# Patient Record
Sex: MALE | Race: BLACK OR AFRICAN AMERICAN | Employment: OTHER | URBAN - METROPOLITAN AREA
[De-identification: names, ages, dates, MRNs, and addresses within clinical notes are randomized per-mention and may not be internally consistent; named-entity substitution may affect disease eponyms.]

---

## 2017-01-26 ENCOUNTER — LAB VISIT (OUTPATIENT)
Dept: LAB | Facility: HOSPITAL | Age: 67
End: 2017-01-26
Attending: INTERNAL MEDICINE
Payer: MEDICARE

## 2017-01-26 DIAGNOSIS — C61 PROSTATE CANCER: ICD-10-CM

## 2017-01-26 LAB — COMPLEXED PSA SERPL-MCNC: 0.22 NG/ML

## 2017-01-26 PROCEDURE — 36415 COLL VENOUS BLD VENIPUNCTURE: CPT | Mod: PO

## 2017-01-26 PROCEDURE — 84153 ASSAY OF PSA TOTAL: CPT

## 2017-01-27 ENCOUNTER — HISTORICAL (OUTPATIENT)
Dept: ADMINISTRATIVE | Facility: HOSPITAL | Age: 67
End: 2017-01-27

## 2017-01-27 LAB
ALBUMIN SERPL-MCNC: 3.7 G/DL (ref 3.1–4.7)
ALP SERPL-CCNC: 64 IU/L (ref 40–104)
ALT (SGPT): 13 IU/L (ref 3–33)
AST SERPL-CCNC: 13 IU/L (ref 10–40)
BASOPHILS NFR BLD: 0 K/UL (ref 0–0.2)
BASOPHILS NFR BLD: 0.7 %
BILIRUB SERPL-MCNC: 0.7 MG/DL (ref 0.3–1)
BUN SERPL-MCNC: 10 MG/DL (ref 8–20)
CALCIUM SERPL-MCNC: 9.1 MG/DL (ref 7.7–10.4)
CHLORIDE: 103 MMOL/L (ref 98–110)
CO2 SERPL-SCNC: 27 MMOL/L (ref 22.8–31.6)
CREATININE: 0.88 MG/DL (ref 0.6–1.4)
CRP SERPL-MCNC: 2.6 MG/DL (ref 0–1.4)
EOSINOPHIL NFR BLD: 0.2 K/UL (ref 0–0.7)
EOSINOPHIL NFR BLD: 4 %
ERYTHROCYTE [DISTWIDTH] IN BLOOD BY AUTOMATED COUNT: 13.6 % (ref 11.7–14.9)
GLUCOSE: 104 MG/DL (ref 70–99)
GRAN #: 4 K/UL (ref 1.4–6.5)
GRAN%: 69.2 %
HCT VFR BLD AUTO: 41.1 % (ref 39–55)
HGB BLD-MCNC: 13.2 G/DL (ref 14–16)
IMMATURE GRANS (ABS): 0 K/UL (ref 0–1)
IMMATURE GRANULOCYTES: 0.3 %
LYMPH #: 1 K/UL (ref 1.2–3.4)
LYMPH%: 17.1 %
MCH RBC QN AUTO: 29.7 PG (ref 25–35)
MCHC RBC AUTO-ENTMCNC: 32.1 G/DL (ref 31–36)
MCV RBC AUTO: 92.6 FL (ref 80–100)
MONO #: 0.5 K/UL (ref 0.1–0.6)
MONO%: 8.7 %
NUCLEATED RBCS: 0 %
PLATELET # BLD AUTO: 379 K/UL (ref 140–440)
PMV BLD AUTO: 9 FL (ref 8.8–12.7)
POTASSIUM SERPL-SCNC: 4.3 MMOL/L (ref 3.5–5)
PROT SERPL-MCNC: 8.1 G/DL (ref 6–8.2)
RBC # BLD AUTO: 4.44 M/UL (ref 4.3–5.9)
SODIUM: 140 MMOL/L (ref 134–144)
WBC # BLD AUTO: 5.7 K/UL (ref 5–10)

## 2017-02-01 ENCOUNTER — OFFICE VISIT (OUTPATIENT)
Dept: UROLOGY | Facility: CLINIC | Age: 67
End: 2017-02-01
Payer: MEDICARE

## 2017-02-01 VITALS
WEIGHT: 281.06 LBS | BODY MASS INDEX: 40.24 KG/M2 | SYSTOLIC BLOOD PRESSURE: 145 MMHG | DIASTOLIC BLOOD PRESSURE: 71 MMHG | HEIGHT: 70 IN | HEART RATE: 67 BPM

## 2017-02-01 DIAGNOSIS — R35.0 INCREASED FREQUENCY OF URINATION: ICD-10-CM

## 2017-02-01 DIAGNOSIS — C61 PROSTATE CANCER: Primary | ICD-10-CM

## 2017-02-01 DIAGNOSIS — Z90.79 HISTORY OF RADICAL PROSTATECTOMY: ICD-10-CM

## 2017-02-01 PROCEDURE — 1160F RVW MEDS BY RX/DR IN RCRD: CPT | Mod: S$GLB,,, | Performed by: NURSE PRACTITIONER

## 2017-02-01 PROCEDURE — 1126F AMNT PAIN NOTED NONE PRSNT: CPT | Mod: S$GLB,,, | Performed by: NURSE PRACTITIONER

## 2017-02-01 PROCEDURE — 99213 OFFICE O/P EST LOW 20 MIN: CPT | Mod: 25,S$GLB,, | Performed by: NURSE PRACTITIONER

## 2017-02-01 PROCEDURE — 99499 UNLISTED E&M SERVICE: CPT | Mod: S$GLB,,, | Performed by: NURSE PRACTITIONER

## 2017-02-01 PROCEDURE — 99999 PR PBB SHADOW E&M-EST. PATIENT-LVL III: CPT | Mod: PBBFAC,,, | Performed by: NURSE PRACTITIONER

## 2017-02-01 PROCEDURE — 96402 CHEMO HORMON ANTINEOPL SQ/IM: CPT | Mod: S$GLB,,, | Performed by: NURSE PRACTITIONER

## 2017-02-01 PROCEDURE — 1159F MED LIST DOCD IN RCRD: CPT | Mod: S$GLB,,, | Performed by: NURSE PRACTITIONER

## 2017-02-01 PROCEDURE — 1157F ADVNC CARE PLAN IN RCRD: CPT | Mod: S$GLB,,, | Performed by: NURSE PRACTITIONER

## 2017-02-01 RX ORDER — PANTOPRAZOLE SODIUM 40 MG/1
40 TABLET, DELAYED RELEASE ORAL DAILY
COMMUNITY
End: 2019-06-03

## 2017-02-01 NOTE — PROGRESS NOTES
Subjective:       Patient ID: Naresh Painting is a 66 y.o. male.    Chief Complaint: Prostate Cancer    HPI Comments: Mr. Painting is a 66-year-old patient that underwent a radical prostatectomy in December of 1995 by Dr. Partida at [facility].  He was first seen by Dr. Menjivar in 2005 where He was currently on GnRH agaonist therapy/Eligard 45 mg every six months due to rise in PSA.  He was last seen in clinic & received Trelstar on 07/28/2016.    He denies any urological problems or concerns.  Has been battling a cold; now with just a cough.  Reports good bladder control    He had some swelling to right side of neck; seeing PCP tomorrow for bx.    Just had wedding anniversary (1/26/2017)  Eating healtier;   Getting plenty of rest.                      PSA                  0.22                01/26/2017                 PSA                  0.19                07/22/2016                 PSA                  0.20                01/18/2016                 PSA                  0.19                07/24/2015                 PSA                  0.15                01/12/2015                 PSA                  0.17                06/06/2014                 PSA                  0.11                12/02/2013                 PSA                      0.13                05/31/2013                 PSA                      0.16                11/29/2012                 PSA                      0.12                05/25/2012                 PSA                      0.13                10/18/2011                 PSA                      0.11                03/17/2011                 PSA                      0.20                08/25/2010                 PSA                      0.11                06/25/2009                 PSA                      0.09                01/09/2009                 PSA                      0.1                 06/12/2008                 PSA                      0.1                 12/11/2007                                    Past Medical History:    Prostate cancer                                               Past Surgical History:    PROSTATE SURGERY                                               HERNIA REPAIR                                                  EYE SURGERY                                                    No family history on file.      Social History    Marital Status:              Spouse Name:                       Years of Education:                 Number of children:               Occupational History    None on file    Social History Main Topics    Smoking Status: Former Smoker                   Packs/Day: 0.00  Years:          Smokeless Status: Never Used                        Comment: quit 1995    Alcohol Use: No              Drug Use: Not on file     Sexual Activity: Not on file          Other Topics            Concern    None on file    Social History Narrative    None on file        Allergies:  Review of patient's allergies indicates no known allergies.    Medications:  Current outpatient prescriptions: aspirin 81 MG Chew, Take 81 mg by mouth once daily., Disp: , Rfl: ;  pravastatin (PRAVACHOL) 40 MG tablet, , Disp: , Rfl: ;  TOPROL XL 50 mg 24 hr tablet, , Disp: , Rfl:   Current facility-administered medications: triptorelin pamoate Syrg 22.5 mg, 22.5 mg, Intramuscular, 1 time in Clinic/HOD, Fauzia Maza NP              Review of Systems   Constitutional: Negative.  Negative for activity change, appetite change and fever.   HENT: Negative.  Negative for facial swelling and trouble swallowing.         Swelling left side of neck     Eyes: Negative.    Respiratory: Positive for cough. Negative for shortness of breath.    Cardiovascular: Negative.  Negative for chest pain and palpitations.   Gastrointestinal: Negative for abdominal pain, constipation, diarrhea, nausea and vomiting.   Genitourinary: Positive for frequency. Negative for difficulty urinating, dysuria, enuresis, flank  pain, genital sores, hematuria, nocturia, penile pain, scrotal swelling, testicular pain and urgency.        Ok with how he urinates.     Musculoskeletal: Negative for back pain, gait problem and neck stiffness.   Skin: Negative.  Negative for wound.   Neurological: Negative for dizziness, tremors, seizures, syncope, speech difficulty, light-headedness and headaches.   Hematological: Does not bruise/bleed easily.   Psychiatric/Behavioral: Negative for confusion and hallucinations. The patient is not nervous/anxious.        Objective:      Physical Exam   Nursing note and vitals reviewed.  Constitutional: He is oriented to person, place, and time. Vital signs are normal. He appears well-developed and well-nourished. He is active and cooperative.  Non-toxic appearance. He does not have a sickly appearance.   HENT:   Head: Normocephalic and atraumatic.   Right Ear: External ear normal.   Left Ear: External ear normal.   Nose: Nose normal.   Mouth/Throat: Mucous membranes are normal.   Eyes: Conjunctivae and lids are normal. No scleral icterus.   Neck: Trachea normal, normal range of motion and full passive range of motion without pain. Neck supple. No JVD present. No tracheal deviation present.       Cardiovascular: Normal rate, regular rhythm, S1 normal and S2 normal.    Pulmonary/Chest: Effort normal and breath sounds normal. No respiratory distress. He exhibits no tenderness.   Abdominal: Soft. Normal appearance and bowel sounds are normal. There is no hepatosplenomegaly. There is no tenderness. There is no rebound, no guarding and no CVA tenderness. Hernia confirmed negative in the right inguinal area and confirmed negative in the left inguinal area.   Genitourinary: Testes normal and penis normal. Right testis shows no swelling and no tenderness. Left testis shows no swelling and no tenderness. Circumcised. No hypospadias, penile erythema or penile tenderness. No discharge found.   Musculoskeletal: Normal range of  motion.   Lymphadenopathy: No inguinal adenopathy noted on the right or left side.   Neurological: He is alert and oriented to person, place, and time. He has normal strength.   Skin: Skin is warm, dry and intact.     Psychiatric: He has a normal mood and affect. His behavior is normal. Judgment and thought content normal.       Assessment:       1. Prostate cancer    2. History of radical prostatectomy    3. Increased frequency of urination        Plan:         I spent 20 minutes with the patient of which more than half was spent in direct consultation with the patient in regards to our treatment and plan.    Education and recommendations of today's plan of care including home remedies.  Diet modifications and regular exercise to improve BMI as well as Prostate cancer care.  Urine sent to lab;  Trelstar today;  RTC 6 months for Trelstar/PSA

## 2017-02-01 NOTE — PROGRESS NOTES
Patient is here today to receive 22.5mg of Trelstar.   Allergies were reviewed.  Alcohol swab used at injection site.  22.5 mg of Trelstar was given IM in the right gluteal.  Patient tolerated injection well .

## 2017-02-01 NOTE — MR AVS SNAPSHOT
[facility]  [address]  [facility] [address]  Phone: [phone]                  Naresh Painting   2017 8:40 AM   Office Visit    Description:  Male : 1950   Provider:  Fauzia Maza NP   Department:  [facility]           Reason for Visit     Prostate Cancer           Diagnoses this Visit        Comments    Prostate cancer    -  Primary     History of radical prostatectomy                To Do List           Goals (5 Years of Data)     None      Follow-Up and Disposition     Return in about 6 months (around 2017) for PSA and Trelstar.      [facility]     [facility] -  Assistance  Registered nurses in the [facility] provide clinical advisement, health education, appointment booking, and other advisory services.  Call for this free service at [phone].             Medications           Message regarding Medications     Verify the changes and/or additions to your medication regime listed below are the same as discussed with your clinician today.  If any of these changes or additions are incorrect, please notify your healthcare provider.        These medications were administered today        Dose Freq    triptorelin pamoate Syrg 22.5 mg 22.5 mg Clinic/HOD 1 time    Sig: Inject 22.5 mg into the muscle one time.    Class: Normal    Route: Intramuscular           Verify that the below list of medications is an accurate representation of the medications you are currently taking.  If none reported, the list may be blank. If incorrect, please contact your healthcare provider. Carry this list with you in case of emergency.           Current Medications     aspirin 81 MG Chew Take 81 mg by mouth once daily.    co-enzyme Q-10 30 mg capsule Take 30 mg by mouth 3 (three) times daily.    fish oil-omega-3 fatty acids 300-1,000 mg capsule Take 2 g by mouth once daily.    L GASSERI/B BIFIDUM/B LONGUM (ESCALERA' COLON  "HEALTH ORAL) Take by mouth.    MULTIVITAMIN/IRON/FOLIC ACID (CENTRUM COMPLETE ORAL) Take by mouth.    pantoprazole (PROTONIX) 40 MG tablet Take 40 mg by mouth once daily.    pravastatin (PRAVACHOL) 40 MG tablet     TOPROL XL 50 mg 24 hr tablet     vitamin D 1000 units Tab Take 185 mg by mouth once daily.    vitamin E 100 UNIT capsule Take 100 Units by mouth once daily.           Clinical Reference Information           Vital Signs - Last Recorded  Most recent update: 2/1/2017  9:12 AM by Loni Hirsch MA    BP Pulse Ht Wt BMI    (!) 145/71 (BP Location: Right arm, Patient Position: Sitting, BP Method: Automatic) 67 5' 10" (1.778 m) 127.5 kg (281 lb 1.4 oz) 40.33 kg/m2      Blood Pressure          Most Recent Value    BP  (!)  145/71      Allergies as of 2/1/2017     No Known Allergies      Immunizations Administered on Date of Encounter - 2/1/2017     None      Orders Placed During Today's Visit      Normal Orders This Visit    Medication Pre-Authorization     Future Labs/Procedures Expected by Expires    Prostate Specific Antigen, Diagnostic  8/1/2017 (Approximate) 3/8/2018      [facility] Sign-Up     Activating your [facility] account is as easy as 1-2-3!     1) Visit [email or web address], select Sign Up Now, enter this activation code and your date of birth, then select Next.  [email or web address]  Expires: 3/18/2017  9:19 AM      2) Create a username and password to use when you visit [facility] in the future and select a security question in case you lose your password and select Next.    3) Enter your e-mail address and click Sign Up!    Additional Information  If you have questions, please e-mail [email or web address] or call [phone] to talk to our [facility] staff. Remember, [facility] is NOT to be used for urgent needs. For medical emergencies, dial 911.         Instructions         PSA                  0.22                01/26/2017                 PSA                  0.19                07/22/2016                "  PSA                  0.20                01/18/2016                 PSA                  0.19                07/24/2015                 PSA                  0.15                01/12/2015                 PSA                  0.17                06/06/2014                 PSA                  0.11                12/02/2013                 PSA                      0.13                05/31/2013                 PSA                      0.16                11/29/2012                 PSA                      0.12                05/25/2012                 PSA                      0.13                10/18/2011                 PSA                      0.11                03/17/2011                 PSA                      0.20                08/25/2010                 PSA                      0.11                06/25/2009                 PSA                      0.09                01/09/2009                 PSA                      0.1                 06/12/2008                 PSA                      0.1                 12/11/2007                          Triptorelin Pamoate Suspension for injection  What is this medicine?  TRIPTORELIN (TRIP toe rel in) decreases testosterone in men and estrogen in women. It is used to treat advanced prostate cancer and endometriosis.  This medicine may be used for other purposes; ask your health care provider or pharmacist if you have questions.  What should I tell my health care provider before I take this medicine?  They need to know if you have any of these conditions:  · diabetes  · heart disease or previous heart attack  · high blood pressure  · high cholesterol  · pain or difficulty passing urine  · spinal cord metastasis  · stroke  · tobacco smoker  · an unusual or allergic reaction to triptorelin, other medicines, foods, dyes, or preservatives  · pregnant or trying to get pregnant  · breast-feeding  How should I use this medicine?  This medicine is for injection into a  muscle. It is given by a health care professional in a hospital or clinic setting.  Talk to your pediatrician regarding the use of this medicine in children. This medicine is not approved for use in children.  Overdosage: If you think you have taken too much of this medicine contact a poison control center or emergency room at once.  NOTE: This medicine is only for you. Do not share this medicine with others.  What if I miss a dose?  It is important not to miss your dose. Call your doctor or health care professional if you are unable to keep an appointment.  What may interact with this medicine?  Do not take this medicine with any of the following medications:  · chasteberry supplements  This medicine may also interact with the following medications:  · cimetidine  · herbal or dietary supplements, like black cohosh, DHEA  · female hormones, like estrogen  · male hormones, like testosterone  · medicines for depression, anxiety, or psychotic disturbances  · methyldopa  · metoclopramide  · phenothiazines like chlorpromazine, mesoridazine, prochlorperazine, thioridazine  · prasterone  · reserpine  This list may not describe all possible interactions. Give your health care provider a list of all the medicines, herbs, non-prescription drugs, or dietary supplements you use. Also tell them if you smoke, drink alcohol, or use illegal drugs. Some items may interact with your medicine.  What should I watch for while using this medicine?  Your condition will be monitored carefully while you are receiving this medicine. You will need important blood work done while you are taking this medicine.  During the first weeks of treatment your symptoms may get worse. These should get better as you continue your treatment. Tell your doctor or healthcare professional if your symptoms do not start to get better or if they continue to get worse.  Women should inform their doctor if they wish to become pregnant or think they might be  pregnant. There is a potential for serious side effects to an unborn child. Talk to your health care professional or pharmacist for more information.  What side effects may I notice from receiving this medicine?  Side effects that you should report to your doctor or health care professional as soon as possible:  · allergic reactions like skin rash, itching or hives, swelling of the face, lips, or tongue  · breast enlargement in both males and females  · breathing problems  · changes in vision  · confused, not alert, other mental change  · dark urine  · new or worsening pain  · pain, tingling, numbness in the hands or feet  · swelling of the ankles, feet, hands  · trouble passing urine or change in the amount of urine  · vomiting  · weakness or paralysis  Side effects that usually do not require medical attention (report to your doctor or health care professional if they continue or are bothersome):  · change in sex drive or performance  · constipation or diarrhea  · dizziness  · headache  · hot flashes  · nausea, stomach upset  · pain at site where injected  This list may not describe all possible side effects. Call your doctor for medical advice about side effects. You may report side effects to FDA at 6-270-FDA-0861.  Where should I keep my medicine?  This drug is given in a hospital or clinic and will not be stored at home.  NOTE:This sheet is a summary. It may not cover all possible information. If you have questions about this medicine, talk to your doctor, pharmacist, or health care provider. Copyright© 2016 [employer]

## 2017-02-01 NOTE — PROGRESS NOTES
Patient, Naresh Painting (MRN #[id number]), presented with a recorded BMI of 40.33 kg/m^2 consistent with the definition of morbid obesity (ICD-10 E66.01). The patient's morbid obesity was monitored, evaluated, addressed and/or treated. This addendum to the medical record is made on 02/01/2017.

## 2017-02-01 NOTE — PATIENT INSTRUCTIONS
PSA                  0.22                01/26/2017                 PSA                  0.19                07/22/2016                 PSA                  0.20                01/18/2016                 PSA                  0.19                07/24/2015                 PSA                  0.15                01/12/2015                 PSA                  0.17                06/06/2014                 PSA                  0.11                12/02/2013                 PSA                      0.13                05/31/2013                 PSA                      0.16                11/29/2012                 PSA                      0.12                05/25/2012                 PSA                      0.13                10/18/2011                 PSA                      0.11                03/17/2011                 PSA                      0.20                08/25/2010                 PSA                      0.11                06/25/2009                 PSA                      0.09                01/09/2009                 PSA                      0.1                 06/12/2008                 PSA                      0.1                 12/11/2007                          Triptorelin Pamoate Suspension for injection  What is this medicine?  TRIPTORELIN (TRIP toe rel in) decreases testosterone in men and estrogen in women. It is used to treat advanced prostate cancer and endometriosis.  This medicine may be used for other purposes; ask your health care provider or pharmacist if you have questions.  What should I tell my health care provider before I take this medicine?  They need to know if you have any of these conditions:  · diabetes  · heart disease or previous heart attack  · high blood pressure  · high cholesterol  · pain or difficulty passing urine  · spinal cord metastasis  · stroke  · tobacco smoker  · an unusual or allergic reaction to triptorelin, other medicines, foods, dyes, or  preservatives  · pregnant or trying to get pregnant  · breast-feeding  How should I use this medicine?  This medicine is for injection into a muscle. It is given by a health care professional in a hospital or clinic setting.  Talk to your pediatrician regarding the use of this medicine in children. This medicine is not approved for use in children.  Overdosage: If you think you have taken too much of this medicine contact a poison control center or emergency room at once.  NOTE: This medicine is only for you. Do not share this medicine with others.  What if I miss a dose?  It is important not to miss your dose. Call your doctor or health care professional if you are unable to keep an appointment.  What may interact with this medicine?  Do not take this medicine with any of the following medications:  · chasteberry supplements  This medicine may also interact with the following medications:  · cimetidine  · herbal or dietary supplements, like black cohosh, DHEA  · female hormones, like estrogen  · male hormones, like testosterone  · medicines for depression, anxiety, or psychotic disturbances  · methyldopa  · metoclopramide  · phenothiazines like chlorpromazine, mesoridazine, prochlorperazine, thioridazine  · prasterone  · reserpine  This list may not describe all possible interactions. Give your health care provider a list of all the medicines, herbs, non-prescription drugs, or dietary supplements you use. Also tell them if you smoke, drink alcohol, or use illegal drugs. Some items may interact with your medicine.  What should I watch for while using this medicine?  Your condition will be monitored carefully while you are receiving this medicine. You will need important blood work done while you are taking this medicine.  During the first weeks of treatment your symptoms may get worse. These should get better as you continue your treatment. Tell your doctor or healthcare professional if your symptoms do not start to  get better or if they continue to get worse.  Women should inform their doctor if they wish to become pregnant or think they might be pregnant. There is a potential for serious side effects to an unborn child. Talk to your health care professional or pharmacist for more information.  What side effects may I notice from receiving this medicine?  Side effects that you should report to your doctor or health care professional as soon as possible:  · allergic reactions like skin rash, itching or hives, swelling of the face, lips, or tongue  · breast enlargement in both males and females  · breathing problems  · changes in vision  · confused, not alert, other mental change  · dark urine  · new or worsening pain  · pain, tingling, numbness in the hands or feet  · swelling of the ankles, feet, hands  · trouble passing urine or change in the amount of urine  · vomiting  · weakness or paralysis  Side effects that usually do not require medical attention (report to your doctor or health care professional if they continue or are bothersome):  · change in sex drive or performance  · constipation or diarrhea  · dizziness  · headache  · hot flashes  · nausea, stomach upset  · pain at site where injected  This list may not describe all possible side effects. Call your doctor for medical advice about side effects. You may report side effects to FDA at 0-144-FDA-8777.  Where should I keep my medicine?  This drug is given in a hospital or clinic and will not be stored at home.  NOTE:This sheet is a summary. It may not cover all possible information. If you have questions about this medicine, talk to your doctor, pharmacist, or health care provider. Copyright© 2016 [employer]

## 2017-03-03 ENCOUNTER — HISTORICAL (OUTPATIENT)
Dept: ADMINISTRATIVE | Facility: HOSPITAL | Age: 67
End: 2017-03-03

## 2017-03-03 LAB
BUN SERPL-MCNC: 12 MG/DL (ref 8–20)
CALCIUM SERPL-MCNC: 9 MG/DL (ref 7.7–10.4)
CHLORIDE: 102 MMOL/L (ref 98–110)
CO2 SERPL-SCNC: 26.9 MMOL/L (ref 22.8–31.6)
CREATININE: 0.83 MG/DL (ref 0.6–1.4)
GLUCOSE: 94 MG/DL (ref 70–99)
HCT VFR BLD AUTO: 43 % (ref 39–55)
HGB BLD-MCNC: 13.9 G/DL (ref 14–16)
MCH RBC QN AUTO: 30.1 PG (ref 25–35)
MCHC RBC AUTO-ENTMCNC: 32.3 G/DL (ref 31–36)
MCV RBC AUTO: 93.1 FL (ref 80–100)
NUCLEATED RBCS: 0 %
PLATELET # BLD AUTO: 347 K/UL (ref 140–440)
POTASSIUM SERPL-SCNC: 4.6 MMOL/L (ref 3.5–5)
RBC # BLD AUTO: 4.62 M/UL (ref 4.3–5.9)
SODIUM: 138 MMOL/L (ref 134–144)
WBC # BLD AUTO: 6.6 K/UL (ref 5–10)

## 2017-05-26 PROBLEM — R59.0 CERVICAL LYMPHADENOPATHY: Status: ACTIVE | Noted: 2017-05-26

## 2017-05-30 RX ORDER — HYDROCHLOROTHIAZIDE 25 MG/1
1 TABLET ORAL DAILY
COMMUNITY
End: 2017-06-09

## 2017-06-09 ENCOUNTER — OFFICE VISIT (OUTPATIENT)
Dept: HEMATOLOGY/ONCOLOGY | Facility: CLINIC | Age: 67
End: 2017-06-09
Payer: MEDICARE

## 2017-06-09 VITALS
WEIGHT: 263 LBS | BODY MASS INDEX: 37.65 KG/M2 | SYSTOLIC BLOOD PRESSURE: 121 MMHG | TEMPERATURE: 98 F | RESPIRATION RATE: 18 BRPM | DIASTOLIC BLOOD PRESSURE: 81 MMHG | HEIGHT: 70 IN | HEART RATE: 76 BPM

## 2017-06-09 DIAGNOSIS — I88.8 GRANULOMATOUS LYMPHADENITIS: ICD-10-CM

## 2017-06-09 DIAGNOSIS — R59.0 CERVICAL LYMPHADENOPATHY: ICD-10-CM

## 2017-06-09 DIAGNOSIS — C61 PROSTATE CANCER: Primary | ICD-10-CM

## 2017-06-09 PROCEDURE — 99203 OFFICE O/P NEW LOW 30 MIN: CPT | Mod: ,,, | Performed by: INTERNAL MEDICINE

## 2017-06-09 PROCEDURE — 1126F AMNT PAIN NOTED NONE PRSNT: CPT | Mod: ,,, | Performed by: INTERNAL MEDICINE

## 2017-06-09 PROCEDURE — 1159F MED LIST DOCD IN RCRD: CPT | Mod: ,,, | Performed by: INTERNAL MEDICINE

## 2017-06-09 RX ORDER — HYDROCODONE BITARTRATE AND ACETAMINOPHEN 5; 325 MG/1; MG/1
TABLET ORAL
COMMUNITY
Start: 2017-03-06 | End: 2017-06-09

## 2017-06-09 NOTE — PROGRESS NOTES
"    [facility]            History & Physical    Subjective:      Patient ID:   NAME: Naresh Painting : 1950     67 y.o. male    Referring Doc: Ronny Driscoll MD  Other Physicians: Adriano Michaud Pernenkil        Chief Complaint:   Advice Only (lump in neck, lymphadenopathy)      HPI:  67 y.o. male with diagnosis of lymphadenopathy who has been referred by Dr Michaud with ID for evaluation by medical hematology/oncology. The patient reported developing right sided neck swelling in /Feb of this year. He had a bad cold and cough back in 2016. He first noticed the cervical lymphnodes on left and then right side of neck in 2017. He has lost some weight but has been on "weight watchers". No fevers or night sweats. He has seen Dr Michaud with ID and had several studies done and she is to see them again on . He is here with his wife. He denies any CP, SOB, HA's or N/V.               ROS:   GEN: normal without any fever, night sweats or unintended weight loss  HEENT: normal with no HA's, sore throat, stiff neck, changes in vision; cervical LAD right > left  CV: normal with no CP, SOB, PND, COOPER or orthopnea  PULM: normal with no SOB, cough, hemoptysis, sputum or pleuritic pain  GI: normal with no abdominal pain, nausea, vomiting, constipation, diarrhea, melanotic stools, BRBPR, or hematemesis  : normal with no hematuria, dysuria  BREAST: normal with no mass, discharge, pain  SKIN: normal with no rash, erythema, bruising, or swelling       Past Medical/Surgical History:  Past Medical History:   Diagnosis Date    GERD (gastroesophageal reflux disease)     Hyperlipidemia     Hypertension     Lymphadenopathy     Prostate cancer      Other: JAK      Past Surgical History:   Procedure Laterality Date    EYE SURGERY      HERNIA REPAIR      PROCTECTOMY      PROSTATE SURGERY           Allergies:  Review of patient's allergies indicates:  No Known Allergies    Social/Family " "History:  Social History     Social History    Marital status:      Spouse name: N/A    Number of children: N/A    Years of education: N/A     Occupational History    Not on file.     Social History Main Topics    Smoking status: Former Smoker    Smokeless tobacco: Never Used      Comment: quit 1995    Alcohol use No    Drug use: No    Sexual activity: Not on file     Other Topics Concern    Not on file     Social History Narrative    No narrative on file     Family History   Problem Relation Age of Onset    Heart disease Mother     Diabetes Father          Medications:    Current Outpatient Prescriptions:     aspirin 81 MG Chew, Take 81 mg by mouth once daily., Disp: , Rfl:     co-enzyme Q-10 30 mg capsule, Take 30 mg by mouth 3 (three) times daily., Disp: , Rfl:     fish oil-omega-3 fatty acids 300-1,000 mg capsule, Take 2 g by mouth once daily., Disp: , Rfl:     L GASSERI/B BIFIDUM/B LONGUM ([employer] HEALTH ORAL), Take by mouth., Disp: , Rfl:     MULTIVITAMIN/IRON/FOLIC ACID (CENTRUM COMPLETE ORAL), Take by mouth., Disp: , Rfl:     pantoprazole (PROTONIX) 40 MG tablet, Take 40 mg by mouth once daily., Disp: , Rfl:     pravastatin (PRAVACHOL) 40 MG tablet, , Disp: , Rfl:     TOPROL XL 50 mg 24 hr tablet, , Disp: , Rfl:     vitamin D 1000 units Tab, Take 185 mg by mouth once daily., Disp: , Rfl:     vitamin E 100 UNIT capsule, Take 100 Units by mouth once daily., Disp: , Rfl:       Pathology:  3/6/2017 supraclavicular left LN biopsy      Objective:   Vitals:  Blood pressure 121/81, pulse 76, temperature 97.9 °F (36.6 °C), temperature source Oral, resp. rate 18, height 5' 10" (1.778 m), weight 119.3 kg (263 lb).    Physical Examination:   GEN: no apparent distress, comfortable; AAOx3  HEAD: atraumatic and normocephalic  EYES: no pallor, no icterus, PERRLA  ENT: OMM, no pharyngeal erythema, external ears WNL; no nasal discharge; no thrush  NECK: cervical LAD right > left; rubbery " in quality; thyroid normal, trachea midline,   CV: RRR with no murmur; normal pulse; normal S1 and S2; no pedal edema  CHEST: Normal respiratory effort; CTAB; normal breath sounds; no wheeze or crackles  ABDOM: nontender and nondistended; soft; normal bowel sounds; no rebound/guarding; overweight  MUSC/Skeletal: ROM normal; no crepitus; joints normal; no deformities or arthropathy  EXTREM: no clubbing, cyanosis, inflammation or swelling  SKIN: no rashes, lesions, ulcers, petechiae or subcutaneous nodules  : no cannon  NEURO: grossly intact; motor/sensory WNL; AAOx3; no tremors  PSYCH: normal mood, affect and behavior  LYMPH: normal cervical, supraclavicular, axillary and groin LN's      Labs:   Lab Results   Component Value Date    WBC 6.6 03/03/2017    HGB 13.9 (L) 03/03/2017    HCT 43.0 03/03/2017    MCV 93.1 03/03/2017     03/03/2017    CMP  Sodium   Date Value Ref Range Status   03/03/2017 138 134 - 144 mmol/L      Potassium   Date Value Ref Range Status   03/03/2017 4.6 3.5 - 5.0 mmol/L      Chloride   Date Value Ref Range Status   03/03/2017 102 98 - 110 mmol/L      CO2   Date Value Ref Range Status   03/03/2017 26.9 22.8 - 31.6 mmol/L      Glucose   Date Value Ref Range Status   03/03/2017 94 70 - 99 mg/dL      BUN, Bld   Date Value Ref Range Status   03/03/2017 12 8 - 20 mg/dL      Creatinine   Date Value Ref Range Status   03/03/2017 0.83 0.60 - 1.40 mg/dL      Calcium   Date Value Ref Range Status   03/03/2017 9.0 7.7 - 10.4 mg/dL      Total Protein   Date Value Ref Range Status   01/27/2017 8.1 6.0 - 8.2 g/dL      Albumin   Date Value Ref Range Status   01/27/2017 3.7 3.1 - 4.7 g/dL      Total Bilirubin   Date Value Ref Range Status   01/27/2017 0.7 0.3 - 1.0 mg/dL      Alkaline Phosphatase   Date Value Ref Range Status   01/27/2017 64 40 - 104 IU/L      AST   Date Value Ref Range Status   01/27/2017 13 10 - 40 IU/L      eGFR if    Date Value Ref Range Status   11/25/2014 >60.0 >60  mL/min/1.73 m^2 Final     eGFR if non    Date Value Ref Range Status   11/25/2014 >60.0 >60 mL/min/1.73 m^2 Final     Comment:     Calculation used to obtain the estimated glomerular filtration  rate (eGFR) is the CKD-EPI equation. Since race is unknown   in our information system, the eGFR values for   -American and Non--American patients are given   for each creatinine result.           Radiology/Diagnostic Studies:      CT scan Feb 6th, 2017    All lab results and imaging results have been reviewed and discussed with the patient    Assessment:   (1) 67 y.o. male with diagnosis of development of right sided neck swelling since Feb 2017  - he has been referred by Dr Michaud with ID for an evaluation  - i spoke to Dr Michaud a couple of weeks ago about the patient peripherally  - FNA bx on 2/24 which was nondiagnostic  - he had a  guided biopsy of an entire left supraclavicular LN on 3/6/2017 with Dr Alexander Oh which showed  necrotizing granulomatous lymphadenitis  - CT scans were from Feb 2017  - flow cytometry studies from 5/25 appear to have been negative for any abnormal cell populations    (2) Hx/of prostate cancer - 1995; s/p prostatectomy followed by XRT    (3) JAK - uses CPAP    (4) GERD    (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       Plan:       1. Set up PET/CT fusion  2. consider referral to Dr Cox or Chetna at [facility] for second opinion and pathological review  3. Fax note to Yamila Otto and Adriano  4. RTc 3-4 weeks    RTC in  3-4 weeks    I have explained and the patient understands all of  the current recommendation(s). I have answered all of their questions to the best of my ability and to their complete satisfaction.             Thank you for allowing me to participate in this patient's care. Please call with any  questions or concerns.    Electronically signed Fidencio Rdz MD

## 2017-07-06 ENCOUNTER — TELEPHONE (OUTPATIENT)
Dept: HEMATOLOGY/ONCOLOGY | Facility: CLINIC | Age: 67
End: 2017-07-06

## 2017-07-06 DIAGNOSIS — C61 PROSTATE CANCER: Primary | ICD-10-CM

## 2017-07-06 LAB — GLUCOSE SERPL-MCNC: 89 MG/DL (ref 70–99)

## 2017-07-06 NOTE — TELEPHONE ENCOUNTER
----- Message from Fidencio Rdz MD sent at 7/6/2017 12:29 PM CDT -----  make sure he has RTC; see if we can set up Ct guided biopsy of the most assessable LN by Interventional Radiology

## 2017-07-07 ENCOUNTER — OFFICE VISIT (OUTPATIENT)
Dept: FAMILY MEDICINE | Facility: CLINIC | Age: 67
End: 2017-07-07
Payer: MEDICARE

## 2017-07-07 VITALS
DIASTOLIC BLOOD PRESSURE: 64 MMHG | HEIGHT: 70 IN | WEIGHT: 259 LBS | BODY MASS INDEX: 37.08 KG/M2 | SYSTOLIC BLOOD PRESSURE: 127 MMHG | HEART RATE: 67 BPM

## 2017-07-07 DIAGNOSIS — E78.2 MULTIPLE-TYPE HYPERLIPIDEMIA: ICD-10-CM

## 2017-07-07 DIAGNOSIS — I10 BENIGN ESSENTIAL HYPERTENSION: Primary | ICD-10-CM

## 2017-07-07 DIAGNOSIS — R59.0 CERVICAL LYMPHADENOPATHY: ICD-10-CM

## 2017-07-07 PROBLEM — Z86.19 HISTORY OF VARICELLA: Status: ACTIVE | Noted: 2017-07-07

## 2017-07-07 PROBLEM — G47.33 OBSTRUCTIVE SLEEP APNEA SYNDROME: Status: ACTIVE | Noted: 2017-07-07

## 2017-07-07 PROBLEM — K21.9 GASTROESOPHAGEAL REFLUX DISEASE WITHOUT ESOPHAGITIS: Status: ACTIVE | Noted: 2017-07-07

## 2017-07-07 PROBLEM — Z78.9 NON-SMOKER: Status: ACTIVE | Noted: 2017-07-07

## 2017-07-07 PROBLEM — R22.1 MASS OF NECK: Status: ACTIVE | Noted: 2017-07-07

## 2017-07-07 PROCEDURE — 99214 OFFICE O/P EST MOD 30 MIN: CPT | Mod: ,,, | Performed by: INTERNAL MEDICINE

## 2017-07-07 PROCEDURE — 1126F AMNT PAIN NOTED NONE PRSNT: CPT | Mod: ,,, | Performed by: INTERNAL MEDICINE

## 2017-07-07 PROCEDURE — 1159F MED LIST DOCD IN RCRD: CPT | Mod: ,,, | Performed by: INTERNAL MEDICINE

## 2017-07-07 NOTE — PROGRESS NOTES
Subjective:       Patient ID: Naresh Painting is a 67 y.o. male.    Chief Complaint: Hypertension    Mr. Painting is a 67-year-old male who comes for follow-up. He has lost approximately 28 pounds of weight after following his diet. He is still going through investigations for lymphadenopathy. He had a PET scan yesterday. He has a scheduled follow-up at Dr. Michaud and Dr. Amezquita next week.    I've taken the opportunity to review patient's PET scan also.  IMPRESSION:    1. Markedly hypermetabolic cervical, mesenteric, retroperitoneal, and retrocrural lymphadenopathy. Findings are highly   suspicious for malignancy, with lymphoma felt more likely than metastasis. Please correlate with results of percutaneous   cervical lymph node biopsy performed in February 2017.  2. Diffusely increased marrow activity likely due to red marrow conversion or anemia. Correlation with CBC would be   helpful.  3. Diffuse hypermetabolic activity within the colon could be physiologic or medication related.  4. Air-fluid level in the left maxillary sinus with soft tissue density within the left mastoid sinus anterior wall.  5. Cholelithiasis, large fat-containing umbilical hernia, gynecomastia, left renal cyst, and other incidental findings as   above.    Read and electronically signed by: Aurelio Hammonds MD on 7/6/2017 12:17 PM CDT       AURELIO HAMMONDS MD        Hypertension   This is a chronic problem. The current episode started more than 1 year ago. Pertinent negatives include no chest pain, malaise/fatigue, neck pain, palpitations, peripheral edema, shortness of breath or sweats. Risk factors for coronary artery disease include obesity and male gender. Past treatments include beta blockers. There is no history of CVA, PVD or a thyroid problem.   Hyperlipidemia   This is a chronic problem. The current episode started more than 1 year ago. Exacerbating diseases include obesity. He has no history of diabetes, hypothyroidism or liver  disease. Pertinent negatives include no chest pain or shortness of breath. Current antihyperlipidemic treatment includes statins. The current treatment provides moderate improvement of lipids. Risk factors for coronary artery disease include a sedentary lifestyle, hypertension, male sex, obesity and dyslipidemia.   Head and Neck Mass:   Chronicity:  Chronic  Onset:  More than 1 month ago  Progression since onset:  Waxing and waning  Frequency:  Constantly  Pain Scale:  0/10  Mass characteristics:  Hard   Associated symptoms: weight loss.  No sore throat, no hemoptysis, no shortness of breath, no sore throat and no sweats.No asthma, no bronchitis, no COPD, no environmental allergies and no pneumonia.      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     Social History Main Topics    Smoking status: Former Smoker    Smokeless tobacco: Never Used      Comment: quit 1995    Alcohol use Yes    Drug use: No    Sexual activity: Yes     Partners: Female     Other Topics Concern    Not on file     Social History Narrative    No narrative on file     Past Surgical History:   Procedure Laterality Date    EYE SURGERY      HERNIA REPAIR      PROCTECTOMY      PROSTATE SURGERY       Family History   Problem Relation Age of Onset    Heart disease Mother     Diabetes Father        Review of Systems   Constitutional: Positive for weight loss. Negative for activity change, appetite change, fatigue, malaise/fatigue and unexpected weight change (lost 28 lbs).   HENT: Negative  "for congestion, sneezing, sore throat and trouble swallowing.    Eyes: Negative for pain and visual disturbance.        Left eye Glass eye prosthesis   Respiratory: Negative for cough, hemoptysis, chest tightness and shortness of breath.    Cardiovascular: Negative for chest pain, palpitations and leg swelling.   Gastrointestinal: Positive for constipation. Negative for abdominal distention, abdominal pain, blood in stool and diarrhea.   Endocrine: Negative for cold intolerance, heat intolerance, polydipsia, polyphagia and polyuria.   Genitourinary: Negative for dysuria, hematuria and scrotal swelling.   Musculoskeletal: Negative for arthralgias, back pain, gait problem and neck pain.   Skin: Negative for pallor, rash and wound.   Allergic/Immunologic: Negative for environmental allergies, food allergies and immunocompromised state.   Neurological: Negative for dizziness, seizures, speech difficulty, light-headedness and numbness.   Hematological: Positive for adenopathy. Does not bruise/bleed easily.   Psychiatric/Behavioral: Negative for agitation, behavioral problems and confusion. The patient is not nervous/anxious.        Objective:       Vitals:    07/07/17 0933 07/07/17 1015   BP: 98/74 127/64   BP Location:  Right arm   Patient Position:  Sitting   BP Method:  Automatic   Pulse: 67    Weight: 117.5 kg (259 lb)    Height: 5' 10" (1.778 m)      Physical Exam   Constitutional: He is oriented to person, place, and time. He appears well-developed. No distress.   HENT:   Head: Normocephalic and atraumatic.       Nose: Nose normal.   Mouth/Throat: Oropharynx is clear and moist. No oropharyngeal exudate.   Eyes: Conjunctivae and EOM are normal. Right eye exhibits no discharge and no exudate. Right conjunctiva is not injected. Right conjunctiva has no hemorrhage.   Left eye is prosthetic.   Neck: Normal range of motion. Neck supple. No JVD present. No neck rigidity. No tracheal deviation and normal range of motion " present. No thyromegaly present.       Cardiovascular: Normal rate, regular rhythm and normal heart sounds.  Exam reveals no gallop and no friction rub.    No murmur heard.  Pulmonary/Chest: Effort normal and breath sounds normal. No respiratory distress. He has no wheezes. He has no rales.   Abdominal: Soft. Bowel sounds are normal. He exhibits no distension. There is no tenderness.       Musculoskeletal: Normal range of motion.   Lymphadenopathy:     He has cervical adenopathy.   Neurological: He is alert and oriented to person, place, and time. He has normal reflexes.   Skin: Skin is warm and dry.   Psychiatric: He has a normal mood and affect.   Nursing note and vitals reviewed.      Assessment:       1. Benign essential hypertension    2. Multiple-type hyperlipidemia    3. Cervical lymphadenopathy         Plan:           Benign essential hypertension    Multiple-type hyperlipidemia    Cervical lymphadenopathy    Patient's blood pressures seem to be fairly stable. It is likely that over a period of time if indeed he has a malignancy, he may lose more weight and his blood pressures start dropping.    I've advised him to continue to monitor his blood pressures at home and notify me if there is any drop in blood pressures. He will continue his lipid medications also for the time being.    He will keep up his follow with Dr. Amezquita and Dr. Monique Michaud    Follow up with me in 3 months or earlier as needed.    Current Outpatient Prescriptions on File Prior to Visit   Medication Sig Dispense Refill    aspirin 81 MG Chew Take 81 mg by mouth once daily.      co-enzyme Q-10 30 mg capsule Take 30 mg by mouth 3 (three) times daily.      fish oil-omega-3 fatty acids 300-1,000 mg capsule Take 2 g by mouth once daily.      L GASSERI/B BIFIDUM/B LONGUM ([employer] HEALTH ORAL) Take by mouth.      MULTIVITAMIN/IRON/FOLIC ACID (CENTRUM COMPLETE ORAL) Take by mouth.      pantoprazole (PROTONIX) 40 MG tablet Take 40  mg by mouth once daily.      pravastatin (PRAVACHOL) 40 MG tablet       TOPROL XL 50 mg 24 hr tablet       vitamin D 1000 units Tab Take 185 mg by mouth once daily.      vitamin E 100 UNIT capsule Take 100 Units by mouth once daily.       No current facility-administered medications on file prior to visit.

## 2017-07-07 NOTE — PATIENT INSTRUCTIONS
Taking Your Blood Pressure  Blood pressure is the force of blood against the artery wall as it moves from the heart through the blood vessels. You can take your own blood pressure reading using a digital monitor. Take readings as often as your healthcare provider instructs. Take each reading at the same time of day.  Step 1. Relax    · Take your blood pressure at the same time every day, such as in the morning or evening, or at the time your healthcare provider recommends.  · Wait at least a half-hour after smoking, eating, drinking caffeinated beverages, or exercising.  · Sit comfortably at a table with both feet on the floor. Do not cross your legs or feet. Place the monitor near you.  · Rest for a few minutes before you begin.  Step 2. Wrap the cuff    · Place your arm on the table, palm up. Your arm should be at the level of your heart. Wrap the cuff around your upper arm, just above your elbow. Its best done on bare skin, not over clothing. Most cuffs will indicate where the brachial artery (the blood vessel in the middle of the arm at the inner side of the elbow) should line up with the cuff. Look in your monitor's instruction booklet for an illustration. You can also bring your cuff to your healthcare provider and have them show you how to correctly place the cuff.  · Make sure your cuff fits. If it doesnt wrap around your upper arm, order a larger cuff.  Step 3. Inflate the cuff    · Pump the cuff until the scale reads 160. If you have a self-inflating cuff, push the button that starts the pump.  · The cuff will tighten, then loosen.  · The numbers will change. When they stop changing, your blood pressure reading will appear.  · Take 2 or 3 readings one minute apart.  Step 4. Write down the results of each reading    · Write down your blood pressure numbers for each reading. Note the date and time. Keep your results in one place, such as a notebook. Even if your monitor has a built-in memory, keep a hard  copy of the readings.  · Remove the cuff from your arm. Turn off the machine.  · Share your blood pressure records with your healthcare providers at each visit.  Date Last Reviewed: 4/27/2016  © 3144-3523 [employer]. [address] [place] [address], [place] [address]. All rights reserved. This information is not intended as a substitute for professional medical care. Always follow your healthcare professional's instructions.

## 2017-07-10 ENCOUNTER — OFFICE VISIT (OUTPATIENT)
Dept: HEMATOLOGY/ONCOLOGY | Facility: CLINIC | Age: 67
End: 2017-07-10
Payer: MEDICARE

## 2017-07-10 VITALS
DIASTOLIC BLOOD PRESSURE: 80 MMHG | TEMPERATURE: 98 F | RESPIRATION RATE: 18 BRPM | WEIGHT: 259.63 LBS | HEIGHT: 70 IN | BODY MASS INDEX: 37.17 KG/M2 | SYSTOLIC BLOOD PRESSURE: 135 MMHG | HEART RATE: 71 BPM

## 2017-07-10 DIAGNOSIS — Z78.9 NON-SMOKER: ICD-10-CM

## 2017-07-10 DIAGNOSIS — I88.8 GRANULOMATOUS LYMPHADENITIS: ICD-10-CM

## 2017-07-10 DIAGNOSIS — R59.0 CERVICAL LYMPHADENOPATHY: ICD-10-CM

## 2017-07-10 DIAGNOSIS — C61 MALIGNANT NEOPLASM OF PROSTATE: Primary | ICD-10-CM

## 2017-07-10 PROCEDURE — 1126F AMNT PAIN NOTED NONE PRSNT: CPT | Mod: ,,, | Performed by: INTERNAL MEDICINE

## 2017-07-10 PROCEDURE — 1159F MED LIST DOCD IN RCRD: CPT | Mod: ,,, | Performed by: INTERNAL MEDICINE

## 2017-07-10 PROCEDURE — 99214 OFFICE O/P EST MOD 30 MIN: CPT | Mod: ,,, | Performed by: INTERNAL MEDICINE

## 2017-07-10 NOTE — LETTER
July 10, 2017      Ronny Driscoll MD  [address]  [address]  [place] [address]           [facility]  [address]  [address]  [place] [address]  Phone: [phone]  Fax: [phone]          Patient: Naresh Painting   MR Number: [id number]   YOB: 1950   Date of Visit: 7/10/2017       Dear Dr. Ronny Driscoll:    Thank you for referring Naresh Painting to me for evaluation. Attached you will find relevant portions of my assessment and plan of care.    If you have questions, please do not hesitate to call me. I look forward to following Naresh Painting along with you.    Sincerely,    Fidencio Rdz MD    Enclosure  CC:  No Recipients    If you would like to receive this communication electronically, please contact [email or web address] or [phone] to request more information on [employer] access.    For providers and/or their staff who would like to refer a patient to [facility], please contact us through our one-stop-shop provider referral line, [facility], at [phone].    If you feel you have received this communication in error or would no longer like to receive these types of communications, please e-mail [email or web address]

## 2017-07-10 NOTE — PROGRESS NOTES
[facility] Hematology/Oncology            PROGRESS NOTE      Subjective:       Patient ID:   NAME: Naresh Painting : 1950     67 y.o. male    Referring Doc: Ronny Driscoll MD  Other Physicians:  Adriano Michaud Pernenkil    Chief Complaint:  Follow-up and Results (PET)      History of Present Illness:     Patient returns today for a regularly scheduled follow-up visit.  The patient had recent PEt scan on  to further evaluate his LN status and he is here to go over the results. He is here with his wife. He denies any new issues. He reports that he was outside with his daughter couple of weeks ago and spayed himself with bug spray which seemed to exacerbate his neck swelling (this has since resolved). He denies any CP, SOB, HA's or N/V. He has lost weight but has been on weight watchers since April. No night sweats or fevers. He saw Dr Driscoll this past Friday.             ROS:   GEN: normal without any fever, night sweats or weight loss  HEENT: normal with no HA's, sore throat, stiff neck, changes in vision  CV: normal with no CP, SOB, PND, COOPER or orthopnea  PULM: normal with no SOB, cough, hemoptysis, sputum or pleuritic pain  GI: normal with no abdominal pain, nausea, vomiting, constipation, diarrhea, melanotic stools, BRBPR, or hematemesis  : normal with no hematuria, dysuria  BREAST: normal with no mass, discharge, pain  SKIN: normal with no rash, erythema, bruising, or swelling    Allergies:  Review of patient's allergies indicates:  No Known Allergies    Medications:    Current Outpatient Prescriptions:     aspirin 81 MG Chew, Take 81 mg by mouth once daily., Disp: , Rfl:     co-enzyme Q-10 30 mg capsule, Take 30 mg by mouth 3 (three) times daily., Disp: , Rfl:     fish oil-omega-3 fatty acids 300-1,000 mg capsule, Take 2 g by mouth once daily., Disp: , Rfl:     L GASSERI/B BIFIDUM/B LONGUM ([employer] ORAL), Take by mouth., Disp: , Rfl:     MULTIVITAMIN/IRON/FOLIC ACID (CENTRUM  "COMPLETE ORAL), Take by mouth., Disp: , Rfl:     pantoprazole (PROTONIX) 40 MG tablet, Take 40 mg by mouth once daily., Disp: , Rfl:     pravastatin (PRAVACHOL) 40 MG tablet, , Disp: , Rfl:     TOPROL XL 50 mg 24 hr tablet, , Disp: , Rfl:     vitamin D 1000 units Tab, Take 185 mg by mouth once daily., Disp: , Rfl:     vitamin E 100 UNIT capsule, Take 100 Units by mouth once daily., Disp: , Rfl:     PMHx/PSHx Updates:  See patient's last visit with me on 6/9/2017.  See H&P on       Pathology:  No matching staging information was found for the patient.          Objective:     Vitals:  Blood pressure 135/80, pulse 71, temperature 97.8 °F (36.6 °C), temperature source Oral, resp. rate 18, height 5' 10" (1.778 m), weight 117.8 kg (259 lb 9.6 oz).    Physical Examination:   GEN: no apparent distress, comfortable; AAOx3  HEAD: atraumatic and normocephalic  EYES: no pallor, no icterus, PERRLA  ENT: OMM, no pharyngeal erythema, external ears WNL; no nasal discharge; no thrush  NECK: no masses, thyroid normal, trachea midline, no LAD/LN's, supple  CV: RRR with no murmur; normal pulse; normal S1 and S2; no pedal edema  CHEST: Normal respiratory effort; CTAB; normal breath sounds; no wheeze or crackles  ABDOM: nontender and nondistended; soft; normal bowel sounds; no rebound/guarding  MUSC/Skeletal: ROM normal; no crepitus; joints normal; no deformities or arthropathy  EXTREM: no clubbing, cyanosis, inflammation or swelling  SKIN: no rashes, lesions, ulcers, petechiae or subcutaneous nodules  : no cannon  NEURO: grossly intact; motor/sensory WNL; AAOx3; no tremors  PSYCH: normal mood, affect and behavior  LYMPH: abnormal LN's on neck right > left            Labs:   Lab Results   Component Value Date    WBC 6.6 03/03/2017    HGB 13.9 (L) 03/03/2017    HCT 43.0 03/03/2017    MCV 93.1 03/03/2017     03/03/2017    CMP  Sodium   Date Value Ref Range Status   03/03/2017 138 134 - 144 mmol/L      Potassium   Date Value " Ref Range Status   03/03/2017 4.6 3.5 - 5.0 mmol/L      Chloride   Date Value Ref Range Status   03/03/2017 102 98 - 110 mmol/L      CO2   Date Value Ref Range Status   03/03/2017 26.9 22.8 - 31.6 mmol/L      Glucose   Date Value Ref Range Status   03/03/2017 94 70 - 99 mg/dL      BUN, Bld   Date Value Ref Range Status   03/03/2017 12 8 - 20 mg/dL      Creatinine   Date Value Ref Range Status   03/03/2017 0.83 0.60 - 1.40 mg/dL      Calcium   Date Value Ref Range Status   03/03/2017 9.0 7.7 - 10.4 mg/dL      Total Protein   Date Value Ref Range Status   01/27/2017 8.1 6.0 - 8.2 g/dL      Albumin   Date Value Ref Range Status   01/27/2017 3.7 3.1 - 4.7 g/dL      Total Bilirubin   Date Value Ref Range Status   01/27/2017 0.7 0.3 - 1.0 mg/dL      Alkaline Phosphatase   Date Value Ref Range Status   01/27/2017 64 40 - 104 IU/L      AST   Date Value Ref Range Status   01/27/2017 13 10 - 40 IU/L      eGFR if    Date Value Ref Range Status   11/25/2014 >60.0 >60 mL/min/1.73 m^2 Final     eGFR if non    Date Value Ref Range Status   11/25/2014 >60.0 >60 mL/min/1.73 m^2 Final     Comment:     Calculation used to obtain the estimated glomerular filtration  rate (eGFR) is the CKD-EPI equation. Since race is unknown   in our information system, the eGFR values for   -American and Non--American patients are given   for each creatinine result.       I have reviewed all available lab results and radiology reports.    Radiology/Diagnostic Studies:    PET 7/6/2017    Assessment/Plan:   (1) 67 y.o. male with diagnosis of development of right sided neck swelling since Feb 2017  - he has been referred by Dr Michaud with ID for an evaluation  - i spoke to Dr Michaud a couple of weeks ago about the patient peripherally  - FNA bx on 2/24 which was nondiagnostic  - he had a  guided biopsy of an entire left supraclavicular LN on 3/6/2017 with Dr Alexander Oh which showed  necrotizing  granulomatous lymphadenitis  - CT scans were from Feb 2017  - flow cytometry studies from 5/25 appear to have been negative for any abnormal cell populations  - PET scan on 7/6 with markedly hypermetabolic LAD     (2) Hx/of prostate cancer - 1995; s/p prostatectomy followed by XRT     (3) JAK - uses CPAP     (4) GERD     (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 Reviewing materials, labs, reports and studies. Making treatment and analytical decisions. Ordering necessary labs, tests and studies.       Plan:       1. Refer to Dr Cox or Chetna at [facility] for second opinion and pathological review  2. Fax note to Dr Michaud, Chetna Driscoll and Adriano  3. RTc 3-4 weeks              Discussion:     I have explained all of the above in detail and the patient understands all of the current recommendation(s). I have answered all of their questions to the best of my ability and to their complete satisfaction.   The patient is to continue with the current management plan.    RTC in  3-4 weeks          Electronically signed by Fidencio Rdz MD

## 2017-07-25 ENCOUNTER — LAB VISIT (OUTPATIENT)
Dept: LAB | Facility: HOSPITAL | Age: 67
End: 2017-07-25
Attending: NURSE PRACTITIONER
Payer: MEDICARE

## 2017-07-25 DIAGNOSIS — C61 PROSTATE CANCER: ICD-10-CM

## 2017-07-25 DIAGNOSIS — Z90.79 HISTORY OF RADICAL PROSTATECTOMY: ICD-10-CM

## 2017-07-25 LAB — COMPLEXED PSA SERPL-MCNC: 0.17 NG/ML

## 2017-07-25 PROCEDURE — 84153 ASSAY OF PSA TOTAL: CPT

## 2017-07-25 PROCEDURE — 36415 COLL VENOUS BLD VENIPUNCTURE: CPT | Mod: PO

## 2017-08-01 ENCOUNTER — OFFICE VISIT (OUTPATIENT)
Dept: UROLOGY | Facility: CLINIC | Age: 67
End: 2017-08-01
Payer: MEDICARE

## 2017-08-01 VITALS
SYSTOLIC BLOOD PRESSURE: 123 MMHG | WEIGHT: 253 LBS | BODY MASS INDEX: 36.3 KG/M2 | HEART RATE: 53 BPM | DIASTOLIC BLOOD PRESSURE: 58 MMHG

## 2017-08-01 DIAGNOSIS — Z90.79 HISTORY OF RADICAL PROSTATECTOMY: ICD-10-CM

## 2017-08-01 DIAGNOSIS — R97.20 ELEVATED PSA: ICD-10-CM

## 2017-08-01 DIAGNOSIS — C61 PROSTATE CANCER: Primary | ICD-10-CM

## 2017-08-01 PROCEDURE — 1126F AMNT PAIN NOTED NONE PRSNT: CPT | Mod: S$GLB,,, | Performed by: NURSE PRACTITIONER

## 2017-08-01 PROCEDURE — 99999 PR PBB SHADOW E&M-EST. PATIENT-LVL III: CPT | Mod: PBBFAC,,, | Performed by: NURSE PRACTITIONER

## 2017-08-01 PROCEDURE — 99213 OFFICE O/P EST LOW 20 MIN: CPT | Mod: 25,S$GLB,, | Performed by: NURSE PRACTITIONER

## 2017-08-01 PROCEDURE — 96402 CHEMO HORMON ANTINEOPL SQ/IM: CPT | Mod: S$GLB,,, | Performed by: NURSE PRACTITIONER

## 2017-08-01 PROCEDURE — 1159F MED LIST DOCD IN RCRD: CPT | Mod: S$GLB,,, | Performed by: NURSE PRACTITIONER

## 2017-08-01 NOTE — PATIENT INSTRUCTIONS
PSA                  0.17                07/25/2017                 PSA                  0.22                01/26/2017                 PSA                  0.19                07/22/2016                 PSA                  0.20                01/18/2016                 PSA                  0.19                07/24/2015                 PSA                  0.15                01/12/2015                 PSA                  0.17                06/06/2014                 PSA                  0.11                12/02/2013                 PSA                      0.13                05/31/2013                 PSA                      0.16                11/29/2012                 PSA                      0.12                05/25/2012                 PSA                      0.13                10/18/2011                 PSA                      0.11                03/17/2011                 PSA                      0.20                08/25/2010                 PSA                      0.11                06/25/2009                 PSA                      0.09                01/09/2009                 PSA                      0.1                 06/12/2008                 PSA                      0.1                 12/11/2007

## 2017-08-01 NOTE — PROGRESS NOTES
Subjective:       Patient ID: Naresh Painting is a 67 y.o. male.    Chief Complaint: Prostate Cancer    Mr. Painting is a 67-year-old patient that underwent a radical prostatectomy in December of 1995 by Dr. Partida at [facility].  He was first seen by Dr. Menjivar in 2005 where He was currently on GnRH agaonist therapy/Eligard 45 mg every six months due to rise in PSA.  He was last seen in clinic & received Trelstar on 02/01/2017    He denies any urological problems or concerns.  Has been battling a cold; now with just a cough.  Reports good bladder control    He had some swelling to left side of neck which improved; (-) Ca.  Now has swelling to right side; seeing specialist at [facility].    Just had wedding anniversary (1/26/2017)  Eating healtier;   Getting plenty of rest.                    PSA                  0.17                07/25/2017                 PSA                  0.22                01/26/2017                 PSA                  0.19                07/22/2016                 PSA                  0.20                01/18/2016                 PSA                  0.19                07/24/2015                 PSA                  0.15                01/12/2015                 PSA                  0.17                06/06/2014                 PSA                  0.11                12/02/2013                 PSA                      0.13                05/31/2013                 PSA                      0.16                11/29/2012                 PSA                      0.12                05/25/2012                 PSA                      0.13                10/18/2011                 PSA                      0.11                03/17/2011                 PSA                      0.20                08/25/2010                 PSA                      0.11                06/25/2009                 PSA                      0.09                01/09/2009                 PSA                      0.1                  06/12/2008                 PSA                      0.1                 12/11/2007                                   Past Medical History:    Prostate cancer                                               Past Surgical History:    PROSTATE SURGERY                                               HERNIA REPAIR                                                  EYE SURGERY                                                    No family history on file.      Social History    Marital Status:              Spouse Name:                       Years of Education:                 Number of children:               Occupational History    None on file    Social History Main Topics    Smoking Status: Former Smoker                   Packs/Day: 0.00  Years:          Smokeless Status: Never Used                        Comment: quit 1995    Alcohol Use: No              Drug Use: Not on file     Sexual Activity: Not on file          Other Topics            Concern    None on file    Social History Narrative    None on file        Allergies:  Review of patient's allergies indicates no known allergies.    Medications:  Current outpatient prescriptions: aspirin 81 MG Chew, Take 81 mg by mouth once daily., Disp: , Rfl: ;  pravastatin (PRAVACHOL) 40 MG tablet, , Disp: , Rfl: ;  TOPROL XL 50 mg 24 hr tablet, , Disp: , Rfl:   Current facility-administered medications: triptorelin pamoate Syrg 22.5 mg, 22.5 mg, Intramuscular, 1 time in Clinic/HOD, Fauzia Maza NP                Review of Systems   Constitutional: Negative.  Negative for activity change, appetite change and fever.   HENT: Negative.  Negative for facial swelling and trouble swallowing.    Eyes: Negative.    Respiratory: Negative.  Negative for shortness of breath.    Cardiovascular: Negative.  Negative for chest pain and palpitations.   Gastrointestinal: Negative for abdominal pain, constipation, diarrhea, nausea and vomiting.   Genitourinary: Negative for  difficulty urinating, dysuria, enuresis, flank pain, frequency, genital sores, hematuria, nocturia, penile pain, scrotal swelling, testicular pain and urgency.        No urinary complaints.     Musculoskeletal: Negative for back pain, gait problem and neck stiffness.   Skin: Negative.  Negative for wound.   Neurological: Negative for dizziness, tremors, seizures, syncope, speech difficulty, light-headedness and headaches.   Hematological: Does not bruise/bleed easily.   Psychiatric/Behavioral: Negative for confusion and hallucinations. The patient is not nervous/anxious.        Objective:      Physical Exam   Nursing note and vitals reviewed.  Constitutional: He is oriented to person, place, and time. Vital signs are normal. He appears well-developed and well-nourished. He is cooperative.  Non-toxic appearance. He does not have a sickly appearance.   Urine clear of infection     HENT:   Head: Normocephalic and atraumatic.   Right Ear: External ear normal.   Left Ear: External ear normal.   Nose: Nose normal.   Mouth/Throat: Mucous membranes are normal.   Eyes: Conjunctivae and lids are normal. No scleral icterus.   Neck: Trachea normal, normal range of motion and full passive range of motion without pain. Neck supple. No JVD present. No tracheal deviation present.   Cardiovascular: Normal rate, regular rhythm, S1 normal and S2 normal.    Pulmonary/Chest: Effort normal and breath sounds normal. No respiratory distress. He exhibits no tenderness.   Abdominal: Soft. Normal appearance and bowel sounds are normal. There is no hepatosplenomegaly. There is no tenderness. There is no rebound, no guarding and no CVA tenderness.   Genitourinary: Testes normal and penis normal. Right testis shows no mass, no swelling and no tenderness. Left testis shows no mass, no swelling and no tenderness. Circumcised. No hypospadias or penile erythema. No discharge found.       Musculoskeletal: Normal range of motion.   Lymphadenopathy: No  inguinal adenopathy noted on the right or left side.   Neurological: He is alert and oriented to person, place, and time. He has normal strength.   Skin: Skin is warm, dry and intact.     Psychiatric: He has a normal mood and affect. His behavior is normal. Judgment and thought content normal.       Assessment:       1. Prostate cancer    2. Elevated PSA    3. History of radical prostatectomy        Plan:     I spent 20 minutes with the patient of which more than half was spent in direct consultation with the patient in regards to our treatment and plan.    Education and recommendations of today's plan of care including home remedies.  Discussed psa levels;  Tolerating Trelstar  RTC 6 months with PSA and next Trelstar

## 2017-08-10 ENCOUNTER — OFFICE VISIT (OUTPATIENT)
Dept: HEMATOLOGY/ONCOLOGY | Facility: CLINIC | Age: 67
End: 2017-08-10
Payer: MEDICARE

## 2017-08-10 VITALS
RESPIRATION RATE: 18 BRPM | WEIGHT: 250.38 LBS | HEART RATE: 81 BPM | DIASTOLIC BLOOD PRESSURE: 55 MMHG | BODY MASS INDEX: 35.84 KG/M2 | TEMPERATURE: 98 F | SYSTOLIC BLOOD PRESSURE: 109 MMHG | HEIGHT: 70 IN

## 2017-08-10 DIAGNOSIS — R22.1 MASS OF NECK: Primary | ICD-10-CM

## 2017-08-10 DIAGNOSIS — I88.8 GRANULOMATOUS LYMPHADENITIS: ICD-10-CM

## 2017-08-10 PROCEDURE — 3008F BODY MASS INDEX DOCD: CPT | Mod: ,,, | Performed by: INTERNAL MEDICINE

## 2017-08-10 PROCEDURE — 99213 OFFICE O/P EST LOW 20 MIN: CPT | Mod: ,,, | Performed by: INTERNAL MEDICINE

## 2017-08-10 PROCEDURE — 1159F MED LIST DOCD IN RCRD: CPT | Mod: ,,, | Performed by: INTERNAL MEDICINE

## 2017-08-10 PROCEDURE — 1126F AMNT PAIN NOTED NONE PRSNT: CPT | Mod: ,,, | Performed by: INTERNAL MEDICINE

## 2017-08-10 NOTE — PROGRESS NOTES
"   [facility] Hematology/Oncology  PROGRESS NOTE      Subjective:       Patient ID:   NAME: Naresh Painting : 1950     67 y.o. male    Referring Doc: Yamila  Other Physicians: Adriano Michaud Pernenkil    Chief Complaint:  Left neck LAD f/u    History of Present Illness:     Patient returns today for a regularly scheduled follow-up visit.  The patient saw Dr Basilio on  and they ordered blood work. He returns to see Dr Basilio on . He is here with his wife. He is feeling "pretty good"; no CP, SOB, HA's or N/V; LN's on right looks better and left neck with no appreciable LAD currently            ROS:   GEN: normal without any fever, night sweats or weight loss  HEENT: normal with no HA's, sore throat, stiff neck, changes in vision  CV: normal with no CP, SOB, PND, COOPER or orthopnea  PULM: normal with no SOB, cough, hemoptysis, sputum or pleuritic pain  GI: normal with no abdominal pain, nausea, vomiting, constipation, diarrhea, melanotic stools, BRBPR, or hematemesis  : normal with no hematuria, dysuria  BREAST: normal with no mass, discharge, pain  SKIN: normal with no rash, erythema, bruising, or swelling    Allergies:  Review of patient's allergies indicates:  No Known Allergies    Medications:    Current Outpatient Prescriptions:     aspirin 81 MG Chew, Take 81 mg by mouth once daily., Disp: , Rfl:     co-enzyme Q-10 30 mg capsule, Take 30 mg by mouth 3 (three) times daily., Disp: , Rfl:     fish oil-omega-3 fatty acids 300-1,000 mg capsule, Take 2 g by mouth once daily., Disp: , Rfl:     L GASSERI/B BIFIDUM/B LONGUM ([employer] ORAL), Take by mouth., Disp: , Rfl:     MULTIVITAMIN/IRON/FOLIC ACID (CENTRUM COMPLETE ORAL), Take by mouth., Disp: , Rfl:     pantoprazole (PROTONIX) 40 MG tablet, Take 40 mg by mouth once daily., Disp: , Rfl:     pravastatin (PRAVACHOL) 40 MG tablet, , Disp: , Rfl:     TOPROL XL 50 mg 24 hr tablet, , Disp: , Rfl:     vitamin D 1000 units Tab, Take 185 mg by " "mouth once daily., Disp: , Rfl:     vitamin E 100 UNIT capsule, Take 100 Units by mouth once daily., Disp: , Rfl:     PMHx/PSHx Updates:  See patient's last visit with me on 7/10/2017.  See H&P on 6/9/2017        Pathology:  See prior note        Objective:     Vitals:  Blood pressure (!) 109/55, pulse 81, temperature 97.6 °F (36.4 °C), resp. rate 18, height 5' 10" (1.778 m), weight 113.6 kg (250 lb 6.4 oz).    Physical Examination:   GEN: no apparent distress, comfortable; AAOx3  HEAD: atraumatic and normocephalic  EYES: no pallor, no icterus, PERRLA  ENT: OMM, no pharyngeal erythema, external ears WNL; no nasal discharge; no thrush  NECK: no masses, thyroid normal, trachea midline, no LAD/LN's, supple  CV: RRR with no murmur; normal pulse; normal S1 and S2; no pedal edema  CHEST: Normal respiratory effort; CTAB; normal breath sounds; no wheeze or crackles  ABDOM: nontender and nondistended; soft; normal bowel sounds; no rebound/guarding  MUSC/Skeletal: ROM normal; no crepitus; joints normal; no deformities or arthropathy  EXTREM: no clubbing, cyanosis, inflammation or swelling  SKIN: no rashes, lesions, ulcers, petechiae or subcutaneous nodules  : no cannon  NEURO: grossly intact; motor/sensory WNL; AAOx3; no tremors  PSYCH: normal mood, affect and behavior  LYMPH: right cervical LAD improved and left neck loos good          Labs:     3/3/2017 on chart      Radiology/Diagnostic Studies:    PEt 7/6/ on chart    I have reviewed all available lab results and radiology reports.    Assessment/Plan:   (1) 67 y.o. male with diagnosis of development of right sided neck swelling since Feb 2017  - he has been referred by Dr Michaud with ID for an evaluation  - i spoke to Dr Michaud previously about the patient peripherally  - FNA bx on 2/24 which was nondiagnostic  - he had a  guided biopsy of an entire left supraclavicular LN on 3/6/2017 with Dr Alexander Oh which showed  necrotizing granulomatous lymphadenitis  - CT " scans were from Feb 2017  - flow cytometry studies from 5/25 appear to have been negative for any abnormal cell populations  - PET scan on 7/6 with markedly hypermetabolic LAD  - he was referred to and seen by Dr Basilio on 7/28 and some studies were ordered by him; the patient follows up with Dr Basilio on 8/25     (2) Hx/of prostate cancer - 1995; s/p prostatectomy followed by XRT     (3) JAK - uses CPAP     (4) GERD     (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 F/[place]  2. Await recommendations from Dr Basilio  3. F/u with PCP  4. RTC in Sept 2017  Fax note to Ronny Driscoll MD, Chetna Michaud    I spent over 15 mins of time with the patient. Over half of this time was spent couseling and coordinating care: reviewing materials, labs, reports and studies; making treatment and analytical decisions; ordering necessary labs, tests and studies; and discussing treatment options and setting up treatment plans.      Discussion:     I have explained all of the above in detail and the patient understands all of the current recommendation(s). I have answered all of their questions to the best of my ability and to their complete satisfaction.   The patient is to continue with the current management plan.            Electronically signed by Fidencio Rdz MD

## 2017-08-10 NOTE — LETTER
August 10, 2017      Ronny Driscoll MD  [address] [facility]  [address]  [place] [address]           [facility] Hematology Oncology  [address] [facility]  [address]  [place] [address]  Phone: [phone]  Fax: [phone]          Patient: Naresh Painting   MR Number: [id number]   YOB: 1950   Date of Visit: 8/10/2017       Dear Dr. Ronny Driscoll:    Thank you for referring Naresh Painting to me for evaluation. Attached you will find relevant portions of my assessment and plan of care.    If you have questions, please do not hesitate to call me. I look forward to following Naresh Painting along with you.    Sincerely,    Fidencio Rdz MD    Enclosure  CC:  No Recipients    If you would like to receive this communication electronically, please contact [email or web address] or [phone] to request more information on [employer] access.    For providers and/or their staff who would like to refer a patient to [facility], please contact us through our one-stop-shop provider referral line, [facility], at [phone].    If you feel you have received this communication in error or would no longer like to receive these types of communications, please e-mail [email or web address]

## 2017-09-12 ENCOUNTER — OFFICE VISIT (OUTPATIENT)
Dept: SURGERY | Facility: CLINIC | Age: 67
End: 2017-09-12
Payer: MEDICARE

## 2017-09-12 VITALS
SYSTOLIC BLOOD PRESSURE: 109 MMHG | DIASTOLIC BLOOD PRESSURE: 55 MMHG | WEIGHT: 250 LBS | HEIGHT: 70 IN | BODY MASS INDEX: 35.79 KG/M2

## 2017-09-12 DIAGNOSIS — R59.0 LAD (LYMPHADENOPATHY) OF LEFT CERVICAL REGION: Primary | ICD-10-CM

## 2017-09-12 LAB
BASOPHILS NFR BLD: 0 K/UL (ref 0–0.2)
BASOPHILS NFR BLD: 0.8 %
BUN SERPL-MCNC: 13 MG/DL (ref 8–20)
CALCIUM SERPL-MCNC: 9.6 MG/DL (ref 7.7–10.4)
CHLORIDE: 102 MMOL/L (ref 98–110)
CO2 SERPL-SCNC: 27.1 MMOL/L (ref 22.8–31.6)
CREATININE: 0.77 MG/DL (ref 0.6–1.4)
EOSINOPHIL NFR BLD: 0.3 K/UL (ref 0–0.7)
EOSINOPHIL NFR BLD: 6 %
ERYTHROCYTE [DISTWIDTH] IN BLOOD BY AUTOMATED COUNT: 15.2 % (ref 11.7–14.9)
GLUCOSE: 87 MG/DL (ref 70–99)
GRAN #: 3.3 K/UL (ref 1.4–6.5)
GRAN%: 64.7 %
HCT VFR BLD AUTO: 43.7 % (ref 39–55)
HGB BLD-MCNC: 14.2 G/DL (ref 14–16)
IMMATURE GRANS (ABS): 0 K/UL (ref 0–1)
IMMATURE GRANULOCYTES: 0.2 %
LYMPH #: 0.8 K/UL (ref 1.2–3.4)
LYMPH%: 15.9 %
MCH RBC QN AUTO: 28.8 PG (ref 25–35)
MCHC RBC AUTO-ENTMCNC: 32.5 G/DL (ref 31–36)
MCV RBC AUTO: 88.6 FL (ref 80–100)
MONO #: 0.6 K/UL (ref 0.1–0.6)
MONO%: 12.4 %
NUCLEATED RBCS: 0 %
PLATELET # BLD AUTO: 305 K/UL (ref 140–440)
PMV BLD AUTO: 9.8 FL (ref 8.8–12.7)
POTASSIUM SERPL-SCNC: 4.2 MMOL/L (ref 3.5–5)
RBC # BLD AUTO: 4.93 M/UL (ref 4.3–5.9)
SODIUM: 138 MMOL/L (ref 134–144)
WBC # BLD AUTO: 5 K/UL (ref 5–10)

## 2017-09-12 PROCEDURE — 3008F BODY MASS INDEX DOCD: CPT | Mod: ,,, | Performed by: SURGERY

## 2017-09-12 PROCEDURE — 1159F MED LIST DOCD IN RCRD: CPT | Mod: ,,, | Performed by: SURGERY

## 2017-09-12 PROCEDURE — 99213 OFFICE O/P EST LOW 20 MIN: CPT | Mod: ,,, | Performed by: SURGERY

## 2017-09-12 NOTE — PROGRESS NOTES
Subjective:       Patient ID: Naresh Painting is a 67 y.o. male.    Chief Complaint: Other (Referred by  &  to al for possible biopsy)      HPI:  Patient is well known to me.  He has history of recurring and relapsing bulky cervical lymphadenopathy.  I performed excisional biopsy of left cervical lymph node six months ago and path returned-NECROTIZING GRANULOMATOUS LYMPHADENITIS.  The LAD actually resolved but is now back and very bulky bilaterally but worse on the left causing neck and left arm discomfort.  PET was performed that showed uptake in the left nodes with largest measuring 6cm.  The right nodes did not have any uptake but measured up to 4cm.  He also had mesenteric and mediastinal enlarged Ln's.  He is referred back to the office for request to biopsy the FDG avid left cervical node.  He again has no constitutional symptoms.             Past Medical History:   Diagnosis Date    Cholelithiasis without cholecystitis July 2017-PET scan    GERD (gastroesophageal reflux disease)     Granulomatous lymphadenitis 6/9/2017    Hyperlipidemia     Hypertension     Lymphadenopathy     JAK (obstructive sleep apnea)     Prostate CA     Prostate cancer      Past Surgical History:   Procedure Laterality Date    EYE SURGERY      HERNIA REPAIR      LYMPH NODE BIOPSY      PROCTECTOMY      PROSTATE SURGERY       Review of patient's allergies indicates:  No Known Allergies  Medication List with Changes/Refills   Current Medications    ASPIRIN 81 MG CHEW    Take 81 mg by mouth once daily.    CO-ENZYME Q-10 30 MG CAPSULE    Take 30 mg by mouth 3 (three) times daily.    FISH OIL-OMEGA-3 FATTY ACIDS 300-1,000 MG CAPSULE    Take 2 g by mouth once daily.    L GASSERI/B BIFIDUM/B LONGUM ([facility] HEALTH ORAL)    Take by mouth.    MULTIVITAMIN/IRON/FOLIC ACID (CENTRUM COMPLETE ORAL)    Take by mouth.    PANTOPRAZOLE (PROTONIX) 40 MG TABLET    Take 40 mg by mouth once daily.    PRAVASTATIN  (PRAVACHOL) 40 MG TABLET        TOPROL XL 50 MG 24 HR TABLET        VITAMIN D 1000 UNITS TAB    Take 185 mg by mouth once daily.    VITAMIN E 100 UNIT CAPSULE    Take 100 Units by mouth once daily.     Family History   Problem Relation Age of Onset    Heart disease Mother     Diabetes Father      Social History     Social History    Marital status:      Spouse name: N/A    Number of children: N/A    Years of education: N/A     Social History Main Topics    Smoking status: Former Smoker    Smokeless tobacco: Never Used      Comment: quit 1995    Alcohol use Yes    Drug use: No    Sexual activity: Yes     Partners: Female     Other Topics Concern    None     Social History Narrative    None         Review of Systems   Constitutional: Negative for appetite change, chills, fever and unexpected weight change.   HENT: Negative for hearing loss, rhinorrhea, sore throat and voice change.    Eyes: Negative for photophobia and visual disturbance.   Respiratory: Negative for cough, choking and shortness of breath.    Cardiovascular: Negative for chest pain, palpitations and leg swelling.   Gastrointestinal: Negative for abdominal pain, blood in stool, constipation, diarrhea, nausea and vomiting.   Endocrine: Negative for cold intolerance, heat intolerance, polydipsia and polyuria.   Musculoskeletal: Negative for arthralgias, back pain, joint swelling and neck stiffness.   Skin: Negative for color change, pallor and rash.   Neurological: Negative for dizziness, seizures, syncope and headaches.   Hematological: Negative for adenopathy. Does not bruise/bleed easily.   Psychiatric/Behavioral: Negative for agitation, behavioral problems and confusion.       Objective:      Physical Exam   Constitutional: He appears well-developed and well-nourished.  Non-toxic appearance. No distress.   HENT:   Head: Normocephalic and atraumatic. Head is without abrasion and without laceration.   Right Ear: External ear normal.    Left Ear: External ear normal.   Nose: Nose normal.   Mouth/Throat: Oropharynx is clear and moist.   Eyes: EOM are normal. Pupils are equal, round, and reactive to light.   Neck: Trachea normal. No tracheal deviation and normal range of motion present.       Large firm barely mobile left neck mass/LAD.  Right side is bulky but much more mobile.     Cardiovascular: Normal rate and regular rhythm.    Pulmonary/Chest: Effort normal. No accessory muscle usage. No tachypnea. No respiratory distress.   Abdominal: Soft. Normal appearance and bowel sounds are normal. He exhibits no distension and no mass. There is no tenderness.   Lymphadenopathy:     He has cervical adenopathy (very bulky LAD bilaterally worse on the left.  ).        Right cervical: Superficial cervical adenopathy present.        Left cervical: Superficial cervical and deep cervical adenopathy present.     He has no axillary adenopathy.        Right: No inguinal adenopathy present.        Left: No inguinal adenopathy present.   Neurological: He is alert. Coordination and gait normal.   Skin: Skin is warm and intact.   Psychiatric: He has a normal mood and affect. His speech is normal and behavior is normal.       Assessment/Plan:   Naresh RIVAS was seen today for other.    Diagnoses and all orders for this visit:    LAD (lymphadenopathy) of left cervical region  -     Ambulatory Referral to External Surgery  -     CBC auto differential; Future  -     Basic metabolic panel; Future  -     CBC auto differential  -     Basic metabolic panel        Will plan for lymph node biopsy tomorrow of the left cervical node.    Planned procedure: Biopsy left cervical lymph node    Ancef 2 gm IV on call to OR    NPO past midnight    Dewayne cloth scrub per protocol    SCDs Bilateral Lower Extremities    I discussed the proposed procedures the the patient including risks, benefits, indications, alternatives and special concerns.  The patient appears to understand and agrees to  go ahead with surgery.  I have made no promises, warranties or verbal agreements beyond what was discussed above.

## 2017-09-19 ENCOUNTER — OFFICE VISIT (OUTPATIENT)
Dept: FAMILY MEDICINE | Facility: CLINIC | Age: 67
End: 2017-09-19
Payer: MEDICARE

## 2017-09-19 VITALS
HEIGHT: 70 IN | WEIGHT: 243 LBS | HEART RATE: 72 BPM | BODY MASS INDEX: 34.79 KG/M2 | SYSTOLIC BLOOD PRESSURE: 138 MMHG | DIASTOLIC BLOOD PRESSURE: 73 MMHG

## 2017-09-19 DIAGNOSIS — R22.1 MASS OF NECK: ICD-10-CM

## 2017-09-19 DIAGNOSIS — M54.2 NECK PAIN: ICD-10-CM

## 2017-09-19 DIAGNOSIS — E78.2 MULTIPLE-TYPE HYPERLIPIDEMIA: ICD-10-CM

## 2017-09-19 DIAGNOSIS — Z23 INFLUENZA VACCINE ADMINISTERED: Primary | ICD-10-CM

## 2017-09-19 DIAGNOSIS — I10 BENIGN ESSENTIAL HYPERTENSION: ICD-10-CM

## 2017-09-19 DIAGNOSIS — C85.91 NON-HODGKIN LYMPHOMA OF LYMPH NODES OF NECK, UNSPECIFIED NON-HODGKIN LYMPHOMA TYPE: ICD-10-CM

## 2017-09-19 PROCEDURE — G0008 ADMIN INFLUENZA VIRUS VAC: HCPCS | Mod: ,,, | Performed by: INTERNAL MEDICINE

## 2017-09-19 PROCEDURE — 3008F BODY MASS INDEX DOCD: CPT | Mod: ,,, | Performed by: INTERNAL MEDICINE

## 2017-09-19 PROCEDURE — 1125F AMNT PAIN NOTED PAIN PRSNT: CPT | Mod: ,,, | Performed by: INTERNAL MEDICINE

## 2017-09-19 PROCEDURE — 90662 IIV NO PRSV INCREASED AG IM: CPT | Mod: ,,, | Performed by: INTERNAL MEDICINE

## 2017-09-19 PROCEDURE — 1159F MED LIST DOCD IN RCRD: CPT | Mod: ,,, | Performed by: INTERNAL MEDICINE

## 2017-09-19 PROCEDURE — 99214 OFFICE O/P EST MOD 30 MIN: CPT | Mod: ,,, | Performed by: INTERNAL MEDICINE

## 2017-09-19 RX ORDER — TIZANIDINE 4 MG/1
4 TABLET ORAL 2 TIMES DAILY
Qty: 20 TABLET | Refills: 1 | Status: SHIPPED | OUTPATIENT
Start: 2017-09-19 | End: 2017-09-29

## 2017-09-19 NOTE — PATIENT INSTRUCTIONS
What Is Non-Hodgkin Lymphoma?    Cancer occurs when cells in the body begin to change and multiply out of control. These cells can form lumps of tissue called tumors. Lymphoma is cancer that starts in cells in the body's lymphatic system. Most types of cancer in the lymphatic system are non-Hodgkin lymphoma.  Understanding the lymphatic system  The lymphatic system helps your body fight disease and infections. This system is a network of tubes, or vessels. The vessels pass through tissue all over your body. A clear fluid called lymph flows through the vessels. Lymph helps the body fight infections. Small organs called lymph nodes are also found along this network of vessels. Lymph nodes are found throughout the body. Most are grouped together in the neck, underarms, and groin area. You may feel swollen or enlarged nodes in these areas when you have a cold.  Some of the bodys internal organs are also part of the lymphatic system. These organs include the bone marrow, spleen, thymus, and tonsils. Other organs, such as parts of the digestive tract, also have lymph tissue.  When non-Hodgkin lymphoma forms  The lymphatic system stretches over the whole body. So non-Hodgkin lymphoma can start almost anywhere in your body. Lymphoma can also spread from the lymphatic system to other tissues of the body. This spread is called metastasis.  Treatment options for non-Hodgkin lymphoma  You and your healthcare provider will discuss a treatment plan thats best for your needs. Treatment options may include:  · Radiation therapy. This uses focused rays of energy to kill cancer cells.  · Chemotherapy. This uses strong medicines to kill cancer cells.  · Immunotherapy. This uses the bodys own immune system to help fight cancer.  · Targeted therapy. This uses medicines that target parts of cancer cells that make them different from normal cells.  · Stem cell transplant. This kills cancer cells with high doses of chemotherapy and  radiation.  Date Last Reviewed: 7/1/2015  © 4249-9392 [employer]. [address] [place] [address], [place] [address]. All rights reserved. This information is not intended as a substitute for professional medical care. Always follow your healthcare professional's instructions.

## 2017-09-19 NOTE — LETTER
September 19, 2017        Fidencio Rdz MD  [address]  [address]  [facility] [address]             [facility]  [address] [address]  Phone: [phone]  Fax: [phone]   Patient: Naresh Painting   MR Number: [id number]   YOB: 1950   Date of Visit: 9/19/2017       Dear Dr. Rdz:    Thank you for referring Naresh Painting to me for evaluation. Below are the relevant portions of my assessment and plan of care.        1. Influenza vaccine administered    2. Mass of neck    3. Non-Hodgkin lymphoma of lymph nodes of neck, unspecified non-Hodgkin lymphoma type    4. Benign essential hypertension    5. Multiple-type hyperlipidemia    6. Neck pain              If you have questions, please do not hesitate to call me. I look forward to following Naresh RIVAS along with you.    Sincerely,      MD CIERRA Tovar III, MD

## 2017-09-19 NOTE — PROGRESS NOTES
Subjective:       Patient ID: Naresh Painting is a 67 y.o. male.    Chief Complaint: Follow-up (surg lym node removal )    Mr. Steinberg is a 67-year-old male who has had significant lymphadenopathy bilaterally in the neck. Initial biopsy and histopathology did not reveal anything incriminating. He had undergone infection workup also in the interim without any significantly positive finding. He continued to have significant lumps and further growth on the right side of the neck.    He has thus far been seen by infectious disease, general surgery, hematology oncology. He also recently got a second opinion from hematology oncology department at [facility] with Dr. Maggie Basilio who advised him to get another biopsy. He underwent another biopsy on the prominent left supraclavicular nodes and the results returned back as malignant lymphoma. Further immunohistochemical staining are pending at this point.     Patient also had some difficulty healing on the left supraclavicular region. He also experiences some numbness in the left hand fingers. He also has some pain in the supraclavicular site. He recently gone to the emergency room because of losing and he had to be observed in the emergency room for approximately 12 hours with multiple dressing changes. His wife had changed the dressing again yesterday. There is no active oozing at this point.      Patient also complains of left-sided neck pain. He was recently immobilized for left neck surgery.      Head and Neck Mass:   Chronicity:  Chronic  Progression since onset:  Gradually worsening  Pain Scale:  3/10  Severity:  Mild  Mass characteristics:  HardNo sore throat, no nasal congestion, no shortness of breath, no sore throat and no weight loss.Aggravated by:  Nothing  Treatments tried:  NothingNo environmental allergies and no pneumonia.  Hypertension   This is a chronic problem. The current episode started more than 1 year ago. The problem has been gradually improving  since onset. The problem is controlled. Associated symptoms include neck pain. Pertinent negatives include no chest pain, palpitations or shortness of breath. Risk factors for coronary artery disease include sedentary lifestyle and obesity. Past treatments include beta blockers. The current treatment provides moderate improvement.   Neck Pain    This is a new problem. The current episode started in the past 7 days. The problem occurs intermittently. The problem has been waxing and waning. The pain is associated with a sleep position. The quality of the pain is described as burning. The pain is at a severity of 3/10. The pain is mild. The pain is worse during the night. Associated symptoms include numbness. Pertinent negatives include no chest pain, paresis, trouble swallowing or weight loss. The treatment provided mild relief.       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     Social History Main Topics    Smoking status: Former Smoker    Smokeless tobacco: Never Used      Comment: quit 1995    Alcohol use Yes    Drug use: No    Sexual activity: Yes     Partners: Female     Other Topics Concern    Not on file     Social History Narrative    No narrative on file     Past Surgical History:   Procedure Laterality Date    EYE SURGERY      HERNIA REPAIR      LYMPH NODE BIOPSY      PROCTECTOMY      PROSTATE SURGERY       Family History   Problem Relation Age of Onset    Heart disease Mother     Diabetes Father        Review of Systems   Constitutional:  "Negative for activity change, appetite change, fatigue, unexpected weight change (lost 28 lbs) and weight loss.   HENT: Negative for congestion, sneezing, sore throat and trouble swallowing.    Eyes: Negative for pain and visual disturbance.        Left eye Glass eye prosthesis   Respiratory: Negative for cough, chest tightness and shortness of breath.    Cardiovascular: Negative for chest pain, palpitations and leg swelling.   Gastrointestinal: Positive for constipation. Negative for abdominal distention, abdominal pain, blood in stool and diarrhea.   Endocrine: Negative for cold intolerance, heat intolerance, polydipsia, polyphagia and polyuria.   Genitourinary: Negative for dysuria, hematuria and scrotal swelling.   Musculoskeletal: Positive for neck pain. Negative for arthralgias, back pain and gait problem.   Skin: Negative for pallor, rash and wound.        Patient's has lumps in the neck which have now been diagnosed to be lymphoma.   Allergic/Immunologic: Negative for environmental allergies, food allergies and immunocompromised state.   Neurological: Positive for numbness. Negative for dizziness, seizures, speech difficulty and light-headedness.        Patient has numbness in the left hand fingers.   Hematological: Positive for adenopathy. Does not bruise/bleed easily.   Psychiatric/Behavioral: Negative for agitation, behavioral problems and confusion. The patient is not nervous/anxious.        Objective:       Vitals:    09/19/17 1106   BP: 138/73   Pulse: 72   Weight: 110.2 kg (243 lb)   Height: 5' 10" (1.778 m)     Physical Exam   Constitutional: He is oriented to person, place, and time. He appears well-developed. No distress.   HENT:   Head: Normocephalic and atraumatic.       Nose: Nose normal.   Mouth/Throat: Oropharynx is clear and moist. No oropharyngeal exudate.   Eyes: Conjunctivae and EOM are normal. Right eye exhibits no discharge and no exudate. Right conjunctiva is not injected. Right " conjunctiva has no hemorrhage.   Left eye is prosthetic.   Neck: Normal range of motion. Neck supple. No JVD present. No neck rigidity. No tracheal deviation and normal range of motion present. No thyromegaly present.       Cardiovascular: Normal rate, regular rhythm and normal heart sounds.  Exam reveals no gallop and no friction rub.    No murmur heard.  Pulmonary/Chest: Effort normal and breath sounds normal. No respiratory distress. He has no wheezes. He has no rales.   Abdominal: Soft. Bowel sounds are normal. He exhibits no distension. There is no tenderness.       Musculoskeletal: Normal range of motion. He exhibits no tenderness or deformity.   Examination of the neck does not reveal any deformity.   Lymphadenopathy:     He has cervical adenopathy.   Neurological: He is alert and oriented to person, place, and time. He has normal reflexes.   Skin: Skin is warm and dry.   Psychiatric: His mood appears anxious.   Nursing note and vitals reviewed.      Assessment:       1. Influenza vaccine administered    2. Mass of neck    3. Non-Hodgkin lymphoma of lymph nodes of neck, unspecified non-Hodgkin lymphoma type    4. Benign essential hypertension    5. Multiple-type hyperlipidemia    6. Neck pain       PRELIMINARY DIAGNOSIS:  1.  LEFT CERVICAL LYMPH NODE BIOPSY:      -    ABNORMAL LYMPH NODE WITH AN ATYPICAL LYMPHOID PROLIFERATION      MOST CONSISTENT WITH A LYMPHOPROLIFERATIVE NEOPLASM, MALIGNANT      LYMPHOMA.  Note: This case will be sent to [facility] for additional  immunohistochemical studies and consultation to confirm the diagnosis  and further classify this lesion.  The final report is pending these  results.  Plan:           Influenza vaccine administered  -     Influenza - High Dose (65+) (PF) ([facility])    Mass of neck    Non-Hodgkin lymphoma of lymph nodes of neck, unspecified non-Hodgkin lymphoma type    Benign essential hypertension    Multiple-type hyperlipidemia    Neck pain  -     tizanidine  (ZANAFLEX) 4 MG tablet; Take 1 tablet (4 mg total) by mouth 2 (two) times daily.  Dispense: 20 tablet; Refill: 1    Recent histopathology returns as malignant lymphoma. This maternal to be non-Hodgkin's lymphoma.    Within my capabilities of her primary care physician I've advised him that more than likely he will undergo further evaluation by the hematology oncology and may have to go through chemotherapy or a combination of chemotherapy or radiation therapy based upon further evaluation.    Depending upon the staging of lymphoma  his prognosis might be reasonable and favorable given her current combination of chemotherapies and other interventions.    He might also need a Port-A-Cath insertion. He needs to continue to watch his left wound and dress it regularly.      He does have some neuropathy and tingling-like symptoms in the left fingers and I hope this gets better on repeat of time.    He'll continue to monitor his blood pressure and dietary measures.    I will see him perhaps in 3 months or earlier for his regular medical issues and in the next few weeks I suspect he will be more busy with the hematology oncology/surgery.

## 2017-09-21 ENCOUNTER — TELEPHONE (OUTPATIENT)
Dept: HEMATOLOGY/ONCOLOGY | Facility: CLINIC | Age: 67
End: 2017-09-21

## 2017-09-21 NOTE — TELEPHONE ENCOUNTER
----- Message from Fidencio Rdz MD sent at 9/20/2017  4:42 PM CDT -----  He needs RTC and I need pathology report and notes from Dr Basilio      ----- Message -----  From: Ronny Driscoll MD  Sent: 9/19/2017   5:09 PM  To: Fidencio Rdz MD, #

## 2017-09-25 ENCOUNTER — TELEPHONE (OUTPATIENT)
Dept: HEMATOLOGY/ONCOLOGY | Facility: CLINIC | Age: 67
End: 2017-09-25

## 2017-09-25 NOTE — TELEPHONE ENCOUNTER
Called to get clarification on Lovenox pt rx is for 7 days 120mg 11.2 ml PA needs to be done.    In the process of getting the auth completed.

## 2017-09-25 NOTE — TELEPHONE ENCOUNTER
Pt's wife called inquiring about the lovenox that was given in the hospital.    Nick on [address] was able to give enough for the pt to get threw the day.    Pt schedule to come f/u with us on 9/26/17.

## 2017-09-26 ENCOUNTER — OFFICE VISIT (OUTPATIENT)
Dept: HEMATOLOGY/ONCOLOGY | Facility: CLINIC | Age: 67
End: 2017-09-26
Payer: MEDICARE

## 2017-09-26 VITALS
DIASTOLIC BLOOD PRESSURE: 84 MMHG | SYSTOLIC BLOOD PRESSURE: 132 MMHG | RESPIRATION RATE: 18 BRPM | BODY MASS INDEX: 34.59 KG/M2 | HEART RATE: 92 BPM | HEIGHT: 70 IN | TEMPERATURE: 98 F | WEIGHT: 241.63 LBS

## 2017-09-26 DIAGNOSIS — R59.0 CERVICAL LYMPHADENOPATHY: ICD-10-CM

## 2017-09-26 DIAGNOSIS — I82.B19 SUBCLAVIAN VEIN THROMBOSIS, UNSPECIFIED LATERALITY: ICD-10-CM

## 2017-09-26 DIAGNOSIS — C83.31 DIFFUSE LARGE B-CELL LYMPHOMA OF LYMPH NODES OF NECK: ICD-10-CM

## 2017-09-26 DIAGNOSIS — C61 MALIGNANT NEOPLASM OF PROSTATE: ICD-10-CM

## 2017-09-26 DIAGNOSIS — R22.1 MASS OF NECK: Primary | ICD-10-CM

## 2017-09-26 PROCEDURE — 1159F MED LIST DOCD IN RCRD: CPT | Mod: ,,, | Performed by: INTERNAL MEDICINE

## 2017-09-26 PROCEDURE — 3008F BODY MASS INDEX DOCD: CPT | Mod: ,,, | Performed by: INTERNAL MEDICINE

## 2017-09-26 PROCEDURE — 1126F AMNT PAIN NOTED NONE PRSNT: CPT | Mod: ,,, | Performed by: INTERNAL MEDICINE

## 2017-09-26 PROCEDURE — 99214 OFFICE O/P EST MOD 30 MIN: CPT | Mod: ,,, | Performed by: INTERNAL MEDICINE

## 2017-09-26 RX ORDER — HYDROCODONE BITARTRATE AND ACETAMINOPHEN 10; 325 MG/1; MG/1
TABLET ORAL
COMMUNITY
Start: 2017-09-14 | End: 2017-11-17

## 2017-09-26 RX ORDER — CLINDAMYCIN HYDROCHLORIDE 300 MG/1
CAPSULE ORAL
COMMUNITY
Start: 2017-09-17 | End: 2017-10-05

## 2017-09-26 NOTE — PROGRESS NOTES
[facility] Hematology/Oncology  PROGRESS NOTE      Subjective:       Patient ID:   NAME: Naresh Painting : 1950     67 y.o. male    Referring Doc: Yamila  Other Physicians: Adriano Michaud, Chetna Pan    Chief Complaint:  Left neck LAD/lymphoma  f/u    History of Present Illness:     Patient returns today for a regularly scheduled follow-up visit.  The patient was seen and evaluated by Dr Basilio at [facility]. The patient underwent repeat LN biopsy with Dr Oh on 2017 and this time the pathologist reports presence of Diffuse Large B-Cell Lymphoma. . The patient was hospitalized over the past weekend at [facility] with new onset subclavian vein thrombosis. He was discharged on lovenox. He is here with his wife. He has been having some pain in left scapular region. No CP, SOB, HA's or N/V. Swelling has come down on the LUE. He wass on lovenox, and was given eliquis samples yesterday.             ROS:   GEN: normal without any fever, night sweats or weight loss  HEENT: normal with no HA's, sore throat, stiff neck, changes in vision  CV: normal with no CP, SOB, PND, COOPER or orthopnea  PULM: normal with no SOB, cough, hemoptysis, sputum or pleuritic pain  GI: normal with no abdominal pain, nausea, vomiting, constipation, diarrhea, melanotic stools, BRBPR, or hematemesis  : normal with no hematuria, dysuria  BREAST: normal with no mass, discharge, pain  SKIN: normal with no rash, erythema, bruising, or swelling    Allergies:  Review of patient's allergies indicates:  No Known Allergies    Medications:    Current Outpatient Prescriptions:     apixaban (ELIQUIS) 5 mg Tab, Take 5 mg by mouth 2 (two) times daily., Disp: , Rfl:     aspirin 81 MG Chew, Take 81 mg by mouth once daily., Disp: , Rfl:     clindamycin (CLEOCIN) 300 MG capsule, , Disp: , Rfl:     co-enzyme Q-10 30 mg capsule, Take 30 mg by mouth 3 (three) times daily., Disp: , Rfl:     fish oil-omega-3 fatty acids 300-1,000 mg capsule, Take 2 g by mouth  "once daily., Disp: , Rfl:     hydrocodone-acetaminophen 10-325mg (NORCO)  mg Tab, , Disp: , Rfl:     L GASSERI/B BIFIDUM/B LONGUM ([employer] ORAL), Take by mouth., Disp: , Rfl:     MULTIVITAMIN/IRON/FOLIC ACID (CENTRUM COMPLETE ORAL), Take by mouth., Disp: , Rfl:     pantoprazole (PROTONIX) 40 MG tablet, Take 40 mg by mouth once daily., Disp: , Rfl:     pravastatin (PRAVACHOL) 40 MG tablet, , Disp: , Rfl:     tizanidine (ZANAFLEX) 4 MG tablet, Take 1 tablet (4 mg total) by mouth 2 (two) times daily., Disp: 20 tablet, Rfl: 1    TOPROL XL 50 mg 24 hr tablet, , Disp: , Rfl:     vitamin D 1000 units Tab, Take 185 mg by mouth once daily., Disp: , Rfl:     vitamin E 100 UNIT capsule, Take 100 Units by mouth once daily., Disp: , Rfl:     PMHx/PSHx Updates:  See patient's last visit with me on 8/10/2017.  See H&P on 6/9/2017        Pathology:    9/13/2017  Pathology Report                                  FINAL DIAGNOSIS:  1.  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  Note: This case was sent to [facility] for additional  immunohistochemical studies and consultation, the results of which  confirm the diagnosis and classification of this lesion.  These results  are inserted below.  2.  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  Note: This case was sent to [facility] for additional  immunohistochemical studies and consultation, the results of which  confirm the diagnosis and classification of this lesion.          Objective:     Vitals:  Blood pressure 132/84, pulse 92, temperature 98.2 °F (36.8 °C), resp. rate 18, height 5' 10" (1.778 m), weight 109.6 kg (241 lb 9.6 oz).    Physical Examination:   GEN: no apparent distress, comfortable; AAOx3  HEAD: atraumatic and normocephalic  EYES: no pallor, no icterus, PERRLA  ENT: OMM, no pharyngeal erythema, external ears " WNL; no nasal discharge; no thrush  NECK: no masses, thyroid normal, trachea midline, no LAD/LN's, supple  CV: RRR with no murmur; normal pulse; normal S1 and S2; no pedal edema  CHEST: Normal respiratory effort; CTAB; normal breath sounds; no wheeze or crackles  ABDOM: nontender and nondistended; soft; normal bowel sounds; no rebound/guarding  MUSC/Skeletal: ROM normal; no crepitus; joints normal; no deformities or arthropathy  EXTREM: no clubbing, cyanosis, inflammation; milsd residual swelling LUE  SKIN: no rashes, lesions, ulcers, petechiae or subcutaneous nodules  : no cannon  NEURO: grossly intact; motor/sensory WNL; AAOx3; no tremors  PSYCH: normal mood, affect and behavior  LYMPH: LAD and LN's left and right neck        pettito      Labs:     9/12/2017  Lab Results   Component Value Date    WBC 5.0 09/12/2017    HGB 14.2 09/12/2017    HCT 43.7 09/12/2017    MCV 88.6 09/12/2017     09/12/2017     BMP  Lab Results   Component Value Date     09/12/2017    K 4.2 09/12/2017     09/12/2017    CO2 27.1 09/12/2017    BUN 13 09/12/2017    CREATININE 0.77 09/12/2017    CALCIUM 9.6 09/12/2017    ESTGFRAFRICA >60.0 11/25/2014    EGFRNONAA >60.0 11/25/2014               Radiology/Diagnostic Studies:    Doppler US of JAVIERE    I have reviewed all available lab results and radiology reports.    Assessment/Plan:   (1) 67 y.o. male with diagnosis of development of right sided neck swelling since Feb 2017  - he has been referred by Dr Michaud with ID for an evaluation  - i spoke to Dr Michaud previously about the patient peripherally  - FNA bx on 2/24 which was nondiagnostic  - he had a  guided biopsy of an entire left supraclavicular LN on 3/6/2017 with Dr Alexander Oh which showed  necrotizing granulomatous lymphadenitis  - CT scans were from Feb 2017  - flow cytometry studies from 5/25 appear to have been negative for any abnormal cell populations  - PET scan on 7/6 with markedly hypermetabolic LAD  - he  was referred to and seen by Dr Basilio at [facility] on 7/28 and again on 8/25  - he underwent repeat cervical LN biopsy with Dr Oh on 9/13/2017 and that 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)     (2) Hx/of prostate cancer - 1995; s/p prostatectomy followed by XRT     (3) JAK - uses CPAP     (4) GERD     (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 He needs to see Dr Basilio at [facility] again to decide on his treatment options (sooner than later)  2. Continue with Eliquis for now and write script  3. He will need to get bilateral bone marrow biopsies - most likely will be done at [facility]  4. he will need portacath at some point to facilitate chemotherapy  RTC in 1-2 weeks  Fax note to Ronny Driscoll MD, Jaswant Oh Saba    Discussion:     I have explained all of the above in detail and the patient understands all of the current recommendation(s). I have answered all of their questions to the best of my ability and to their complete satisfaction.   The patient is to continue with the current management plan.            Electronically signed by Fidencio Rdz MD

## 2017-09-26 NOTE — LETTER
September 26, 2017      Ronny Driscoll MD  [address] [facility]  [address]  [place] [address]           [facility] Hematology Oncology  [address] [facility]  [address]  [place] [address]  Phone: [phone]  Fax: [phone]          Patient: Naresh Painting   MR Number: [id number]   YOB: 1950   Date of Visit: 9/26/2017       Dear Dr. Ronny Driscoll:    Thank you for referring Naresh Painting to me for evaluation. Attached you will find relevant portions of my assessment and plan of care.    If you have questions, please do not hesitate to call me. I look forward to following Naresh Painting along with you.    Sincerely,    Fidencio Rdz MD    Enclosure  CC:  No Recipients    If you would like to receive this communication electronically, please contact [email or web address] or [phone] to request more information on [employer] Link access.    For providers and/or their staff who would like to refer a patient to [facility], please contact us through our one-stop-shop provider referral line, [facility], at [phone].    If you feel you have received this communication in error or would no longer like to receive these types of communications, please e-mail [email or web address]

## 2017-10-05 ENCOUNTER — TELEPHONE (OUTPATIENT)
Dept: HEMATOLOGY/ONCOLOGY | Facility: CLINIC | Age: 67
End: 2017-10-05

## 2017-10-05 ENCOUNTER — OFFICE VISIT (OUTPATIENT)
Dept: HEMATOLOGY/ONCOLOGY | Facility: CLINIC | Age: 67
End: 2017-10-05
Payer: MEDICARE

## 2017-10-05 VITALS
WEIGHT: 237.38 LBS | DIASTOLIC BLOOD PRESSURE: 84 MMHG | HEART RATE: 80 BPM | BODY MASS INDEX: 34.06 KG/M2 | SYSTOLIC BLOOD PRESSURE: 124 MMHG | TEMPERATURE: 98 F

## 2017-10-05 DIAGNOSIS — C83.31 DIFFUSE LARGE B-CELL LYMPHOMA OF LYMPH NODES OF NECK: Primary | ICD-10-CM

## 2017-10-05 DIAGNOSIS — T45.1X5A CHEMOTHERAPY-INDUCED NEUTROPENIA: ICD-10-CM

## 2017-10-05 DIAGNOSIS — C61 MALIGNANT NEOPLASM OF PROSTATE: ICD-10-CM

## 2017-10-05 DIAGNOSIS — I82.B19 SUBCLAVIAN VEIN THROMBOSIS, UNSPECIFIED LATERALITY: ICD-10-CM

## 2017-10-05 DIAGNOSIS — Z78.9 NON-SMOKER: ICD-10-CM

## 2017-10-05 DIAGNOSIS — D70.1 CHEMOTHERAPY-INDUCED NEUTROPENIA: ICD-10-CM

## 2017-10-05 DIAGNOSIS — R59.0 CERVICAL LYMPHADENOPATHY: ICD-10-CM

## 2017-10-05 PROCEDURE — 99214 OFFICE O/P EST MOD 30 MIN: CPT | Mod: ,,, | Performed by: INTERNAL MEDICINE

## 2017-10-05 RX ORDER — ONDANSETRON HYDROCHLORIDE 8 MG/1
TABLET, FILM COATED ORAL
COMMUNITY
Start: 2017-10-02 | End: 2017-11-17

## 2017-10-05 RX ORDER — PROMETHAZINE HYDROCHLORIDE 25 MG/1
25 TABLET ORAL EVERY 6 HOURS PRN
Qty: 30 TABLET | Refills: 3 | Status: SHIPPED | OUTPATIENT
Start: 2017-10-05 | End: 2017-11-17

## 2017-10-05 NOTE — PROGRESS NOTES
[facility] Hematology/Oncology  PROGRESS NOTE      Subjective:       Patient ID:   NAME: Naresh Painting : 1950     67 y.o. male    Referring Doc: Yamila  Other Physicians: Adriano Michaud Pernenkel, Saba    Chief Complaint:  DLBCL NHL    History of Present Illness:     Patient returns today for a regularly scheduled follow-up visit.  The patient saw Dr Basilio last Friday and he has been diagnosed with DLBCL NHL. Dr Basilio wants him to get echo and portacath and he recommends starting him on R-CHOP. He had bone marrow biopsy on 10/3 and the report appears to be negative for the presence of marrow involvement with lymphoma. He is still having some LUE weakness and possible radiculopathy. He has some occassional nausea and occasional hoarseness. No CP, SOB, or HA's.             ROS:   GEN: normal without any fever, night sweats or weight loss; positive fatigue  HEENT: normal with no HA's, sore throat, stiff neck, changes in vision  CV: normal with no CP, SOB, PND, COOPER or orthopnea  PULM: normal with no SOB, cough, hemoptysis, sputum or pleuritic pain  GI: normal with no abdominal pain, nausea, vomiting, constipation, diarrhea, melanotic stools, BRBPR, or hematemesis  : normal with no hematuria, dysuria  BREAST: normal with no mass, discharge, pain  SKIN: normal with no rash, erythema, bruising, or swelling    Allergies:  Review of patient's allergies indicates:  No Known Allergies    Medications:    Current Outpatient Prescriptions:     apixaban (ELIQUIS) 5 mg Tab, Take 5 mg by mouth 2 (two) times daily., Disp: , Rfl:     aspirin 81 MG Chew, Take 81 mg by mouth once daily., Disp: , Rfl:     co-enzyme Q-10 30 mg capsule, Take 30 mg by mouth 3 (three) times daily., Disp: , Rfl:     fish oil-omega-3 fatty acids 300-1,000 mg capsule, Take 2 g by mouth once daily., Disp: , Rfl:     hydrocodone-acetaminophen 10-325mg (NORCO)  mg Tab, , Disp: , Rfl:     L GASSERI/B BIFIDUM/B LONGUM ([employer]  ORAL), Take by mouth., Disp: , Rfl:     MULTIVITAMIN/IRON/FOLIC ACID (CENTRUM COMPLETE ORAL), Take by mouth., Disp: , Rfl:     ondansetron (ZOFRAN) 8 MG tablet, , Disp: , Rfl:     pantoprazole (PROTONIX) 40 MG tablet, Take 40 mg by mouth once daily., Disp: , Rfl:     pravastatin (PRAVACHOL) 40 MG tablet, , Disp: , Rfl:     TOPROL XL 50 mg 24 hr tablet, , Disp: , Rfl:     vitamin D 1000 units Tab, Take 185 mg by mouth once daily., Disp: , Rfl:     vitamin E 100 UNIT capsule, Take 100 Units by mouth once daily., Disp: , Rfl:     PMHx/PSHx Updates:  See patient's last visit with me on 9/26/2017.  See H&P on 6/9/2017        Pathology:  10/3 bone marrow biopsy - negative for lymphoma          Objective:     Vitals:  Blood pressure 124/84, pulse 80, temperature 97.9 °F (36.6 °C), temperature source Oral, weight 107.7 kg (237 lb 6.4 oz).    Physical Examination:   GEN: no apparent distress, comfortable; AAOx3  HEAD: atraumatic and normocephalic  EYES: no pallor, no icterus, PERRLA  ENT: OMM, no pharyngeal erythema, external ears WNL; no nasal discharge; no thrush  NECK: no masses, thyroid normal, trachea midline, no LAD/LN's, supple  CV: RRR with no murmur; normal pulse; normal S1 and S2; no pedal edema  CHEST: Normal respiratory effort; CTAB; normal breath sounds; no wheeze or crackles  ABDOM: nontender and nondistended; soft; normal bowel sounds; no rebound/guarding  MUSC/Skeletal: ROM normal; no crepitus; joints normal; no deformities or arthropathy  EXTREM: no clubbing, cyanosis, inflammation or swelling  SKIN: no rashes, lesions, ulcers, petechiae or subcutaneous nodules  : no cannon  NEURO: grossly intact; motor/sensory WNL; AAOx3; no tremors  PSYCH: normal mood, affect and behavior  LYMPH: left neck LAD            Labs:     9/23/2017      Radiology/Diagnostic Studies:        I have reviewed all available lab results and radiology reports.    Assessment/Plan:   (1) 67 y.o. male with diagnosis of development  of right sided neck swelling since Feb 2017  - he has been referred by Dr Michaud with ID for an evaluation  - i spoke to Dr Michaud previously about the patient peripherally  - FNA bx on 2/24 which was nondiagnostic  - he had a  guided biopsy of an entire left supraclavicular LN on 3/6/2017 with Dr Alexander Oh which showed  necrotizing granulomatous lymphadenitis  - CT scans were from Feb 2017  - flow cytometry studies from 5/25 appear to have been negative for any abnormal cell populations  - PET scan on 7/6 with markedly hypermetabolic LAD  - he was referred to and seen by Dr Basilio at [facility] on 7/28 and again on 8/25  - he underwent repeat cervical LN biopsy with Dr Oh on 9/13/2017 and that 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)  - he saw Dr Basilio again at [facility] and had bone marrow biopsy on 10/3  - the bone marrow showed no involvement of the lymphoom  - Dr Basilio recommended starting R-CHOP while he awaits additional studies  - he needs echo and portacath     (2) Hx/of prostate cancer - 1995; s/p prostatectomy followed by XRT     (3) JAK - uses CPAP     (4) GERD     (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 I discussed the TNM staging process with regard to the patient's particular cancer type, and the calculated stage based on the currently available TNM data. I discussed the available prognostic data with regard to the current staging information and how it relates to the prognosis of their particular neoplastic process.      I discussed the available treatment option(s) in accordance with the latest  NCCN Guidelines, their overall age and their co-morbidities. I went over the risks and benefits of the chemotherapy with regard to their particular cancer type, their cancer stage, their age, and their co-morbidities. I provided literature on the chemotherapy regimen and discussed the chemotherapy side-effect profiles of the drug(s). I discussed the importance of compliance with obtaining and monitoring weekly lab work, and went over the potential hematopathology issues and risks with anemia, leucopenia and thrombocytopenia that can occur with chemotherapy. I discussed the potential risks of liver and kidney damage, which could be permanent and could necessitate dialysis long-term if kidney failure developed. I discussed the emetic and/or diarrheal potential of the regimen and the potential need for use of antiemetic and anti-diarrheal medications. I discussed the risk for development of anaphylactic shock, bronchospasm, dysrhythmia, and respiratory/cardiovascular failure. I discussed the potential risks for development of alopecia, cold sensory issues, ringing in ears, vertigo and neuropathy, all of which could end up being chronic and life-long. I discussed the risks of hand-foot syndrome and rashes, and development of other autoimmune mediated processes such as pneumonitis and colitis which could be life threatening. I discussed the risks of the potential development of leukemia and/or lymphoma from use of certain chemotherapy agents. I discussed the need for neutropenic precautions, basic hygiene/sanitation behaviors and dietary restrictions.    The patient's consent has been obtained to proceed with the chemotherapy.The patient will be referred to Chemotherapy School /[facility] for training and education on chemotherapy, use of antiemetics and/or anti-diarrheals, use of NSAID's, potential chemotherapy side-effects, and any specific recommendations and precautions with the particular chemotherapy  agents.      I answered all of the patient's (and family's, if applicable) questions to the best of my ability and to their complete satisfaction. The patient acknowledged full understanding of the risks, recommendations and plan(s).       PLAN:  1.set up echo and portacath  2. provide literature on R- CHOP  3. Set up chemotherapy school with [facility]  4. RTC in 1 week  5. F/u with Dr Basilio on Nov 10th  Fax note to  Ronny Driscoll MD, Adriano Michaud Saba    Discussion:     I have explained all of the above in detail and the patient understands all of the current recommendation(s). I have answered all of their questions to the best of my ability and to their complete satisfaction.   The patient is to continue with the current management plan.            Electronically signed by Fidencio Rdz MD

## 2017-10-05 NOTE — TELEPHONE ENCOUNTER
I spoke with pt's wife. Informed her that the appt with Dr. Ellis will be on Tuesday, Oct 10, 2017 to eval for port placement. They will most likely put his port in on Wednesday. Per Dr. Rdz, pt needs to hold his eliquis 2 days prior to procedure and resume the day after the procedure. Pts wife understands and will call with any further questions.

## 2017-10-08 LAB
ALBUMIN SERPL-MCNC: 4.3 G/DL (ref 3.6–4.8)
ALBUMIN/GLOB SERPL: 2.3 {RATIO} (ref 1.2–2.2)
ALP SERPL-CCNC: 83 IU/L (ref 39–117)
ALT SERPL-CCNC: 15 IU/L (ref 0–44)
AMBIG ABBREV CMP 14 DEFAULT: NORMAL
AST SERPL-CCNC: 22 IU/L (ref 0–40)
BASOPHILS # BLD AUTO: 0.1 X10E3/UL (ref 0–0.2)
BASOPHILS NFR BLD AUTO: 1 %
BILIRUB SERPL-MCNC: 0.5 MG/DL (ref 0–1.2)
BUN SERPL-MCNC: 16 MG/DL (ref 8–27)
BUN/CREAT SERPL: 21 (ref 10–24)
CALCIUM SERPL-MCNC: 9.1 MG/DL (ref 8.6–10.2)
CHLORIDE SERPL-SCNC: 99 MMOL/L (ref 96–106)
CO2 SERPL-SCNC: 24 MMOL/L (ref 18–29)
CREAT SERPL-MCNC: 0.77 MG/DL (ref 0.76–1.27)
EOSINOPHIL # BLD AUTO: 0.2 X10E3/UL (ref 0–0.4)
EOSINOPHIL NFR BLD AUTO: 4 %
ERYTHROCYTE [DISTWIDTH] IN BLOOD BY AUTOMATED COUNT: 15.5 % (ref 12.3–15.4)
GLOBULIN SER CALC-MCNC: 1.9 G/DL (ref 1.5–4.5)
GLUCOSE SERPL-MCNC: 86 MG/DL (ref 65–99)
HCT VFR BLD AUTO: 43.1 % (ref 37.5–51)
HGB BLD-MCNC: 14 G/DL (ref 12.6–17.7)
IMM GRANULOCYTES # BLD: 0 X10E3/UL (ref 0–0.1)
IMM GRANULOCYTES NFR BLD: 0 %
LYMPHOCYTES # BLD AUTO: 0.8 X10E3/UL (ref 0.7–3.1)
LYMPHOCYTES NFR BLD AUTO: 15 %
MCH RBC QN AUTO: 29.2 PG (ref 26.6–33)
MCHC RBC AUTO-ENTMCNC: 32.5 G/DL (ref 31.5–35.7)
MCV RBC AUTO: 90 FL (ref 79–97)
MONOCYTES # BLD AUTO: 0.7 X10E3/UL (ref 0.1–0.9)
MONOCYTES NFR BLD AUTO: 12 %
NEUTROPHILS # BLD AUTO: 3.8 X10E3/UL (ref 1.4–7)
NEUTROPHILS NFR BLD AUTO: 68 %
PLATELET # BLD AUTO: 333 X10E3/UL (ref 150–379)
POTASSIUM SERPL-SCNC: 4.3 MMOL/L (ref 3.5–5.2)
PROT SERPL-MCNC: 6.2 G/DL (ref 6–8.5)
RBC # BLD AUTO: 4.79 X10E6/UL (ref 4.14–5.8)
SODIUM SERPL-SCNC: 141 MMOL/L (ref 134–144)
WBC # BLD AUTO: 5.5 X10E3/UL (ref 3.4–10.8)

## 2017-10-09 ENCOUNTER — CLINICAL SUPPORT (OUTPATIENT)
Dept: HEMATOLOGY/ONCOLOGY | Facility: CLINIC | Age: 67
End: 2017-10-09
Payer: MEDICARE

## 2017-10-09 ENCOUNTER — TELEPHONE (OUTPATIENT)
Dept: HEMATOLOGY/ONCOLOGY | Facility: CLINIC | Age: 67
End: 2017-10-09

## 2017-10-09 NOTE — TELEPHONE ENCOUNTER
Called Allopurinol 100 mg po daily #30 with 5 refills   Compazine 5 mg 1 po [id number] PRN nausea #30 with 2 Refills and Sancuso Patch 1 applied 24 hours prior to chemotherapy # 1 with 5 refills. To [facility] in the [facility] per the patient wife. Per [employer] the patients copay for  Sancuso would be $499 for 4 patches. Working on assistance for the patches through the drug company.

## 2017-10-09 NOTE — PROGRESS NOTES
Naresh RIVAS Painting  [id number]    Formerly [facility], later [facility]    TITLE: PLAN OF CARE FOR THE CHEMOTHERAPY PATIENT / TEACHING PROTOCOL    PURPOSE: To involve the patient / significant other in the plan of care and to provide teaching to the significant other & patient receiving chemotherapy.    LEVEL: Independent.    CONTENT: The Plan of Care for the chemotherapy patient is individualized and appropriate to the patients needs, strengths, limitations, & goals.  Education includes information regarding chemotherapy side effects, the treatment itself, and self-care  Activities.    GOAL / OUTCOME STANDARDS    PHYSIOLOGIC: The client will remain free or experience minimal side effects or toxicities throughout the chemotherapy treatment period.     PSYCHOLOGIC: The client/significant others will demonstrate positive coping mechanisms in relation to chemotherapy and its side effects.      COGINITIVE: The client/significant others will verbalize understanding of self-care measure to avoid/minimize side effects of the chemotherapy regime.    EVALUATION / COMMENT KEY:    V = Audiovisual/Video  S = Successfully meets outcome  N = Needs further instruction  NA = Not applicable to the patient  P = Previous knowledge  U = Unable to comprehend  * = See progress notes          PLAN OF CARE  INFORMATION TO BE DELIVERED / NURSING INTERVENTIONS DATE EVALUATION   1. Assessment of client/caregiver,         knowledge of cancer diagnosis,         and chemotherapy as a treatment. 1a. Evaluate patient/caregiver learning ability    b. Plan teaching sessions with patient/caregiver according to needs and present anxiety level/ability to learn.    c. Provide Chemotherapy Education Packet,        Mouth Care Protocol,         Specific Patient Education Sheets. 10/09/2017 S   2. Individual chemotherapy treatment         plan. 2a. Review of Chemotherapy Education handout from [employer]            10/09/2017   S   3. Knowledge Deficit &  Self-Management of general side effects common to all chemotherapy:  a. Nausea/Vomiting  b.   Diarrhea  c. Mouth Care  d. Dental care  e. Constipation  f. Hair Loss  g. Potential for infection  h. Fatigue   3a. Reinforce that the majority of side effects from chemotherapy are reversible and are  controlled both in the hospital and at home        (blood counts recover, hair grows back).   b.  Refer to the following for reinforcement of         information post-treatment:  1. Mouth Care Protocol.  2. Bowel Protocol for constipation or diarrhea.  3.  Drug Specific Chemotherapy Information Sheets for each medication patient receiving.    10/09/2017     S     PLAN OF CARE  INFORMATION TO BE DELIVERED / NURSING INTERVENTIONS DATE EVALUATION   h. Potential for bleeding         i. Potential anemia/fatigue         j. Potential sunburn         k. Birth control measures  l. Safety measures post treatment 4.  Chemotherapy Home Care Instruction  and Safety Information Sheet.  A. patient/caregivers to thoroughly cook shellfish (shrimp, crab, etc) to decrease the chance of infection.    B.  Use sunscreen and protective clothing while in the sun.   10/09/2017      4. Knowledge deficit & Self Management of Drug Specific  Side Effects.    a. BLADDER EFFECTS        (Hemorrhagic Cystitis)                  Preventable with adequate hydration; occurs 2-3 days or more post treatment.   1.  Instruct patient to:  a.   Void at least every 2 hours; increase intake.  b.   DO NOT hold urine; go when urge is felt.  c.    Empty bladder at bedtime and on         awakening.  d.   Observe for color changes (red to tea           colored), amount and frequency changes.  e.   Notify oncologist of any abnormalities           in urine or voiding or if you cannot               drink adequate fluids.   10/09/2017   S   b.   CHANGES IN URINE        COLOR:      1.   Instruct patient:  a.   Most evident in first 2-3 voidings after            administration.  b. Lasts less than 24 hours.  c. If urine is discolored 2 or more days post- treatment, notify oncologist.      10/09/2017 S   c.    KIDNEY EFFECTS           (Nephrotoxicity)   1.  Instruct patient to:  a.   Drink 8-16 glasses of fluid/day the day   pre-treatment and 3-4 days post-treatment to maintain hydration; the best way to minimize kidney problems.  b.   Notify oncologist immediately if unable to drink fluids or if changes are noted in urinary elimination.     10/09/2017   S   a. PULMONARY TOXICITY    1. Instruct patient to report symptoms such as shortness of breath, chest pain, shallow breathing, or chest wall discomfort to physician.  2. Reinforce preventative measures used by the health care team.  a. Baseline and periodic PFT and chest x-ray.   10/09/2017   S     PLAN OF CARE INFORMATION TO BE DELIVERED / NURSING INTERVENTIONS DATE EVALUATION   b. NERVE & MUSCLE EFFECTS (neurotoxocity; neuropathy, possible visual/hearing changes)        3. Instruct patient to:    a. Report numbness or tingling of the hands/feet, loss of fine motor movement (buttoning shirt, tying shoelaces), or gait changes to your oncologist.  b. If numbness/tingling are present:  1. protect feet with shoes at all times.  2. Use gloves for washing dishes/gardening & potholders in kitchen.       10/09/2017   S   c. CARDIOTOXICITY  Decreased effectiveness of             cardiac function. Effective are                  cumulative and irreversible.                                    CARDIAC ARRYTHMIAS              4   Instruct:  a. Heart function may be tested before treatment and perdiocally during treatment.  b. Notify oncologist of irregular pulse, palpitations, shortness of breath, or swelling in lower extremities/feet.          Taxol and Taxotere can cause arrhythmias on infusion that resolve once infusion discontinued. Instruct nurse if any irregularity felt.    10/09/2017   S   d. EXTRAVASTION  Occurs when vesicants  leak outside of vein and cause damage to the skin and underlying tissues.   1. Reinforce preventive measures used to avoid complications.  a. Fresh IV site or central line monitored continuously with vesicant IVP.  b. Continuous infusion via central line site and blood return monitored periodically around the clock.  2. Instruct to:  a. Notify nurse of any discomfort, burning, stinging, etc. at IV site during chemotherapy administration.  b. Notify oncologist of any redness, pain, or swelling at IV site after discharge from hospital.   10/09/2017   S   e. HYPERSENSITIVITY can happen with any medication.   1. Instruct patient:  a. Nurse is with them during the initial part of treatment and will be close by to monitor.  b. Pre-medication ordered by the oncologist must be taken on time. If doses are missed, treatment will need to be re-scheduled.  c. Skin redness, itching, or hives appearing after discharge should be reported to oncologist. 10/09/2017   S       PLAN OF CARE INFORMATION TO BE DELIVERED / NURSING INTERVENTIONS DATE EVALUATION   f. FLU-LIKE SYNDROME      1. Instruct patient symptoms are hard to prevent and may include fever, shaking chills, muscle and body aches.  a. Taking prescribed medications from physician if needed.  b. Adequate fluids are important.    2. Reinforce the need to call if temperature is         elevated to 100.4 or more  10/09/2017   S   g. HAND-FOOT SYNDROME  causes painful, symmetric swelling and redness of palms and soles                  5. Instruct patient to report any numbness or tingling in the hands or feet.  6. Explain prevention techniques, such as     a. Use heavy moisturizers to lessen skin dryness and itching, but to avoid if skin is cracked or broken  b. Bathe in tepid water, use non-perfumed soap, and wash gently. Baths with oatmeal or diluted baking soda may be soothing.  c. Avoid tight fitting shoes and repetitive actions, such as rubbing hands or applying pressure to  hands/feet.  7. Review measures to take should syndrome occur:  a. Cold compresses and elevation for          edema  b. Pain medications and other measures as ordered by oncologist.   4.   Syndrome resolves few weeks after therapy. 10/09/2017   S   5. DISCHARGE PLANNING /        EDUCATION 1.    Explain importance of compliance with follow- up  tests (CBC, CMP).  2.    Verify patient/caregiver know:  a.    Oncologists office phone number.  b.    Dates of follow-up appointments.  c.    Prescriptions given for nausea  3.   Review side effects to monitor and notify          oncologist about.  4.   Reinforce the need for patient and caregivers to:  a.    Review information given.  b.    Call oncologists office with questions          or symptoms  5.   Provide Cancer [facility] [employer] make referrals if needed for financial or .   10/09/2017   S     PROGRESS NOTES: I met with the patient and his wife today for chemotherapy education. he will be starting treatment with RCHOP . We discussed the mechanism of action, potential side effects of this treatment as well as ways he can manage them at home. Some of these side effects include but or not limited to fever, nausea, vomiting, decreased appetite, fatigue, weakness, cytopenias, myalgia/arthralgia, constipation, diarrhea, bleeding, headache, shortness of breath, nail changes, taste change, hair thinning/loss, mood disturbances, or edema. We also discussed dietary modifications he should make although this will be discussed in more detail with the dietician. he was provided with anti-emetic medication, a copy of all of the information we discussed today as well as our contact information. he will be provided a schedule on his first day of treatment. We will obtain labs on a weekly basis and the patient will follow-up with the physician for toxicity monitoring throughout treatment. All questions were answered and an informed consent was obtained. he was  reminded to certainly contact us sooner if needed. Patient currently had complaints of nausea and vomiting after eating. He has not been taking his Pantoprazole. Patient notified to restart his Pantoprazole per Dr. Rdz and he can try taking his Promethazine prior to eating. Per Dr. Rdz called Allopurinol to start today. Patient and wife verbalized understanding. Also called in a Sancuso patch and Compazine. Patient and wife notified to not take the compazine at the same time as Promethazine. Verbalized understanding. Patient has an ECHO today, port consult tomorrow with placement on Wednesday. Patient to start chemotherapy on Oct 16th.

## 2017-10-09 NOTE — PROGRESS NOTES
Naresh is a 67 year old male with NHL getting RCHOP.  He has a mass in his neck, but does not have any issues swallowing. Weight: 241 lbs. Prior to diagnosis, he attended [employer] and states he lost 50 lbs.  He recently lost 10 pounds which he attributes to being severely nauseated.  He states anything bulky or gritty will trigger his nausea. (his example was a smoothie his wife made him). Plan: Discussed importance of nutrition during cancer treatment and discouraged intentional weight loss. 2. Gave list of calories dense small meal and snack ideas.  Advised he aim for 2500 calories daily and 10 cups of fluid. 3. Advised he drink liquids between meals (1 hour after) to help with nausea and prevent fullness. 4. Gave samples of nutrition supplements and copy of milkshake recipes. 5. Advised he add fat for calories, unless the butter etc made him nauseated. 6. Reviewed list of eating tips with nausea and RN gave sample of Sancuso patch and called in prescription. 7. Gave copy of my chemo school packet, was not able to verbally review with them because they had appointment at the hospital.  8. Will follow up at first chemo appointment.

## 2017-10-09 NOTE — PROGRESS NOTES
Met with patient and his wife to complete New Patient Orientation and distress screening.  Patient signed consent form to receive information from the [employer].

## 2017-10-10 ENCOUNTER — TELEPHONE (OUTPATIENT)
Dept: HEMATOLOGY/ONCOLOGY | Facility: CLINIC | Age: 67
End: 2017-10-10

## 2017-10-10 ENCOUNTER — OFFICE VISIT (OUTPATIENT)
Dept: SURGERY | Facility: CLINIC | Age: 67
End: 2017-10-10
Payer: MEDICARE

## 2017-10-10 ENCOUNTER — DOCUMENTATION ONLY (OUTPATIENT)
Dept: RADIATION ONCOLOGY | Facility: CLINIC | Age: 67
End: 2017-10-10

## 2017-10-10 VITALS
SYSTOLIC BLOOD PRESSURE: 124 MMHG | DIASTOLIC BLOOD PRESSURE: 84 MMHG | HEIGHT: 70 IN | WEIGHT: 237.44 LBS | BODY MASS INDEX: 33.99 KG/M2

## 2017-10-10 DIAGNOSIS — C83.31 DIFFUSE LARGE B-CELL LYMPHOMA OF LYMPH NODES OF NECK: ICD-10-CM

## 2017-10-10 DIAGNOSIS — I87.2 VENOUS INSUFFICIENCY: Primary | ICD-10-CM

## 2017-10-10 PROCEDURE — 99024 POSTOP FOLLOW-UP VISIT: CPT | Mod: ,,, | Performed by: SURGERY

## 2017-10-10 RX ORDER — PROCHLORPERAZINE MALEATE 10 MG
TABLET ORAL
COMMUNITY
Start: 2017-10-09 | End: 2017-11-17

## 2017-10-10 RX ORDER — ALLOPURINOL 100 MG/1
TABLET ORAL
COMMUNITY
Start: 2017-10-09 | End: 2017-11-17

## 2017-10-10 NOTE — TELEPHONE ENCOUNTER
Received a call from Patient [employer]och[employer] that there is no assistance for the Sancuso patch for this patient.  He would have to pay his $400.00 co/pay.  Advised patient and Dr. Rdz's office.

## 2017-10-10 NOTE — PROGRESS NOTES
Subjective:       Patient ID: Naresh Painting is a 67 y.o. male.    Chief Complaint: Other (Eval Port Placement- Chemo to start 10/16/17)      HPI:  Patient returns in consultation for port a cath placement.  His last biopsy of the left supraclavicular mass did return diffuse B cell lymphoma.  He is scheduled to start chemotherapy this Monday.  He was diagnosed with left upper extremity DVT two weeks ago.  He is now on eliquis.  It has been stopped since Sunday.      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    ASPIRIN 81 MG CHEW    Take 81 mg by mouth once daily.    CO-ENZYME Q-10 30 MG CAPSULE    Take 30 mg by mouth 3 (three) times daily.    FISH OIL-OMEGA-3 FATTY ACIDS 300-1,000 MG CAPSULE    Take 2 g by mouth once daily.    HYDROCODONE-ACETAMINOPHEN 10-325MG (NORCO)  MG TAB        L GASSERI/B BIFIDUM/B LONGUM ([facility] ORAL)    Take by mouth.    MULTIVITAMIN/IRON/FOLIC ACID (CENTRUM COMPLETE ORAL)    Take by mouth.    ONDANSETRON (ZOFRAN) 8 MG TABLET        PANTOPRAZOLE (PROTONIX) 40 MG TABLET    Take 40 mg by mouth once daily.    PRAVASTATIN (PRAVACHOL) 40 MG TABLET         PROCHLORPERAZINE (COMPAZINE) 10 MG TABLET        PROMETHAZINE (PHENERGAN) 25 MG TABLET    Take 1 tablet (25 mg total) by mouth every 6 (six) hours as needed for Nausea.    TOPROL XL 50 MG 24 HR TABLET        VITAMIN D 1000 UNITS TAB    Take 185 mg by mouth once daily.    VITAMIN E 100 UNIT CAPSULE    Take 100 Units by mouth once daily.     Family History   Problem Relation Age of Onset    Heart disease Mother     Diabetes Father      Social History     Social History    Marital status:      Spouse name: N/A    Number of children: N/A    Years of education: N/A     Social History Main Topics    Smoking status: Former Smoker    Smokeless tobacco: Never Used      Comment: quit 1995    Alcohol use Yes    Drug use: No    Sexual activity: Yes     Partners: Female     Other Topics Concern    None     Social History Narrative    None         Review of Systems   Constitutional: Positive for fatigue. Negative for appetite change, chills, fever and unexpected weight change.   HENT: Negative for hearing loss, rhinorrhea, sore throat and voice change.    Eyes: Negative for photophobia and visual disturbance.   Respiratory: Negative for cough, choking and shortness of breath.    Cardiovascular: Negative for chest pain, palpitations and leg swelling.   Gastrointestinal: Positive for nausea. Negative for abdominal pain, blood in stool, constipation, diarrhea and vomiting.   Endocrine: Negative for cold intolerance, heat intolerance, polydipsia and polyuria.   Musculoskeletal: Positive for neck pain. Negative for arthralgias, back pain, joint swelling and neck stiffness.   Skin: Negative for color change, pallor and rash.   Neurological: Negative for dizziness, seizures, syncope and headaches.   Hematological: Positive for adenopathy. Does not bruise/bleed easily.   Psychiatric/Behavioral: Negative for agitation, behavioral problems and confusion.       Objective:      Physical Exam   Constitutional: He is  oriented to person, place, and time. He appears well-developed and well-nourished.  Non-toxic appearance. No distress.   HENT:   Head: Normocephalic and atraumatic. Head is without abrasion and without laceration.   Right Ear: External ear normal.   Left Ear: External ear normal.   Nose: Nose normal.   Mouth/Throat: Oropharynx is clear and moist.   Eyes: EOM are normal. Pupils are equal, round, and reactive to light.   Neck: Trachea normal. Neck supple. No tracheal deviation and normal range of motion present.       Large firm barely mobile left neck mass/LAD.  Right side is bulky but much more mobile.     Cardiovascular: Normal rate and regular rhythm.    Pulmonary/Chest: Effort normal. No accessory muscle usage. No tachypnea. No respiratory distress.   Abdominal: Soft. Normal appearance and bowel sounds are normal. He exhibits no distension and no mass. There is no tenderness.   Lymphadenopathy:     He has cervical adenopathy (very bulky LAD bilaterally worse on the left.  ).        Right cervical: Superficial cervical adenopathy present.        Left cervical: Superficial cervical and deep cervical adenopathy present.     He has no axillary adenopathy.        Right: No inguinal adenopathy present.        Left: No inguinal adenopathy present.   Neurological: He is alert and oriented to person, place, and time. Coordination and gait normal.   Skin: Skin is warm and intact.        incision healing well.     Psychiatric: He has a normal mood and affect. His speech is normal and behavior is normal.       Assessment/Plan:   Venous insufficiency  Comments:  Port will be scheduled for tomorrow.    Orders:  -     Ambulatory Referral to External Surgery    Diffuse large B-cell lymphoma of lymph nodes of neck  Comments:  Chemotherapy is scheduled to start this monday.  Port will be placed tomorrow.          Planned procedure: port a cath placement     Ancef 2 gm IV on call to OR    NPO past midnight    Dewayne cloth scrub per  protocol    SCDs Bilateral Lower Extremities    I discussed the proposed procedures the patient including risks, benefits, indications, alternatives and special concerns.  The patient appears to understand and agrees to go ahead with surgery.  I have made no promises, warranties or verbal agreements beyond what was discussed above.    No Follow-up on file.

## 2017-10-10 NOTE — PROGRESS NOTES
NUTRITION REFERRAL  Naresh is a 67 year old male with NHL getting RCHOP.  He has a mass in his neck, but does not have any issues swallowing. Weight: 241 lbs. Prior to diagnosis, he attended Weight Watchers and states he lost 50 lbs.  He recently lost 10 pounds which he attributes to being severely nauseated.  He states anything bulky or gritty will trigger his nausea. (his example was a smoothie his wife made him). Plan: Discussed importance of nutrition during cancer treatment and discouraged intentional weight loss. 2. Gave list of calories dense small meal and snack ideas.  Advised he aim for 2500 calories daily and 10 cups of fluid. 3. Advised he drink liquids between meals (1 hour after) to help with nausea and prevent fullness. 4. Gave samples of nutrition supplements and copy of milkshake recipes. 5. Advised he add fat for calories, unless the butter etc made him nauseated. 6. Reviewed list of eating tips with nausea and RN gave sample of Sancuso patch and called in prescription. 7. Gave copy of my chemo school packet, was not able to verbally review with them because they had appointment at the hospital.  8. Will follow up at first chemo appointment.

## 2017-10-10 NOTE — LETTER
October 10, 2017      Fidencio Rdz MD  [address]  [address]  [place] [address]           [facility]  [address]  [address]  [place] [address]  Phone: [phone]  Fax: [phone]          Patient: Naresh Painting   MR Number: [id number]   YOB: 1950   Date of Visit: 10/10/2017       Dear Dr. Fidencio Rdz:    Thank you for referring Naresh Painting to me for evaluation. Attached you will find relevant portions of my assessment and plan of care.    If you have questions, please do not hesitate to call me. I look forward to following Naresh Painting along with you.    Sincerely,    Amandeep Ellis III, MD    Enclosure  CC:  No Recipients    If you would like to receive this communication electronically, please contact [email or web address] or [phone] to request more information on [employer] access.    For providers and/or their staff who would like to refer a patient to [facility], please contact us through our one-stop-shop provider referral line, [facility], at [phone].    If you feel you have received this communication in error or would no longer like to receive these types of communications, please e-mail [email or web address]

## 2017-10-12 ENCOUNTER — OFFICE VISIT (OUTPATIENT)
Dept: HEMATOLOGY/ONCOLOGY | Facility: CLINIC | Age: 67
End: 2017-10-12
Payer: MEDICARE

## 2017-10-12 VITALS
SYSTOLIC BLOOD PRESSURE: 106 MMHG | HEIGHT: 70 IN | BODY MASS INDEX: 33.31 KG/M2 | WEIGHT: 232.69 LBS | RESPIRATION RATE: 18 BRPM | HEART RATE: 68 BPM | TEMPERATURE: 98 F | DIASTOLIC BLOOD PRESSURE: 60 MMHG

## 2017-10-12 DIAGNOSIS — C61 MALIGNANT NEOPLASM OF PROSTATE: Primary | ICD-10-CM

## 2017-10-12 DIAGNOSIS — T45.1X5A CHEMOTHERAPY-INDUCED NEUTROPENIA: ICD-10-CM

## 2017-10-12 DIAGNOSIS — C83.31 DIFFUSE LARGE B-CELL LYMPHOMA OF LYMPH NODES OF NECK: ICD-10-CM

## 2017-10-12 DIAGNOSIS — R59.0 CERVICAL LYMPHADENOPATHY: ICD-10-CM

## 2017-10-12 DIAGNOSIS — D70.1 CHEMOTHERAPY-INDUCED NEUTROPENIA: ICD-10-CM

## 2017-10-12 DIAGNOSIS — R22.1 MASS OF NECK: ICD-10-CM

## 2017-10-12 DIAGNOSIS — I82.B19 SUBCLAVIAN VEIN THROMBOSIS, UNSPECIFIED LATERALITY: ICD-10-CM

## 2017-10-12 PROCEDURE — 99214 OFFICE O/P EST MOD 30 MIN: CPT | Mod: ,,, | Performed by: INTERNAL MEDICINE

## 2017-10-12 RX ORDER — FAMOTIDINE 10 MG/ML
20 INJECTION INTRAVENOUS
Status: CANCELLED | OUTPATIENT
Start: 2017-10-12

## 2017-10-12 RX ORDER — DOXORUBICIN HYDROCHLORIDE 2 MG/ML
50 INJECTION, SOLUTION INTRAVENOUS
Status: CANCELLED | OUTPATIENT
Start: 2017-10-12

## 2017-10-12 RX ORDER — SODIUM CHLORIDE 0.9 % (FLUSH) 0.9 %
10 SYRINGE (ML) INJECTION
Status: CANCELLED | OUTPATIENT
Start: 2017-10-12

## 2017-10-12 RX ORDER — HEPARIN 100 UNIT/ML
500 SYRINGE INTRAVENOUS
Status: CANCELLED | OUTPATIENT
Start: 2017-10-12

## 2017-10-12 RX ORDER — ACETAMINOPHEN 325 MG/1
650 TABLET ORAL
Status: CANCELLED | OUTPATIENT
Start: 2017-10-12

## 2017-10-12 NOTE — LETTER
October 12, 2017      Ronny Driscoll MD  [address] [facility]  [address]  [place] [address]           [facility]  [address] [facility]  [address]  [place] [address]  Phone: [phone]  Fax: [phone]          Patient: Naresh Painting   MR Number: [id number]   YOB: 1950   Date of Visit: 10/12/2017       Dear Dr. Ronny Driscoll:    Thank you for referring Naresh Painting to me for evaluation. Attached you will find relevant portions of my assessment and plan of care.    If you have questions, please do not hesitate to call me. I look forward to following Naresh Painting along with you.    Sincerely,    Fidencio Rdz MD    Enclosure  CC:  No Recipients    If you would like to receive this communication electronically, please contact [email or web address] or [phone] to request more information on [email or web address] access.    For providers and/or their staff who would like to refer a patient to [facility], please contact us through our one-stop-shop provider referral line, [facility], at [phone].    If you feel you have received this communication in error or would no longer like to receive these types of communications, please e-mail [email or web address]

## 2017-10-12 NOTE — PROGRESS NOTES
[facility] Hematology/Oncology  PROGRESS NOTE      Subjective:       Patient ID:   NAME: Naresh Painting : 1950     67 y.o. male    Referring Doc: Ronny Driscoll MD  Other Physicians: Adriano Michaud Pernenkel, Saba    Chief Complaint:  Follow-up and Results (scan and labs in epic)      History of Present Illness:     Patient returns today for a regularly scheduled follow-up visit.  The patient had his portacath placed and echo was done. He is starting chemotherapy this Monday. He has had chemotherapy school and training with Che Lazo. He has bene having some N/V issues for the past couple of weeks. He is using zofran and phenergan. No CP, SOB, Ha's or fevers. He had portacath placed yesterday.             ROS:   GEN: normal without any fever, night sweats or weight loss  HEENT: normal with no HA's, sore throat, stiff neck, changes in vision  CV: normal with no CP, SOB, PND, COOPER or orthopnea  PULM: normal with no SOB, cough, hemoptysis, sputum or pleuritic pain  GI: normal with no abdominal pain, some nausea issues; no constipation, diarrhea, melanotic stools, BRBPR, or hematemesis  : normal with no hematuria, dysuria  BREAST: normal with no mass, discharge, pain  SKIN: normal with no rash, erythema, bruising, or swelling    Allergies:  Review of patient's allergies indicates:  No Known Allergies    Medications:    Current Outpatient Prescriptions:     allopurinol (ZYLOPRIM) 100 MG tablet, , Disp: , Rfl:     apixaban (ELIQUIS) 5 mg Tab, Take 5 mg by mouth 2 (two) times daily., Disp: , Rfl:     aspirin 81 MG Chew, Take 81 mg by mouth once daily., Disp: , Rfl:     co-enzyme Q-10 30 mg capsule, Take 30 mg by mouth 3 (three) times daily., Disp: , Rfl:     fish oil-omega-3 fatty acids 300-1,000 mg capsule, Take 2 g by mouth once daily., Disp: , Rfl:     hydrocodone-acetaminophen 10-325mg (NORCO)  mg Tab, , Disp: , Rfl:     L GASSERI/B BIFIDUM/B LONGUM (wireWAX ORAL), Take by mouth.,  "Disp: , Rfl:     MULTIVITAMIN/IRON/FOLIC ACID (CENTRUM COMPLETE ORAL), Take by mouth., Disp: , Rfl:     ondansetron (ZOFRAN) 8 MG tablet, , Disp: , Rfl:     pantoprazole (PROTONIX) 40 MG tablet, Take 40 mg by mouth once daily., Disp: , Rfl:     pravastatin (PRAVACHOL) 40 MG tablet, , Disp: , Rfl:     prochlorperazine (COMPAZINE) 10 MG tablet, , Disp: , Rfl:     promethazine (PHENERGAN) 25 MG tablet, Take 1 tablet (25 mg total) by mouth every 6 (six) hours as needed for Nausea., Disp: 30 tablet, Rfl: 3    TOPROL XL 50 mg 24 hr tablet, , Disp: , Rfl:     vitamin D 1000 units Tab, Take 185 mg by mouth once daily., Disp: , Rfl:     vitamin E 100 UNIT capsule, Take 100 Units by mouth once daily., Disp: , Rfl:     PMHx/PSHx Updates:  See patient's last visit with me on 10/5/2017.  See H&P on 6/9/2017        Pathology:  See path report on LN  9/13/2017  FINAL DIAGNOSIS:  1.  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  Note: This case was sent to [facility] for additional  immunohistochemical studies and consultation, the results of which  confirm the diagnosis and classification of this lesion.  These results  are inserted below.  2.  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  Note: This case was sent to [facility] for additional  immunohistochemical studies and consultation, the results of which  confirm the diagnosis and classification of this lesion.  These results  are inserted below.        Objective:     Vitals:  Blood pressure 106/60, pulse 68, temperature 97.9 °F (36.6 °C), resp. rate 18, height 5' 10" (1.778 m), weight 105.6 kg (232 lb 11.2 oz).    Physical Examination:   GEN: no apparent distress, comfortable; AAOx3  HEAD: atraumatic and normocephalic  EYES: no pallor, no icterus, PERRLA  ENT: OMM, no pharyngeal erythema, external ears WNL; no nasal discharge; no " thrush  NECK: no masses, thyroid normal, trachea midline, no LAD/LN's, supple  CV: RRR with no murmur; normal pulse; normal S1 and S2; no pedal edema  CHEST: Normal respiratory effort; CTAB; normal breath sounds; no wheeze or crackles  ABDOM: nontender and nondistended; soft; normal bowel sounds; no rebound/guarding  MUSC/Skeletal: ROM normal; no crepitus; joints normal; no deformities or arthropathy  EXTREM: no clubbing, cyanosis, inflammation or swelling  SKIN: no rashes, lesions, ulcers, petechiae or subcutaneous nodules  : no cannon  NEURO: grossly intact; motor/sensory WNL; AAOx3; no tremors  PSYCH: normal mood, affect and behavior  LYMPH: normal cervical, supraclavicular, axillary and groin LN's            Labs:     Pending today      Radiology/Diagnostic Studies:    [facility] Unknown Rad Eap    Result Date: 10/11/2017  HISTORY: Post central venous catheter insertion. FINDINGS: Portable chest radiograph at 0950 hours compared to 09/22/2017 shows interval insertion of a right subclavian injection port type catheter, with the distal tip overlying the SVC and no pneumothorax. The cardiomediastinal silhouette and pulmonary vasculature are stable. Rightward tracheal deviation from known left supraclavicular mass is unchanged. The lungs are hypoinflated with basilar opacities suggesting subsegmental atelectasis, and minor blunting the costophrenic angles. There is no acute osseous abnormality.   IMPRESSION: Insertion of a right subclavian injection port type catheter, with the distal tip overlying the SVC and no pneumothorax. Read and electronically signed by: Zac Borja MD on 10/11/2017 10:22 AM CDT ZAC BORJA MD      I have reviewed all available lab results and radiology reports.    Assessment/Plan:     (1) 67 y.o. male with diagnosis of development of right sided neck swelling since Feb 2017  - he has been referred by Dr Michaud with ID for an evaluation  - i spoke to Dr Michaud previously about the patient  peripherally  - FNA bx on 2/24 which was nondiagnostic  - he had a  guided biopsy of an entire left supraclavicular LN on 3/6/2017 with Dr Alexander Oh which showed  necrotizing granulomatous lymphadenitis  - CT scans were from Feb 2017  - flow cytometry studies from 5/25 appear to have been negative for any abnormal cell populations  - PET scan on 7/6 with markedly hypermetabolic LAD  - he was referred to and seen by Dr Basilio at [facility] on 7/28 and again on 8/25  - he underwent repeat cervical LN biopsy with Dr Oh on 9/13/2017 and that 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)  - he saw Dr Basilio again at 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 Start allopurinol now  2. proceed with 1st day of chemotherapy this Monday  3. I answered all of their questions   4.  RTC in 1 week  Fax note to  Ronny Driscoll MD, Adriano Michaud Saba    Discussion:     I have explained all of the above in detail and the patient understands all of the current recommendation(s). I have answered all of their questions to the best of my ability and to their complete satisfaction.   The patient is to continue with the current management plan.            Electronically signed by Fidencio Rdz MD

## 2017-10-13 ENCOUNTER — TELEPHONE (OUTPATIENT)
Dept: HEMATOLOGY/ONCOLOGY | Facility: CLINIC | Age: 67
End: 2017-10-13

## 2017-10-13 LAB
ALBUMIN SERPL-MCNC: 3.7 G/DL (ref 3.6–4.8)
ALBUMIN/GLOB SERPL: 1.5 {RATIO} (ref 1.2–2.2)
ALP SERPL-CCNC: 75 IU/L (ref 39–117)
ALT SERPL-CCNC: 21 IU/L (ref 0–44)
AST SERPL-CCNC: 30 IU/L (ref 0–40)
BASOPHILS # BLD AUTO: 0 X10E3/UL (ref 0–0.2)
BASOPHILS NFR BLD AUTO: 1 %
BILIRUB SERPL-MCNC: 0.7 MG/DL (ref 0–1.2)
BUN SERPL-MCNC: 14 MG/DL (ref 8–27)
BUN/CREAT SERPL: 17 (ref 10–24)
CALCIUM SERPL-MCNC: 9 MG/DL (ref 8.6–10.2)
CHLORIDE SERPL-SCNC: 105 MMOL/L (ref 96–106)
CO2 SERPL-SCNC: 24 MMOL/L (ref 18–29)
CREAT SERPL-MCNC: 0.82 MG/DL (ref 0.76–1.27)
EOSINOPHIL # BLD AUTO: 0.1 X10E3/UL (ref 0–0.4)
EOSINOPHIL NFR BLD AUTO: 2 %
ERYTHROCYTE [DISTWIDTH] IN BLOOD BY AUTOMATED COUNT: 15.5 % (ref 12.3–15.4)
GLOBULIN SER CALC-MCNC: 2.4 G/DL (ref 1.5–4.5)
GLUCOSE SERPL-MCNC: 91 MG/DL (ref 65–99)
HCT VFR BLD AUTO: 41.8 % (ref 37.5–51)
HGB BLD-MCNC: 13.6 G/DL (ref 12.6–17.7)
IMM GRANULOCYTES # BLD: 0 X10E3/UL (ref 0–0.1)
IMM GRANULOCYTES NFR BLD: 0 %
LYMPHOCYTES # BLD AUTO: 0.5 X10E3/UL (ref 0.7–3.1)
LYMPHOCYTES NFR BLD AUTO: 8 %
MCH RBC QN AUTO: 29.4 PG (ref 26.6–33)
MCHC RBC AUTO-ENTMCNC: 32.5 G/DL (ref 31.5–35.7)
MCV RBC AUTO: 91 FL (ref 79–97)
MONOCYTES # BLD AUTO: 0.5 X10E3/UL (ref 0.1–0.9)
MONOCYTES NFR BLD AUTO: 8 %
NEUTROPHILS # BLD AUTO: 5.3 X10E3/UL (ref 1.4–7)
NEUTROPHILS NFR BLD AUTO: 81 %
PLATELET # BLD AUTO: 254 X10E3/UL (ref 150–379)
POTASSIUM SERPL-SCNC: 4 MMOL/L (ref 3.5–5.2)
PROT SERPL-MCNC: 6.1 G/DL (ref 6–8.5)
RBC # BLD AUTO: 4.62 X10E6/UL (ref 4.14–5.8)
SODIUM SERPL-SCNC: 146 MMOL/L (ref 134–144)
WBC # BLD AUTO: 6.6 X10E3/UL (ref 3.4–10.8)

## 2017-10-13 NOTE — TELEPHONE ENCOUNTER
Patients wife called regarding a few questions she had prior to him starting chemo on Monday. She wanted to know when he starts his Prednisone, per the prescription in his chart he needs to start it oin Day2. She states he has been taking his Allopurinol. She wanted to know could he eat prior to. She was notified he can eat and take his medications as usual. He can bring his pain meds with him oif needed and they can also bring something to eat since he iwll be here a majority of the day. She also asked when he could put this Sancuso patch on? He only has 1, notified her he can put it on Sunday, 24 hours prior to chemo. Wife verbalized understanding.

## 2017-10-26 ENCOUNTER — OFFICE VISIT (OUTPATIENT)
Dept: HEMATOLOGY/ONCOLOGY | Facility: CLINIC | Age: 67
End: 2017-10-26
Payer: MEDICARE

## 2017-10-26 VITALS
TEMPERATURE: 98 F | SYSTOLIC BLOOD PRESSURE: 109 MMHG | HEART RATE: 90 BPM | BODY MASS INDEX: 31.87 KG/M2 | RESPIRATION RATE: 18 BRPM | WEIGHT: 222.63 LBS | DIASTOLIC BLOOD PRESSURE: 79 MMHG | HEIGHT: 70 IN

## 2017-10-26 DIAGNOSIS — I82.B19 SUBCLAVIAN VEIN THROMBOSIS, UNSPECIFIED LATERALITY: Primary | ICD-10-CM

## 2017-10-26 DIAGNOSIS — T45.1X5A CHEMOTHERAPY-INDUCED NEUTROPENIA: ICD-10-CM

## 2017-10-26 DIAGNOSIS — D70.1 CHEMOTHERAPY-INDUCED NEUTROPENIA: ICD-10-CM

## 2017-10-26 DIAGNOSIS — R59.0 CERVICAL LYMPHADENOPATHY: ICD-10-CM

## 2017-10-26 DIAGNOSIS — C61 MALIGNANT NEOPLASM OF PROSTATE: ICD-10-CM

## 2017-10-26 DIAGNOSIS — C83.31 DIFFUSE LARGE B-CELL LYMPHOMA OF LYMPH NODES OF NECK: ICD-10-CM

## 2017-10-26 PROCEDURE — 99214 OFFICE O/P EST MOD 30 MIN: CPT | Mod: ,,, | Performed by: INTERNAL MEDICINE

## 2017-10-26 RX ORDER — PREDNISONE 50 MG/1
TABLET ORAL
Refills: 6 | COMMUNITY
Start: 2017-10-13 | End: 2018-06-05

## 2017-10-26 NOTE — PROGRESS NOTES
"   [facility] Hematology/Oncology  PROGRESS NOTE      Subjective:       Patient ID:   NAME: Naresh Painting : 1950     67 y.o. male    Referring Doc: Yamila  Other Physicians: Rhonda Michaud Pernenkil, Saba    Chief Complaint:  NHL f/u    History of Present Illness:     Patient returns today for a regularly scheduled follow-up visit.  The patient had his first cycle of chemotherapy. He is here with his wife. He did well with the chemotherapy and had no ill effects. He denies any CP, SOB, HA's or V. His nausea resolved and he now able to lay on his back. His LN's in neck have gone down and the swelling in the left arm has resolved. No fevers. He is feeling "beautiful". He has some dry mouth issues.             ROS:   GEN: normal without any fever, night sweats or weight loss  HEENT: normal with no HA's, sore throat, stiff neck, changes in vision  CV: normal with no CP, SOB, PND, COOPER or orthopnea  PULM: normal with no SOB, cough, hemoptysis, sputum or pleuritic pain  GI: normal with no abdominal pain, nausea, vomiting, constipation, diarrhea, melanotic stools, BRBPR, or hematemesis  : normal with no hematuria, dysuria  BREAST: normal with no mass, discharge, pain  SKIN: normal with no rash, erythema, bruising, or swelling    Allergies:  Review of patient's allergies indicates:  No Known Allergies    Medications:    Current Outpatient Prescriptions:     allopurinol (ZYLOPRIM) 100 MG tablet, , Disp: , Rfl:     apixaban (ELIQUIS) 5 mg Tab, Take 5 mg by mouth 2 (two) times daily., Disp: , Rfl:     aspirin 81 MG Chew, Take 81 mg by mouth once daily., Disp: , Rfl:     co-enzyme Q-10 30 mg capsule, Take 30 mg by mouth 3 (three) times daily., Disp: , Rfl:     fish oil-omega-3 fatty acids 300-1,000 mg capsule, Take 2 g by mouth once daily., Disp: , Rfl:     hydrocodone-acetaminophen 10-325mg (NORCO)  mg Tab, , Disp: , Rfl:     L GASSERI/B BIFIDUM/B LONGUM (Workable ORAL), Take by mouth., Disp: , " "Rfl:     MULTIVITAMIN/IRON/FOLIC ACID (CENTRUM COMPLETE ORAL), Take by mouth., Disp: , Rfl:     ondansetron (ZOFRAN) 8 MG tablet, , Disp: , Rfl:     pantoprazole (PROTONIX) 40 MG tablet, Take 40 mg by mouth once daily., Disp: , Rfl:     pravastatin (PRAVACHOL) 40 MG tablet, , Disp: , Rfl:     predniSONE (DELTASONE) 50 MG Tab, TK 3 TS PO ONCE D ON DAYS 2 THROUGH 5, Disp: , Rfl: 6    prochlorperazine (COMPAZINE) 10 MG tablet, , Disp: , Rfl:     promethazine (PHENERGAN) 25 MG tablet, Take 1 tablet (25 mg total) by mouth every 6 (six) hours as needed for Nausea., Disp: 30 tablet, Rfl: 3    TOPROL XL 50 mg 24 hr tablet, , Disp: , Rfl:     vitamin D 1000 units Tab, Take 185 mg by mouth once daily., Disp: , Rfl:     vitamin E 100 UNIT capsule, Take 100 Units by mouth once daily., Disp: , Rfl:     PMHx/PSHx Updates:  See patient's last visit with me on 10/12/2017.  See H&P on 6/9/2017        Pathology:  See path report on LN  9/13/2017  FINAL DIAGNOSIS:  1.  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  Note: This case was sent to [facility] for additional  immunohistochemical studies and consultation, the results of which  confirm the diagnosis and classification of this lesion.  These results  are inserted below.  2.  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  Note: This case was sent to [facility] for additional  immunohistochemical studies and consultation, the results of which  confirm the diagnosis and classification of this lesion.  These results  are inserted below.          Objective:     Vitals:  Blood pressure 109/79, pulse 90, temperature 97.9 °F (36.6 °C), resp. rate 18, height 5' 10" (1.778 m), weight 101 kg (222 lb 9.6 oz).    Physical Examination:   GEN: no apparent distress, comfortable; AAOx3  HEAD: atraumatic and normocephalic  EYES: no pallor, no icterus, " PERRLA  ENT: OMM, no pharyngeal erythema, external ears WNL; no nasal discharge; no thrush  NECK: no masses, thyroid normal, trachea midline, no LAD/LN's, supple; left neck LAD resolved  CV: RRR with no murmur; normal pulse; normal S1 and S2; no pedal edema  CHEST: Normal respiratory effort; CTAB; normal breath sounds; no wheeze or crackles  ABDOM: nontender and nondistended; soft; normal bowel sounds; no rebound/guarding  MUSC/Skeletal: ROM normal; no crepitus; joints normal; no deformities or arthropathy  EXTREM: no clubbing, cyanosis, inflammation or swelling  SKIN: no rashes, lesions, ulcers, petechiae or subcutaneous nodules  : no cannon  NEURO: grossly intact; motor/sensory WNL; AAOx3; no tremors  PSYCH: normal mood, affect and behavior  LYMPH: normal cervical, supraclavicular, axillary and groin LN's            Labs:   10/19/2017  Lab Results   Component Value Date    WBC 4.9 10/19/2017    HGB 12.8 10/19/2017    HCT 39.7 10/19/2017    MCV 90 10/19/2017     10/19/2017     BMP  Lab Results   Component Value Date     10/19/2017    K 3.9 10/19/2017     10/19/2017    CO2 20 10/19/2017    BUN 17 10/19/2017    CREATININE 0.68 (L) 10/19/2017    CALCIUM 9.0 10/19/2017    ESTGFRAFRICA >60.0 11/25/2014    EGFRNONAA 98 10/19/2017     Lab Results   Component Value Date    ALT 17 10/19/2017    AST 26 10/19/2017    ALKPHOS 66 10/19/2017    BILITOT 0.9 10/19/2017     10/26/2017 due today      Radiology/Diagnostic Studies:    [facility] Unknown Rad Eap    Result Date: 10/11/2017  HISTORY: Post central venous catheter insertion. FINDINGS: Portable chest radiograph at 0950 hours compared to 09/22/2017 shows interval insertion of a right subclavian injection port type catheter, with the distal tip overlying the SVC and no pneumothorax. The cardiomediastinal silhouette and pulmonary vasculature are stable. Rightward tracheal deviation from known left supraclavicular mass is unchanged. The lungs are hypoinflated with  basilar opacities suggesting subsegmental atelectasis, and minor blunting the costophrenic angles. There is no acute osseous abnormality. IMPRESSION: Insertion of a right subclavian injection port type catheter, with the distal tip overlying the SVC and no pneumothorax. Read and electronically signed by: Zac Borja MD on 10/11/2017 10:22 AM CDT ZAC BORJA MD      I have reviewed all available lab results and radiology reports.    Assessment/Plan:     (1) 67 y.o. male with diagnosis of development of right sided neck swelling since Feb 2017  - he has been referred by Dr Michaud with ID for an evaluation  - i spoke to Dr Michaud previously about the patient peripherally  - FNA bx on 2/24 which was nondiagnostic  - he had a  guided biopsy of an entire left supraclavicular LN on 3/6/2017 with Dr Alexander Oh which showed  necrotizing granulomatous lymphadenitis  - CT scans were from Feb 2017  - flow cytometry studies from 5/25 appear to have been negative for any abnormal cell populations  - PET scan on 7/6 with markedly hypermetabolic LAD  - he was referred to and seen by Dr Basilio at [facility] on 7/28 and again on 8/25  - he underwent repeat cervical LN biopsy with Dr Oh on 9/13/2017 and that 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)  - he saw Dr Basilio again at [facility] and had bone marrow biopsy on 10/3  - the bone marrow showed no involvement of the lymphoom  - Dr Basilio recommended starting R-CHOP while he awaits additional studies  - portacath placed by Dr Oh and he underwent chemotherapy school and training with Che Lazo  - I previously provided literature on R-CHOP  - he had his first cycle and is here for short-term f/u - he is doing good and has resolution with the prior symptoms and the LAD has improved drastically    - labs are due today and are pending     (2) Hx/of prostate cancer - 1995; s/p prostatectomy followed by XRT     (3) JAK - uses CPAP     (4) GERD     (5)  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 Check labs weekly  2. Stay on allopurinol at this time  3. Proceed with next cycle as planned (Nov 6th)  4. RTC in 3-4 weeks  Fax note to Ronny Driscoll MD, Adriano Michaud Saba       Discussion:     I have explained all of the above in detail and the patient understands all of the current recommendation(s). I have answered all of their questions to the best of my ability and to their complete satisfaction.   The patient is to continue with the current management plan.            Electronically signed by Fidencio Rdz MD

## 2017-10-26 NOTE — LETTER
October 26, 2017      Ronny Driscoll MD  [address]  [address]  [place] [address]           [facility] Hematology Oncology  [address]  [address]  [place] [address]  Phone: [phone]  Fax: [phone]          Patient: Naresh Painting   MR Number: [id number]   YOB: 1950   Date of Visit: 10/26/2017       Dear Dr. Ronny Driscoll:    Thank you for referring Naresh Painting to me for evaluation. Attached you will find relevant portions of my assessment and plan of care.    If you have questions, please do not hesitate to call me. I look forward to following Naresh Painting along with you.    Sincerely,    Fidencio Rdz MD    Enclosure  CC:  No Recipients    If you would like to receive this communication electronically, please contact [email or web address] or [phone] to request more information on [employer] Link access.    For providers and/or their staff who would like to refer a patient to [facility], please contact us through our one-stop-shop provider referral line, [facility], [place], at [phone].    If you feel you have received this communication in error or would no longer like to receive these types of communications, please e-mail [email or web address]

## 2017-10-27 LAB
ALBUMIN SERPL-MCNC: 3.9 G/DL (ref 3.6–4.8)
ALBUMIN/GLOB SERPL: 1.7 {RATIO} (ref 1.2–2.2)
ALP SERPL-CCNC: 68 IU/L (ref 39–117)
ALT SERPL-CCNC: 28 IU/L (ref 0–44)
AMBIG ABBREV CMP 14 DEFAULT: NORMAL
AST SERPL-CCNC: 23 IU/L (ref 0–40)
BASOPHILS # BLD AUTO: 0 X10E3/UL (ref 0–0.2)
BASOPHILS NFR BLD AUTO: 4 %
BILIRUB SERPL-MCNC: 0.5 MG/DL (ref 0–1.2)
BUN SERPL-MCNC: 11 MG/DL (ref 8–27)
BUN/CREAT SERPL: 15 (ref 10–24)
CALCIUM SERPL-MCNC: 8.6 MG/DL (ref 8.6–10.2)
CHLORIDE SERPL-SCNC: 101 MMOL/L (ref 96–106)
CO2 SERPL-SCNC: 21 MMOL/L (ref 18–29)
CREAT SERPL-MCNC: 0.71 MG/DL (ref 0.76–1.27)
EOSINOPHIL # BLD AUTO: 0 X10E3/UL (ref 0–0.4)
EOSINOPHIL NFR BLD AUTO: 4 %
ERYTHROCYTE [DISTWIDTH] IN BLOOD BY AUTOMATED COUNT: 15.2 % (ref 12.3–15.4)
GFR SERPLBLD CREATININE-BSD FMLA CKD-EPI: 111 ML/MIN/1.73
GFR SERPLBLD CREATININE-BSD FMLA CKD-EPI: 96 ML/MIN/1.73
GLOBULIN SER CALC-MCNC: 2.3 G/DL (ref 1.5–4.5)
GLUCOSE SERPL-MCNC: 103 MG/DL (ref 65–99)
HCT VFR BLD AUTO: 40 % (ref 37.5–51)
HGB BLD-MCNC: 12.9 G/DL (ref 12.6–17.7)
LYMPHOCYTES # BLD AUTO: 0.3 X10E3/UL (ref 0.7–3.1)
LYMPHOCYTES NFR BLD AUTO: 42 %
MCH RBC QN AUTO: 28.8 PG (ref 26.6–33)
MCHC RBC AUTO-ENTMCNC: 32.3 G/DL (ref 31.5–35.7)
MCV RBC AUTO: 89 FL (ref 79–97)
MONOCYTES # BLD AUTO: 0 X10E3/UL (ref 0.1–0.9)
MONOCYTES NFR BLD AUTO: 3 %
MORPHOLOGY BLD-IMP: ABNORMAL
NEUTROPHILS # BLD AUTO: 0.4 X10E3/UL (ref 1.4–7)
NEUTROPHILS NFR BLD AUTO: 47 %
PLATELET # BLD AUTO: 143 X10E3/UL (ref 150–379)
POTASSIUM SERPL-SCNC: 4.1 MMOL/L (ref 3.5–5.2)
PROT SERPL-MCNC: 6.2 G/DL (ref 6–8.5)
RBC # BLD AUTO: 4.48 X10E6/UL (ref 4.14–5.8)
SODIUM SERPL-SCNC: 138 MMOL/L (ref 134–144)
WBC # BLD AUTO: 0.8 X10E3/UL (ref 3.4–10.8)

## 2017-11-02 RX ORDER — SODIUM CHLORIDE 0.9 % (FLUSH) 0.9 %
10 SYRINGE (ML) INJECTION
Status: CANCELLED | OUTPATIENT
Start: 2017-11-02

## 2017-11-02 RX ORDER — HEPARIN 100 UNIT/ML
500 SYRINGE INTRAVENOUS
Status: CANCELLED | OUTPATIENT
Start: 2017-11-02

## 2017-11-02 RX ORDER — DOXORUBICIN HYDROCHLORIDE 2 MG/ML
50 INJECTION, SOLUTION INTRAVENOUS
Status: CANCELLED | OUTPATIENT
Start: 2017-11-02

## 2017-11-02 RX ORDER — FAMOTIDINE 10 MG/ML
20 INJECTION INTRAVENOUS
Status: CANCELLED | OUTPATIENT
Start: 2017-11-02

## 2017-11-02 RX ORDER — ACETAMINOPHEN 325 MG/1
650 TABLET ORAL
Status: CANCELLED | OUTPATIENT
Start: 2017-11-02

## 2017-11-13 ENCOUNTER — OFFICE VISIT (OUTPATIENT)
Dept: HEMATOLOGY/ONCOLOGY | Facility: CLINIC | Age: 67
End: 2017-11-13
Payer: MEDICARE

## 2017-11-13 VITALS
RESPIRATION RATE: 18 BRPM | HEART RATE: 89 BPM | DIASTOLIC BLOOD PRESSURE: 68 MMHG | SYSTOLIC BLOOD PRESSURE: 102 MMHG | WEIGHT: 234.63 LBS | TEMPERATURE: 98 F | BODY MASS INDEX: 33.66 KG/M2

## 2017-11-13 DIAGNOSIS — R59.0 CERVICAL LYMPHADENOPATHY: ICD-10-CM

## 2017-11-13 DIAGNOSIS — T45.1X5A CHEMOTHERAPY-INDUCED NEUTROPENIA: ICD-10-CM

## 2017-11-13 DIAGNOSIS — C83.31 DIFFUSE LARGE B-CELL LYMPHOMA OF LYMPH NODES OF NECK: ICD-10-CM

## 2017-11-13 DIAGNOSIS — D70.1 CHEMOTHERAPY-INDUCED NEUTROPENIA: ICD-10-CM

## 2017-11-13 DIAGNOSIS — C61 MALIGNANT NEOPLASM OF PROSTATE: Primary | ICD-10-CM

## 2017-11-13 DIAGNOSIS — I82.B19 SUBCLAVIAN VEIN THROMBOSIS, UNSPECIFIED LATERALITY: ICD-10-CM

## 2017-11-13 PROCEDURE — 99214 OFFICE O/P EST MOD 30 MIN: CPT | Mod: ,,, | Performed by: INTERNAL MEDICINE

## 2017-11-13 NOTE — PROGRESS NOTES
"   [facility] Hematology/Oncology  PROGRESS NOTE      Subjective:       Patient ID:   NAME: Naresh Painting : 1950     67 y.o. male    Referring Doc: Yamila  Other Physicians: Rhonda Michaud Pernenkil, Saba    Chief Complaint:  NHL f/u    History of Present Illness:     Patient returns today for a regularly scheduled follow-up visit.  The patient received 2nd cycle last Monday and he is here with his wife today for follow-up visit. He is feeling "great"; no longer has any N/V; eating better; LN's have shrunken down around neck; no CP, SOB, HA's or N/V; no fevers or night sweats. He saw Dr Basilio on  and follows up again in 2018.             ROS:   GEN: normal without any fever, night sweats or weight loss  HEENT: normal with no HA's, sore throat, stiff neck, changes in vision  CV: normal with no CP, SOB, PND, COOPER or orthopnea  PULM: normal with no SOB, cough, hemoptysis, sputum or pleuritic pain  GI: normal with no abdominal pain, nausea, vomiting, constipation, diarrhea, melanotic stools, BRBPR, or hematemesis  : normal with no hematuria, dysuria  BREAST: normal with no mass, discharge, pain  SKIN: normal with no rash, erythema, bruising, or swelling    Allergies:  Review of patient's allergies indicates:  No Known Allergies    Medications:    Current Outpatient Prescriptions:     allopurinol (ZYLOPRIM) 100 MG tablet, , Disp: , Rfl:     apixaban (ELIQUIS) 5 mg Tab, Take 5 mg by mouth 2 (two) times daily., Disp: , Rfl:     aspirin 81 MG Chew, Take 81 mg by mouth once daily., Disp: , Rfl:     co-enzyme Q-10 30 mg capsule, Take 30 mg by mouth 3 (three) times daily., Disp: , Rfl:     fish oil-omega-3 fatty acids 300-1,000 mg capsule, Take 2 g by mouth once daily., Disp: , Rfl:     hydrocodone-acetaminophen 10-325mg (NORCO)  mg Tab, , Disp: , Rfl:     L GASSERI/B BIFIDUM/B LONGUM (UB Access ORAL), Take by mouth., Disp: , Rfl:     MULTIVITAMIN/IRON/FOLIC ACID (CENTRUM COMPLETE " ORAL), Take by mouth., Disp: , Rfl:     ondansetron (ZOFRAN) 8 MG tablet, , Disp: , Rfl:     pantoprazole (PROTONIX) 40 MG tablet, Take 40 mg by mouth once daily., Disp: , Rfl:     pravastatin (PRAVACHOL) 40 MG tablet, , Disp: , Rfl:     predniSONE (DELTASONE) 50 MG Tab, TK 3 TS PO ONCE D ON DAYS 2 THROUGH 5, Disp: , Rfl: 6    prochlorperazine (COMPAZINE) 10 MG tablet, , Disp: , Rfl:     promethazine (PHENERGAN) 25 MG tablet, Take 1 tablet (25 mg total) by mouth every 6 (six) hours as needed for Nausea., Disp: 30 tablet, Rfl: 3    TOPROL XL 50 mg 24 hr tablet, , Disp: , Rfl:     vitamin D 1000 units Tab, Take 185 mg by mouth once daily., Disp: , Rfl:     vitamin E 100 UNIT capsule, Take 100 Units by mouth once daily., Disp: , Rfl:     PMHx/PSHx Updates:  See patient's last visit with me on 10/26/2017.  See H&P on 6/9/2017        Pathology:  See 9/13/2017 path          Objective:     Vitals:  Blood pressure 102/68, pulse 89, temperature 98.1 °F (36.7 °C), resp. rate 18, weight 106.4 kg (234 lb 9.6 oz).    Physical Examination:   GEN: no apparent distress, comfortable; AAOx3  HEAD: atraumatic and normocephalic  EYES: no pallor, no icterus, PERRLA  ENT: OMM, no pharyngeal erythema, external ears WNL; no nasal discharge; no thrush  NECK: no masses, thyroid normal, trachea midline, no LAD/LN's, supple  CV: RRR with no murmur; normal pulse; normal S1 and S2; no pedal edema  CHEST: Normal respiratory effort; CTAB; normal breath sounds; no wheeze or crackles  ABDOM: nontender and nondistended; soft; normal bowel sounds; no rebound/guarding  MUSC/Skeletal: ROM normal; no crepitus; joints normal; no deformities or arthropathy  EXTREM: no clubbing, cyanosis, inflammation or swelling  SKIN: no rashes, lesions, ulcers, petechiae or subcutaneous nodules  : no cannon  NEURO: grossly intact; motor/sensory WNL; AAOx3; no tremors  PSYCH: normal mood, affect and behavior  LYMPH: normal cervical, supraclavicular, axillary and  groin LN's            Labs:     11/9/2017 on chart  Lab Results   Component Value Date    WBC 76.1 (>) 11/09/2017    HGB 12.4 (L) 11/09/2017    HCT 36.4 (L) 11/09/2017    MCV 90 11/09/2017     11/09/2017     BMP  Lab Results   Component Value Date     11/09/2017    K 3.9 11/09/2017     11/09/2017    CO2 20 11/09/2017    BUN 15 11/09/2017    CREATININE 0.67 (L) 11/09/2017    CALCIUM 9.0 11/09/2017    ESTGFRAFRICA >60.0 11/25/2014    EGFRNONAA 99 11/09/2017     Lab Results   Component Value Date    ALT 16 11/09/2017    AST 16 11/09/2017    ALKPHOS 92 11/09/2017    BILITOT 0.3 11/09/2017           Radiology/Diagnostic Studies:    No results found.    I have reviewed all available lab results and radiology reports.    Assessment/Plan:     (1) 67 y.o. male with diagnosis of development of right sided neck swelling since Feb 2017  - he has been referred by Dr Michaud with ID for an evaluation  - i spoke to Dr Michaud previously about the patient peripherally  - FNA bx on 2/24 which was nondiagnostic  - he had a  guided biopsy of an entire left supraclavicular LN on 3/6/2017 with Dr Alexander Oh which showed  necrotizing granulomatous lymphadenitis  - CT scans were from Feb 2017  - flow cytometry studies from 5/25 appear to have been negative for any abnormal cell populations  - PET scan on 7/6 with markedly hypermetabolic LAD  - he was referred to and seen by Dr Basilio at [facility] on 7/28 and again on 8/25  - he underwent repeat cervical LN biopsy with Dr Oh on 9/13/2017 and that 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)  - he saw Dr Basilio again at [facility] and had bone marrow biopsy on 10/3  - the bone marrow showed no involvement of the lymphoom  - Dr Basilio recommended starting R-CHOP while he awaits additional studies  - portacath placed by Dr Oh and he underwent chemotherapy school and training with Che Lazo  - I previously provided literature on R-CHOP  - he had his  2nd cycle and is here for short-term f/u - he is doing good and has resolution with the prior symptoms and the LAD has improved drastically     - labs are due today and are pending     (2) Hx/of prostate cancer - 1995; s/p prostatectomy followed by XRT; on lupron per Dr Menjivar with  and Fauzia Maza (NP)     (3) JAK - uses CPAP     (4) GERD     (5) Myocardial bridging congenital disorder - followed by Dr Santiago with cardiology   - recent echo with good EF at 60%     (6) recent subclavian vein thrombosis - on eliquis 5mg po bid    (7) elevated Leucocytosis - suspect secondary to the neulasta       PLAN:  1. Continue weekly labs  2. Continue current regimen  3. Discontinue the allopurinol  4. F/u with PCP etc  5. Need latest note for Dr Basilio  RTC in 3-4 weeks  Fax note Avelino Driscoll MD, Adriano Michaud, and  Chetna     Discussion:     I have explained all of the above in detail and the patient understands all of the current recommendation(s). I have answered all of their questions to the best of my ability and to their complete satisfaction.   The patient is to continue with the current management plan.            Electronically signed by Fidencio Rdz MD

## 2017-11-13 NOTE — LETTER
November 13, 2017      Ronny Driscoll MD  [address] [facility]  [address]  [place] [address]           [facility] Hematology Oncology  [address] [facility]  [address]  [place] [address]  Phone: [phone]  Fax: [phone]          Patient: Naresh Painting   MR Number: [id number]   YOB: 1950   Date of Visit: 11/13/2017       Dear Dr. Ronny Driscoll:    Thank you for referring Naresh Painting to me for evaluation. Attached you will find relevant portions of my assessment and plan of care.    If you have questions, please do not hesitate to call me. I look forward to following Naresh Painting along with you.    Sincerely,    Fidencio Rdz MD    Enclosure  CC:  No Recipients    If you would like to receive this communication electronically, please contact [email or web address] or [phone] to request more information on [employer] access.    For providers and/or their staff who would like to refer a patient to [facility], please contact us through our one-stop-shop provider referral line, [facility], at [phone].    If you feel you have received this communication in error or would no longer like to receive these types of communications, please e-mail [email or web address]

## 2017-11-17 ENCOUNTER — OFFICE VISIT (OUTPATIENT)
Dept: FAMILY MEDICINE | Facility: CLINIC | Age: 67
End: 2017-11-17
Payer: MEDICARE

## 2017-11-17 VITALS
HEIGHT: 70 IN | SYSTOLIC BLOOD PRESSURE: 110 MMHG | WEIGHT: 233 LBS | DIASTOLIC BLOOD PRESSURE: 60 MMHG | BODY MASS INDEX: 33.36 KG/M2 | HEART RATE: 79 BPM

## 2017-11-17 DIAGNOSIS — K42.9 UMBILICAL HERNIA WITHOUT OBSTRUCTION AND WITHOUT GANGRENE: ICD-10-CM

## 2017-11-17 DIAGNOSIS — Z11.59 NEED FOR HEPATITIS C SCREENING TEST: ICD-10-CM

## 2017-11-17 DIAGNOSIS — I10 BENIGN ESSENTIAL HYPERTENSION: Primary | ICD-10-CM

## 2017-11-17 DIAGNOSIS — I82.B12 THROMBOSIS OF LEFT SUBCLAVIAN VEIN: ICD-10-CM

## 2017-11-17 DIAGNOSIS — K21.9 GASTROESOPHAGEAL REFLUX DISEASE WITHOUT ESOPHAGITIS: ICD-10-CM

## 2017-11-17 DIAGNOSIS — C83.31 DIFFUSE LARGE B-CELL LYMPHOMA OF LYMPH NODES OF NECK: ICD-10-CM

## 2017-11-17 DIAGNOSIS — E78.2 MULTIPLE-TYPE HYPERLIPIDEMIA: ICD-10-CM

## 2017-11-17 PROCEDURE — 99214 OFFICE O/P EST MOD 30 MIN: CPT | Mod: ,,, | Performed by: INTERNAL MEDICINE

## 2017-11-17 NOTE — PATIENT INSTRUCTIONS
Taking Your Blood Pressure  Blood pressure is the force of blood against the artery wall as it moves from the heart through the blood vessels. You can take your own blood pressure reading using a digital monitor. Take your readings the same each time, using the same arm. Take readings as often as your healthcare provider instructs.  About blood pressure monitors  Blood pressure monitors are designed for certain ages and cases. You can find monitors for older adults, for pregnant women, and for children. Make sure the one you choose is the right one for your age and situation.  The American Heart Association recommends an automatic cuff monitor that fits on your upper arm (bicep). The cuff should fit your arm size. A cuff thats too large or too small will not give an accurate reading. Measure around your upper arm to find your size.  Monitors that attach to your finger or wrist are not as accurate as monitors for your upper arm.  Ask your healthcare provider for help in choosing a monitor. Bring your monitor to your next provider visit if you need help in using it the correct way.  The steps below are general instructions for using an automatic digital monitor.  Step 1. Relax    · Take your blood pressure at the same time every day, such as in the morning or evening, or at the time your healthcare provider recommends.  · Wait at least a half-hour after smoking, eating, or exercising. Don't drink coffee, tea, soda, or other caffeinated beverages before checking your blood pressure.  · Sit comfortably at a table with both feet on the floor. Do not cross your legs or feet. Place the monitor near you.  · Rest for a few minutes before you begin.  Step 2. Wrap the cuff    · Place your arm on the table, palm up. Your arm should be at the level of your heart. Wrap the cuff around your upper arm, just above your elbow. Its best done on bare skin, not over clothing. Most cuffs will indicate where the brachial artery (the  blood vessel in the middle of the arm at the inner side of the elbow) should line up with the cuff. Look in your monitor's instruction booklet for an illustration. You can also bring your cuff to your healthcare provider and have them show you how to correctly place the cuff.  Step 3. Inflate the cuff    · Push the button that starts the pump.  · The cuff will tighten, then loosen.  · The numbers will change. When they stop changing, your blood pressure reading will appear.  · Take 2 or 3 readings one minute apart.  Step 4. Write down the results of each reading    · Write down your blood pressure numbers for each reading. Note the date and time. Keep your results in one place, such as a notebook. Even if your monitor has a built-in memory, keep a hard copy of the readings.  · Remove the cuff from your arm. Turn off the machine.  · Bring your blood pressure records with your healthcare providers at each visit.  · If you start a new blood pressure medicine, note the day you started the new medicine. Also note the day if you change the dose of your medicine. This information goes on your blood pressure recording sheet. This will help your healthcare provider monitor how well the medicine changes are working.  · Ask your healthcare provider what numbers should prompt you to call him or her. Also ask what numbers should prompt you to get help right away.  Date Last Reviewed: 11/1/2016  © 2003-3421 [employer]. [address] [place] [address], [place] [address]. All rights reserved. This information is not intended as a substitute for professional medical care. Always follow your healthcare professional's instructions.

## 2017-11-17 NOTE — PROGRESS NOTES
Subjective:       Patient ID: Naresh Painting is a 67 y.o. male.    Chief Complaint: Hyperlipidemia; Gastroesophageal Reflux; Hypertension; Lymphadenopathy; and Deep Vein Thrombosis    Mr. Naresh Steinberg is a 67-year-old male who comes for follow-up. He has been recently diagnosed with non-Hodgkin's lymphoma with lymph nodes in the neck region. He had a couple of biopsies in past and recently has gone through chemotherapy.    After chemotherapy he had lost weight but his lymphadenopathy has significantly diminished at least on the left side. On the right side also it has diminished significantly.    Patient also was discovered to have a clot in the left upper extremity and he has been started on anti-coagulation.      Hyperlipidemia   This is a chronic problem. The current episode started more than 1 year ago. Pertinent negatives include no chest pain or shortness of breath. Current antihyperlipidemic treatment includes statins. Risk factors for coronary artery disease include a sedentary lifestyle, hypertension, male sex, obesity and dyslipidemia.   Gastroesophageal Reflux   He complains of heartburn. He reports no abdominal pain, no chest pain, no coughing or no sore throat. The problem has been gradually improving. Pertinent negatives include no fatigue. Risk factors include obesity. He has tried a PPI for the symptoms. The treatment provided moderate relief.   Hypertension   This is a chronic problem. The current episode started more than 1 year ago. The problem has been gradually improving since onset. Associated symptoms include malaise/fatigue and neck pain. Pertinent negatives include no anxiety, chest pain, palpitations or shortness of breath. There are no associated agents to hypertension. Risk factors for coronary artery disease include sedentary lifestyle, male gender, obesity and dyslipidemia.       Past Medical History:   Diagnosis Date    Chemotherapy-induced neutropenia 10/5/2017    Cholelithiasis  without cholecystitis July 2017-PET scan    Diffuse large B-cell lymphoma of lymph nodes of neck 9/26/2017    GERD (gastroesophageal reflux disease)     Granulomatous lymphadenitis 6/9/2017    Hyperlipidemia     Hypertension     Lymphadenopathy     JAK (obstructive sleep apnea)     Prostate CA     Prostate cancer     Subclavian vein thrombosis 9/26/2017     Social History     Social History    Marital status:      Spouse name: N/A    Number of children: N/A    Years of education: N/A     Occupational History    Not on file.     Social History Main Topics    Smoking status: Former Smoker    Smokeless tobacco: Never Used      Comment: quit 1995    Alcohol use Yes    Drug use: No    Sexual activity: Yes     Partners: Female     Other Topics Concern    Not on file     Social History Narrative    No narrative on file     Past Surgical History:   Procedure Laterality Date    EYE SURGERY      HERNIA REPAIR      LYMPH NODE BIOPSY      PROCTECTOMY      PROSTATE SURGERY       Family History   Problem Relation Age of Onset    Heart disease Mother     Diabetes Father        Review of Systems   Constitutional: Positive for malaise/fatigue. Negative for activity change, appetite change, fatigue and unexpected weight change (lost 28 lbs).   HENT: Negative for congestion, sneezing, sore throat and trouble swallowing.    Eyes: Negative for pain and visual disturbance.        Left eye Glass eye prosthesis   Respiratory: Negative for cough, chest tightness and shortness of breath.    Cardiovascular: Negative for chest pain, palpitations and leg swelling.        Left upper extremity deep vein thrombosis.   Gastrointestinal: Positive for constipation and heartburn. Negative for abdominal distention, abdominal pain, blood in stool and diarrhea.   Endocrine: Negative for cold intolerance, heat intolerance, polydipsia, polyphagia and polyuria.   Genitourinary: Negative for dysuria, hematuria and scrotal  "swelling.   Musculoskeletal: Positive for neck pain. Negative for arthralgias, back pain and gait problem.   Skin: Negative for pallor, rash and wound.        Patient's has lumps in the neck which have now been diagnosed to be lymphoma.   Allergic/Immunologic: Negative for environmental allergies, food allergies and immunocompromised state.   Neurological: Positive for numbness. Negative for dizziness, seizures, speech difficulty and light-headedness.        Patient has numbness in the left hand fingers.   Hematological: Positive for adenopathy. Does not bruise/bleed easily.        Diagnosis of NHL   Psychiatric/Behavioral: Negative for agitation, behavioral problems and confusion. The patient is not nervous/anxious.        Objective:       Vitals:    11/17/17 0819 11/17/17 0913   BP: 95/62 110/60   Pulse: 79    Weight: 105.7 kg (233 lb)    Height: 5' 10" (1.778 m)      Physical Exam   Constitutional: He is oriented to person, place, and time. He appears well-developed. No distress.   HENT:   Head: Normocephalic and atraumatic.       Nose: Nose normal.   Mouth/Throat: Oropharynx is clear and moist. No oropharyngeal exudate.   Eyes: Conjunctivae and EOM are normal. Right eye exhibits no discharge and no exudate. Right conjunctiva is not injected. Right conjunctiva has no hemorrhage.   Left eye is prosthetic.   Neck: Normal range of motion. Neck supple. No JVD present. No neck rigidity. No tracheal deviation and normal range of motion present. No thyromegaly present.       The lymph nodes on right and left side have dramatically shrunken now. A small lymph node is felt on the right side which is firm, nontender and adherent to the underlying structures.   Cardiovascular: Normal rate, regular rhythm and normal heart sounds.  Exam reveals no gallop and no friction rub.    No murmur heard.  Pulmonary/Chest: Effort normal and breath sounds normal. No respiratory distress. He has no wheezes. He has no rales.   Abdominal: " Soft. Bowel sounds are normal. He exhibits no distension. There is no tenderness.       Musculoskeletal: Normal range of motion. He exhibits no tenderness or deformity.   Examination of the neck does not reveal any deformity.   Lymphadenopathy:     He has cervical adenopathy.        Right cervical: Deep cervical adenopathy present.     He has no axillary adenopathy.   Right-sided cervical lymphadenopathy. No left-sided lymphadenopathy. No axillary lymphadenopathy.   Neurological: He is alert and oriented to person, place, and time. He has normal reflexes.   Skin: Skin is warm and dry.   Psychiatric: His mood appears not anxious.   Patient appears to be happier and less anxious today.   Nursing note and vitals reviewed.      Assessment:       1. Benign essential hypertension    2. Multiple-type hyperlipidemia    3. Diffuse large B-cell lymphoma of lymph nodes of neck    4. Thrombosis of left subclavian vein    5. Need for hepatitis C screening test    6. Gastroesophageal reflux disease without esophagitis    7. Umbilical hernia without obstruction and without gangrene         Plan:           Benign essential hypertension    Multiple-type hyperlipidemia    Diffuse large B-cell lymphoma of lymph nodes of neck    Thrombosis of left subclavian vein    Need for hepatitis C screening test  -     Hepatitis C antibody; Future; Expected date: 11/17/2017    Gastroesophageal reflux disease without esophagitis    Umbilical hernia without obstruction and without gangrene    Patient has been advised to watch diet and exercise. Avoid fried and fatty food. Compliance to medications and follow up urged.    Appropriate seasonal immunizations have been discussed. He should be due for pneumococcal vaccine now. We'll check with oncology.      At this point the umbilical hernia is not causing any problem. There is no evidence of obstruction or incarceration. Should there be any problems patient will notify us as needed.  Advised Mr. Painting  for Anti reflux measures like small feequent meals, avoid spicy and greasy food. Head end up at night.        Fall precautions injury precautions also have been discussed. I will continue to monitor his labs. Follow-up in 4 months.

## 2017-11-20 PROBLEM — K42.9 UMBILICAL HERNIA WITHOUT OBSTRUCTION OR GANGRENE: Status: ACTIVE | Noted: 2017-07-06

## 2017-11-24 RX ORDER — FAMOTIDINE 10 MG/ML
20 INJECTION INTRAVENOUS
Status: CANCELLED | OUTPATIENT
Start: 2017-11-27

## 2017-11-24 RX ORDER — DOXORUBICIN HYDROCHLORIDE 2 MG/ML
50 INJECTION, SOLUTION INTRAVENOUS
Status: CANCELLED | OUTPATIENT
Start: 2017-11-27

## 2017-11-24 RX ORDER — HEPARIN 100 UNIT/ML
500 SYRINGE INTRAVENOUS
Status: CANCELLED | OUTPATIENT
Start: 2017-11-27

## 2017-11-24 RX ORDER — SODIUM CHLORIDE 0.9 % (FLUSH) 0.9 %
10 SYRINGE (ML) INJECTION
Status: CANCELLED | OUTPATIENT
Start: 2017-11-27

## 2017-11-24 RX ORDER — ACETAMINOPHEN 325 MG/1
650 TABLET ORAL
Status: CANCELLED | OUTPATIENT
Start: 2017-11-27

## 2017-11-25 LAB
ALBUMIN SERPL-MCNC: 3.7 G/DL (ref 3.6–4.8)
ALBUMIN/GLOB SERPL: 1.6 {RATIO} (ref 1.2–2.2)
ALP SERPL-CCNC: 78 IU/L (ref 39–117)
ALT SERPL-CCNC: 16 IU/L (ref 0–44)
AMBIG ABBREV CMP 14 DEFAULT: NORMAL
AST SERPL-CCNC: 17 IU/L (ref 0–40)
BASOPHILS # BLD AUTO: 0 X10E3/UL (ref 0–0.2)
BASOPHILS NFR BLD AUTO: 1 %
BILIRUB SERPL-MCNC: 0.3 MG/DL (ref 0–1.2)
BUN SERPL-MCNC: 10 MG/DL (ref 8–27)
BUN/CREAT SERPL: 14 (ref 10–24)
CALCIUM SERPL-MCNC: 9 MG/DL (ref 8.6–10.2)
CHLORIDE SERPL-SCNC: 102 MMOL/L (ref 96–106)
CO2 SERPL-SCNC: 24 MMOL/L (ref 18–29)
CREAT SERPL-MCNC: 0.69 MG/DL (ref 0.76–1.27)
EOSINOPHIL # BLD AUTO: 0 X10E3/UL (ref 0–0.4)
EOSINOPHIL NFR BLD AUTO: 1 %
ERYTHROCYTE [DISTWIDTH] IN BLOOD BY AUTOMATED COUNT: 16.8 % (ref 12.3–15.4)
GFR SERPLBLD CREATININE-BSD FMLA CKD-EPI: 113 ML/MIN/1.73
GFR SERPLBLD CREATININE-BSD FMLA CKD-EPI: 98 ML/MIN/1.73
GLOBULIN SER CALC-MCNC: 2.3 G/DL (ref 1.5–4.5)
GLUCOSE SERPL-MCNC: 93 MG/DL (ref 65–99)
HCT VFR BLD AUTO: 35.8 % (ref 37.5–51)
HGB BLD-MCNC: 11.7 G/DL (ref 12.6–17.7)
LYMPHOCYTES # BLD AUTO: 0.6 X10E3/UL (ref 0.7–3.1)
LYMPHOCYTES NFR BLD AUTO: 22 %
MCH RBC QN AUTO: 29 PG (ref 26.6–33)
MCHC RBC AUTO-ENTMCNC: 32.7 G/DL (ref 31.5–35.7)
MCV RBC AUTO: 89 FL (ref 79–97)
MONOCYTES # BLD AUTO: 0.4 X10E3/UL (ref 0.1–0.9)
MONOCYTES NFR BLD AUTO: 15 %
MORPHOLOGY BLD-IMP: ABNORMAL
NEUTROPHILS # BLD AUTO: 1.8 X10E3/UL (ref 1.4–7)
NEUTROPHILS NFR BLD AUTO: 61 %
PLATELET # BLD AUTO: 161 X10E3/UL (ref 150–379)
POTASSIUM SERPL-SCNC: 4.6 MMOL/L (ref 3.5–5.2)
PROT SERPL-MCNC: 6 G/DL (ref 6–8.5)
RBC # BLD AUTO: 4.03 X10E6/UL (ref 4.14–5.8)
SODIUM SERPL-SCNC: 140 MMOL/L (ref 134–144)
WBC # BLD AUTO: 2.9 X10E3/UL (ref 3.4–10.8)

## 2017-12-04 ENCOUNTER — OFFICE VISIT (OUTPATIENT)
Dept: HEMATOLOGY/ONCOLOGY | Facility: CLINIC | Age: 67
End: 2017-12-04
Payer: MEDICARE

## 2017-12-04 VITALS
BODY MASS INDEX: 34.25 KG/M2 | RESPIRATION RATE: 18 BRPM | TEMPERATURE: 98 F | DIASTOLIC BLOOD PRESSURE: 80 MMHG | SYSTOLIC BLOOD PRESSURE: 117 MMHG | HEART RATE: 75 BPM | WEIGHT: 238.69 LBS

## 2017-12-04 DIAGNOSIS — R59.0 CERVICAL LYMPHADENOPATHY: ICD-10-CM

## 2017-12-04 DIAGNOSIS — T45.1X5A CHEMOTHERAPY-INDUCED NEUTROPENIA: ICD-10-CM

## 2017-12-04 DIAGNOSIS — D70.1 CHEMOTHERAPY-INDUCED NEUTROPENIA: ICD-10-CM

## 2017-12-04 DIAGNOSIS — I82.B12 THROMBOSIS OF LEFT SUBCLAVIAN VEIN: ICD-10-CM

## 2017-12-04 DIAGNOSIS — C83.31 DIFFUSE LARGE B-CELL LYMPHOMA OF LYMPH NODES OF NECK: ICD-10-CM

## 2017-12-04 DIAGNOSIS — C61 MALIGNANT NEOPLASM OF PROSTATE: Primary | ICD-10-CM

## 2017-12-04 PROCEDURE — 99214 OFFICE O/P EST MOD 30 MIN: CPT | Mod: ,,, | Performed by: INTERNAL MEDICINE

## 2017-12-04 NOTE — PROGRESS NOTES
[facility] Hematology/Oncology  PROGRESS NOTE      Subjective:       Patient ID:   NAME: Naresh Painting : 1950     67 y.o. male    Referring Doc: Yamila  Other Physicians: Adriano Michaud Pernenkil, Saba    Chief Complaint:  NHL f/u    History of Present Illness:     Patient returns today for a regularly scheduled follow-up visit.  The patient is here with his wife. He is doing good so far with the current regimen. No new issues or complaints. He has had 3 cycles so far and is due for #4 on Dec 18th. He is breathing ok. No CP, SOB, HA's or N/V.             ROS:   GEN: normal without any fever, night sweats or weight loss  HEENT: normal with no HA's, sore throat, stiff neck, changes in vision  CV: normal with no CP, SOB, PND, COOPER or orthopnea  PULM: normal with no SOB, cough, hemoptysis, sputum or pleuritic pain  GI: normal with no abdominal pain, nausea, vomiting, constipation, diarrhea, melanotic stools, BRBPR, or hematemesis  : normal with no hematuria, dysuria  BREAST: normal with no mass, discharge, pain  SKIN: normal with no rash, erythema, bruising, or swelling    Allergies:  Review of patient's allergies indicates:  No Known Allergies    Medications:    Current Outpatient Prescriptions:     apixaban (ELIQUIS) 5 mg Tab, Take 5 mg by mouth 2 (two) times daily., Disp: , Rfl:     aspirin 81 MG Chew, Take 81 mg by mouth once daily., Disp: , Rfl:     co-enzyme Q-10 30 mg capsule, Take 30 mg by mouth 3 (three) times daily., Disp: , Rfl:     L GASSERI/B BIFIDUM/B LONGUM ([employer] ORAL), Take by mouth., Disp: , Rfl:     MULTIVITAMIN/IRON/FOLIC ACID (CENTRUM COMPLETE ORAL), Take by mouth., Disp: , Rfl:     pantoprazole (PROTONIX) 40 MG tablet, Take 40 mg by mouth once daily., Disp: , Rfl:     pravastatin (PRAVACHOL) 40 MG tablet, , Disp: , Rfl:     predniSONE (DELTASONE) 50 MG Tab, TK 3 TS PO ONCE D ON DAYS 2 THROUGH 5, Disp: , Rfl: 6    TOPROL XL 50 mg 24 hr tablet, Take 25 mg by mouth  once daily., Disp: , Rfl:     vitamin D 1000 units Tab, Take 185 mg by mouth once daily., Disp: , Rfl:     vitamin E 100 UNIT capsule, Take 100 Units by mouth once daily., Disp: , Rfl:     PMHx/PSHx Updates:  See patient's last visit with me on 11/13/2017.  See H&P on 6/9/2017        Pathology:  See path 9/13/2017          Objective:     Vitals:  Blood pressure 117/80, pulse 75, temperature 97.9 °F (36.6 °C), resp. rate 18, weight 108.3 kg (238 lb 11.2 oz).    Physical Examination:   GEN: no apparent distress, comfortable; AAOx3  HEAD: atraumatic and normocephalic  EYES: no pallor, no icterus, PERRLA  ENT: OMM, no pharyngeal erythema, external ears WNL; no nasal discharge; no thrush  NECK: no masses, thyroid normal, trachea midline, no LAD/LN's, supple; residual LN on right neck still; left looks good  CV: RRR with no murmur; normal pulse; normal S1 and S2; no pedal edema  CHEST: Normal respiratory effort; CTAB; normal breath sounds; no wheeze or crackles  ABDOM: nontender and nondistended; soft; normal bowel sounds; no rebound/guarding  MUSC/Skeletal: ROM normal; no crepitus; joints normal; no deformities or arthropathy  EXTREM: no clubbing, cyanosis, inflammation or swelling  SKIN: no rashes, lesions, ulcers, petechiae or subcutaneous nodules  : no cannon  NEURO: grossly intact; motor/sensory WNL; AAOx3; no tremors  PSYCH: normal mood, affect and behavior  LYMPH: normal cervical, supraclavicular, axillary and groin LN's            Labs:     11/24/2017  Lab Results   Component Value Date    WBC 46.1 (HH) 11/30/2017    HGB 11.7 (L) 11/30/2017    HCT 36.8 (L) 11/30/2017    MCV 95 11/30/2017     (H) 11/30/2017     BMP  Lab Results   Component Value Date     11/30/2017    K 3.8 11/30/2017     11/30/2017    CO2 21 11/30/2017    BUN 14 11/30/2017    CREATININE 0.66 (L) 11/30/2017    CALCIUM 8.8 11/30/2017    ESTGFRAFRICA >60.0 11/25/2014    EGFRNONAA 99 11/30/2017     Lab Results   Component Value  Date    ALT 14 11/30/2017    AST 13 11/30/2017    ALKPHOS 69 11/30/2017    BILITOT 0.5 11/30/2017           Radiology/Diagnostic Studies:    No results found.    I have reviewed all available lab results and radiology reports.    Assessment/Plan:   (1) 67 y.o. male with diagnosis of development of right sided neck swelling since Feb 2017  - he has been referred by Dr Michaud with ID for an evaluation  - i spoke to Dr Michaud previously about the patient peripherally  - FNA bx on 2/24 which was nondiagnostic  - he had a  guided biopsy of an entire left supraclavicular LN on 3/6/2017 with Dr Alexander Oh which showed  necrotizing granulomatous lymphadenitis  - CT scans were from Feb 2017  - flow cytometry studies from 5/25 appear to have been negative for any abnormal cell populations  - PET scan on 7/6 with markedly hypermetabolic LAD  - he was referred to and seen by Dr Basilio at [facility] on 7/28 and again on 8/25  - he underwent repeat cervical LN biopsy with Dr Oh on 9/13/2017 and that 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)  - he saw Dr Basilio again at [facility] and had bone marrow biopsy on 10/3  - the bone marrow showed no involvement of the lymphoom  - Dr Basilio recommended starting R-CHOP while he awaits additional studies  - portacath placed by Dr Oh and he underwent chemotherapy school and training with Che Lazo  - I previously provided literature on R-CHOP  - he had his 3rd cycle and is here for short-term f/u - he is doing good and has resolution with the prior symptoms and the LAD has improved drastically     - labs are due today and are pending     (2) Hx/of prostate cancer - 1995; s/p prostatectomy followed by XRT; on lupron per Dr Menjivar with  and Fauzia Maza (NP)     (3) JAK - uses CPAP     (4) GERD     (5) Myocardial bridging congenital disorder - followed by Dr Sanitago with cardiology   - recent echo with good EF at 60%     (6) recent subclavian vein thrombosis  - on eliquis 5mg po bid     (7) elevated Leucocytosis - suspect secondary to the neulasta              PLAN:  1. Repeat PET scan  2. Check labs weekly  3. Continue eliquis for now  4. Cycle #4 on Dec 18th  RTC in 3-4 weeks  Fax note to Ronny Driscoll MD, Adriano Michaud, and  Chetna    Discussion:     I have explained all of the above in detail and the patient understands all of the current recommendation(s). I have answered all of their questions to the best of my ability and to their complete satisfaction.   The patient is to continue with the current management plan.            Electronically signed by Fidencio Rdz MD

## 2017-12-04 NOTE — LETTER
December 4, 2017      Ronny Driscoll MD  [address] [facility]  [address]  [place] [address]           [facility] Hematology Oncology  [address] [facility]  [address]  [place] [address]  Phone: [phone]  Fax: [phone]          Patient: Naresh Painting   MR Number: [id number]   YOB: 1950   Date of Visit: 12/4/2017       Dear Dr. Ronny Driscoll:    Thank you for referring Naresh Painting to me for evaluation. Attached you will find relevant portions of my assessment and plan of care.    If you have questions, please do not hesitate to call me. I look forward to following Naresh Painting along with you.    Sincerely,    Fidencio Rdz MD    Enclosure  CC:  No Recipients    If you would like to receive this communication electronically, please contact [email or web address] or [phone] to request more information on [employer] Link access.    For providers and/or their staff who would like to refer a patient to [facility], please contact us through our one-stop-shop provider referral line, [facility], at [phone].    If you feel you have received this communication in error or would no longer like to receive these types of communications, please e-mail [email or web address]

## 2017-12-06 DIAGNOSIS — I82.90 CLOT: Primary | ICD-10-CM

## 2017-12-06 RX ORDER — APIXABAN 5 MG/1
TABLET, FILM COATED ORAL
Qty: 60 TABLET | Refills: 0 | Status: SHIPPED | OUTPATIENT
Start: 2017-12-06 | End: 2017-12-12 | Stop reason: SDUPTHER

## 2017-12-18 RX ORDER — ACETAMINOPHEN 325 MG/1
650 TABLET ORAL
Status: CANCELLED | OUTPATIENT
Start: 2017-12-18

## 2017-12-18 RX ORDER — HEPARIN 100 UNIT/ML
500 SYRINGE INTRAVENOUS
Status: CANCELLED | OUTPATIENT
Start: 2017-12-18

## 2017-12-18 RX ORDER — FAMOTIDINE 10 MG/ML
20 INJECTION INTRAVENOUS
Status: CANCELLED | OUTPATIENT
Start: 2017-12-18

## 2017-12-18 RX ORDER — SODIUM CHLORIDE 0.9 % (FLUSH) 0.9 %
10 SYRINGE (ML) INJECTION
Status: CANCELLED | OUTPATIENT
Start: 2017-12-18

## 2017-12-18 RX ORDER — DOXORUBICIN HYDROCHLORIDE 2 MG/ML
50 INJECTION, SOLUTION INTRAVENOUS
Status: CANCELLED | OUTPATIENT
Start: 2017-12-18

## 2017-12-22 LAB
ALBUMIN SERPL-MCNC: 3.9 G/DL (ref 3.6–4.8)
ALBUMIN/GLOB SERPL: 2 {RATIO} (ref 1.2–2.2)
ALP SERPL-CCNC: 75 IU/L (ref 39–117)
ALT SERPL-CCNC: 13 IU/L (ref 0–44)
AST SERPL-CCNC: 17 IU/L (ref 0–40)
BASOPHILS # BLD AUTO: 0 X10E3/UL (ref 0–0.2)
BASOPHILS NFR BLD AUTO: 0 %
BILIRUB SERPL-MCNC: 0.5 MG/DL (ref 0–1.2)
BUN SERPL-MCNC: 16 MG/DL (ref 8–27)
BUN/CREAT SERPL: 27 (ref 10–24)
CALCIUM SERPL-MCNC: 8.8 MG/DL (ref 8.6–10.2)
CHLORIDE SERPL-SCNC: 101 MMOL/L (ref 96–106)
CO2 SERPL-SCNC: 18 MMOL/L (ref 18–29)
CREAT SERPL-MCNC: 0.6 MG/DL (ref 0.76–1.27)
EGFR IF AFRICAN AMERICAN: 119 ML/MIN/1.73
EOSINOPHIL # BLD AUTO: 0 X10E3/UL (ref 0–0.4)
EOSINOPHIL NFR BLD AUTO: 0 %
ERYTHROCYTE [DISTWIDTH] IN BLOOD BY AUTOMATED COUNT: 18.3 % (ref 12.3–15.4)
EST. GFR  (NON AFRICAN AMERICAN): 103 ML/MIN/1.73
GLOBULIN SER CALC-MCNC: 2 G/DL (ref 1.5–4.5)
GLUCOSE SERPL-MCNC: 58 MG/DL (ref 65–99)
HCT VFR BLD AUTO: 34.8 % (ref 37.5–51)
HGB BLD-MCNC: 11.5 G/DL (ref 13–17.7)
LYMPHOCYTES # BLD AUTO: 1 X10E3/UL (ref 0.7–3.1)
LYMPHOCYTES NFR BLD AUTO: 2 %
MCH RBC QN AUTO: 30.8 PG (ref 26.6–33)
MCHC RBC AUTO-ENTMCNC: 33 G/DL (ref 31.5–35.7)
MCV RBC AUTO: 93 FL (ref 79–97)
MONOCYTES # BLD AUTO: 1 X10E3/UL (ref 0.1–0.9)
MONOCYTES NFR BLD AUTO: 2 %
MORPHOLOGY BLD-IMP: ABNORMAL
NEUTROPHILS # BLD AUTO: 48.8 X10E3/UL (ref 1.4–7)
NEUTROPHILS NFR BLD AUTO: 96 %
PLATELET # BLD AUTO: 315 X10E3/UL (ref 150–379)
POTASSIUM SERPL-SCNC: 3.6 MMOL/L (ref 3.5–5.2)
PROT SERPL-MCNC: 5.9 G/DL (ref 6–8.5)
RBC # BLD AUTO: 3.73 X10E6/UL (ref 4.14–5.8)
SODIUM SERPL-SCNC: 143 MMOL/L (ref 134–144)
WBC # BLD AUTO: 50.8 X10E3/UL (ref 3.4–10.8)

## 2017-12-27 ENCOUNTER — TELEPHONE (OUTPATIENT)
Dept: HEMATOLOGY/ONCOLOGY | Facility: CLINIC | Age: 67
End: 2017-12-27

## 2017-12-27 LAB — GLUCOSE SERPL-MCNC: 90 MG/DL (ref 70–99)

## 2017-12-27 NOTE — TELEPHONE ENCOUNTER
----- Message from Fidencio Rdz MD sent at 12/27/2017  2:56 PM CST -----  Call him with good report

## 2018-01-03 ENCOUNTER — OFFICE VISIT (OUTPATIENT)
Dept: HEMATOLOGY/ONCOLOGY | Facility: CLINIC | Age: 68
End: 2018-01-03
Payer: MEDICARE

## 2018-01-03 VITALS
HEART RATE: 69 BPM | BODY MASS INDEX: 34.76 KG/M2 | WEIGHT: 242.81 LBS | HEIGHT: 70 IN | RESPIRATION RATE: 18 BRPM | DIASTOLIC BLOOD PRESSURE: 58 MMHG | TEMPERATURE: 98 F | SYSTOLIC BLOOD PRESSURE: 96 MMHG

## 2018-01-03 DIAGNOSIS — C83.31 DIFFUSE LARGE B-CELL LYMPHOMA OF LYMPH NODES OF NECK: Primary | ICD-10-CM

## 2018-01-03 DIAGNOSIS — I82.B12 THROMBOSIS OF LEFT SUBCLAVIAN VEIN: ICD-10-CM

## 2018-01-03 DIAGNOSIS — D70.1 CHEMOTHERAPY-INDUCED NEUTROPENIA: ICD-10-CM

## 2018-01-03 DIAGNOSIS — T45.1X5A CHEMOTHERAPY-INDUCED NEUTROPENIA: ICD-10-CM

## 2018-01-03 PROCEDURE — 99214 OFFICE O/P EST MOD 30 MIN: CPT | Mod: ,,, | Performed by: INTERNAL MEDICINE

## 2018-01-03 NOTE — LETTER
January 3, 2018      Ronny Driscoll MD  [address] [facility]  [address]  [place] [address]           [facility] Hematology Oncology  [address] [facility]  [address]  [place] [address]  Phone: [phone]  Fax: [phone]          Patient: Naresh Painting   MR Number: [id number]   YOB: 1950   Date of Visit: 1/3/2018       Dear Dr. Ronny Driscoll:    Thank you for referring Naresh Painting to me for evaluation. Attached you will find relevant portions of my assessment and plan of care.    If you have questions, please do not hesitate to call me. I look forward to following Naresh Painting along with you.    Sincerely,    Fidencio Rdz MD    Enclosure  CC:  No Recipients    If you would like to receive this communication electronically, please contact [email or web address] or [phone] to request more information on [employer] Link access.    For providers and/or their staff who would like to refer a patient to [facility], please contact us through our one-stop-shop provider referral line, [facility], at [phone].    If you feel you have received this communication in error or would no longer like to receive these types of communications, please e-mail [email or web address]

## 2018-01-03 NOTE — PROGRESS NOTES
[facility] Hematology/Oncology  PROGRESS NOTE      Subjective:       Patient ID:   NAME: Naresh Painting : 1950     67 y.o. male    Referring Doc: Yamila  Other Physicians: Adriano Michaud Pernenkil, Saba    Chief Complaint:  NHL f/u    History of Present Illness:     Patient returns today for a regularly scheduled follow-up visit.  The patient is here with his wife. He is doing good so far with the current regimen. No new issues or complaints. He has had 4 cycles so far and is due for #5 on . He is breathing ok. No CP, SOB, HA's or N/V. He had recent PET.             ROS:   GEN: normal without any fever, night sweats or weight loss  HEENT: normal with no HA's, sore throat, stiff neck, changes in vision  CV: normal with no CP, SOB, PND, COOPER or orthopnea  PULM: normal with no SOB, cough, hemoptysis, sputum or pleuritic pain  GI: normal with no abdominal pain, nausea, vomiting, constipation, diarrhea, melanotic stools, BRBPR, or hematemesis  : normal with no hematuria, dysuria  BREAST: normal with no mass, discharge, pain  SKIN: normal with no rash, erythema, bruising, or swelling    Allergies:  Review of patient's allergies indicates:  No Known Allergies    Medications:    Current Outpatient Prescriptions:     apixaban (ELIQUIS) 5 mg Tab, Take 1 tablet (5 mg total) by mouth 2 (two) times daily., Disp: 60 tablet, Rfl: 6    aspirin 81 MG Chew, Take 81 mg by mouth once daily., Disp: , Rfl:     co-enzyme Q-10 30 mg capsule, Take 30 mg by mouth 3 (three) times daily., Disp: , Rfl:     L GASSERI/B BIFIDUM/B LONGUM (Nexercise ORAL), Take by mouth., Disp: , Rfl:     MULTIVITAMIN/IRON/FOLIC ACID (CENTRUM COMPLETE ORAL), Take by mouth., Disp: , Rfl:     pantoprazole (PROTONIX) 40 MG tablet, Take 40 mg by mouth once daily., Disp: , Rfl:     pravastatin (PRAVACHOL) 40 MG tablet, , Disp: , Rfl:     predniSONE (DELTASONE) 50 MG Tab, TK 3 TS PO ONCE D ON DAYS 2 THROUGH 5, Disp: , Rfl: 6    TOPROL  "XL 50 mg 24 hr tablet, Take 25 mg by mouth once daily., Disp: , Rfl:     vitamin D 1000 units Tab, Take 185 mg by mouth once daily., Disp: , Rfl:     vitamin E 100 UNIT capsule, Take 100 Units by mouth once daily., Disp: , Rfl:     PMHx/PSHx Updates:  See patient's last visit with me on 12/4/2017.  See H&P on 6/9/2017        Pathology:  See path 9/13/2017          Objective:     Vitals:  Blood pressure (!) 96/58, pulse 69, temperature 98 °F (36.7 °C), resp. rate 18, height 5' 10" (1.778 m), weight 110.1 kg (242 lb 12.8 oz).    Physical Examination:   GEN: no apparent distress, comfortable; AAOx3  HEAD: atraumatic and normocephalic  EYES: no pallor, no icterus, PERRLA  ENT: OMM, no pharyngeal erythema, external ears WNL; no nasal discharge; no thrush  NECK: no masses, thyroid normal, trachea midline, no LAD/LN's, supple; residual LN on right neck still; left looks good  CV: RRR with no murmur; normal pulse; normal S1 and S2; no pedal edema  CHEST: Normal respiratory effort; CTAB; normal breath sounds; no wheeze or crackles  ABDOM: nontender and nondistended; soft; normal bowel sounds; no rebound/guarding  MUSC/Skeletal: ROM normal; no crepitus; joints normal; no deformities or arthropathy  EXTREM: no clubbing, cyanosis, inflammation or swelling  SKIN: no rashes, lesions, ulcers, petechiae or subcutaneous nodules  : no cannon  NEURO: grossly intact; motor/sensory WNL; AAOx3; no tremors  PSYCH: normal mood, affect and behavior  LYMPH: normal cervical, supraclavicular, axillary and groin LN's            Labs:     12/28/2017  Lab Results   Component Value Date    WBC 4.0 12/28/2017    HGB 11.7 (L) 12/28/2017    HCT 35.2 (L) 12/28/2017    MCV 92 12/28/2017     12/28/2017     BMP  Lab Results   Component Value Date     12/28/2017    K 4.0 12/28/2017     12/28/2017    CO2 22 12/28/2017    BUN 8 12/28/2017    CREATININE 0.73 (L) 12/28/2017    CALCIUM 8.8 12/28/2017    ESTGFRAFRICA >60.0 11/25/2014    " EGFRNONAA 95 12/28/2017     Lab Results   Component Value Date    ALT 18 12/28/2017    AST 18 12/28/2017    ALKPHOS 82 12/28/2017    BILITOT 0.3 12/28/2017             Radiology/Diagnostic Studies:    PET 12/27/2017:  IMPRESSION:  1. Diffusely increased FDG uptake throughout the visualized osseous structures  suggestive of chemotherapy and in keeping with reactive marrow.  However, this  pattern may mask underlying hypermetabolic osseous foci.  2. Marked interval decrease in size, number and FDG activity to the cervical,  retroperitoneal and upper abdominal lymphadenopathy, now with only soft tissue  fat stranding and FDG activity seen in the left supraclavicular soft tissue and  small bowel mesenteric root.  3. No new sites of FDG avid disease.  4. Status post prostatectomy without focal abnormal FDG activity in the pelvis  to specifically suggest residual/recurrent disease.  I have reviewed all available lab results and radiology reports.    Assessment/Plan:   (1) 67 y.o. male with diagnosis of development of right sided neck swelling since Feb 2017  - he has been referred by Dr Michaud with ID for an evaluation  - i spoke to Dr Michaud previously about the patient peripherally  - FNA bx on 2/24 which was nondiagnostic  - he had a  guided biopsy of an entire left supraclavicular LN on 3/6/2017 with Dr Alexander Oh which showed  necrotizing granulomatous lymphadenitis  - CT scans were from Feb 2017  - flow cytometry studies from 5/25 appear to have been negative for any abnormal cell populations  - PET scan on 7/6 with markedly hypermetabolic LAD  - he was referred to and seen by Dr Basilio at [facility] on 7/28 and again on 8/25  - he underwent repeat cervical LN biopsy with Dr Oh on 9/13/2017 and that 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)  - he saw Dr Basilio again at [facility] and had bone marrow biopsy on 10/3  - the bone marrow showed no involvement of the lymphoom  - Dr Basilio  recommended starting R-CHOP while he awaits additional studies  - portacath placed by Dr Oh and he underwent chemotherapy school and training with Che Lazo  - I previously provided literature on R-CHOP  - he had his 4th cycle and is here for short-term f/u - he is doing good and has resolution with the prior symptoms and the LAD has improved drastically     - PET scan 12/27 looks good     (2) Hx/of prostate cancer - 1995; s/p prostatectomy followed by XRT; on lupron per Dr Menjivar with  and Fauzia Maza (NP)     (3) JAK - uses CPAP     (4) GERD     (5) Myocardial bridging congenital disorder - followed by Dr Santiago with cardiology   - recent echo with good EF at 60%     (6) recent subclavian vein thrombosis - on eliquis 5mg po bid     (7) elevated Leucocytosis - suspect secondary to the neulasta              PLAN:  1. Cycel #5 on Jan 8th  2. Check labs weekly  3. Continue eliquis for now  4. F/U with Dr Basilio in April planned  RTC in 3-4 weeks  Fax note to Ronny Driscoll MD, Adriano Michaud, and  Chetna    Discussion:     I have explained all of the above in detail and the patient understands all of the current recommendation(s). I have answered all of their questions to the best of my ability and to their complete satisfaction.   The patient is to continue with the current management plan.            Electronically signed by Fidencio Rdz MD

## 2018-01-05 LAB
ALBUMIN SERPL-MCNC: 3.7 G/DL (ref 3.6–4.8)
ALBUMIN/GLOB SERPL: 1.6 {RATIO} (ref 1.2–2.2)
ALP SERPL-CCNC: 70 IU/L (ref 39–117)
ALT SERPL-CCNC: 15 IU/L (ref 0–44)
AMBIG ABBREV CMP 14 DEFAULT: NORMAL
AST SERPL-CCNC: 18 IU/L (ref 0–40)
BASOPHILS # BLD AUTO: 0.2 X10E3/UL (ref 0–0.2)
BASOPHILS NFR BLD AUTO: 5 %
BILIRUB SERPL-MCNC: 0.4 MG/DL (ref 0–1.2)
BUN SERPL-MCNC: 9 MG/DL (ref 8–27)
BUN/CREAT SERPL: 14 (ref 10–24)
CALCIUM SERPL-MCNC: 9.2 MG/DL (ref 8.6–10.2)
CHLORIDE SERPL-SCNC: 103 MMOL/L (ref 96–106)
CO2 SERPL-SCNC: 20 MMOL/L (ref 18–29)
CREAT SERPL-MCNC: 0.63 MG/DL (ref 0.76–1.27)
EGFR IF AFRICAN AMERICAN: 117 ML/MIN/1.73
EOSINOPHIL # BLD AUTO: 0.1 X10E3/UL (ref 0–0.4)
EOSINOPHIL NFR BLD AUTO: 2 %
ERYTHROCYTE [DISTWIDTH] IN BLOOD BY AUTOMATED COUNT: 18.1 % (ref 12.3–15.4)
EST. GFR  (NON AFRICAN AMERICAN): 101 ML/MIN/1.73
GLOBULIN SER CALC-MCNC: 2.3 G/DL (ref 1.5–4.5)
GLUCOSE SERPL-MCNC: 88 MG/DL (ref 65–99)
HCT VFR BLD AUTO: 37.4 % (ref 37.5–51)
HGB BLD-MCNC: 12 G/DL (ref 13–17.7)
LYMPHOCYTES # BLD AUTO: 0.6 X10E3/UL (ref 0.7–3.1)
LYMPHOCYTES NFR BLD AUTO: 15 %
MCH RBC QN AUTO: 31.3 PG (ref 26.6–33)
MCHC RBC AUTO-ENTMCNC: 32.1 G/DL (ref 31.5–35.7)
MCV RBC AUTO: 97 FL (ref 79–97)
MONOCYTES # BLD AUTO: 0.5 X10E3/UL (ref 0.1–0.9)
MONOCYTES NFR BLD AUTO: 11 %
MORPHOLOGY BLD-IMP: ABNORMAL
NEUTROPHILS # BLD AUTO: 2.9 X10E3/UL (ref 1.4–7)
NEUTROPHILS NFR BLD AUTO: 67 %
NRBC BLD AUTO-RTO: 1 %
PLATELET # BLD AUTO: 331 X10E3/UL (ref 150–379)
POTASSIUM SERPL-SCNC: 4.3 MMOL/L (ref 3.5–5.2)
PROT SERPL-MCNC: 6 G/DL (ref 6–8.5)
RBC # BLD AUTO: 3.84 X10E6/UL (ref 4.14–5.8)
SODIUM SERPL-SCNC: 140 MMOL/L (ref 134–144)
WBC # BLD AUTO: 4.3 X10E3/UL (ref 3.4–10.8)

## 2018-01-05 RX ORDER — HEPARIN 100 UNIT/ML
500 SYRINGE INTRAVENOUS
Status: CANCELLED | OUTPATIENT
Start: 2018-01-08

## 2018-01-05 RX ORDER — SODIUM CHLORIDE 0.9 % (FLUSH) 0.9 %
10 SYRINGE (ML) INJECTION
Status: CANCELLED | OUTPATIENT
Start: 2018-01-08

## 2018-01-05 RX ORDER — FAMOTIDINE 10 MG/ML
20 INJECTION INTRAVENOUS
Status: CANCELLED | OUTPATIENT
Start: 2018-01-08

## 2018-01-05 RX ORDER — ACETAMINOPHEN 325 MG/1
650 TABLET ORAL
Status: CANCELLED | OUTPATIENT
Start: 2018-01-08

## 2018-01-05 RX ORDER — DOXORUBICIN HYDROCHLORIDE 2 MG/ML
50 INJECTION, SOLUTION INTRAVENOUS
Status: CANCELLED | OUTPATIENT
Start: 2018-01-08

## 2018-01-12 LAB
ALBUMIN SERPL-MCNC: 3.9 G/DL (ref 3.6–4.8)
ALBUMIN/GLOB SERPL: 2.3 {RATIO} (ref 1.2–2.2)
ALP SERPL-CCNC: 71 IU/L (ref 39–117)
ALT SERPL-CCNC: 11 IU/L (ref 0–44)
AST SERPL-CCNC: 17 IU/L (ref 0–40)
BASOPHILS # BLD AUTO: 0.1 X10E3/UL (ref 0–0.2)
BASOPHILS NFR BLD AUTO: 0 %
BILIRUB SERPL-MCNC: 0.6 MG/DL (ref 0–1.2)
BUN SERPL-MCNC: 18 MG/DL (ref 8–27)
BUN/CREAT SERPL: 27 (ref 10–24)
CALCIUM SERPL-MCNC: 8.4 MG/DL (ref 8.6–10.2)
CHLORIDE SERPL-SCNC: 106 MMOL/L (ref 96–106)
CO2 SERPL-SCNC: 21 MMOL/L (ref 18–29)
CREAT SERPL-MCNC: 0.66 MG/DL (ref 0.76–1.27)
EGFR IF AFRICAN AMERICAN: 115 ML/MIN/1.73
EOSINOPHIL # BLD AUTO: 0.2 X10E3/UL (ref 0–0.4)
EOSINOPHIL NFR BLD AUTO: 0 %
ERYTHROCYTE [DISTWIDTH] IN BLOOD BY AUTOMATED COUNT: 17.6 % (ref 12.3–15.4)
EST. GFR  (NON AFRICAN AMERICAN): 99 ML/MIN/1.73
GLOBULIN SER CALC-MCNC: 1.7 G/DL (ref 1.5–4.5)
GLUCOSE SERPL-MCNC: 73 MG/DL (ref 65–99)
HCT VFR BLD AUTO: 35.5 % (ref 37.5–51)
HGB BLD-MCNC: 11.4 G/DL (ref 13–17.7)
IMM GRANULOCYTES # BLD: 1.3 X10E3/UL (ref 0–0.1)
IMM GRANULOCYTES NFR BLD: 3 %
LYMPHOCYTES # BLD AUTO: 0.8 X10E3/UL (ref 0.7–3.1)
LYMPHOCYTES NFR BLD AUTO: 2 %
MCH RBC QN AUTO: 31.3 PG (ref 26.6–33)
MCHC RBC AUTO-ENTMCNC: 32.1 G/DL (ref 31.5–35.7)
MCV RBC AUTO: 98 FL (ref 79–97)
MONOCYTES # BLD AUTO: 0.7 X10E3/UL (ref 0.1–0.9)
MONOCYTES NFR BLD AUTO: 2 %
NEUTROPHILS # BLD AUTO: 44.4 X10E3/UL (ref 1.4–7)
NEUTROPHILS NFR BLD AUTO: 93 %
PLATELET # BLD AUTO: 293 X10E3/UL (ref 150–379)
POTASSIUM SERPL-SCNC: 3.8 MMOL/L (ref 3.5–5.2)
PROT SERPL-MCNC: 5.6 G/DL (ref 6–8.5)
RBC # BLD AUTO: 3.64 X10E6/UL (ref 4.14–5.8)
SODIUM SERPL-SCNC: 142 MMOL/L (ref 134–144)
WBC # BLD AUTO: 47.4 X10E3/UL (ref 3.4–10.8)

## 2018-01-19 LAB
ALBUMIN SERPL-MCNC: 3.7 G/DL (ref 3.6–4.8)
ALBUMIN/GLOB SERPL: 1.9 {RATIO} (ref 1.2–2.2)
ALP SERPL-CCNC: 79 IU/L (ref 39–117)
ALT SERPL-CCNC: 18 IU/L (ref 0–44)
AST SERPL-CCNC: 21 IU/L (ref 0–40)
BASOPHILS # BLD AUTO: NORMAL 10*3/UL
BASOPHILS NFR BLD AUTO: NORMAL %
BILIRUB SERPL-MCNC: 0.4 MG/DL (ref 0–1.2)
BUN SERPL-MCNC: 7 MG/DL (ref 8–27)
BUN/CREAT SERPL: 10 (ref 10–24)
CALCIUM SERPL-MCNC: 8.5 MG/DL (ref 8.6–10.2)
CHLORIDE SERPL-SCNC: 102 MMOL/L (ref 96–106)
CO2 SERPL-SCNC: 21 MMOL/L (ref 18–29)
CREAT SERPL-MCNC: 0.71 MG/DL (ref 0.76–1.27)
EGFR IF AFRICAN AMERICAN: 111 ML/MIN/1.73
EOSINOPHIL # BLD AUTO: NORMAL 10*3/UL
EOSINOPHIL NFR BLD AUTO: NORMAL %
ERYTHROCYTE [DISTWIDTH] IN BLOOD BY AUTOMATED COUNT: NORMAL %
EST. GFR  (NON AFRICAN AMERICAN): 96 ML/MIN/1.73
GLOBULIN SER CALC-MCNC: 2 G/DL (ref 1.5–4.5)
GLUCOSE SERPL-MCNC: 83 MG/DL (ref 65–99)
HCT VFR BLD AUTO: NORMAL %
HGB BLD-MCNC: NORMAL G/DL
IMM GRANULOCYTES # BLD: NORMAL 10*3/UL
IMM GRANULOCYTES NFR BLD: NORMAL %
IMMATURE CELLS: NORMAL
LYMPHOCYTES # BLD AUTO: NORMAL 10*3/UL
LYMPHOCYTES NFR BLD AUTO: NORMAL %
MCH RBC QN AUTO: NORMAL PG
MCHC RBC AUTO-ENTMCNC: NORMAL G/DL
MCV RBC AUTO: NORMAL FL
MONOCYTES # BLD AUTO: NORMAL 10*3/UL
MONOCYTES NFR BLD AUTO: NORMAL %
MORPHOLOGY BLD-IMP: NORMAL
NEUTROPHILS # BLD AUTO: NORMAL 10*3/UL
NEUTROPHILS NFR BLD AUTO: NORMAL %
NRBC BLD AUTO-RTO: NORMAL %
PLATELET # BLD AUTO: NORMAL 10*3/UL
POTASSIUM SERPL-SCNC: 4.1 MMOL/L (ref 3.5–5.2)
PROT SERPL-MCNC: 5.7 G/DL (ref 6–8.5)
RBC # BLD AUTO: NORMAL 10*6/UL
SODIUM SERPL-SCNC: 141 MMOL/L (ref 134–144)
WBC # BLD AUTO: NORMAL 10*3/UL

## 2018-01-26 ENCOUNTER — LAB VISIT (OUTPATIENT)
Dept: LAB | Facility: HOSPITAL | Age: 68
End: 2018-01-26
Attending: NURSE PRACTITIONER
Payer: MEDICARE

## 2018-01-26 DIAGNOSIS — Z90.79 HISTORY OF RADICAL PROSTATECTOMY: ICD-10-CM

## 2018-01-26 DIAGNOSIS — C61 PROSTATE CANCER: ICD-10-CM

## 2018-01-26 DIAGNOSIS — R97.20 ELEVATED PSA: ICD-10-CM

## 2018-01-26 LAB — COMPLEXED PSA SERPL-MCNC: 0.04 NG/ML

## 2018-01-26 PROCEDURE — 84153 ASSAY OF PSA TOTAL: CPT

## 2018-01-26 PROCEDURE — 36415 COLL VENOUS BLD VENIPUNCTURE: CPT | Mod: PO

## 2018-01-28 RX ORDER — SODIUM CHLORIDE 0.9 % (FLUSH) 0.9 %
10 SYRINGE (ML) INJECTION
Status: CANCELLED | OUTPATIENT
Start: 2018-01-29

## 2018-01-28 RX ORDER — ACETAMINOPHEN 325 MG/1
650 TABLET ORAL
Status: CANCELLED | OUTPATIENT
Start: 2018-01-29

## 2018-01-28 RX ORDER — DOXORUBICIN HYDROCHLORIDE 2 MG/ML
50 INJECTION, SOLUTION INTRAVENOUS
Status: CANCELLED | OUTPATIENT
Start: 2018-01-29

## 2018-01-28 RX ORDER — HEPARIN 100 UNIT/ML
500 SYRINGE INTRAVENOUS
Status: CANCELLED | OUTPATIENT
Start: 2018-01-29

## 2018-01-28 RX ORDER — FAMOTIDINE 10 MG/ML
20 INJECTION INTRAVENOUS
Status: CANCELLED | OUTPATIENT
Start: 2018-01-29

## 2018-01-29 RX ORDER — FAMOTIDINE 10 MG/ML
20 INJECTION INTRAVENOUS
Status: CANCELLED | OUTPATIENT
Start: 2018-01-29

## 2018-01-29 RX ORDER — ACETAMINOPHEN 325 MG/1
650 TABLET ORAL
Status: CANCELLED | OUTPATIENT
Start: 2018-01-29

## 2018-01-29 RX ORDER — SODIUM CHLORIDE 0.9 % (FLUSH) 0.9 %
10 SYRINGE (ML) INJECTION
Status: CANCELLED | OUTPATIENT
Start: 2018-01-29

## 2018-01-29 RX ORDER — DOXORUBICIN HYDROCHLORIDE 2 MG/ML
50 INJECTION, SOLUTION INTRAVENOUS
Status: CANCELLED | OUTPATIENT
Start: 2018-01-29

## 2018-01-29 RX ORDER — HEPARIN 100 UNIT/ML
500 SYRINGE INTRAVENOUS
Status: CANCELLED | OUTPATIENT
Start: 2018-01-29

## 2018-01-30 ENCOUNTER — OFFICE VISIT (OUTPATIENT)
Dept: HEMATOLOGY/ONCOLOGY | Facility: CLINIC | Age: 68
End: 2018-01-30
Payer: MEDICARE

## 2018-01-30 VITALS
BODY MASS INDEX: 36.83 KG/M2 | TEMPERATURE: 98 F | WEIGHT: 243 LBS | RESPIRATION RATE: 18 BRPM | DIASTOLIC BLOOD PRESSURE: 70 MMHG | HEART RATE: 73 BPM | SYSTOLIC BLOOD PRESSURE: 109 MMHG | HEIGHT: 68 IN

## 2018-01-30 DIAGNOSIS — C83.31 DIFFUSE LARGE B-CELL LYMPHOMA OF LYMPH NODES OF NECK: ICD-10-CM

## 2018-01-30 DIAGNOSIS — I82.B12 THROMBOSIS OF LEFT SUBCLAVIAN VEIN: Primary | ICD-10-CM

## 2018-01-30 DIAGNOSIS — T45.1X5A CHEMOTHERAPY-INDUCED NEUTROPENIA: ICD-10-CM

## 2018-01-30 DIAGNOSIS — C61 MALIGNANT NEOPLASM OF PROSTATE: ICD-10-CM

## 2018-01-30 DIAGNOSIS — Z90.79 HISTORY OF PROSTATECTOMY: ICD-10-CM

## 2018-01-30 DIAGNOSIS — D70.1 CHEMOTHERAPY-INDUCED NEUTROPENIA: ICD-10-CM

## 2018-01-30 PROCEDURE — 99214 OFFICE O/P EST MOD 30 MIN: CPT | Mod: ,,, | Performed by: INTERNAL MEDICINE

## 2018-01-30 RX ORDER — HEPARIN 100 UNIT/ML
500 SYRINGE INTRAVENOUS
Status: CANCELLED | OUTPATIENT
Start: 2018-02-28

## 2018-01-30 RX ORDER — SODIUM CHLORIDE 0.9 % (FLUSH) 0.9 %
10 SYRINGE (ML) INJECTION
Status: CANCELLED | OUTPATIENT
Start: 2018-02-28

## 2018-01-30 NOTE — LETTER
January 30, 2018      Ronny Driscoll MD  [address]  [address]  [place] [address]           [facility] Hematology Oncology  [address]  [address]  [place] [address]  Phone: [phone]  Fax: [phone]          Patient: Naresh Painting   MR Number: [id number]   YOB: 1950   Date of Visit: 1/30/2018       Dear Dr. Ronny Driscoll:    Thank you for referring Naresh Painting to me for evaluation. Attached you will find relevant portions of my assessment and plan of care.    If you have questions, please do not hesitate to call me. I look forward to following Naresh Painting along with you.    Sincerely,    Fidencio Rdz MD    Enclosure  CC:  No Recipients    If you would like to receive this communication electronically, please contact [email or web address] or [phone] to request more information on [employer] Link access.    For providers and/or their staff who would like to refer a patient to [facility], please contact us through our one-stop-shop provider referral line, [facility], at [phone].    If you feel you have received this communication in error or would no longer like to receive these types of communications, please e-mail [email or web address]

## 2018-01-30 NOTE — PROGRESS NOTES
[facility] Hematology/Oncology  PROGRESS NOTE      Subjective:       Patient ID:   NAME: Naresh Painting : 1950     67 y.o. male    Referring Doc: Yamila  Other Physicians: Adriano Michaud Pernenkil, Saba    Chief Complaint:  NHL f/u    History of Present Illness:     Patient returns today for a regularly scheduled follow-up visit.  The patient is here with his wife. He has done good so far with the current regimen. No new issues or complaints. He has completed 6 cycles of chemotherapy. He is breathing ok. No CP, SOB, HA's or N/V. Mild constipation. .             ROS:   GEN: normal without any fever, night sweats or weight loss  HEENT: normal with no HA's, sore throat, stiff neck, changes in vision  CV: normal with no CP, SOB, PND, COOPER or orthopnea  PULM: normal with no SOB, cough, hemoptysis, sputum or pleuritic pain  GI: normal with no abdominal pain, nausea, vomiting, , diarrhea, melanotic stools, BRBPR, or hematemesis; mild constipation  : normal with no hematuria, dysuria  BREAST: normal with no mass, discharge, pain  SKIN: normal with no rash, erythema, bruising, or swelling    Allergies:  Review of patient's allergies indicates:  No Known Allergies    Medications:    Current Outpatient Prescriptions:     apixaban (ELIQUIS) 5 mg Tab, Take 1 tablet (5 mg total) by mouth 2 (two) times daily., Disp: 60 tablet, Rfl: 6    aspirin 81 MG Chew, Take 81 mg by mouth once daily., Disp: , Rfl:     co-enzyme Q-10 30 mg capsule, Take 30 mg by mouth 3 (three) times daily., Disp: , Rfl:     L GASSERI/B BIFIDUM/B LONGUM ([place] ORAL), Take by mouth., Disp: , Rfl:     MULTIVITAMIN/IRON/FOLIC ACID (CENTRUM COMPLETE ORAL), Take by mouth., Disp: , Rfl:     pantoprazole (PROTONIX) 40 MG tablet, Take 40 mg by mouth once daily., Disp: , Rfl:     pravastatin (PRAVACHOL) 40 MG tablet, , Disp: , Rfl:     predniSONE (DELTASONE) 50 MG Tab, TK 3 TS PO ONCE D ON DAYS 2 THROUGH 5, Disp: , Rfl: 6    TOPROL XL 50  "mg 24 hr tablet, Take 25 mg by mouth once daily., Disp: , Rfl:     vitamin D 1000 units Tab, Take 185 mg by mouth once daily., Disp: , Rfl:     vitamin E 100 UNIT capsule, Take 100 Units by mouth once daily., Disp: , Rfl:     PMHx/PSHx Updates:  See patient's last visit with me on 1/3/2018.  See H&P on 6/9/2017        Pathology:  See path 9/13/2017          Objective:     Vitals:  Blood pressure 109/70, pulse 73, temperature 98.2 °F (36.8 °C), resp. rate 18, height 5' 8" (1.727 m), weight 110.2 kg (243 lb).    Physical Examination:   GEN: no apparent distress, comfortable; AAOx3  HEAD: atraumatic and normocephalic  EYES: no pallor, no icterus, PERRLA  ENT: OMM, no pharyngeal erythema, external ears WNL; no nasal discharge; no thrush  NECK: no masses, thyroid normal, trachea midline,  LAD/LN's, supple; residual LN on right neck still; left looks good  CV: RRR with no murmur; normal pulse; normal S1 and S2; no pedal edema  CHEST: Normal respiratory effort; CTAB; normal breath sounds; no wheeze or crackles  ABDOM: nontender and nondistended; soft; normal bowel sounds; no rebound/guarding  MUSC/Skeletal: ROM normal; no crepitus; joints normal; no deformities or arthropathy  EXTREM: no clubbing, cyanosis, inflammation or swelling  SKIN: no rashes, lesions, ulcers, petechiae or subcutaneous nodules  : no cannon  NEURO: grossly intact; motor/sensory WNL; AAOx3; no tremors  PSYCH: normal mood, affect and behavior  LYMPH: normal cervical, supraclavicular, axillary and groin LN's            Labs:     1/26/2018  Lab Results   Component Value Date    WBC 4.8 01/25/2018    HGB 12.1 (L) 01/25/2018    HCT 36.7 (L) 01/25/2018    MCV 96 01/25/2018     01/25/2018     BMP  Lab Results   Component Value Date     01/25/2018    K 5.0 01/25/2018     01/25/2018    CO2 19 01/25/2018    BUN 11 01/25/2018    CREATININE 0.72 (L) 01/25/2018    CALCIUM 8.9 01/25/2018    ESTGFRAFRICA >60.0 11/25/2014    EGFRNONAA 96 " 01/25/2018     Lab Results   Component Value Date    ALT 15 01/25/2018    AST 22 01/25/2018    ALKPHOS 67 01/25/2018    BILITOT 0.3 01/25/2018           Radiology/Diagnostic Studies:    PET 12/27/2017:  IMPRESSION:  1. Diffusely increased FDG uptake throughout the visualized osseous structures  suggestive of chemotherapy and in keeping with reactive marrow.  However, this  pattern may mask underlying hypermetabolic osseous foci.  2. Marked interval decrease in size, number and FDG activity to the cervical,  retroperitoneal and upper abdominal lymphadenopathy, now with only soft tissue  fat stranding and FDG activity seen in the left supraclavicular soft tissue and  small bowel mesenteric root.  3. No new sites of FDG avid disease.  4. Status post prostatectomy without focal abnormal FDG activity in the pelvis  to specifically suggest residual/recurrent disease.  I have reviewed all available lab results and radiology reports.    Assessment/Plan:   (1) 67 y.o. male with diagnosis of development of right sided neck swelling since Feb 2017  - he has been referred by Dr Michaud with ID for an evaluation  - i spoke to Dr Michaud previously about the patient peripherally  - FNA bx on 2/24 which was nondiagnostic  - he had a  guided biopsy of an entire left supraclavicular LN on 3/6/2017 with Dr Alexander Oh which showed  necrotizing granulomatous lymphadenitis  - CT scans were from Feb 2017  - flow cytometry studies from 5/25 appear to have been negative for any abnormal cell populations  - PET scan on 7/6 with markedly hypermetabolic LAD  - he was referred to and seen by Dr Basilio at 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 and had bone marrow biopsy on 10/3  - the bone marrow showed no involvement of the lymphoom  - Dr Basilio recommended starting  R-CHOP while he awaited additional studies  - portacath placed by Dr Oh and he underwent chemotherapy school and training with Che Lazo  - I previously provided literature on R-CHOP  - he had his 6th and final cycle  - he is doing good and has resolution with the prior symptoms and the LAD has improved drastically with latest PET scan 12/27 looks good     (2) Hx/of prostate cancer - 1995; s/p prostatectomy followed by XRT; on lupron per Dr Menjivar with  and Fauzia Maza (NP)     (3) JAK - uses CPAP     (4) GERD     (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 Completed cycle #6 - will get him to se Dr Basilio again for f/u post-chemotherapy  2. Check labs weekly for 4 weeks then monthly; PF q 4-6 weeks  3. Continue eliquis for now  4. F/U with Dr Basilio to see if he would be candidate for maintenance rituximab  5. Repeat scans in May/June 2018  RTC in 4 weeks  Fax note to Ronny Driscoll MD, Adriano Michaud, and  Chetna    Discussion:     I have explained all of the above in detail and the patient understands all of the current recommendation(s). I have answered all of their questions to the best of my ability and to their complete satisfaction.   The patient is to continue with the current management plan.            Electronically signed by Fidencio Rdz MD

## 2018-01-31 DIAGNOSIS — C61 PROSTATE CANCER: Primary | ICD-10-CM

## 2018-01-31 DIAGNOSIS — Z90.79 HISTORY OF RADICAL PROSTATECTOMY: ICD-10-CM

## 2018-02-02 ENCOUNTER — OFFICE VISIT (OUTPATIENT)
Dept: UROLOGY | Facility: CLINIC | Age: 68
End: 2018-02-02
Payer: MEDICARE

## 2018-02-02 VITALS
HEIGHT: 68 IN | DIASTOLIC BLOOD PRESSURE: 77 MMHG | SYSTOLIC BLOOD PRESSURE: 133 MMHG | WEIGHT: 250.44 LBS | BODY MASS INDEX: 37.96 KG/M2 | HEART RATE: 80 BPM

## 2018-02-02 DIAGNOSIS — R97.20 ELEVATED PSA: ICD-10-CM

## 2018-02-02 DIAGNOSIS — C61 PROSTATE CANCER: Primary | ICD-10-CM

## 2018-02-02 DIAGNOSIS — Z90.79 HISTORY OF RADICAL PROSTATECTOMY: ICD-10-CM

## 2018-02-02 LAB
ALBUMIN SERPL-MCNC: 3.7 G/DL (ref 3.6–4.8)
ALBUMIN/GLOB SERPL: 1.9 {RATIO} (ref 1.2–2.2)
ALP SERPL-CCNC: 84 IU/L (ref 39–117)
ALT SERPL-CCNC: 12 IU/L (ref 0–44)
AST SERPL-CCNC: 20 IU/L (ref 0–40)
BASOPHILS # BLD AUTO: 0.6 X10E3/UL (ref 0–0.2)
BASOPHILS NFR BLD AUTO: 1 %
BILIRUB SERPL-MCNC: 0.4 MG/DL (ref 0–1.2)
BUN SERPL-MCNC: 15 MG/DL (ref 8–27)
BUN/CREAT SERPL: 22 (ref 10–24)
CALCIUM SERPL-MCNC: 8.9 MG/DL (ref 8.6–10.2)
CHLORIDE SERPL-SCNC: 103 MMOL/L (ref 96–106)
CO2 SERPL-SCNC: 19 MMOL/L (ref 18–29)
CREAT SERPL-MCNC: 0.69 MG/DL (ref 0.76–1.27)
EOSINOPHIL # BLD AUTO: 0 X10E3/UL (ref 0–0.4)
EOSINOPHIL NFR BLD AUTO: 0 %
ERYTHROCYTE [DISTWIDTH] IN BLOOD BY AUTOMATED COUNT: 16.2 % (ref 12.3–15.4)
GFR SERPLBLD CREATININE-BSD FMLA CKD-EPI: 113 ML/MIN/1.73
GFR SERPLBLD CREATININE-BSD FMLA CKD-EPI: 98 ML/MIN/1.73
GLOBULIN SER CALC-MCNC: 2 G/DL (ref 1.5–4.5)
GLUCOSE SERPL-MCNC: 47 MG/DL (ref 65–99)
HCT VFR BLD AUTO: 38.3 % (ref 37.5–51)
HGB BLD-MCNC: 11.6 G/DL (ref 13–17.7)
IMMATURE CELLS: ABNORMAL
LYMPHOCYTES # BLD AUTO: 0 X10E3/UL (ref 0.7–3.1)
LYMPHOCYTES NFR BLD AUTO: 0 %
MCH RBC QN AUTO: 31.3 PG (ref 26.6–33)
MCHC RBC AUTO-ENTMCNC: 30.3 G/DL (ref 31.5–35.7)
MCV RBC AUTO: 103 FL (ref 79–97)
MONOCYTES # BLD AUTO: 0.6 X10E3/UL (ref 0.1–0.9)
MONOCYTES NFR BLD AUTO: 1 %
MORPHOLOGY BLD-IMP: ABNORMAL
NEUTROPHILS # BLD AUTO: 57.3 X10E3/UL (ref 1.4–7)
NEUTROPHILS NFR BLD AUTO: 95 %
NEUTS BAND NFR BLD AUTO: 3 %
PLATELET # BLD AUTO: 268 X10E3/UL (ref 150–379)
POTASSIUM SERPL-SCNC: 3.4 MMOL/L (ref 3.5–5.2)
PROT SERPL-MCNC: 5.7 G/DL (ref 6–8.5)
RBC # BLD AUTO: 3.71 X10E6/UL (ref 4.14–5.8)
SODIUM SERPL-SCNC: 143 MMOL/L (ref 134–144)
WBC # BLD AUTO: 58.5 X10E3/UL (ref 3.4–10.8)

## 2018-02-02 PROCEDURE — 96402 CHEMO HORMON ANTINEOPL SQ/IM: CPT | Mod: JG,S$GLB,, | Performed by: NURSE PRACTITIONER

## 2018-02-02 PROCEDURE — 1159F MED LIST DOCD IN RCRD: CPT | Mod: S$GLB,,, | Performed by: NURSE PRACTITIONER

## 2018-02-02 PROCEDURE — 1126F AMNT PAIN NOTED NONE PRSNT: CPT | Mod: S$GLB,,, | Performed by: NURSE PRACTITIONER

## 2018-02-02 PROCEDURE — 99999 PR PBB SHADOW E&M-EST. PATIENT-LVL IV: CPT | Mod: PBBFAC,,, | Performed by: NURSE PRACTITIONER

## 2018-02-02 PROCEDURE — 3008F BODY MASS INDEX DOCD: CPT | Mod: S$GLB,,, | Performed by: NURSE PRACTITIONER

## 2018-02-02 PROCEDURE — 99214 OFFICE O/P EST MOD 30 MIN: CPT | Mod: 25,S$GLB,, | Performed by: NURSE PRACTITIONER

## 2018-02-02 NOTE — PROGRESS NOTES
Subjective:       Patient ID: Naresh Painting is a 67 y.o. male.    Chief Complaint: Prostate Cancer (6 month check up, injection)    Mr. Painting is a 67-year-old patient that underwent a radical prostatectomy in December of 1995 by Dr. Partida at [facility].  He was first seen by Dr. Menjivar in 2005 where He was currently on GnRH agaonist therapy/Eligard 45 mg every six months due to rise in PSA.  He was last seen in clinic & received Trelstar on 02/01/2017    He denies any urological problems or concerns.  Has been battling a cold; now with just a cough.  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  First seen at [facility], but now followed by Dr. Rdz  .    Just had wedding anniversary (1/26/2017)  Eating healtier;   Getting plenty of rest.  Overall feeling better.                     PSA                  0.04                01/26/2018                 PSA                  0.17                07/25/2017                 PSA                  0.22                01/26/2017                 PSA                  0.19                07/22/2016                 PSA                  0.20                01/18/2016                 PSA                  0.19                07/24/2015                 PSA                  0.15                01/12/2015                 PSA                  0.17                06/06/2014                 PSA                  0.11                12/02/2013                 PSA                      0.13                05/31/2013                 PSA                      0.16                11/29/2012                 PSA                      0.12                05/25/2012                 PSA                      0.13                10/18/2011                 PSA                      0.11                 03/17/2011                 PSA                      0.20                08/25/2010                 PSA                      0.11                06/25/2009                 PSA                      0.09                01/09/2009                 PSA                      0.1                 06/12/2008                 PSA                      0.1                 12/11/2007                                   Past Medical History:    Prostate cancer                                               Past Surgical History:    PROSTATE SURGERY                                               HERNIA REPAIR                                                  EYE SURGERY                                                    No family history on file.      Social History    Marital Status:              Spouse Name:                       Years of Education:                 Number of children:               Occupational History    None on file    Social History Main Topics    Smoking Status: Former Smoker                   Packs/Day: 0.00  Years:          Smokeless Status: Never Used                        Comment: quit 1995    Alcohol Use: No              Drug Use: Not on file     Sexual Activity: Not on file          Other Topics            Concern    None on file    Social History Narrative    None on file        Allergies:  Review of patient's allergies indicates no known allergies.    Medications:  Current outpatient prescriptions: aspirin 81 MG Chew, Take 81 mg by mouth once daily., Disp: , Rfl: ;  pravastatin (PRAVACHOL) 40 MG tablet, , Disp: , Rfl: ;  TOPROL XL 50 mg 24 hr tablet, , Disp: , Rfl:   Current facility-administered medications: triptorelin pamoate Syrg 22.5 mg, 22.5 mg, Intramuscular, 1 time in Clinic/HOD, Fauzia Maza NP                Review of Systems   Constitutional: Negative for activity change, appetite change, chills and fever.   HENT: Negative for facial swelling and trouble swallowing.    Eyes: Negative  for visual disturbance.   Respiratory: Negative for chest tightness and shortness of breath.    Cardiovascular: Negative for chest pain and palpitations.   Gastrointestinal: Negative.  Negative for abdominal pain, constipation, diarrhea, nausea and vomiting.   Genitourinary: Positive for frequency. Negative for difficulty urinating, dysuria, flank pain, hematuria, penile pain, penile swelling, scrotal swelling and testicular pain.        Pleased with how things are going     Musculoskeletal: Negative for back pain, gait problem, myalgias and neck stiffness.   Skin: Negative for rash.   Neurological: Negative for dizziness and speech difficulty.   Hematological: Does not bruise/bleed easily.   Psychiatric/Behavioral: Negative for behavioral problems.       Objective:      Physical Exam   Nursing note and vitals reviewed.  Constitutional: He is oriented to person, place, and time. Vital signs are normal. He appears well-developed and well-nourished. He is cooperative.  Non-toxic appearance. He does not have a sickly appearance.   HENT:   Head: Normocephalic and atraumatic.   Right Ear: External ear normal.   Left Ear: External ear normal.   Nose: Nose normal.   Mouth/Throat: Mucous membranes are normal.   Eyes: Conjunctivae and lids are normal. No scleral icterus.   Neck: Trachea normal, normal range of motion and full passive range of motion without pain. Neck supple. No JVD present. No tracheal deviation present.   Cardiovascular: Normal rate, S1 normal and S2 normal.    Pulmonary/Chest: Effort normal. No respiratory distress. He exhibits no tenderness.   Abdominal: Soft. Normal appearance and bowel sounds are normal. There is no hepatosplenomegaly. There is no tenderness. There is no CVA tenderness.   Genitourinary: Testes normal and penis normal.       Musculoskeletal: Normal range of motion.   Neurological: He is alert and oriented to person, place, and time. He has normal strength.   Skin: Skin is warm, dry and  intact.     Psychiatric: He has a normal mood and affect. His behavior is normal. Judgment and thought content normal.       Assessment:       1. Prostate cancer    2. History of radical prostatectomy    3. Elevated PSA        Plan:         I spent 25 minutes with the patient of which more than half was spent in direct consultation with the patient in regards to our treatment and plan.    Education and recommendations of today's plan of care including home remedies.  Diet modifications; daily exercise to improve overall health lower BMI  Trelstar today.  RTC 6 months with PSA and Trelstar

## 2018-02-02 NOTE — PATIENT INSTRUCTIONS
PSA                  0.04                01/26/2018                 PSA                  0.17                07/25/2017                 PSA                  0.22                01/26/2017                 PSA                  0.19                07/22/2016                 PSA                  0.20                01/18/2016                 PSA                  0.19                07/24/2015                 PSA                  0.15                01/12/2015                 PSA                  0.17                06/06/2014                 PSA                  0.11                12/02/2013                 PSA                      0.13                05/31/2013                 PSA                      0.16                11/29/2012                 PSA                      0.12                05/25/2012                 PSA                      0.13                10/18/2011                 PSA                      0.11                03/17/2011                 PSA                      0.20                08/25/2010                 PSA                      0.11                06/25/2009                 PSA                      0.09                01/09/2009                 PSA                      0.1                 06/12/2008                 PSA                      0.1                 12/11/2007                Hormone Therapy for Prostate Cancer  Androgens are male hormones. Androgens such as testosterone are made in the testicles. Prostate cancer cells need androgens to grow. Reducing the amount of androgens in the body or blocking prostate cancer cells from using them can help treat prostate cancer. This therapy does not cure the cancer, but it can help control it. It may be used alone. Or it may be used with radiation therapy to help make this treatment more effective. Read below to learn more about this treatment.  How the therapy is done  Hormone therapy can be done with:  · LHRH (GnRH) agonists or  antagonists. These are medicines that stop the testicles from making androgens. These are injected into a muscle or just under the skin. This is done every few weeks or months. Or they may be given with a small device put under the skin on the inside of the arm. This implant gives a steady dose of medicine over time. LHRH agonists and antagonists are often used with anti-androgens (see below).  · Anti-androgens. These are medicines that stop cancer cells from using androgens as a way to grow. These come in pill form and are taken by mouth. They are often used along with other forms of hormone therapy.  · CYP17 inhibitors. These slow the amount of hormones made in prostate cancer cells and other body cells. They are given as pills. They are often used along with other forms of hormone therapy.  · Other medicines. These may include corticosteroids, estrogens, or anti-fungal medicines. These can also help lower the levels of androgens in the body. They are used less often than the medicines listed above.  · Orchiectomy. This is surgery to remove the testicles. This stops the body from making most androgens. Artificial (prosthetic) testicles can be placed afterward to give the look of real testicles.  Possible side effects   Side effects are similar for most types of hormone therapy, but they can vary a bit between medicines. Possible side effects can include:  · Sudden increase in body heat (hot flashes)  · Tiredness  · Less interest in sex  · Inability to get or keep an erection  · Decrease in size of penis and testicles  · Changes in facial hair  · Mood changes, such as depression, irritability, or anxiety  · Trouble with memory and concentration  · Loss of muscle  · Diarrhea  · Nausea  · Weight gain  · Breast-area tenderness or growth  · Low red blood cell count (anemia)  · Hair loss  · Bone thinning (osteoporosis)  · Increased risk of heart disease, stroke, and diabetes  Coping with side effects  Some of the side  effects are temporary. Others are more long-lasting. This depends on the type of hormone therapy used, and how it affects your body. Most side effects of orchiectomy are permanent. Your health care provider can tell you more. To help cope with side effects, try the tips below.  · Talk to your health care provider about your symptoms. He or she may prescribe medicines that can help you feel better and reduce problems.  · If you have hot flashes, dont take hot showers. Dont use hot tubs or saunas.  · Dont eat spicy food or drink alcohol. Dont have caffeine.  · Get regular physical activity.  · Eat a healthy diet.  · Keep mentally active.  · Work with your partner to manage sexual changes.  · Try counseling or support groups.  Follow-up care  During the course of your treatment, youll have regular visits with your health care provider. You may also have tests. These let your health care provider check your health and see how well the treatment is working. After treatment ends, you and your health care provider will discuss the results. Youll also discuss whether you need additional cancer treatments.  Resources  For more information about cancer and its treatment, visit the websites listed below:  · American Cancer Society   · National [employer] Martin   · [employer]   Date Last Reviewed: 3/30/2015  © 8681-2693 [employer]. [address] [place] [address], [place] [address]. All rights reserved. This information is not intended as a substitute for professional medical care. Always follow your healthcare professional's instructions.        Triptorelin injection  What is this medicine?  TRIPTORELIN (TRIP toe rel in) decreases testosterone in men and estrogen in women. It is used to treat advanced prostate cancer and endometriosis.  How should I use this medicine?  This medicine is for injection into a muscle. It is given by a health care professional in a hospital or clinic setting.  Talk to your pediatrician  regarding the use of this medicine in children. This medicine is not approved for use in children.  What side effects may I notice from receiving this medicine?  Side effects that you should report to your doctor or health care professional as soon as possible:  · allergic reactions like skin rash, itching or hives, swelling of the face, lips, or tongue  · breast enlargement in both males and females  · breathing problems  · changes in vision  · confused, not alert, other mental change  · dark urine  · new or worsening pain  · pain, tingling, numbness in the hands or feet  · swelling of the ankles, feet, hands  · trouble passing urine or change in the amount of urine  · vomiting  · weakness or paralysis  Side effects that usually do not require medical attention (report to your doctor or health care professional if they continue or are bothersome):  · change in sex drive or performance  · constipation or diarrhea  · dizziness  · headache  · hot flashes  · nausea, stomach upset  · pain at site where injected  What may interact with this medicine?  Do not take this medicine with any of the following medications:  · chasteberry supplements  This medicine may also interact with the following medications:  · cimetidine  · herbal or dietary supplements, like black cohosh, DHEA  · female hormones, like estrogen  · male hormones, like testosterone  · medicines for depression, anxiety, or psychotic disturbances  · methyldopa  · metoclopramide  · phenothiazines like chlorpromazine, mesoridazine, prochlorperazine, thioridazine  · prasterone  · reserpine  What if I miss a dose?  It is important not to miss your dose. Call your doctor or health care professional if you are unable to keep an appointment.  Where should I keep my medicine?  This drug is given in a hospital or clinic and will not be stored at home.  What should I tell my health care provider before I take this medicine?  They need to know if you have any of these  conditions:  · diabetes  · heart disease or previous heart attack  · high blood pressure  · high cholesterol  · pain or difficulty passing urine  · spinal cord metastasis  · stroke  · tobacco smoker  · an unusual or allergic reaction to triptorelin, other medicines, foods, dyes, or preservatives  · pregnant or trying to get pregnant  · breast-feeding  What should I watch for while using this medicine?  Your condition will be monitored carefully while you are receiving this medicine. You will need important blood work done while you are taking this medicine.  During the first weeks of treatment your symptoms may get worse. These should get better as you continue your treatment. Tell your doctor or healthcare professional if your symptoms do not start to get better or if they continue to get worse.  Women should inform their doctor if they wish to become pregnant or think they might be pregnant. There is a potential for serious side effects to an unborn child. Talk to your health care professional or pharmacist for more information.  NOTE:This sheet is a summary. It may not cover all possible information. If you have questions about this medicine, talk to your doctor, pharmacist, or health care provider. Copyright© 2017 [employer]

## 2018-02-05 ENCOUNTER — TELEPHONE (OUTPATIENT)
Dept: HEMATOLOGY/ONCOLOGY | Facility: CLINIC | Age: 68
End: 2018-02-05

## 2018-02-05 NOTE — TELEPHONE ENCOUNTER
----- Message from Milton Acosta RN sent at 2/5/2018 10:15 AM CST -----      ----- Message -----  From: Tia Michael  Sent: 2/5/2018   9:53 AM  To: [facility] Staff    Amita called in requesting a letter for the patient to excuse him from Jury Duty. Please advise and contact Amita at [phone]. Thanks

## 2018-02-07 DIAGNOSIS — I82.90 CLOT: ICD-10-CM

## 2018-02-07 RX ORDER — APIXABAN 5 MG/1
TABLET, FILM COATED ORAL
Qty: 60 TABLET | Refills: 0 | Status: SHIPPED | OUTPATIENT
Start: 2018-02-07 | End: 2018-02-28

## 2018-02-09 LAB
ALBUMIN SERPL-MCNC: 4 G/DL (ref 3.6–4.8)
ALBUMIN/GLOB SERPL: 2.9 {RATIO} (ref 1.2–2.2)
ALP SERPL-CCNC: 72 IU/L (ref 39–117)
ALT SERPL-CCNC: 18 IU/L (ref 0–44)
AST SERPL-CCNC: 23 IU/L (ref 0–40)
BASOPHILS # BLD AUTO: 0 X10E3/UL (ref 0–0.2)
BASOPHILS NFR BLD AUTO: 2 %
BILIRUB SERPL-MCNC: 0.3 MG/DL (ref 0–1.2)
BUN SERPL-MCNC: 8 MG/DL (ref 8–27)
BUN/CREAT SERPL: 13 (ref 10–24)
CALCIUM SERPL-MCNC: 8.5 MG/DL (ref 8.6–10.2)
CHLORIDE SERPL-SCNC: 103 MMOL/L (ref 96–106)
CO2 SERPL-SCNC: 23 MMOL/L (ref 18–29)
CREAT SERPL-MCNC: 0.64 MG/DL (ref 0.76–1.27)
EOSINOPHIL # BLD AUTO: 0.1 X10E3/UL (ref 0–0.4)
EOSINOPHIL NFR BLD AUTO: 4 %
ERYTHROCYTE [DISTWIDTH] IN BLOOD BY AUTOMATED COUNT: 15.7 % (ref 12.3–15.4)
GFR SERPLBLD CREATININE-BSD FMLA CKD-EPI: 101 ML/MIN/1.73
GFR SERPLBLD CREATININE-BSD FMLA CKD-EPI: 116 ML/MIN/1.73
GLOBULIN SER CALC-MCNC: 1.4 G/DL (ref 1.5–4.5)
GLUCOSE SERPL-MCNC: 92 MG/DL (ref 65–99)
HCT VFR BLD AUTO: 34.7 % (ref 37.5–51)
HGB BLD-MCNC: 11 G/DL (ref 13–17.7)
IMM GRANULOCYTES # BLD: 0 X10E3/UL (ref 0–0.1)
IMM GRANULOCYTES NFR BLD: 1 %
LYMPHOCYTES # BLD AUTO: 0.3 X10E3/UL (ref 0.7–3.1)
LYMPHOCYTES NFR BLD AUTO: 18 %
MCH RBC QN AUTO: 30.7 PG (ref 26.6–33)
MCHC RBC AUTO-ENTMCNC: 31.7 G/DL (ref 31.5–35.7)
MCV RBC AUTO: 97 FL (ref 79–97)
MONOCYTES # BLD AUTO: 0.5 X10E3/UL (ref 0.1–0.9)
MONOCYTES NFR BLD AUTO: 29 %
MORPHOLOGY BLD-IMP: ABNORMAL
NEUTROPHILS # BLD AUTO: 0.8 X10E3/UL (ref 1.4–7)
NEUTROPHILS NFR BLD AUTO: 46 %
PLATELET # BLD AUTO: 113 X10E3/UL (ref 150–379)
POTASSIUM SERPL-SCNC: 4.2 MMOL/L (ref 3.5–5.2)
PROT SERPL-MCNC: 5.4 G/DL (ref 6–8.5)
RBC # BLD AUTO: 3.58 X10E6/UL (ref 4.14–5.8)
SODIUM SERPL-SCNC: 141 MMOL/L (ref 134–144)
WBC # BLD AUTO: 1.7 X10E3/UL (ref 3.4–10.8)

## 2018-02-15 LAB
ALBUMIN SERPL-MCNC: 3.7 G/DL (ref 3.6–4.8)
ALBUMIN/GLOB SERPL: 1.9 {RATIO} (ref 1.2–2.2)
ALP SERPL-CCNC: 65 IU/L (ref 39–117)
ALT SERPL-CCNC: 19 IU/L (ref 0–44)
AST SERPL-CCNC: 22 IU/L (ref 0–40)
BASOPHILS # BLD AUTO: 0.1 X10E3/UL (ref 0–0.2)
BASOPHILS NFR BLD AUTO: 1 %
BILIRUB SERPL-MCNC: 0.3 MG/DL (ref 0–1.2)
BUN SERPL-MCNC: 7 MG/DL (ref 8–27)
BUN/CREAT SERPL: 11 (ref 10–24)
CALCIUM SERPL-MCNC: 8.7 MG/DL (ref 8.6–10.2)
CHLORIDE SERPL-SCNC: 105 MMOL/L (ref 96–106)
CO2 SERPL-SCNC: 25 MMOL/L (ref 18–29)
CREAT SERPL-MCNC: 0.66 MG/DL (ref 0.76–1.27)
EOSINOPHIL # BLD AUTO: 0.1 X10E3/UL (ref 0–0.4)
EOSINOPHIL NFR BLD AUTO: 2 %
ERYTHROCYTE [DISTWIDTH] IN BLOOD BY AUTOMATED COUNT: 15.3 % (ref 12.3–15.4)
GFR SERPLBLD CREATININE-BSD FMLA CKD-EPI: 115 ML/MIN/1.73
GFR SERPLBLD CREATININE-BSD FMLA CKD-EPI: 99 ML/MIN/1.73
GLOBULIN SER CALC-MCNC: 2 G/DL (ref 1.5–4.5)
GLUCOSE SERPL-MCNC: 94 MG/DL (ref 65–99)
HCT VFR BLD AUTO: 35.4 % (ref 37.5–51)
HGB BLD-MCNC: 11.5 G/DL (ref 13–17.7)
IMM GRANULOCYTES # BLD: 0 X10E3/UL (ref 0–0.1)
IMM GRANULOCYTES NFR BLD: 1 %
LYMPHOCYTES # BLD AUTO: 1 X10E3/UL (ref 0.7–3.1)
LYMPHOCYTES NFR BLD AUTO: 26 %
MCH RBC QN AUTO: 31.5 PG (ref 26.6–33)
MCHC RBC AUTO-ENTMCNC: 32.5 G/DL (ref 31.5–35.7)
MCV RBC AUTO: 97 FL (ref 79–97)
MONOCYTES # BLD AUTO: 0.4 X10E3/UL (ref 0.1–0.9)
MONOCYTES NFR BLD AUTO: 11 %
NEUTROPHILS # BLD AUTO: 2.3 X10E3/UL (ref 1.4–7)
NEUTROPHILS NFR BLD AUTO: 59 %
PLATELET # BLD AUTO: 287 X10E3/UL (ref 150–379)
POTASSIUM SERPL-SCNC: 4.4 MMOL/L (ref 3.5–5.2)
PROT SERPL-MCNC: 5.7 G/DL (ref 6–8.5)
RBC # BLD AUTO: 3.65 X10E6/UL (ref 4.14–5.8)
SODIUM SERPL-SCNC: 143 MMOL/L (ref 134–144)
WBC # BLD AUTO: 3.8 X10E3/UL (ref 3.4–10.8)

## 2018-02-25 LAB
ALBUMIN SERPL-MCNC: 4.1 G/DL (ref 3.6–4.8)
ALBUMIN/GLOB SERPL: 2.4 {RATIO} (ref 1.2–2.2)
ALP SERPL-CCNC: 62 IU/L (ref 39–117)
ALT SERPL-CCNC: 16 IU/L (ref 0–44)
AST SERPL-CCNC: 22 IU/L (ref 0–40)
BASOPHILS # BLD AUTO: 0.1 X10E3/UL (ref 0–0.2)
BASOPHILS NFR BLD AUTO: 4 %
BILIRUB SERPL-MCNC: 0.3 MG/DL (ref 0–1.2)
BUN SERPL-MCNC: 10 MG/DL (ref 8–27)
BUN/CREAT SERPL: 13 (ref 10–24)
CALCIUM SERPL-MCNC: 9 MG/DL (ref 8.6–10.2)
CHLORIDE SERPL-SCNC: 108 MMOL/L (ref 96–106)
CO2 SERPL-SCNC: 24 MMOL/L (ref 18–29)
CREAT SERPL-MCNC: 0.79 MG/DL (ref 0.76–1.27)
EOSINOPHIL # BLD AUTO: 0.2 X10E3/UL (ref 0–0.4)
EOSINOPHIL NFR BLD AUTO: 6 %
ERYTHROCYTE [DISTWIDTH] IN BLOOD BY AUTOMATED COUNT: 15.7 % (ref 12.3–15.4)
GFR SERPLBLD CREATININE-BSD FMLA CKD-EPI: 107 ML/MIN/1.73
GFR SERPLBLD CREATININE-BSD FMLA CKD-EPI: 92 ML/MIN/1.73
GLOBULIN SER CALC-MCNC: 1.7 G/DL (ref 1.5–4.5)
GLUCOSE SERPL-MCNC: 95 MG/DL (ref 65–99)
HCT VFR BLD AUTO: 37.6 % (ref 37.5–51)
HGB BLD-MCNC: 12 G/DL (ref 13–17.7)
IMM GRANULOCYTES # BLD: 0 X10E3/UL (ref 0–0.1)
IMM GRANULOCYTES NFR BLD: 0 %
LYMPHOCYTES # BLD AUTO: 1.3 X10E3/UL (ref 0.7–3.1)
LYMPHOCYTES NFR BLD AUTO: 33 %
MCH RBC QN AUTO: 31.1 PG (ref 26.6–33)
MCHC RBC AUTO-ENTMCNC: 31.9 G/DL (ref 31.5–35.7)
MCV RBC AUTO: 97 FL (ref 79–97)
MONOCYTES # BLD AUTO: 0.5 X10E3/UL (ref 0.1–0.9)
MONOCYTES NFR BLD AUTO: 14 %
MORPHOLOGY BLD-IMP: ABNORMAL
NEUTROPHILS # BLD AUTO: 1.7 X10E3/UL (ref 1.4–7)
NEUTROPHILS NFR BLD AUTO: 43 %
PLATELET # BLD AUTO: 301 X10E3/UL (ref 150–379)
POTASSIUM SERPL-SCNC: 5.3 MMOL/L (ref 3.5–5.2)
PROT SERPL-MCNC: 5.8 G/DL (ref 6–8.5)
RBC # BLD AUTO: 3.86 X10E6/UL (ref 4.14–5.8)
SODIUM SERPL-SCNC: 147 MMOL/L (ref 134–144)
WBC # BLD AUTO: 3.9 X10E3/UL (ref 3.4–10.8)

## 2018-02-27 ENCOUNTER — OFFICE VISIT (OUTPATIENT)
Dept: HEMATOLOGY/ONCOLOGY | Facility: CLINIC | Age: 68
End: 2018-02-27
Payer: MEDICARE

## 2018-02-27 VITALS
DIASTOLIC BLOOD PRESSURE: 85 MMHG | WEIGHT: 238 LBS | TEMPERATURE: 98 F | HEIGHT: 68 IN | HEART RATE: 64 BPM | BODY MASS INDEX: 36.07 KG/M2 | RESPIRATION RATE: 18 BRPM | SYSTOLIC BLOOD PRESSURE: 135 MMHG

## 2018-02-27 DIAGNOSIS — D70.1 CHEMOTHERAPY-INDUCED NEUTROPENIA: ICD-10-CM

## 2018-02-27 DIAGNOSIS — C83.31 DIFFUSE LARGE B-CELL LYMPHOMA OF LYMPH NODES OF NECK: ICD-10-CM

## 2018-02-27 DIAGNOSIS — I82.B12 THROMBOSIS OF LEFT SUBCLAVIAN VEIN: ICD-10-CM

## 2018-02-27 DIAGNOSIS — T45.1X5A CHEMOTHERAPY-INDUCED NEUTROPENIA: ICD-10-CM

## 2018-02-27 DIAGNOSIS — C61 MALIGNANT NEOPLASM OF PROSTATE: Primary | ICD-10-CM

## 2018-02-27 PROCEDURE — 3008F BODY MASS INDEX DOCD: CPT | Mod: ,,, | Performed by: INTERNAL MEDICINE

## 2018-02-27 PROCEDURE — 1159F MED LIST DOCD IN RCRD: CPT | Mod: ,,, | Performed by: INTERNAL MEDICINE

## 2018-02-27 PROCEDURE — 99214 OFFICE O/P EST MOD 30 MIN: CPT | Mod: ,,, | Performed by: INTERNAL MEDICINE

## 2018-02-27 NOTE — PROGRESS NOTES
[facility] Hematology/Oncology  PROGRESS NOTE      Subjective:       Patient ID:   NAME: aNresh Painting : 1950     67 y.o. male    Referring Doc: Yamila  Other Physicians: Adriano Michaud Pernenkil, Saba    Chief Complaint:  NHL f/u    History of Present Illness:     Patient returns today for a regularly scheduled follow-up visit.  The patient is here with his wife. He saw Dr Basilio at [facility] again and he decided that the patient is high risk for neuro-recurrence so he is doing HD MTX therapy and intrathecal therapy.  He has yet to start the treatments but plans to this week. He is doing ok at this time. No CP, SOB, HA's or N/V            ROS:   GEN: normal without any fever, night sweats or weight loss  HEENT: normal with no HA's, sore throat, stiff neck, changes in vision  CV: normal with no CP, SOB, PND, COOPER or orthopnea  PULM: normal with no SOB, cough, hemoptysis, sputum or pleuritic pain  GI: normal with no abdominal pain, nausea, vomiting, , diarrhea, melanotic stools, BRBPR, or hematemesis;  : normal with no hematuria, dysuria  BREAST: normal with no mass, discharge, pain  SKIN: normal with no rash, erythema, bruising, or swelling    Allergies:  Review of patient's allergies indicates:  No Known Allergies    Medications:    Current Outpatient Prescriptions:     apixaban (ELIQUIS) 5 mg Tab, Take 1 tablet (5 mg total) by mouth 2 (two) times daily., Disp: 60 tablet, Rfl: 6    aspirin 81 MG Chew, Take 81 mg by mouth once daily., Disp: , Rfl:     co-enzyme Q-10 30 mg capsule, Take 30 mg by mouth 3 (three) times daily., Disp: , Rfl:     ELIQUIS 5 mg Tab, TAKE 1 TABLET BY MOUTH TWICE DAILY, Disp: 60 tablet, Rfl: 0    L GASSERI/B BIFIDUM/B LONGUM (uMix.TV ORAL), Take by mouth., Disp: , Rfl:     MULTIVITAMIN/IRON/FOLIC ACID (CENTRUM COMPLETE ORAL), Take by mouth., Disp: , Rfl:     pantoprazole (PROTONIX) 40 MG tablet, Take 40 mg by mouth once daily., Disp: , Rfl:     pravastatin  "(PRAVACHOL) 40 MG tablet, , Disp: , Rfl:     predniSONE (DELTASONE) 50 MG Tab, TK 3 TS PO ONCE D ON DAYS 2 THROUGH 5, Disp: , Rfl: 6    TOPROL XL 50 mg 24 hr tablet, Take 25 mg by mouth once daily., Disp: , Rfl:     vitamin D 1000 units Tab, Take 185 mg by mouth once daily., Disp: , Rfl:     vitamin E 100 UNIT capsule, Take 100 Units by mouth once daily., Disp: , Rfl:     Current Facility-Administered Medications:     triptorelin pamoate Syrg 22.5 mg, 22.5 mg, Intramuscular, Q6 Months, Fauzia Maza NP, 22.5 mg at 02/02/18 1012    PMHx/PSHx Updates:  See patient's last visit with me on 1/30/2018.  See H&P on 6/9/2017        Pathology:  See path 9/13/2017          Objective:     Vitals:  Blood pressure 135/85, pulse 64, temperature 97.8 °F (36.6 °C), resp. rate 18, height 5' 8" (1.727 m), weight 108 kg (238 lb).    Physical Examination:   GEN: no apparent distress, comfortable; AAOx3  HEAD: atraumatic and normocephalic  EYES: no pallor, no icterus, PERRLA  ENT: OMM, no pharyngeal erythema, external ears WNL; no nasal discharge; no thrush  NECK: no masses, thyroid normal, trachea midline,  LAD/LN's, supple; residual LN on right neck still; left looks good  CV: RRR with no murmur; normal pulse; normal S1 and S2; no pedal edema  CHEST: Normal respiratory effort; CTAB; normal breath sounds; no wheeze or crackles  ABDOM: nontender and nondistended; soft; normal bowel sounds; no rebound/guarding  MUSC/Skeletal: ROM normal; no crepitus; joints normal; no deformities or arthropathy  EXTREM: no clubbing, cyanosis, inflammation or swelling  SKIN: no rashes, lesions, ulcers, petechiae or subcutaneous nodules  : no cannon  NEURO: grossly intact; motor/sensory WNL; AAOx3; no tremors  PSYCH: normal mood, affect and behavior  LYMPH: normal cervical, supraclavicular, axillary and groin LN's            Labs:     2/23/2018  Lab Results   Component Value Date    WBC 3.9 02/23/2018    HGB 12.0 (L) 02/23/2018    HCT 37.6 " 02/23/2018    MCV 97 02/23/2018     02/23/2018     BMP  Lab Results   Component Value Date     (H) 02/23/2018    K 5.3 (H) 02/23/2018     (H) 02/23/2018    CO2 24 02/23/2018    BUN 10 02/23/2018    CREATININE 0.79 02/23/2018    CALCIUM 9.0 02/23/2018    ESTGFRAFRICA >60.0 11/25/2014    EGFRNONAA 92 02/23/2018     Lab Results   Component Value Date    ALT 16 02/23/2018    AST 22 02/23/2018    ALKPHOS 62 02/23/2018    BILITOT 0.3 02/23/2018           Radiology/Diagnostic Studies:    PET 12/27/2017:  IMPRESSION:  1. Diffusely increased FDG uptake throughout the visualized osseous structures  suggestive of chemotherapy and in keeping with reactive marrow.  However, this  pattern may mask underlying hypermetabolic osseous foci.  2. Marked interval decrease in size, number and FDG activity to the cervical,  retroperitoneal and upper abdominal lymphadenopathy, now with only soft tissue  fat stranding and FDG activity seen in the left supraclavicular soft tissue and  small bowel mesenteric root.  3. No new sites of FDG avid disease.  4. Status post prostatectomy without focal abnormal FDG activity in the pelvis  to specifically suggest residual/recurrent disease.  I have reviewed all available lab results and radiology reports.    Assessment/Plan:   (1) 67 y.o. male with diagnosis of development of right sided neck swelling since Feb 2017  - he has been referred by Dr Michaud with ID for an evaluation  - i spoke to Dr Michaud previously about the patient peripherally  - FNA bx on 2/24 which was nondiagnostic  - he had a  guided biopsy of an entire left supraclavicular LN on 3/6/2017 with Dr Alexander Oh which showed  necrotizing granulomatous lymphadenitis  - CT scans were from Feb 2017  - flow cytometry studies from 5/25 appear to have been negative for any abnormal cell populations  - PET scan on 7/6 with markedly hypermetabolic LAD  - he was referred to and seen by Dr Basilio at [facility] on 7/28 and again  on 8/25  - he underwent repeat cervical LN biopsy with Dr Oh on 9/13/2017 and that 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)  - he saw Dr Basilio again at [facility] and had bone marrow biopsy on 10/3  - the bone marrow showed no involvement of the lymphoom  - Dr Basilio recommended starting R-CHOP while he awaited additional studies  - portacath placed by Dr Oh and he underwent chemotherapy school and training with Che Lazo  - I previously provided literature on R-CHOP  - he had his 6th and final cycle  - he did good and has resolution with the prior symptoms and the LAD has improved drastically with latest PET scan 12/27 looking good     - he saw Dr Basilio again at [facility] and they are planning High dose MTX x 3 and also three intrathecal MTX's    (2) Hx/of prostate cancer - 1995; s/p prostatectomy followed by XRT; on lupron per Dr Menjivar with  and Fauzia Maza (NP)     (3) JAK - uses CPAP     (4) GERD     (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 Proceed with the HD-MTX and IT-MTX plans with Dr Basilio at [facility]  2. Check labs weekly; PF q 4-6 weeks  3. Continue eliquis for now  4. F/U with Dr Basilio  5. Repeat scans in May/June 2018  RTC in 4 weeks  Fax note to Ronny Driscoll MD, Adriano Michaud, and  Chetna    Discussion:     I have explained all of the above in detail and the patient understands all of the current recommendation(s). I have answered all of their questions to the best of my ability and to their complete satisfaction.   The patient is to continue with the current management plan.            Electronically signed by Fidencio Rdz MD

## 2018-02-27 NOTE — LETTER
February 27, 2018      Ronny Driscoll MD  [address] [facility]  [address]  [place] [address]           [facility] Hematology Oncology  [address] [facility]  [address]  [place] [address]  Phone: [phone]  Fax: [phone]          Patient: Naresh Painting   MR Number: [id number]   YOB: 1950   Date of Visit: 2/27/2018       Dear Dr. Ronny Driscoll:    Thank you for referring Naresh Painting to me for evaluation. Attached you will find relevant portions of my assessment and plan of care.    If you have questions, please do not hesitate to call me. I look forward to following Naresh Painting along with you.    Sincerely,    Fidencio Rdz MD    Enclosure  CC:  No Recipients    If you would like to receive this communication electronically, please contact [email or web address] or [phone] to request more information on [employer] Link access.    For providers and/or their staff who would like to refer a patient to [facility], please contact us through our one-stop-shop provider referral line, [facility], at [phone].    If you feel you have received this communication in error or would no longer like to receive these types of communications, please e-mail [email or web address]

## 2018-02-28 ENCOUNTER — OFFICE VISIT (OUTPATIENT)
Dept: FAMILY MEDICINE | Facility: CLINIC | Age: 68
End: 2018-02-28
Payer: MEDICARE

## 2018-02-28 VITALS
SYSTOLIC BLOOD PRESSURE: 109 MMHG | BODY MASS INDEX: 36.37 KG/M2 | HEIGHT: 68 IN | DIASTOLIC BLOOD PRESSURE: 76 MMHG | WEIGHT: 240 LBS | HEART RATE: 61 BPM

## 2018-02-28 DIAGNOSIS — I82.B12 THROMBOSIS OF LEFT SUBCLAVIAN VEIN: ICD-10-CM

## 2018-02-28 DIAGNOSIS — E66.01 MORBID OBESITY: ICD-10-CM

## 2018-02-28 DIAGNOSIS — E78.2 MULTIPLE-TYPE HYPERLIPIDEMIA: ICD-10-CM

## 2018-02-28 DIAGNOSIS — Z11.59 NEED FOR HEPATITIS C SCREENING TEST: ICD-10-CM

## 2018-02-28 DIAGNOSIS — C85.91 NON-HODGKIN LYMPHOMA OF LYMPH NODES OF NECK, UNSPECIFIED NON-HODGKIN LYMPHOMA TYPE: ICD-10-CM

## 2018-02-28 DIAGNOSIS — Z23 PNEUMOCOCCAL VACCINE ADMINISTERED: ICD-10-CM

## 2018-02-28 DIAGNOSIS — Z85.46 HISTORY OF PROSTATE CANCER: ICD-10-CM

## 2018-02-28 DIAGNOSIS — I10 BENIGN ESSENTIAL HYPERTENSION: Primary | ICD-10-CM

## 2018-02-28 PROCEDURE — 99214 OFFICE O/P EST MOD 30 MIN: CPT | Mod: ,,, | Performed by: INTERNAL MEDICINE

## 2018-02-28 PROCEDURE — 90732 PPSV23 VACC 2 YRS+ SUBQ/IM: CPT | Mod: ,,, | Performed by: INTERNAL MEDICINE

## 2018-02-28 PROCEDURE — G0009 ADMIN PNEUMOCOCCAL VACCINE: HCPCS | Mod: ,,, | Performed by: INTERNAL MEDICINE

## 2018-02-28 NOTE — PROGRESS NOTES
Subjective:       Patient ID: Naresh Painting is a 67 y.o. male.    Chief Complaint: Hypertension; Hyperlipidemia; Obesity; and Lymphoma (Blood clot)    Mr. Naresh Steinberg is a 67-year-old present male who comes for follow-up. His underlying issues include hypertension, dyslipidemia. Approximately last year he was diagnosed with non-Hodgkin's lymphoma following extensive diagnosis of neck lymphadenopathy. After that he has undergone chemotherapy with resolution of most of the adenopathy.    He also had a thrombus in the left-sided subclavian vein. He is on anticoagulation for the same.  Patient has lost significant amount of weight and he generally feels better and energetic. His sleep is also better.    He'll be due for pneumonia vaccine.        Hypertension   This is a chronic problem. The current episode started more than 1 year ago. The problem is controlled. Associated symptoms include malaise/fatigue, neck pain and peripheral edema. Pertinent negatives include no anxiety, chest pain, palpitations or shortness of breath. Risk factors for coronary artery disease include sedentary lifestyle, male gender, obesity and dyslipidemia. Past treatments include beta blockers. The current treatment provides moderate improvement. There is no history of a hypertension causing med, pheochromocytoma or a thyroid problem.   Hyperlipidemia   This is a chronic problem. The current episode started more than 1 year ago. Exacerbating diseases include obesity. He has no history of hypothyroidism. Pertinent negatives include no chest pain or shortness of breath. Current antihyperlipidemic treatment includes statins. Risk factors for coronary artery disease include a sedentary lifestyle, hypertension, male sex, obesity and dyslipidemia.       Past Medical History:   Diagnosis Date    Chemotherapy-induced neutropenia 10/5/2017    Cholelithiasis without cholecystitis July 2017-PET scan    Diffuse large B-cell lymphoma of lymph nodes of  neck 9/26/2017    GERD (gastroesophageal reflux disease)     Granulomatous lymphadenitis 6/9/2017    Hyperlipidemia     Hypertension     Lymphadenopathy     JAK (obstructive sleep apnea)     Prostate CA     Prostate cancer     Subclavian vein thrombosis 9/26/2017     Social History     Social History    Marital status:      Spouse name: N/A    Number of children: N/A    Years of education: N/A     Occupational History    Not on file.     Social History Main Topics    Smoking status: Former Smoker    Smokeless tobacco: Never Used      Comment: quit 1995    Alcohol use Yes    Drug use: No    Sexual activity: Yes     Partners: Female     Other Topics Concern    Not on file     Social History Narrative    No narrative on file     Past Surgical History:   Procedure Laterality Date    EYE SURGERY      HERNIA REPAIR      LYMPH NODE BIOPSY      PROCTECTOMY      PROSTATE SURGERY       Family History   Problem Relation Age of Onset    Heart disease Mother     Diabetes Father        Review of Systems   Constitutional: Positive for malaise/fatigue. Negative for activity change, appetite change, fatigue and unexpected weight change (lost 28 lbs).   HENT: Negative for congestion, sneezing, sore throat and trouble swallowing.    Eyes: Negative for pain and visual disturbance.        Left eye Glass eye prosthesis   Respiratory: Negative for cough, chest tightness and shortness of breath.    Cardiovascular: Negative for chest pain, palpitations and leg swelling.        Left upper extremity deep vein thrombosis.   Gastrointestinal: Positive for constipation. Negative for abdominal distention, abdominal pain, blood in stool and diarrhea.   Endocrine: Negative for cold intolerance, heat intolerance, polydipsia, polyphagia and polyuria.   Genitourinary: Negative for dysuria, hematuria and scrotal swelling.   Musculoskeletal: Positive for neck pain. Negative for arthralgias, back pain and gait problem.  "  Skin: Negative for pallor, rash and wound.        Patient's has lumps in the neck which have now been diagnosed to be lymphoma.   Allergic/Immunologic: Negative for environmental allergies, food allergies and immunocompromised state.   Neurological: Positive for numbness. Negative for dizziness, seizures, speech difficulty and light-headedness.        Patient has numbness in the left hand fingers.   Hematological: Positive for adenopathy. Does not bruise/bleed easily.        Diagnosis of NHL   Psychiatric/Behavioral: Negative for agitation, behavioral problems and confusion. The patient is not nervous/anxious.        Objective:       Vitals:    02/28/18 1313   BP: 109/76   Pulse: 61   Weight: 108.9 kg (240 lb)   Height: 5' 8" (1.727 m)     Physical Exam   Constitutional: He appears well-developed. No distress.   HENT:   Head: Normocephalic and atraumatic.       Nose: Nose normal.   Mouth/Throat: Oropharynx is clear and moist. No oropharyngeal exudate.   Eyes: Conjunctivae and EOM are normal. Right eye exhibits no discharge and no exudate. Right conjunctiva is not injected. Right conjunctiva has no hemorrhage.   Left eye is prosthetic.   Neck: Normal range of motion. Neck supple. No JVD present. No neck rigidity. No tracheal deviation and normal range of motion present. No thyromegaly present.       The lymph nodes on right and left side have dramatically shrunken now. A small lymph node is felt on the right side which is firm, nontender and adherent to the underlying structures.   Cardiovascular: Normal rate, regular rhythm and normal heart sounds.  Exam reveals no gallop and no friction rub.    No murmur heard.  Pulmonary/Chest: Effort normal and breath sounds normal. No respiratory distress. He has no wheezes. He has no rales.   Abdominal: Soft. Bowel sounds are normal. He exhibits no distension. There is no tenderness.       Musculoskeletal: Normal range of motion. He exhibits edema (1+). He exhibits no " tenderness or deformity.   Examination of the neck does not reveal any deformity.   Lymphadenopathy:     He has cervical adenopathy.        Right cervical: Deep cervical adenopathy present.     He has no axillary adenopathy.   Right-sided cervical lymphadenopathy. No left-sided lymphadenopathy. No axillary lymphadenopathy.   Neurological: He is alert. He has normal reflexes.   Skin: Skin is warm and dry.   Psychiatric: His mood appears not anxious.   Patient appears to be happier and less anxious today.   Nursing note and vitals reviewed.      Assessment:       1. Benign essential hypertension    2. Multiple-type hyperlipidemia    3. Non-Hodgkin lymphoma of lymph nodes of neck, unspecified non-Hodgkin lymphoma type    4. History of prostate cancer    5. Morbid obesity    6. Thrombosis of left subclavian vein    7. Need for hepatitis C screening test    8. Pneumococcal vaccine administered         Plan:           Benign essential hypertension    Multiple-type hyperlipidemia  -     Lipid panel; Future; Expected date: 02/28/2018    Non-Hodgkin lymphoma of lymph nodes of neck, unspecified non-Hodgkin lymphoma type    History of prostate cancer    Morbid obesity    Thrombosis of left subclavian vein    Need for hepatitis C screening test  -     Hepatitis C antibody; Future; Expected date: 02/28/2018    Pneumococcal vaccine administered  -     (In Office Administered) Pneumococcal Polysaccharide Vaccine (23 Valent) (SQ/IM)    Patient's blood pressures are fairly stable. We'll check a lipid panel at follow-up. He has lost significant weight which may help both the above medical conditions.    History of non-Hodgkin's lymphoma and current chemotherapy has been noted. He'll keep his follow-up with oncologist.      He continues on chronic anti-coagulation for thrombosis of the left prescription taken vein which I assume will be short-lived.    Fall precautions and injury precautions have been discussed. History of prostate  cancer is also been noted.    He'll be screened for hepatitis C.    He will be updated on pneumococcal 23 vaccine. Follow-up in 3 months time.

## 2018-02-28 NOTE — PATIENT INSTRUCTIONS
Taking Your Blood Pressure  Blood pressure is the force of blood against the artery wall as it moves from the heart through the blood vessels. You can take your own blood pressure reading using a digital monitor. Take your readings the same each time, using the same arm. Take readings as often as your healthcare provider instructs.  About blood pressure monitors  Blood pressure monitors are designed for certain ages and cases. You can find monitors for older adults, for pregnant women, and for children. Make sure the one you choose is the right one for your age and situation.  The American Heart Association recommends an automatic cuff monitor that fits on your upper arm (bicep). The cuff should fit your arm size. A cuff thats too large or too small will not give an accurate reading. Measure around your upper arm to find your size.  Monitors that attach to your finger or wrist are not as accurate as monitors for your upper arm.  Ask your healthcare provider for help in choosing a monitor. Bring your monitor to your next provider visit if you need help in using it the correct way.  The steps below are general instructions for using an automatic digital monitor.  Step 1. Relax    · Take your blood pressure at the same time every day, such as in the morning or evening, or at the time your healthcare provider recommends.  · Wait at least a half-hour after smoking, eating, or exercising. Don't drink coffee, tea, soda, or other caffeinated beverages before checking your blood pressure.  · Sit comfortably at a table with both feet on the floor. Do not cross your legs or feet. Place the monitor near you.  · Rest for a few minutes before you begin.  Step 2. Wrap the cuff    · Place your arm on the table, palm up. Your arm should be at the level of your heart. Wrap the cuff around your upper arm, just above your elbow. Its best done on bare skin, not over clothing. Most cuffs will indicate where the brachial artery (the  blood vessel in the middle of the arm at the inner side of the elbow) should line up with the cuff. Look in your monitor's instruction booklet for an illustration. You can also bring your cuff to your healthcare provider and have them show you how to correctly place the cuff.  Step 3. Inflate the cuff    · Push the button that starts the pump.  · The cuff will tighten, then loosen.  · The numbers will change. When they stop changing, your blood pressure reading will appear.  · Take 2 or 3 readings one minute apart.  Step 4. Write down the results of each reading    · Write down your blood pressure numbers for each reading. Note the date and time. Keep your results in one place, such as a notebook. Even if your monitor has a built-in memory, keep a hard copy of the readings.  · Remove the cuff from your arm. Turn off the machine.  · Bring your blood pressure records with your healthcare providers at each visit.  · If you start a new blood pressure medicine, note the day you started the new medicine. Also note the day if you change the dose of your medicine. This information goes on your blood pressure recording sheet. This will help your healthcare provider monitor how well the medicine changes are working.  · Ask your healthcare provider what numbers should prompt you to call him or her. Also ask what numbers should prompt you to get help right away.  Date Last Reviewed: 11/1/2016  © 9876-8036 [employer]. [address] [place] [address], [place] [address]. All rights reserved. This information is not intended as a substitute for professional medical care. Always follow your healthcare professional's instructions.

## 2018-03-02 LAB
ALBUMIN SERPL-MCNC: 3.4 G/DL (ref 3.6–4.8)
ALBUMIN/GLOB SERPL: 1.4 {RATIO} (ref 1.2–2.2)
ALP SERPL-CCNC: 64 IU/L (ref 39–117)
ALT SERPL-CCNC: 16 IU/L (ref 0–44)
AST SERPL-CCNC: 23 IU/L (ref 0–40)
BASOPHILS # BLD AUTO: 0.1 X10E3/UL (ref 0–0.2)
BASOPHILS NFR BLD AUTO: 1 %
BILIRUB SERPL-MCNC: 0.3 MG/DL (ref 0–1.2)
BUN SERPL-MCNC: 9 MG/DL (ref 8–27)
BUN/CREAT SERPL: 12 (ref 10–24)
CALCIUM SERPL-MCNC: 8.9 MG/DL (ref 8.6–10.2)
CHLORIDE SERPL-SCNC: 105 MMOL/L (ref 96–106)
CO2 SERPL-SCNC: 22 MMOL/L (ref 18–29)
CREAT SERPL-MCNC: 0.76 MG/DL (ref 0.76–1.27)
EOSINOPHIL # BLD AUTO: 0.5 X10E3/UL (ref 0–0.4)
EOSINOPHIL NFR BLD AUTO: 12 %
ERYTHROCYTE [DISTWIDTH] IN BLOOD BY AUTOMATED COUNT: 14.7 % (ref 12.3–15.4)
GFR SERPLBLD CREATININE-BSD FMLA CKD-EPI: 108 ML/MIN/1.73
GFR SERPLBLD CREATININE-BSD FMLA CKD-EPI: 94 ML/MIN/1.73
GLOBULIN SER CALC-MCNC: 2.4 G/DL (ref 1.5–4.5)
GLUCOSE SERPL-MCNC: 88 MG/DL (ref 65–99)
HCT VFR BLD AUTO: 38.1 % (ref 37.5–51)
HGB BLD-MCNC: 12.4 G/DL (ref 13–17.7)
IMM GRANULOCYTES # BLD: 0 X10E3/UL (ref 0–0.1)
IMM GRANULOCYTES NFR BLD: 0 %
LYMPHOCYTES # BLD AUTO: 0.6 X10E3/UL (ref 0.7–3.1)
LYMPHOCYTES NFR BLD AUTO: 14 %
MCH RBC QN AUTO: 31.2 PG (ref 26.6–33)
MCHC RBC AUTO-ENTMCNC: 32.5 G/DL (ref 31.5–35.7)
MCV RBC AUTO: 96 FL (ref 79–97)
MONOCYTES # BLD AUTO: 1 X10E3/UL (ref 0.1–0.9)
MONOCYTES NFR BLD AUTO: 23 %
MORPHOLOGY BLD-IMP: ABNORMAL
NEUTROPHILS # BLD AUTO: 2.1 X10E3/UL (ref 1.4–7)
NEUTROPHILS NFR BLD AUTO: 50 %
PLATELET # BLD AUTO: 270 X10E3/UL (ref 150–379)
POTASSIUM SERPL-SCNC: 4.1 MMOL/L (ref 3.5–5.2)
PROT SERPL-MCNC: 5.8 G/DL (ref 6–8.5)
RBC # BLD AUTO: 3.98 X10E6/UL (ref 4.14–5.8)
SODIUM SERPL-SCNC: 144 MMOL/L (ref 134–144)
WBC # BLD AUTO: 4.2 X10E3/UL (ref 3.4–10.8)

## 2018-03-10 LAB
ALBUMIN SERPL-MCNC: 3.9 G/DL (ref 3.6–4.8)
ALBUMIN/GLOB SERPL: 1.9 {RATIO} (ref 1.2–2.2)
ALP SERPL-CCNC: 65 IU/L (ref 39–117)
ALT SERPL-CCNC: 22 IU/L (ref 0–44)
AST SERPL-CCNC: 47 IU/L (ref 0–40)
BASOPHILS # BLD AUTO: 0.1 X10E3/UL (ref 0–0.2)
BASOPHILS NFR BLD AUTO: 2 %
BILIRUB SERPL-MCNC: 0.4 MG/DL (ref 0–1.2)
BUN SERPL-MCNC: 11 MG/DL (ref 8–27)
BUN/CREAT SERPL: 14 (ref 10–24)
CALCIUM SERPL-MCNC: 8.7 MG/DL (ref 8.6–10.2)
CHLORIDE SERPL-SCNC: 103 MMOL/L (ref 96–106)
CO2 SERPL-SCNC: 22 MMOL/L (ref 18–29)
CREAT SERPL-MCNC: 0.76 MG/DL (ref 0.76–1.27)
EOSINOPHIL # BLD AUTO: 0.8 X10E3/UL (ref 0–0.4)
EOSINOPHIL NFR BLD AUTO: 20 %
ERYTHROCYTE [DISTWIDTH] IN BLOOD BY AUTOMATED COUNT: 14.6 % (ref 12.3–15.4)
GFR SERPLBLD CREATININE-BSD FMLA CKD-EPI: 108 ML/MIN/1.73
GFR SERPLBLD CREATININE-BSD FMLA CKD-EPI: 94 ML/MIN/1.73
GLOBULIN SER CALC-MCNC: 2.1 G/DL (ref 1.5–4.5)
GLUCOSE SERPL-MCNC: 95 MG/DL (ref 65–99)
HCT VFR BLD AUTO: 39.2 % (ref 37.5–51)
HGB BLD-MCNC: 12.8 G/DL (ref 13–17.7)
IMM GRANULOCYTES # BLD: 0 X10E3/UL (ref 0–0.1)
IMM GRANULOCYTES NFR BLD: 0 %
LYMPHOCYTES # BLD AUTO: 0.7 X10E3/UL (ref 0.7–3.1)
LYMPHOCYTES NFR BLD AUTO: 18 %
MCH RBC QN AUTO: 31.1 PG (ref 26.6–33)
MCHC RBC AUTO-ENTMCNC: 32.7 G/DL (ref 31.5–35.7)
MCV RBC AUTO: 95 FL (ref 79–97)
MONOCYTES # BLD AUTO: 0.7 X10E3/UL (ref 0.1–0.9)
MONOCYTES NFR BLD AUTO: 17 %
MORPHOLOGY BLD-IMP: ABNORMAL
NEUTROPHILS # BLD AUTO: 1.7 X10E3/UL (ref 1.4–7)
NEUTROPHILS NFR BLD AUTO: 43 %
PLATELET # BLD AUTO: 233 X10E3/UL (ref 150–379)
POTASSIUM SERPL-SCNC: 5.2 MMOL/L (ref 3.5–5.2)
PROT SERPL-MCNC: 6 G/DL (ref 6–8.5)
RBC # BLD AUTO: 4.12 X10E6/UL (ref 4.14–5.8)
SODIUM SERPL-SCNC: 138 MMOL/L (ref 134–144)
WBC # BLD AUTO: 4.1 X10E3/UL (ref 3.4–10.8)

## 2018-03-23 LAB
ALBUMIN SERPL-MCNC: 3.8 G/DL (ref 3.6–4.8)
ALBUMIN/GLOB SERPL: 1.8 {RATIO} (ref 1.2–2.2)
ALP SERPL-CCNC: 66 IU/L (ref 39–117)
ALT SERPL-CCNC: 19 IU/L (ref 0–44)
AST SERPL-CCNC: 18 IU/L (ref 0–40)
BASOPHILS # BLD AUTO: 0 X10E3/UL (ref 0–0.2)
BASOPHILS NFR BLD AUTO: 1 %
BILIRUB SERPL-MCNC: 0.3 MG/DL (ref 0–1.2)
BUN SERPL-MCNC: 14 MG/DL (ref 8–27)
BUN/CREAT SERPL: 16 (ref 10–24)
CALCIUM SERPL-MCNC: 8.7 MG/DL (ref 8.6–10.2)
CHLORIDE SERPL-SCNC: 105 MMOL/L (ref 96–106)
CO2 SERPL-SCNC: 20 MMOL/L (ref 18–29)
CREAT SERPL-MCNC: 0.9 MG/DL (ref 0.76–1.27)
EOSINOPHIL # BLD AUTO: 0.7 X10E3/UL (ref 0–0.4)
EOSINOPHIL NFR BLD AUTO: 17 %
ERYTHROCYTE [DISTWIDTH] IN BLOOD BY AUTOMATED COUNT: 14.4 % (ref 12.3–15.4)
GFR SERPLBLD CREATININE-BSD FMLA CKD-EPI: 101 ML/MIN/1.73
GFR SERPLBLD CREATININE-BSD FMLA CKD-EPI: 87 ML/MIN/1.73
GLOBULIN SER CALC-MCNC: 2.1 G/DL (ref 1.5–4.5)
GLUCOSE SERPL-MCNC: 94 MG/DL (ref 65–99)
HCT VFR BLD AUTO: 37.1 % (ref 37.5–51)
HGB BLD-MCNC: 12.3 G/DL (ref 13–17.7)
IMM GRANULOCYTES # BLD: 0 X10E3/UL (ref 0–0.1)
IMM GRANULOCYTES NFR BLD: 0 %
LYMPHOCYTES # BLD AUTO: 0.5 X10E3/UL (ref 0.7–3.1)
LYMPHOCYTES NFR BLD AUTO: 13 %
MCH RBC QN AUTO: 31.1 PG (ref 26.6–33)
MCHC RBC AUTO-ENTMCNC: 33.2 G/DL (ref 31.5–35.7)
MCV RBC AUTO: 94 FL (ref 79–97)
MONOCYTES # BLD AUTO: 0.4 X10E3/UL (ref 0.1–0.9)
MONOCYTES NFR BLD AUTO: 11 %
NEUTROPHILS # BLD AUTO: 2.3 X10E3/UL (ref 1.4–7)
NEUTROPHILS NFR BLD AUTO: 58 %
PLATELET # BLD AUTO: 153 X10E3/UL (ref 150–379)
POTASSIUM SERPL-SCNC: 3.8 MMOL/L (ref 3.5–5.2)
PROT SERPL-MCNC: 5.9 G/DL (ref 6–8.5)
RBC # BLD AUTO: 3.95 X10E6/UL (ref 4.14–5.8)
SODIUM SERPL-SCNC: 145 MMOL/L (ref 134–144)
WBC # BLD AUTO: 3.9 X10E3/UL (ref 3.4–10.8)

## 2018-03-27 ENCOUNTER — OFFICE VISIT (OUTPATIENT)
Dept: HEMATOLOGY/ONCOLOGY | Facility: CLINIC | Age: 68
End: 2018-03-27
Payer: MEDICARE

## 2018-03-27 VITALS
SYSTOLIC BLOOD PRESSURE: 120 MMHG | HEART RATE: 68 BPM | TEMPERATURE: 98 F | BODY MASS INDEX: 37.07 KG/M2 | RESPIRATION RATE: 18 BRPM | WEIGHT: 243.81 LBS | DIASTOLIC BLOOD PRESSURE: 65 MMHG

## 2018-03-27 DIAGNOSIS — C83.31 DIFFUSE LARGE B-CELL LYMPHOMA OF LYMPH NODES OF NECK: ICD-10-CM

## 2018-03-27 DIAGNOSIS — C61 MALIGNANT NEOPLASM OF PROSTATE: Primary | ICD-10-CM

## 2018-03-27 DIAGNOSIS — I82.B12 THROMBOSIS OF LEFT SUBCLAVIAN VEIN: ICD-10-CM

## 2018-03-27 PROCEDURE — 3074F SYST BP LT 130 MM HG: CPT | Mod: ,,, | Performed by: INTERNAL MEDICINE

## 2018-03-27 PROCEDURE — 99214 OFFICE O/P EST MOD 30 MIN: CPT | Mod: ,,, | Performed by: INTERNAL MEDICINE

## 2018-03-27 PROCEDURE — 3078F DIAST BP <80 MM HG: CPT | Mod: ,,, | Performed by: INTERNAL MEDICINE

## 2018-03-27 NOTE — PROGRESS NOTES
[facility]  PROGRESS NOTE      Subjective:       Patient ID:   NAME: Naresh Painting : 1950     67 y.o. male    Referring Doc: Yamila  Other Physicians: Adriano Michaud Pernenkil, Saba    Chief Complaint:  NHL f/u    History of Present Illness:     Patient returns today for a regularly scheduled follow-up visit.  The patient is here with his wife. He saw Dr Basilio at [facility] again and he decided that the patient is high risk for neuro-recurrence so he is doing HD MTX therapy and intrathecal therapy.  He has had one treatment so far. He is doing ok at this time. No CP, SOB, HA's or N/V.  is his next chemotherapy sessions.             ROS:   GEN: normal without any fever, night sweats or weight loss  HEENT: normal with no HA's, sore throat, stiff neck, changes in vision  CV: normal with no CP, SOB, PND, COOPER or orthopnea  PULM: normal with no SOB, cough, hemoptysis, sputum or pleuritic pain  GI: normal with no abdominal pain, nausea, vomiting, , diarrhea, melanotic stools, BRBPR, or hematemesis;  : normal with no hematuria, dysuria  BREAST: normal with no mass, discharge, pain  SKIN: normal with no rash, erythema, bruising, or swelling    Allergies:  Review of patient's allergies indicates:  No Known Allergies    Medications:    Current Outpatient Prescriptions:     apixaban (ELIQUIS) 5 mg Tab, Take 1 tablet (5 mg total) by mouth 2 (two) times daily., Disp: 60 tablet, Rfl: 6    aspirin 81 MG Chew, Take 81 mg by mouth once daily., Disp: , Rfl:     co-enzyme Q-10 30 mg capsule, Take 30 mg by mouth 3 (three) times daily., Disp: , Rfl:     L GASSERI/B BIFIDUM/B LONGUM ([facility] HEALTH ORAL), Take by mouth., Disp: , Rfl:     MULTIVITAMIN/IRON/FOLIC ACID (CENTRUM COMPLETE ORAL), Take by mouth., Disp: , Rfl:     pantoprazole (PROTONIX) 40 MG tablet, Take 40 mg by mouth once daily., Disp: , Rfl:     pravastatin (PRAVACHOL) 40 MG tablet, , Disp: , Rfl:     predniSONE (DELTASONE) 50  MG Tab, TK 3 TS PO ONCE D ON DAYS 2 THROUGH 5, Disp: , Rfl: 6    TOPROL XL 50 mg 24 hr tablet, Take 25 mg by mouth once daily., Disp: , Rfl:     vitamin D 1000 units Tab, Take 185 mg by mouth once daily., Disp: , Rfl:     vitamin E 100 UNIT capsule, Take 100 Units by mouth once daily., Disp: , Rfl:     Current Facility-Administered Medications:     triptorelin pamoate Syrg 22.5 mg, 22.5 mg, Intramuscular, Q6 Months, Fauzia Maza NP, 22.5 mg at 02/02/18 1012    PMHx/PSHx Updates:  See patient's last visit with me on 2/27/2018.  See H&P on 6/9/2017        Pathology:  See path 9/13/2017          Objective:     Vitals:  Blood pressure 120/65, pulse 68, temperature 97.6 °F (36.4 °C), resp. rate 18, weight 110.6 kg (243 lb 12.8 oz).    Physical Examination:   GEN: no apparent distress, comfortable; AAOx3  HEAD: atraumatic and normocephalic  EYES: no pallor, no icterus, PERRLA  ENT: OMM, no pharyngeal erythema, external ears WNL; no nasal discharge; no thrush  NECK: no masses, thyroid normal, trachea midline,  LAD/LN's, supple; residual LN on right neck still; left looks good  CV: RRR with no murmur; normal pulse; normal S1 and S2; no pedal edema  CHEST: Normal respiratory effort; CTAB; normal breath sounds; no wheeze or crackles  ABDOM: nontender and nondistended; soft; normal bowel sounds; no rebound/guarding  MUSC/Skeletal: ROM normal; no crepitus; joints normal; no deformities or arthropathy  EXTREM: no clubbing, cyanosis, inflammation or swelling  SKIN: no rashes, lesions, ulcers, petechiae or subcutaneous nodules  : no cannon  NEURO: grossly intact; motor/sensory WNL; AAOx3; no tremors  PSYCH: normal mood, affect and behavior  LYMPH: normal cervical, supraclavicular, axillary and groin LN's            Labs:     2/23/2018  Lab Results   Component Value Date    WBC 3.9 03/22/2018    HGB 12.3 (L) 03/22/2018    HCT 37.1 (L) 03/22/2018    MCV 94 03/22/2018     03/22/2018     BMP  Lab Results   Component  Value Date     (H) 03/22/2018    K 3.8 03/22/2018     03/22/2018    CO2 20 03/22/2018    BUN 14 03/22/2018    CREATININE 0.90 03/22/2018    CALCIUM 8.7 03/22/2018    ESTGFRAFRICA >60.0 11/25/2014    EGFRNONAA 87 03/22/2018     Lab Results   Component Value Date    ALT 19 03/22/2018    AST 18 03/22/2018    ALKPHOS 66 03/22/2018    BILITOT 0.3 03/22/2018           Radiology/Diagnostic Studies:    PET 12/27/2017:  IMPRESSION:  1. Diffusely increased FDG uptake throughout the visualized osseous structures  suggestive of chemotherapy and in keeping with reactive marrow.  However, this  pattern may mask underlying hypermetabolic osseous foci.  2. Marked interval decrease in size, number and FDG activity to the cervical,  retroperitoneal and upper abdominal lymphadenopathy, now with only soft tissue  fat stranding and FDG activity seen in the left supraclavicular soft tissue and  small bowel mesenteric root.  3. No new sites of FDG avid disease.  4. Status post prostatectomy without focal abnormal FDG activity in the pelvis  to specifically suggest residual/recurrent disease.  I have reviewed all available lab results and radiology reports.    Assessment/Plan:   (1) 67 y.o. male with diagnosis of development of right sided neck swelling since Feb 2017  - he has been referred by Dr Michaud with ID for an evaluation  - i spoke to Dr Michaud previously about the patient peripherally  - FNA bx on 2/24 which was nondiagnostic  - he had a  guided biopsy of an entire left supraclavicular LN on 3/6/2017 with Dr Alexander Oh which showed  necrotizing granulomatous lymphadenitis  - CT scans were from Feb 2017  - flow cytometry studies from 5/25 appear to have been negative for any abnormal cell populations  - PET scan on 7/6 with markedly hypermetabolic LAD  - he was referred to and seen by Dr Basilio at [facility] on 7/28 and again on 8/25  - he underwent repeat cervical LN biopsy with Dr Oh on 9/13/2017 and that  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)  - he saw Dr Basilio again at [facility] and had bone marrow biopsy on 10/3  - the bone marrow showed no involvement of the lymphoom  - Dr Basilio recommended starting R-CHOP while he awaited additional studies  - portacath placed by Dr Oh and he underwent chemotherapy school and training with Che Lazo  - I previously provided literature on R-CHOP  - he had his 6th and final cycle  - he did good and has resolution with the prior symptoms and the LAD has improved drastically with latest PET scan 12/27 looking good     - He saw Dr Basilio at [facility] again and he decided that the patient is high risk for neuro-recurrence so he is doing HD MTX therapy and intrathecal therapy.  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 Proceed with the current HD-MTX and IT-MTX plans with Dr Basilio at [facility]  2. Check labs weekly; PF q 4-6 weeks  3. Continue eliquis for now  4. F/U with Dr Basilio  5. Repeat scans in May/June 2018  RTC in 4 weeks  Fax note to Ronny Driscoll MD, Adriano Michaud, and  Chetna    Discussion:     I have explained all of the above in detail and the patient understands all of the current recommendation(s). I have answered all of their questions to the best of my ability and to their complete satisfaction.   The patient is to continue with the current management plan.            Electronically signed by Fidencio Rdz MD

## 2018-03-27 NOTE — LETTER
March 27, 2018      Ronny Driscoll MD  [address] [facility]  [address]  [place] [address]           [facility]  [address] [facility]  [address]  [place] [address]  Phone: [phone]  Fax: [phone]          Patient: Naresh Painting   MR Number: [id number]   YOB: 1950   Date of Visit: 3/27/2018       Dear Dr. Ronny Driscoll:    Thank you for referring Naresh Painting to me for evaluation. Attached you will find relevant portions of my assessment and plan of care.    If you have questions, please do not hesitate to call me. I look forward to following Naresh Painting along with you.    Sincerely,    Fidencio Rdz MD    Enclosure  CC:  No Recipients    If you would like to receive this communication electronically, please contact [email or web address] or [phone] to request more information on [employer] access.    For providers and/or their staff who would like to refer a patient to [facility], please contact us through our one-stop-shop provider referral line, [facility], at [phone].    If you feel you have received this communication in error or would no longer like to receive these types of communications, please e-mail [email or web address]

## 2018-03-30 LAB
ALBUMIN SERPL-MCNC: 3.8 G/DL (ref 3.6–4.8)
ALBUMIN/GLOB SERPL: 1.9 {RATIO} (ref 1.2–2.2)
ALP SERPL-CCNC: 68 IU/L (ref 39–117)
ALT SERPL-CCNC: 14 IU/L (ref 0–44)
AST SERPL-CCNC: 18 IU/L (ref 0–40)
BASOPHILS # BLD AUTO: 0 X10E3/UL (ref 0–0.2)
BASOPHILS NFR BLD AUTO: 1 %
BILIRUB SERPL-MCNC: 0.4 MG/DL (ref 0–1.2)
BUN SERPL-MCNC: 9 MG/DL (ref 8–27)
BUN/CREAT SERPL: 11 (ref 10–24)
CALCIUM SERPL-MCNC: 8.6 MG/DL (ref 8.6–10.2)
CHLORIDE SERPL-SCNC: 106 MMOL/L (ref 96–106)
CO2 SERPL-SCNC: 21 MMOL/L (ref 18–29)
CREAT SERPL-MCNC: 0.8 MG/DL (ref 0.76–1.27)
EOSINOPHIL # BLD AUTO: 0.4 X10E3/UL (ref 0–0.4)
EOSINOPHIL NFR BLD AUTO: 14 %
ERYTHROCYTE [DISTWIDTH] IN BLOOD BY AUTOMATED COUNT: 14.4 % (ref 12.3–15.4)
GFR SERPLBLD CREATININE-BSD FMLA CKD-EPI: 106 ML/MIN/1.73
GFR SERPLBLD CREATININE-BSD FMLA CKD-EPI: 92 ML/MIN/1.73
GLOBULIN SER CALC-MCNC: 2 G/DL (ref 1.5–4.5)
GLUCOSE SERPL-MCNC: 99 MG/DL (ref 65–99)
HCT VFR BLD AUTO: 36.6 % (ref 37.5–51)
HGB BLD-MCNC: 11.9 G/DL (ref 13–17.7)
IMM GRANULOCYTES # BLD: 0 X10E3/UL (ref 0–0.1)
IMM GRANULOCYTES NFR BLD: 0 %
LYMPHOCYTES # BLD AUTO: 0.5 X10E3/UL (ref 0.7–3.1)
LYMPHOCYTES NFR BLD AUTO: 21 %
MCH RBC QN AUTO: 30.8 PG (ref 26.6–33)
MCHC RBC AUTO-ENTMCNC: 32.5 G/DL (ref 31.5–35.7)
MCV RBC AUTO: 95 FL (ref 79–97)
MONOCYTES # BLD AUTO: 0.4 X10E3/UL (ref 0.1–0.9)
MONOCYTES NFR BLD AUTO: 16 %
NEUTROPHILS # BLD AUTO: 1.2 X10E3/UL (ref 1.4–7)
NEUTROPHILS NFR BLD AUTO: 48 %
PLATELET # BLD AUTO: 225 X10E3/UL (ref 150–379)
POTASSIUM SERPL-SCNC: 3.8 MMOL/L (ref 3.5–5.2)
PROT SERPL-MCNC: 5.8 G/DL (ref 6–8.5)
RBC # BLD AUTO: 3.86 X10E6/UL (ref 4.14–5.8)
SODIUM SERPL-SCNC: 143 MMOL/L (ref 134–144)
WBC # BLD AUTO: 2.5 X10E3/UL (ref 3.4–10.8)

## 2018-04-02 ENCOUNTER — OFFICE VISIT (OUTPATIENT)
Dept: FAMILY MEDICINE | Facility: CLINIC | Age: 68
End: 2018-04-02
Payer: MEDICARE

## 2018-04-02 VITALS
HEART RATE: 68 BPM | SYSTOLIC BLOOD PRESSURE: 108 MMHG | DIASTOLIC BLOOD PRESSURE: 79 MMHG | HEIGHT: 68 IN | WEIGHT: 247 LBS | BODY MASS INDEX: 37.44 KG/M2

## 2018-04-02 DIAGNOSIS — E78.2 MULTIPLE-TYPE HYPERLIPIDEMIA: ICD-10-CM

## 2018-04-02 DIAGNOSIS — I10 BENIGN ESSENTIAL HYPERTENSION: Primary | ICD-10-CM

## 2018-04-02 DIAGNOSIS — C85.91 NON-HODGKIN LYMPHOMA OF LYMPH NODES OF NECK, UNSPECIFIED NON-HODGKIN LYMPHOMA TYPE: ICD-10-CM

## 2018-04-02 DIAGNOSIS — I82.B12 THROMBOSIS OF LEFT SUBCLAVIAN VEIN: ICD-10-CM

## 2018-04-02 DIAGNOSIS — Z11.59 NEED FOR HEPATITIS C SCREENING TEST: ICD-10-CM

## 2018-04-02 PROCEDURE — 3078F DIAST BP <80 MM HG: CPT | Mod: ,,, | Performed by: INTERNAL MEDICINE

## 2018-04-02 PROCEDURE — 3074F SYST BP LT 130 MM HG: CPT | Mod: ,,, | Performed by: INTERNAL MEDICINE

## 2018-04-02 PROCEDURE — 99214 OFFICE O/P EST MOD 30 MIN: CPT | Mod: ,,, | Performed by: INTERNAL MEDICINE

## 2018-04-02 NOTE — PROGRESS NOTES
Subjective:       Patient ID: Naresh Painting is a 67 y.o. male.    Chief Complaint: Hospital Follow Up (chemo ); Lymhoma; Hypertension; Hyperlipidemia; and Blood clot    Mr. Naresh Steinberg is a 67-year-old male who comes for follow-up. He has underlying hypertension and hyperlipidemia. He was recently diagnosed with non-Hodgkin's lymphoma with significant lymphadenopathy in the neck region. In conjunction with Dr. Amezquita and Dr. Elizabeth mercer at [facility] he has gone thru chemotherapy with R- CHOP regimen with significant reduction of lymphadenopathy in the neck. He had general improvement in symptoms.    He is small right submandibular lymph node as a residual. His exercise tolerance is fair. His blood pressure seemed to be stable at home. He is tolerating the medications without any problems.    He is awaiting insurance approval for the next cycle of chemotherapy. He probably had intrathecal therapy also.    In the interim here and also developed a thrombosis in the left subclavian vein region for which he is on anticoagulation with Eliquis. I suspect the duration of anticoagulation may be till the completion of treatment for cancer.      Hypertension   This is a chronic problem. The current episode started more than 1 year ago. The problem has been gradually improving since onset. Associated symptoms include neck pain and peripheral edema. Pertinent negatives include no chest pain, palpitations or shortness of breath. Risk factors for coronary artery disease include sedentary lifestyle, male gender, obesity and dyslipidemia. Past treatments include beta blockers.   Hyperlipidemia   This is a chronic problem. The current episode started more than 1 year ago. Exacerbating diseases include obesity. Pertinent negatives include no chest pain or shortness of breath. He is currently on no antihyperlipidemic treatment. The current treatment provides moderate improvement of lipids. Risk factors for coronary artery  disease include hypertension, male sex, obesity, a sedentary lifestyle and dyslipidemia.       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     Social History     Social History    Marital status:      Spouse name: N/A    Number of children: N/A    Years of education: N/A     Occupational History    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     [employer]     26 yrs    [employer]     Social History Main Topics    Smoking status: Former Smoker    Smokeless tobacco: Never Used      Comment: quit 1995    Alcohol use Yes    Drug use: No    Sexual activity: Not Currently     Partners: Female     Other Topics Concern    Not on file     Social History Narrative    No narrative on file     Past Surgical History:   Procedure Laterality Date    EYE SURGERY      HERNIA REPAIR      LYMPH NODE BIOPSY      PROCTECTOMY      PROSTATE SURGERY       Family History   Problem Relation Age of Onset    Heart disease Mother     Diabetes Father        Review of Systems   Constitutional: Negative for activity change, appetite change, fatigue and unexpected weight change (lost 28 lbs).   HENT: Negative for congestion, sneezing, sore throat and trouble swallowing.    Eyes: Negative for pain and visual disturbance.        Left eye Glass eye prosthesis   Respiratory: Negative for cough, chest tightness and shortness of breath.    Cardiovascular: Negative for chest pain, palpitations and leg swelling.        Left upper  extremity deep vein thrombosis.   Gastrointestinal: Positive for constipation. Negative for abdominal distention, abdominal pain, blood in stool and diarrhea.   Endocrine: Negative for cold intolerance, heat intolerance, polydipsia, polyphagia and polyuria.   Genitourinary: Negative for dysuria, hematuria and scrotal swelling.   Musculoskeletal: Positive for neck pain. Negative for arthralgias, back pain and gait problem.   Skin: Negative for pallor, rash and wound.        Patient's has lumps in the neck which have now been diagnosed to be lymphoma.   Allergic/Immunologic: Negative for environmental allergies, food allergies and immunocompromised state.   Neurological: Positive for numbness. Negative for dizziness, seizures, speech difficulty and light-headedness.        Patient has numbness in the left hand fingers.   Hematological: Positive for adenopathy. Does not bruise/bleed easily.        Diagnosis of NHL   Psychiatric/Behavioral: Negative for agitation, behavioral problems and confusion. The patient is not nervous/anxious.        Objective:      Physical Exam   Constitutional: He appears well-developed. No distress.   HENT:   Head: Normocephalic and atraumatic.       Nose: Nose normal.   Mouth/Throat: Oropharynx is clear and moist. No oropharyngeal exudate.   Eyes: Conjunctivae and EOM are normal. Right eye exhibits no discharge and no exudate. Right conjunctiva is not injected. Right conjunctiva has no hemorrhage.   Left eye is prosthetic.   Neck: Normal range of motion. Neck supple. No JVD present. No neck rigidity. No tracheal deviation and normal range of motion present. No thyromegaly present.       The lymph nodes on right and left side have dramatically shrunken now. A small lymph node is felt on the right side which is firm, nontender and adherent to the underlying structures.   Cardiovascular: Normal rate, regular rhythm and normal heart sounds.  Exam reveals no gallop and no friction rub.    No  murmur heard.  Pulmonary/Chest: Effort normal and breath sounds normal. No respiratory distress. He has no wheezes. He has no rales.   Abdominal: Soft. Bowel sounds are normal. He exhibits no distension. There is no tenderness.       Musculoskeletal: He exhibits edema (2 +). He exhibits no tenderness or deformity.   Examination of the neck does not reveal any deformity.   Lymphadenopathy:     He has cervical adenopathy.        Right cervical: Deep cervical adenopathy present.     He has no axillary adenopathy.   Right-sided cervical lymphadenopathy. No left-sided lymphadenopathy. No axillary lymphadenopathy.   Neurological: He is alert. He has normal reflexes.   Skin: Skin is warm and dry.   Psychiatric: He has a normal mood and affect. His mood appears not anxious.   Patient appears to be happier and less anxious today.   Nursing note and vitals reviewed.      Assessment:       1. Benign essential hypertension    2. Multiple-type hyperlipidemia    3. Non-Hodgkin lymphoma of lymph nodes of neck, unspecified non-Hodgkin lymphoma type    4. Thrombosis of left subclavian vein    5. Need for hepatitis C screening test       Comprehensive metabolic panel   Order: [id number]   Status:  Final result   Visible to patient:  No (Not Released) Next appt:  04/23/2018 at 10:00 AM in Hematology and Oncology (Fidencio Rdz MD)     Ref Range & Units 4d ago 11d ago    Glucose 65 - 99 mg/dL 99  94     BUN, Bld 8 - 27 mg/dL 9  14     Creatinine 0.76 - 1.27 mg/dL 0.80  0.90     eGFR if non African American >59 mL/min/1.73 92  87     eGFR if African American >59 mL/min/1.73 106  101     BUN/Creatinine Ratio 10 - 24 11  16     Sodium 134 - 144 mmol/L 143  145      Potassium 3.5 - 5.2 mmol/L 3.8  3.8     Chloride 96 - 106 mmol/L 106  105     CO2 18 - 29 mmol/L 21  20     Calcium 8.6 - 10.2 mg/dL 8.6  8.7     Total Protein 6.0 - 8.5 g/dL 5.8   5.9      Albumin 3.6 - 4.8 g/dL 3.8  3.8     Globulin, Total 1.5 - 4.5 g/dL 2.0  2.1      Albumin/Globulin Ratio 1.2 - 2.2 1.9  1.8     Total Bilirubin 0.0 - 1.2 mg/dL 0.4  0.3     Alkaline Phosphatase 39 - 117 IU/L 68  66            WBC 3.4 - 10.8 x10E3/uL 2.5   3.9      RBC 4.14 - 5.80 x10E6/uL 3.86   3.95      Hemoglobin 13.0 - 17.7 g/dL 11.9   12.3      Hematocrit 37.5 - 51.0 % 36.6   37.1      MCV 79 - 97 fL 95  94     MCH 26.6 - 33.0 pg 30.8  31.1     MCHC 31.5 - 35.7 g/dL 32.5  33.2          Plan:           Benign essential hypertension    Multiple-type hyperlipidemia  -     Lipid panel; Future; Expected date: 04/02/2018    Non-Hodgkin lymphoma of lymph nodes of neck, unspecified non-Hodgkin lymphoma type    Thrombosis of left subclavian vein    Need for hepatitis C screening test  -     Hepatitis C antibody; Future; Expected date: 04/02/2018    Patient's blood pressures are stable and lifestyle and dietary issues have been discussed again. Patient seems to be gaining some weight back again.    Discussed about the fats especially butter, fried food and need for substitution with greens and vegetables.    Discussed about fall injuries and precautions in view of concomitant anticoagulation.    I've reviewed future plans about cancer treatment with patient also.    I'll see him back in 3-4 months time.    Appropriate preventive care issues and immunization issues have also been discussed.

## 2018-04-02 NOTE — PATIENT INSTRUCTIONS
Taking Your Blood Pressure  Blood pressure is the force of blood against the artery wall as it moves from the heart through the blood vessels. You can take your own blood pressure reading using a digital monitor. Take your readings the same each time, using the same arm. Take readings as often as your healthcare provider instructs.  About blood pressure monitors  Blood pressure monitors are designed for certain ages and cases. You can find monitors for older adults, for pregnant women, and for children. Make sure the one you choose is the right one for your age and situation.  The American Heart Association recommends an automatic cuff monitor that fits on your upper arm (bicep). The cuff should fit your arm size. A cuff thats too large or too small will not give an accurate reading. Measure around your upper arm to find your size.  Monitors that attach to your finger or wrist are not as accurate as monitors for your upper arm.  Ask your healthcare provider for help in choosing a monitor. Bring your monitor to your next provider visit if you need help in using it the correct way.  The steps below are general instructions for using an automatic digital monitor.  Step 1. Relax    · Take your blood pressure at the same time every day, such as in the morning or evening, or at the time your healthcare provider recommends.  · Wait at least a half-hour after smoking, eating, or exercising. Don't drink coffee, tea, soda, or other caffeinated beverages before checking your blood pressure.  · Sit comfortably at a table with both feet on the floor. Do not cross your legs or feet. Place the monitor near you.  · Rest for a few minutes before you begin.  Step 2. Wrap the cuff    · Place your arm on the table, palm up. Your arm should be at the level of your heart. Wrap the cuff around your upper arm, just above your elbow. Its best done on bare skin, not over clothing. Most cuffs will indicate where the brachial artery (the  blood vessel in the middle of the arm at the inner side of the elbow) should line up with the cuff. Look in your monitor's instruction booklet for an illustration. You can also bring your cuff to your healthcare provider and have them show you how to correctly place the cuff.  Step 3. Inflate the cuff    · Push the button that starts the pump.  · The cuff will tighten, then loosen.  · The numbers will change. When they stop changing, your blood pressure reading will appear.  · Take 2 or 3 readings one minute apart.  Step 4. Write down the results of each reading    · Write down your blood pressure numbers for each reading. Note the date and time. Keep your results in one place, such as a notebook. Even if your monitor has a built-in memory, keep a hard copy of the readings.  · Remove the cuff from your arm. Turn off the machine.  · Bring your blood pressure records with your healthcare providers at each visit.  · If you start a new blood pressure medicine, note the day you started the new medicine. Also note the day if you change the dose of your medicine. This information goes on your blood pressure recording sheet. This will help your healthcare provider monitor how well the medicine changes are working.  · Ask your healthcare provider what numbers should prompt you to call him or her. Also ask what numbers should prompt you to get help right away.  Date Last Reviewed: 11/1/2016  © 8868-3419 [employer]. [address] [place] [address], [place] [address]. All rights reserved. This information is not intended as a substitute for professional medical care. Always follow your healthcare professional's instructions.

## 2018-04-13 LAB
ALBUMIN SERPL-MCNC: 4 G/DL (ref 3.6–4.8)
ALBUMIN/GLOB SERPL: 2 {RATIO} (ref 1.2–2.2)
ALP SERPL-CCNC: 66 IU/L (ref 39–117)
ALT SERPL-CCNC: 11 IU/L (ref 0–44)
AST SERPL-CCNC: 22 IU/L (ref 0–40)
BASOPHILS # BLD AUTO: 0 X10E3/UL (ref 0–0.2)
BASOPHILS NFR BLD AUTO: 1 %
BILIRUB SERPL-MCNC: 0.4 MG/DL (ref 0–1.2)
BUN SERPL-MCNC: 8 MG/DL (ref 8–27)
BUN/CREAT SERPL: 12 (ref 10–24)
CALCIUM SERPL-MCNC: 9.1 MG/DL (ref 8.6–10.2)
CHLORIDE SERPL-SCNC: 102 MMOL/L (ref 96–106)
CO2 SERPL-SCNC: 21 MMOL/L (ref 18–29)
CREAT SERPL-MCNC: 0.68 MG/DL (ref 0.76–1.27)
EOSINOPHIL # BLD AUTO: 0.3 X10E3/UL (ref 0–0.4)
EOSINOPHIL NFR BLD AUTO: 6 %
ERYTHROCYTE [DISTWIDTH] IN BLOOD BY AUTOMATED COUNT: 13.8 % (ref 12.3–15.4)
GFR SERPLBLD CREATININE-BSD FMLA CKD-EPI: 113 ML/MIN/1.73
GFR SERPLBLD CREATININE-BSD FMLA CKD-EPI: 98 ML/MIN/1.73
GLOBULIN SER CALC-MCNC: 2 G/DL (ref 1.5–4.5)
GLUCOSE SERPL-MCNC: 90 MG/DL (ref 65–99)
HCT VFR BLD AUTO: 37.8 % (ref 37.5–51)
HGB BLD-MCNC: 12.5 G/DL (ref 13–17.7)
IMM GRANULOCYTES # BLD: 0 X10E3/UL (ref 0–0.1)
IMM GRANULOCYTES NFR BLD: 0 %
LYMPHOCYTES # BLD AUTO: 0.9 X10E3/UL (ref 0.7–3.1)
LYMPHOCYTES NFR BLD AUTO: 20 %
MCH RBC QN AUTO: 31 PG (ref 26.6–33)
MCHC RBC AUTO-ENTMCNC: 33.1 G/DL (ref 31.5–35.7)
MCV RBC AUTO: 94 FL (ref 79–97)
MONOCYTES # BLD AUTO: 0.5 X10E3/UL (ref 0.1–0.9)
MONOCYTES NFR BLD AUTO: 11 %
NEUTROPHILS # BLD AUTO: 2.7 X10E3/UL (ref 1.4–7)
NEUTROPHILS NFR BLD AUTO: 62 %
PLATELET # BLD AUTO: 287 X10E3/UL (ref 150–379)
POTASSIUM SERPL-SCNC: 4.4 MMOL/L (ref 3.5–5.2)
PROT SERPL-MCNC: 6 G/DL (ref 6–8.5)
RBC # BLD AUTO: 4.03 X10E6/UL (ref 4.14–5.8)
SODIUM SERPL-SCNC: 141 MMOL/L (ref 134–144)
WBC # BLD AUTO: 4.4 X10E3/UL (ref 3.4–10.8)

## 2018-04-19 ENCOUNTER — OFFICE VISIT (OUTPATIENT)
Dept: SURGERY | Facility: CLINIC | Age: 68
End: 2018-04-19
Payer: MEDICARE

## 2018-04-19 VITALS
DIASTOLIC BLOOD PRESSURE: 79 MMHG | BODY MASS INDEX: 37.44 KG/M2 | SYSTOLIC BLOOD PRESSURE: 108 MMHG | WEIGHT: 247 LBS | HEIGHT: 68 IN

## 2018-04-19 DIAGNOSIS — K42.0 INCARCERATED UMBILICAL HERNIA: Primary | ICD-10-CM

## 2018-04-19 PROCEDURE — 99024 POSTOP FOLLOW-UP VISIT: CPT | Mod: ,,, | Performed by: SURGERY

## 2018-04-19 RX ORDER — HYDROCODONE BITARTRATE AND ACETAMINOPHEN 7.5; 325 MG/1; MG/1
TABLET ORAL
COMMUNITY
Start: 2018-04-12 | End: 2018-06-05

## 2018-04-19 NOTE — PROGRESS NOTES
Subjective:       Patient ID: Naresh Painting is a 67 y.o. male.    Chief Complaint: Post-op Evaluation (FU DOS 4/5/18 Incarcerated Umbilical Hernia Repair)      HPI:  Patient presented with an incarcerated umbilical hernia two weeks ago.  He went to the or for emergent repair. Bowel was viable.  He is doing very well.  He has no complaints.      Review of Systems   Constitutional: Negative for appetite change, chills, fever and unexpected weight change.   HENT: Negative for hearing loss, rhinorrhea, sore throat and voice change.    Eyes: Negative for photophobia and visual disturbance.   Respiratory: Negative for cough, choking and shortness of breath.    Cardiovascular: Negative for chest pain, palpitations and leg swelling.   Gastrointestinal: Negative for abdominal pain, blood in stool, constipation, diarrhea, nausea and vomiting.   Endocrine: Negative for cold intolerance, heat intolerance, polydipsia and polyuria.   Musculoskeletal: Negative for arthralgias, back pain, joint swelling and neck stiffness.   Skin: Negative for color change, pallor and rash.   Neurological: Negative for dizziness, seizures, syncope and headaches.   Hematological: Negative for adenopathy. Does not bruise/bleed easily.   Psychiatric/Behavioral: Negative for agitation, behavioral problems and confusion.       Objective:      Physical Exam   Constitutional: He is oriented to person, place, and time. He is cooperative. No distress.   Pulmonary/Chest: Effort normal. No respiratory distress.   Abdominal: Soft. He exhibits no distension. There is no tenderness. There is no rebound and no guarding.   Neurological: He is alert and oriented to person, place, and time.   Skin:   Incision is clean, dry and intact  There is no evidence of infection, hematoma or seroma   There is some superficial bruising.         Assessment/Plan:   Incarcerated umbilical hernia      Staples removed.  Doing well.  He also has lymphoma and is on chemotherapy.  It  is OK from surgery standpoint to resume chemotherapy.  RTC PRN

## 2018-04-20 LAB
ALBUMIN SERPL-MCNC: 4 G/DL (ref 3.6–4.8)
ALBUMIN/GLOB SERPL: 1.8 {RATIO} (ref 1.2–2.2)
ALP SERPL-CCNC: 69 IU/L (ref 39–117)
ALT SERPL-CCNC: 14 IU/L (ref 0–44)
AST SERPL-CCNC: 19 IU/L (ref 0–40)
BASOPHILS # BLD AUTO: 0.1 X10E3/UL (ref 0–0.2)
BASOPHILS NFR BLD AUTO: 1 %
BILIRUB SERPL-MCNC: 0.4 MG/DL (ref 0–1.2)
BUN SERPL-MCNC: 12 MG/DL (ref 8–27)
BUN/CREAT SERPL: 13 (ref 10–24)
CALCIUM SERPL-MCNC: 9.7 MG/DL (ref 8.6–10.2)
CHLORIDE SERPL-SCNC: 100 MMOL/L (ref 96–106)
CO2 SERPL-SCNC: 26 MMOL/L (ref 18–29)
CREAT SERPL-MCNC: 0.89 MG/DL (ref 0.76–1.27)
EOSINOPHIL # BLD AUTO: 0.4 X10E3/UL (ref 0–0.4)
EOSINOPHIL NFR BLD AUTO: 9 %
ERYTHROCYTE [DISTWIDTH] IN BLOOD BY AUTOMATED COUNT: 14.1 % (ref 12.3–15.4)
GFR SERPLBLD CREATININE-BSD FMLA CKD-EPI: 102 ML/MIN/1.73
GFR SERPLBLD CREATININE-BSD FMLA CKD-EPI: 88 ML/MIN/1.73
GLOBULIN SER CALC-MCNC: 2.2 G/DL (ref 1.5–4.5)
GLUCOSE SERPL-MCNC: 98 MG/DL (ref 65–99)
HCT VFR BLD AUTO: 39.1 % (ref 37.5–51)
HGB BLD-MCNC: 12.6 G/DL (ref 13–17.7)
IMM GRANULOCYTES # BLD: 0 X10E3/UL (ref 0–0.1)
IMM GRANULOCYTES NFR BLD: 0 %
LYMPHOCYTES # BLD AUTO: 0.7 X10E3/UL (ref 0.7–3.1)
LYMPHOCYTES NFR BLD AUTO: 19 %
MCH RBC QN AUTO: 29.9 PG (ref 26.6–33)
MCHC RBC AUTO-ENTMCNC: 32.2 G/DL (ref 31.5–35.7)
MCV RBC AUTO: 93 FL (ref 79–97)
MONOCYTES # BLD AUTO: 0.9 X10E3/UL (ref 0.1–0.9)
MONOCYTES NFR BLD AUTO: 23 %
MORPHOLOGY BLD-IMP: ABNORMAL
NEUTROPHILS # BLD AUTO: 1.9 X10E3/UL (ref 1.4–7)
NEUTROPHILS NFR BLD AUTO: 48 %
PLATELET # BLD AUTO: 340 X10E3/UL (ref 150–379)
POTASSIUM SERPL-SCNC: 4.8 MMOL/L (ref 3.5–5.2)
PROT SERPL-MCNC: 6.2 G/DL (ref 6–8.5)
RBC # BLD AUTO: 4.21 X10E6/UL (ref 4.14–5.8)
SODIUM SERPL-SCNC: 140 MMOL/L (ref 134–144)
WBC # BLD AUTO: 3.9 X10E3/UL (ref 3.4–10.8)

## 2018-04-22 LAB
CHOLEST SERPL-MCNC: 258 MG/DL (ref 100–199)
HCV AB S/CO SERPL IA: <0.1 S/CO RATIO (ref 0–0.9)
HDLC SERPL-MCNC: 53 MG/DL
LDLC SERPL CALC-MCNC: 181 MG/DL (ref 0–99)
TRIGL SERPL-MCNC: 119 MG/DL (ref 0–149)
VLDLC SERPL CALC-MCNC: 24 MG/DL (ref 5–40)

## 2018-04-23 ENCOUNTER — OFFICE VISIT (OUTPATIENT)
Dept: HEMATOLOGY/ONCOLOGY | Facility: CLINIC | Age: 68
End: 2018-04-23
Payer: MEDICARE

## 2018-04-23 VITALS
HEART RATE: 62 BPM | SYSTOLIC BLOOD PRESSURE: 120 MMHG | BODY MASS INDEX: 35.36 KG/M2 | WEIGHT: 233.31 LBS | TEMPERATURE: 98 F | HEIGHT: 68 IN | RESPIRATION RATE: 18 BRPM | DIASTOLIC BLOOD PRESSURE: 73 MMHG

## 2018-04-23 DIAGNOSIS — I82.B12 THROMBOSIS OF LEFT SUBCLAVIAN VEIN: ICD-10-CM

## 2018-04-23 DIAGNOSIS — C83.31 DIFFUSE LARGE B-CELL LYMPHOMA OF LYMPH NODES OF NECK: ICD-10-CM

## 2018-04-23 DIAGNOSIS — D70.1 CHEMOTHERAPY-INDUCED NEUTROPENIA: ICD-10-CM

## 2018-04-23 DIAGNOSIS — T45.1X5A CHEMOTHERAPY-INDUCED NEUTROPENIA: ICD-10-CM

## 2018-04-23 DIAGNOSIS — C85.91 NON-HODGKIN LYMPHOMA OF LYMPH NODES OF NECK, UNSPECIFIED NON-HODGKIN LYMPHOMA TYPE: Primary | ICD-10-CM

## 2018-04-23 PROCEDURE — 3074F SYST BP LT 130 MM HG: CPT | Mod: ,,, | Performed by: INTERNAL MEDICINE

## 2018-04-23 PROCEDURE — 99214 OFFICE O/P EST MOD 30 MIN: CPT | Mod: ,,, | Performed by: INTERNAL MEDICINE

## 2018-04-23 PROCEDURE — 3078F DIAST BP <80 MM HG: CPT | Mod: ,,, | Performed by: INTERNAL MEDICINE

## 2018-04-23 NOTE — PROGRESS NOTES
[facility] Hematology/Oncology  PROGRESS NOTE      Subjective:       Patient ID:   NAME: Naresh Painting : 1950     67 y.o. male    Referring Doc: Yamila  Other Physicians: Adriano Michaud Pernenkil, Saba    Chief Complaint:  NHL f/u    History of Present Illness:     Patient returns today for a regularly scheduled follow-up visit.  The patient is here with his wife. He had recent hernia surgery for incarcerated hernia with Dr Ellis at [facility]. He saw Dr Basilio this Friday - he has two more chemotherapies left with the last one on May 23rd. No CP, SOB, HA's or N/V. He had staples removed and is healing well.           ROS:   GEN: normal without any fever, night sweats or weight loss  HEENT: normal with no HA's, sore throat, stiff neck, changes in vision  CV: normal with no CP, SOB, PND, COOPER or orthopnea  PULM: normal with no SOB, cough, hemoptysis, sputum or pleuritic pain  GI: normal with no abdominal pain, nausea, vomiting, , diarrhea, melanotic stools, BRBPR, or hematemesis;  : normal with no hematuria, dysuria  BREAST: normal with no mass, discharge, pain  SKIN: normal with no rash, erythema, bruising, or swelling    Allergies:  Review of patient's allergies indicates:  No Known Allergies    Medications:    Current Outpatient Prescriptions:     apixaban (ELIQUIS) 5 mg Tab, Take 1 tablet (5 mg total) by mouth 2 (two) times daily., Disp: 60 tablet, Rfl: 6    aspirin 81 MG Chew, Take 81 mg by mouth once daily., Disp: , Rfl:     co-enzyme Q-10 30 mg capsule, Take 30 mg by mouth 3 (three) times daily., Disp: , Rfl:     hydrocodone-acetaminophen 7.5-325mg (NORCO) 7.5-325 mg per tablet, , Disp: , Rfl:     L GASSERI/B BIFIDUM/B LONGUM ([employer] HEALTH ORAL), Take by mouth., Disp: , Rfl:     MULTIVITAMIN/IRON/FOLIC ACID (CENTRUM COMPLETE ORAL), Take by mouth., Disp: , Rfl:     pantoprazole (PROTONIX) 40 MG tablet, Take 40 mg by mouth once daily., Disp: , Rfl:     predniSONE (DELTASONE) 50 MG Tab, TK 3 TS  "PO ONCE D ON DAYS 2 THROUGH 5, Disp: , Rfl: 6    TOPROL XL 50 mg 24 hr tablet, Take 25 mg by mouth once daily., Disp: , Rfl:     vitamin D 1000 units Tab, Take 185 mg by mouth once daily., Disp: , Rfl:     vitamin E 100 UNIT capsule, Take 100 Units by mouth once daily., Disp: , Rfl:     Current Facility-Administered Medications:     triptorelin pamoate Syrg 22.5 mg, 22.5 mg, Intramuscular, Q6 Months, Fauzia Maza NP, 22.5 mg at 02/02/18 1012    PMHx/PSHx Updates:  See patient's last visit with me on 3/27/2018.  See H&P on 6/9/2017        Pathology:  See path 9/13/2017          Objective:     Vitals:  Blood pressure 120/73, pulse 62, temperature 97.9 °F (36.6 °C), resp. rate 18, height 5' 8" (1.727 m), weight 105.8 kg (233 lb 4.8 oz).    Physical Examination:   GEN: no apparent distress, comfortable; AAOx3  HEAD: atraumatic and normocephalic  EYES: no pallor, no icterus, PERRLA  ENT: OMM, no pharyngeal erythema, external ears WNL; no nasal discharge; no thrush  NECK: no masses, thyroid normal, trachea midline,  LAD/LN's, supple; residual LN on right neck still; left looks good  CV: RRR with no murmur; normal pulse; normal S1 and S2; no pedal edema  CHEST: Normal respiratory effort; CTAB; normal breath sounds; no wheeze or crackles  ABDOM: nontender and nondistended; soft; normal bowel sounds; no rebound/guardingp; vertical wound healed well  MUSC/Skeletal: ROM normal; no crepitus; joints normal; no deformities or arthropathy  EXTREM: no clubbing, cyanosis, inflammation or swelling  SKIN: no rashes, lesions, ulcers, petechiae or subcutaneous nodules  : no cannon  NEURO: grossly intact; motor/sensory WNL; AAOx3; no tremors  PSYCH: normal mood, affect and behavior  LYMPH: normal cervical, supraclavicular, axillary and groin LN's            Labs:     4/19/2018  Lab Results   Component Value Date    WBC 3.9 04/19/2018    HGB 12.6 (L) 04/19/2018    HCT 39.1 04/19/2018    MCV 93 04/19/2018     04/19/2018 "     BMP  Lab Results   Component Value Date     04/19/2018    K 4.8 04/19/2018     04/19/2018    CO2 26 04/19/2018    BUN 12 04/19/2018    CREATININE 0.89 04/19/2018    CALCIUM 9.7 04/19/2018    ESTGFRAFRICA >60.0 11/25/2014    EGFRNONAA 88 04/19/2018     Lab Results   Component Value Date    ALT 14 04/19/2018    AST 19 04/19/2018    ALKPHOS 69 04/19/2018    BILITOT 0.4 04/19/2018           Radiology/Diagnostic Studies:    PET 12/27/2017:  IMPRESSION:  1. Diffusely increased FDG uptake throughout the visualized osseous structures  suggestive of chemotherapy and in keeping with reactive marrow.  However, this  pattern may mask underlying hypermetabolic osseous foci.  2. Marked interval decrease in size, number and FDG activity to the cervical,  retroperitoneal and upper abdominal lymphadenopathy, now with only soft tissue  fat stranding and FDG activity seen in the left supraclavicular soft tissue and  small bowel mesenteric root.  3. No new sites of FDG avid disease.  4. Status post prostatectomy without focal abnormal FDG activity in the pelvis  to specifically suggest residual/recurrent disease.  I have reviewed all available lab results and radiology reports.    Assessment/Plan:   (1) 67 y.o. male with diagnosis of development of right sided neck swelling since Feb 2017  - he has been referred by Dr Michaud with ID for an evaluation  - i spoke to Dr Michaud previously about the patient peripherally  - FNA bx on 2/24 which was nondiagnostic  - he had a  guided biopsy of an entire left supraclavicular LN on 3/6/2017 with Dr Alexander Oh which showed  necrotizing granulomatous lymphadenitis  - CT scans were from Feb 2017  - flow cytometry studies from 5/25 appear to have been negative for any abnormal cell populations  - PET scan on 7/6 with markedly hypermetabolic LAD  - he was referred to and seen [place] at [place] on 7/28 and again on 8/25  - he underwent repeat cervical LN biopsy with Dr Oh  on 9/13/2017 and that 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)  - he saw Dr Basilio again at [facility] and had bone marrow biopsy on 10/3  - the bone marrow showed no involvement of the lymphoom  - Dr Basilio recommended starting R-CHOP while he awaited additional studies  - portacath placed by Dr Oh and he underwent chemotherapy school and training with Che Lazo  - I previously provided literature on R-CHOP      - per Dr Basilio's direction, he has been on HD-MTX chemotherapy at [facility] as of lately  - he saw Dr Basilio this past Friday and will finish up the last chemotherapy in May 23rd       (2) Hx/of prostate cancer - 1995; s/p prostatectomy followed by XRT; on lupron per Dr Menjivar with  and Fauzia Maza (NP)     (3) JAK - uses CPAP     (4) GERD     (5) Myocardial bridging congenital disorder - followed by Dr Santiago with cardiology   - recent echo with good EF at 60%     (6) recent subclavian vein thrombosis - on eliquis 5mg po bid     (7) leucocytosis - resolved     (8) recent incarcerated hernia surgery with Dr Ellis on 4/4        PLAN:  1. Proceed remaining chemotherapy at [facility] per Dr Basilio at [facility]  2. Check labs weekly; PF q 4-6 weeks  3. Continue eliquis for now  4. F/U with Dr Basilio  5. Repeat scans in May/June 2018  RTC in 4 weeks  Fax note to Ronny Driscoll MD, Adriano Michaud, and  Chetna    Discussion:     I have explained all of the above in detail and the patient understands all of the current recommendation(s). I have answered all of their questions to the best of my ability and to their complete satisfaction.   The patient is to continue with the current management plan.            Electronically signed by Fidencio Rdz MD

## 2018-04-23 NOTE — LETTER
April 23, 2018      Ronny Driscoll MD  [address] [facility]  [address]  [place] [address]           [facility] Hematology Oncology  [address]  [address]  [place] [address]  Phone: [phone]  Fax: [phone]          Patient: Naresh Painting   MR Number: [id number]   YOB: 1950   Date of Visit: 4/23/2018       Dear Dr. Ronny Driscoll:    Thank you for referring Naresh Painting to me for evaluation. Attached you will find relevant portions of my assessment and plan of care.    If you have questions, please do not hesitate to call me. I look forward to following Naresh Painting along with you.    Sincerely,    Fidencio Rdz MD    Enclosure  CC:  No Recipients    If you would like to receive this communication electronically, please contact [email or web address] or [phone] to request more information on [employer] Link access.    For providers and/or their staff who would like to refer a patient to [facility], please contact us through our one-stop-shop provider referral line, [facility], at [phone].    If you feel you have received this communication in error or would no longer like to receive these types of communications, please e-mail [email or web address]

## 2018-04-25 ENCOUNTER — OFFICE VISIT (OUTPATIENT)
Dept: FAMILY MEDICINE | Facility: CLINIC | Age: 68
End: 2018-04-25
Payer: MEDICARE

## 2018-04-25 VITALS
BODY MASS INDEX: 35.92 KG/M2 | WEIGHT: 237 LBS | HEIGHT: 68 IN | HEART RATE: 73 BPM | DIASTOLIC BLOOD PRESSURE: 70 MMHG | SYSTOLIC BLOOD PRESSURE: 114 MMHG

## 2018-04-25 DIAGNOSIS — K42.0 INCARCERATED UMBILICAL HERNIA: Primary | ICD-10-CM

## 2018-04-25 DIAGNOSIS — C83.31 DIFFUSE LARGE B-CELL LYMPHOMA OF LYMPH NODES OF NECK: ICD-10-CM

## 2018-04-25 DIAGNOSIS — I82.B12 THROMBOSIS OF LEFT SUBCLAVIAN VEIN: ICD-10-CM

## 2018-04-25 PROCEDURE — 3078F DIAST BP <80 MM HG: CPT | Mod: ,,, | Performed by: INTERNAL MEDICINE

## 2018-04-25 PROCEDURE — 1111F DSCHRG MED/CURRENT MED MERGE: CPT | Mod: ,,, | Performed by: INTERNAL MEDICINE

## 2018-04-25 PROCEDURE — 99213 OFFICE O/P EST LOW 20 MIN: CPT | Mod: ,,, | Performed by: INTERNAL MEDICINE

## 2018-04-25 PROCEDURE — 3074F SYST BP LT 130 MM HG: CPT | Mod: ,,, | Performed by: INTERNAL MEDICINE

## 2018-04-25 NOTE — PATIENT INSTRUCTIONS
What Is a Hernia?    A hernia is when an organ or tissue pushes through a weak area in the belly (abdominal) wall. This weak area may be present at birth. Or it may be caused by abdominal strain over time. If not treated, a hernia can get worse with time and physical stress.  When a bulge forms  When there is a weak area in the abdominal wall, an organ or tissue can push outward. This often causes a bulge that you can see under your skin. The bulge may get bigger when you stand up. It may go away when you lie down. You may also feel some pressure or mild pain when lifting, coughing, urinating, or doing other activities.  Types of hernias  The type of hernia you have depends on its location. Most hernias form in the groin at or near the internal ring. This is the entrance to a canal between the abdomen and groin. Hernias can also occur in the abdomen, thigh, or genitals.  · An incisional hernia occurs at the site of a previous surgical incision.  · An umbilical hernia occurs at the navel.  · An indirect inguinal hernia occurs in the groin at the internal ring.  · A direct inguinal hernia occurs in the groin near the internal ring.  · A femoral hernia occurs just below the groin.  · An epigastric hernia occurs in the upper abdomen at the midline.  Diagnosis  In most cases, your healthcare provider can diagnose a hernia by doing a physical exam.  In some cases it might not be clear why you have a swelling in the belly wall. Then your provider may order an imaging test such as an ultrasound. This can help with the diagnosis.  Surgery  A hernia will not heal on its own. Surgery is needed to fix the weak spot in the abdominal wall. If not treated, a hernia can get larger. It can also cause serious health problems. The good news is that hernia surgery can be done quickly and safely. In some cases, you can go home the same day as your surgery.   When to call your provider  Call your healthcare provider right away if the  swelling around your hernia becomes larger, firmer, or more painful. These may be signs that your intestines are stuck in the abdominal wall. This is an emergency. The hernia must be repaired right away to avoid serious problems.  Date Last Reviewed: 7/1/2016  © 3676-8119 [employer]. [address] [place] [address]. All rights reserved. This information is not intended as a substitute for professional medical care. Always follow your healthcare professional's instructions.

## 2018-04-25 NOTE — PROGRESS NOTES
Subjective:       Patient ID: Naresh Painting is a 67 y.o. male.    Chief Complaint: Hospital Follow Up (hernia surg ); Incarcerated umbilical hernia; Lymphoma; and Subclavian vein thrombosis    Mr. Steinberg was hospitalized on 04/05/2018 with abdominal pain and discomfort. He was found to have incarcerated umbilical hernia. He was operated by Dr. Amandeep Rosa and the postop course was essentially unremarkable.    Patient's underlying medical issues also include being under treatment for lymphoma. He also unfortunately developed a left subclavian vein thrombosis for which he is taking Eliquis. He also has underlying hypertension and dyslipidemia.     Thus far after the surgery he has been doing fairly okay. His stitches seems to be healing slowly and steadily. As always he is a happy and content man.        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     Social History     Social History    Marital status:      Spouse name: N/A    Number of children: 2    Years of education: N/A     Occupational History    Retired  [employer]at Dept- 3 yrs     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    [employer]     Social History Main Topics    Smoking status: Former Smoker    Smokeless tobacco: Never Used      Comment: quit 1995    Alcohol use Yes    Drug use: No    Sexual activity: Not Currently     Partners: Female     Other Topics Concern    Not on file     Social History Narrative    No narrative on file     Past Surgical  "History:   Procedure Laterality Date    EYE SURGERY      LYMPH NODE BIOPSY      PROCTECTOMY      PROSTATE SURGERY      UMBILICAL HERNIA REPAIR  04/05/2018    Incarcerated-      Family History   Problem Relation Age of Onset    Heart disease Mother     Diabetes Father        Review of Systems   Constitutional: Negative for activity change, appetite change, fatigue and unexpected weight change (lost 28 lbs).   HENT: Negative for congestion, sneezing, sore throat and trouble swallowing.    Eyes: Negative for pain and visual disturbance.        Left eye Glass eye prosthesis   Respiratory: Negative for cough, chest tightness and shortness of breath.    Cardiovascular: Negative for chest pain, palpitations and leg swelling.        Left upper extremity deep vein thrombosis.   Gastrointestinal: Positive for constipation. Negative for abdominal distention, abdominal pain, blood in stool and diarrhea.   Endocrine: Negative for cold intolerance, heat intolerance, polydipsia, polyphagia and polyuria.   Genitourinary: Negative for dysuria, hematuria and scrotal swelling.   Musculoskeletal: Positive for neck pain. Negative for arthralgias, back pain and gait problem.   Skin: Negative for pallor, rash and wound.        Patient's has lumps in the neck which have now been diagnosed to be lymphoma.   Allergic/Immunologic: Negative for environmental allergies, food allergies and immunocompromised state.   Neurological: Positive for numbness. Negative for dizziness, seizures, speech difficulty and light-headedness.        Patient has numbness in the left hand fingers.   Hematological: Positive for adenopathy. Does not bruise/bleed easily.        Diagnosis of NHL   Psychiatric/Behavioral: Negative for agitation, behavioral problems and confusion. The patient is not nervous/anxious.        Objective:       Vitals:    04/25/18 1619   BP: 114/70   Pulse: 73   Weight: 107.5 kg (237 lb)   Height: 5' 8" (1.727 m)     Physical " Exam   Constitutional: He appears well-developed. No distress.   HENT:   Head: Normocephalic and atraumatic.   Nose: Nose normal.   Mouth/Throat: Oropharynx is clear and moist. No oropharyngeal exudate.   Eyes: Conjunctivae and EOM are normal. Right eye exhibits no discharge and no exudate. Right conjunctiva is not injected. Right conjunctiva has no hemorrhage.   Left eye is prosthetic.   Neck: Normal range of motion. Neck supple. No JVD present. No neck rigidity. No tracheal deviation and normal range of motion present. No thyromegaly present.       The lymph nodes on right and left side have dramatically shrunken now. A small lymph node is felt on the right side which is firm, nontender and adherent to the underlying structures.   Cardiovascular: Normal rate, regular rhythm and normal heart sounds.  Exam reveals no gallop and no friction rub.    No murmur heard.  Pulmonary/Chest: Effort normal and breath sounds normal. No respiratory distress. He has no wheezes. He has no rales.   Abdominal: Soft. Bowel sounds are normal. He exhibits no distension. There is no tenderness.       Abdominal examination is remarkable for healing surgical scar without any evidence of infection. There is a small hematoma and bruise on the left side which is not bothersome. Abdomen is generally soft.   Musculoskeletal: He exhibits edema (2 +). He exhibits no tenderness or deformity.   Examination of the neck does not reveal any deformity.   Lymphadenopathy:     He has cervical adenopathy.        Right cervical: Deep cervical adenopathy present.     He has no axillary adenopathy.   Right-sided cervical lymphadenopathy. No left-sided lymphadenopathy. No axillary lymphadenopathy.   Neurological: He is alert. He has normal reflexes.   Skin: Skin is warm and dry.   Psychiatric: He has a normal mood and affect. His mood appears not anxious.   Patient appears to be happier and less anxious today.   Nursing note and vitals reviewed.       Assessment:       1. Incarcerated umbilical hernia    2. Diffuse large B-cell lymphoma of lymph nodes of neck    3. Thrombosis of left subclavian vein         Plan:           Incarcerated umbilical hernia    Diffuse large B-cell lymphoma of lymph nodes of neck    Thrombosis of left subclavian vein    I've reviewed patient's medications. He continues on chronic anti-coagulation. He keeps his follow-up with oncology department. No other major bleeding complications. Fall precautions and injury precautions have been discussed. His blood pressures are fairly stable. He is maintaining a steady weight which will enable him to go thru the rigors of chemotherapy.    He has regular appointment with me in June to monitor his blood pressures and lipids.

## 2018-04-27 LAB
ALBUMIN SERPL-MCNC: 4 G/DL (ref 3.6–4.8)
ALBUMIN/GLOB SERPL: 1.7 {RATIO} (ref 1.2–2.2)
ALP SERPL-CCNC: 66 IU/L (ref 39–117)
ALT SERPL-CCNC: 12 IU/L (ref 0–44)
AST SERPL-CCNC: 19 IU/L (ref 0–40)
BASOPHILS # BLD AUTO: 0 X10E3/UL (ref 0–0.2)
BASOPHILS NFR BLD AUTO: 2 %
BILIRUB SERPL-MCNC: 0.4 MG/DL (ref 0–1.2)
BUN SERPL-MCNC: 11 MG/DL (ref 8–27)
BUN/CREAT SERPL: 13 (ref 10–24)
CALCIUM SERPL-MCNC: 9.1 MG/DL (ref 8.6–10.2)
CHLORIDE SERPL-SCNC: 104 MMOL/L (ref 96–106)
CO2 SERPL-SCNC: 21 MMOL/L (ref 18–29)
CREAT SERPL-MCNC: 0.82 MG/DL (ref 0.76–1.27)
EOSINOPHIL # BLD AUTO: 0.3 X10E3/UL (ref 0–0.4)
EOSINOPHIL NFR BLD AUTO: 10 %
ERYTHROCYTE [DISTWIDTH] IN BLOOD BY AUTOMATED COUNT: 14.2 % (ref 12.3–15.4)
GFR SERPLBLD CREATININE-BSD FMLA CKD-EPI: 105 ML/MIN/1.73
GFR SERPLBLD CREATININE-BSD FMLA CKD-EPI: 91 ML/MIN/1.73
GLOBULIN SER CALC-MCNC: 2.4 G/DL (ref 1.5–4.5)
GLUCOSE SERPL-MCNC: 89 MG/DL (ref 65–99)
HCT VFR BLD AUTO: 39.1 % (ref 37.5–51)
HGB BLD-MCNC: 12.7 G/DL (ref 13–17.7)
IMM GRANULOCYTES # BLD: 0 X10E3/UL (ref 0–0.1)
IMM GRANULOCYTES NFR BLD: 0 %
LYMPHOCYTES # BLD AUTO: 0.6 X10E3/UL (ref 0.7–3.1)
LYMPHOCYTES NFR BLD AUTO: 22 %
MCH RBC QN AUTO: 30.2 PG (ref 26.6–33)
MCHC RBC AUTO-ENTMCNC: 32.5 G/DL (ref 31.5–35.7)
MCV RBC AUTO: 93 FL (ref 79–97)
MONOCYTES # BLD AUTO: 0.5 X10E3/UL (ref 0.1–0.9)
MONOCYTES NFR BLD AUTO: 19 %
NEUTROPHILS # BLD AUTO: 1.3 X10E3/UL (ref 1.4–7)
NEUTROPHILS NFR BLD AUTO: 47 %
PLATELET # BLD AUTO: 246 X10E3/UL (ref 150–379)
POTASSIUM SERPL-SCNC: 4.3 MMOL/L (ref 3.5–5.2)
PROT SERPL-MCNC: 6.4 G/DL (ref 6–8.5)
RBC # BLD AUTO: 4.21 X10E6/UL (ref 4.14–5.8)
SODIUM SERPL-SCNC: 141 MMOL/L (ref 134–144)
WBC # BLD AUTO: 2.7 X10E3/UL (ref 3.4–10.8)

## 2018-05-05 LAB
ALBUMIN SERPL-MCNC: 4 G/DL (ref 3.6–4.8)
ALBUMIN/GLOB SERPL: 2 {RATIO} (ref 1.2–2.2)
ALP SERPL-CCNC: 66 IU/L (ref 39–117)
ALT SERPL-CCNC: 16 IU/L (ref 0–44)
AST SERPL-CCNC: 20 IU/L (ref 0–40)
BASOPHILS # BLD AUTO: 0.1 X10E3/UL (ref 0–0.2)
BASOPHILS NFR BLD AUTO: 3 %
BILIRUB SERPL-MCNC: 0.4 MG/DL (ref 0–1.2)
BUN SERPL-MCNC: 12 MG/DL (ref 8–27)
BUN/CREAT SERPL: 16 (ref 10–24)
CALCIUM SERPL-MCNC: 8.9 MG/DL (ref 8.6–10.2)
CHLORIDE SERPL-SCNC: 103 MMOL/L (ref 96–106)
CO2 SERPL-SCNC: 22 MMOL/L (ref 18–29)
CREAT SERPL-MCNC: 0.73 MG/DL (ref 0.76–1.27)
EOSINOPHIL # BLD AUTO: 0.2 X10E3/UL (ref 0–0.4)
EOSINOPHIL NFR BLD AUTO: 10 %
ERYTHROCYTE [DISTWIDTH] IN BLOOD BY AUTOMATED COUNT: 14.2 % (ref 12.3–15.4)
GFR SERPLBLD CREATININE-BSD FMLA CKD-EPI: 110 ML/MIN/1.73
GFR SERPLBLD CREATININE-BSD FMLA CKD-EPI: 95 ML/MIN/1.73
GLOBULIN SER CALC-MCNC: 2 G/DL (ref 1.5–4.5)
GLUCOSE SERPL-MCNC: 92 MG/DL (ref 65–99)
HCT VFR BLD AUTO: 37.5 % (ref 37.5–51)
HGB BLD-MCNC: 12.3 G/DL (ref 13–17.7)
LYMPHOCYTES # BLD AUTO: 0.9 X10E3/UL (ref 0.7–3.1)
LYMPHOCYTES NFR BLD AUTO: 43 %
MCH RBC QN AUTO: 30.2 PG (ref 26.6–33)
MCHC RBC AUTO-ENTMCNC: 32.8 G/DL (ref 31.5–35.7)
MCV RBC AUTO: 92 FL (ref 79–97)
MONOCYTES # BLD AUTO: 0.4 X10E3/UL (ref 0.1–0.9)
MONOCYTES NFR BLD AUTO: 18 %
MORPHOLOGY BLD-IMP: ABNORMAL
NEUTROPHILS # BLD AUTO: 0.6 X10E3/UL (ref 1.4–7)
NEUTROPHILS NFR BLD AUTO: 26 %
PLATELET # BLD AUTO: 197 X10E3/UL (ref 150–379)
POTASSIUM SERPL-SCNC: 4.3 MMOL/L (ref 3.5–5.2)
PROT SERPL-MCNC: 6 G/DL (ref 6–8.5)
RBC # BLD AUTO: 4.07 X10E6/UL (ref 4.14–5.8)
SODIUM SERPL-SCNC: 140 MMOL/L (ref 134–144)
WBC # BLD AUTO: 2.2 X10E3/UL (ref 3.4–10.8)

## 2018-05-12 LAB
ALBUMIN SERPL-MCNC: 4 G/DL (ref 3.6–4.8)
ALBUMIN/GLOB SERPL: 2.4 {RATIO} (ref 1.2–2.2)
ALP SERPL-CCNC: 61 IU/L (ref 39–117)
ALT SERPL-CCNC: 19 IU/L (ref 0–44)
AST SERPL-CCNC: 15 IU/L (ref 0–40)
BASOPHILS # BLD AUTO: 0 X10E3/UL (ref 0–0.2)
BASOPHILS NFR BLD AUTO: 1 %
BILIRUB SERPL-MCNC: 0.5 MG/DL (ref 0–1.2)
BUN SERPL-MCNC: 13 MG/DL (ref 8–27)
BUN/CREAT SERPL: 17 (ref 10–24)
CALCIUM SERPL-MCNC: 8.7 MG/DL (ref 8.6–10.2)
CHLORIDE SERPL-SCNC: 105 MMOL/L (ref 96–106)
CO2 SERPL-SCNC: 20 MMOL/L (ref 18–29)
CREAT SERPL-MCNC: 0.77 MG/DL (ref 0.76–1.27)
EOSINOPHIL # BLD AUTO: 0.2 X10E3/UL (ref 0–0.4)
EOSINOPHIL NFR BLD AUTO: 10 %
ERYTHROCYTE [DISTWIDTH] IN BLOOD BY AUTOMATED COUNT: 14.3 % (ref 12.3–15.4)
GFR SERPLBLD CREATININE-BSD FMLA CKD-EPI: 108 ML/MIN/1.73
GFR SERPLBLD CREATININE-BSD FMLA CKD-EPI: 93 ML/MIN/1.73
GLOBULIN SER CALC-MCNC: 1.7 G/DL (ref 1.5–4.5)
GLUCOSE SERPL-MCNC: 100 MG/DL (ref 65–99)
HCT VFR BLD AUTO: 35.5 % (ref 37.5–51)
HGB BLD-MCNC: 11.6 G/DL (ref 13–17.7)
LYMPHOCYTES # BLD AUTO: 0.8 X10E3/UL (ref 0.7–3.1)
LYMPHOCYTES NFR BLD AUTO: 48 %
MCH RBC QN AUTO: 29.8 PG (ref 26.6–33)
MCHC RBC AUTO-ENTMCNC: 32.7 G/DL (ref 31.5–35.7)
MCV RBC AUTO: 91 FL (ref 79–97)
MONOCYTES # BLD AUTO: 0.2 X10E3/UL (ref 0.1–0.9)
MONOCYTES NFR BLD AUTO: 12 %
MORPHOLOGY BLD-IMP: ABNORMAL
NEUTROPHILS # BLD AUTO: 0.5 X10E3/UL (ref 1.4–7)
NEUTROPHILS NFR BLD AUTO: 29 %
NRBC BLD AUTO-RTO: 1 %
PLATELET # BLD AUTO: 136 X10E3/UL (ref 150–379)
POTASSIUM SERPL-SCNC: 3.9 MMOL/L (ref 3.5–5.2)
PROT SERPL-MCNC: 5.7 G/DL (ref 6–8.5)
RBC # BLD AUTO: 3.89 X10E6/UL (ref 4.14–5.8)
SODIUM SERPL-SCNC: 142 MMOL/L (ref 134–144)
WBC # BLD AUTO: 1.6 X10E3/UL (ref 3.4–10.8)

## 2018-05-30 ENCOUNTER — OFFICE VISIT (OUTPATIENT)
Dept: HEMATOLOGY/ONCOLOGY | Facility: CLINIC | Age: 68
End: 2018-05-30
Payer: MEDICARE

## 2018-05-30 VITALS
DIASTOLIC BLOOD PRESSURE: 57 MMHG | RESPIRATION RATE: 18 BRPM | BODY MASS INDEX: 36.51 KG/M2 | SYSTOLIC BLOOD PRESSURE: 122 MMHG | HEART RATE: 71 BPM | TEMPERATURE: 98 F | WEIGHT: 240.13 LBS

## 2018-05-30 DIAGNOSIS — T45.1X5A CHEMOTHERAPY-INDUCED NEUTROPENIA: ICD-10-CM

## 2018-05-30 DIAGNOSIS — D70.1 CHEMOTHERAPY-INDUCED NEUTROPENIA: ICD-10-CM

## 2018-05-30 DIAGNOSIS — R59.0 CERVICAL LYMPHADENOPATHY: ICD-10-CM

## 2018-05-30 DIAGNOSIS — C85.91 NON-HODGKIN LYMPHOMA OF LYMPH NODES OF NECK, UNSPECIFIED NON-HODGKIN LYMPHOMA TYPE: ICD-10-CM

## 2018-05-30 DIAGNOSIS — C61 MALIGNANT NEOPLASM OF PROSTATE: ICD-10-CM

## 2018-05-30 DIAGNOSIS — C83.31 DIFFUSE LARGE B-CELL LYMPHOMA OF LYMPH NODES OF NECK: ICD-10-CM

## 2018-05-30 DIAGNOSIS — I82.B12 THROMBOSIS OF LEFT SUBCLAVIAN VEIN: Primary | ICD-10-CM

## 2018-05-30 PROCEDURE — 3078F DIAST BP <80 MM HG: CPT | Mod: ,,, | Performed by: INTERNAL MEDICINE

## 2018-05-30 PROCEDURE — 3074F SYST BP LT 130 MM HG: CPT | Mod: ,,, | Performed by: INTERNAL MEDICINE

## 2018-05-30 PROCEDURE — 99214 OFFICE O/P EST MOD 30 MIN: CPT | Mod: ,,, | Performed by: INTERNAL MEDICINE

## 2018-05-30 RX ORDER — LEUCOVORIN CALCIUM 5 MG/1
TABLET ORAL
Refills: 0 | COMMUNITY
Start: 2018-05-07 | End: 2018-10-01

## 2018-05-30 NOTE — LETTER
May 30, 2018      Ronny Driscoll MD  [address] [facility]  [address]  [place] [address]           [facility] Hematology Oncology  [address]  [address]  [place] [address]  Phone: [phone]  Fax: [phone]          Patient: Naresh Painting   MR Number: [id number]   YOB: 1950   Date of Visit: 5/30/2018       Dear Dr. Ronny Driscoll:    Thank you for referring Naresh Painting to me for evaluation. Attached you will find relevant portions of my assessment and plan of care.    If you have questions, please do not hesitate to call me. I look forward to following Naresh Painting along with you.    Sincerely,    Fidencio Rdz MD    Enclosure  CC:  No Recipients    If you would like to receive this communication electronically, please contact [email or web address] or [phone] to request more information on [employer] Link access.    For providers and/or their staff who would like to refer a patient to [facility], please contact us through our one-stop-shop provider referral line, [facility], at [phone].    If you feel you have received this communication in error or would no longer like to receive these types of communications, please e-mail [email or web address]

## 2018-05-30 NOTE — PROGRESS NOTES
[facility] Hematology/Oncology  PROGRESS NOTE      Subjective:       Patient ID:   NAME: Naresh Painting : 1950     68 y.o. male    Referring Doc: Yamila  Other Physicians: Adriano Michaud Pernenkil, Saba    Chief Complaint:  NHL f/u    History of Present Illness:     Patient returns today for a regularly scheduled follow-up visit.  The patient is here with his wife. He was unable to get the last chemotherapy due to low WBC.. No CP, SOB, HA's or N/V. He goes back to [facility] on            ROS:   GEN: normal without any fever, night sweats or weight loss  HEENT: normal with no HA's, sore throat, stiff neck, changes in vision  CV: normal with no CP, SOB, PND, COOPER or orthopnea  PULM: normal with no SOB, cough, hemoptysis, sputum or pleuritic pain  GI: normal with no abdominal pain, nausea, vomiting, , diarrhea, melanotic stools, BRBPR, or hematemesis;  : normal with no hematuria, dysuria  BREAST: normal with no mass, discharge, pain  SKIN: normal with no rash, erythema, bruising, or swelling    Allergies:  Review of patient's allergies indicates:  No Known Allergies    Medications:    Current Outpatient Prescriptions:     apixaban (ELIQUIS) 5 mg Tab, Take 1 tablet (5 mg total) by mouth 2 (two) times daily., Disp: 60 tablet, Rfl: 6    aspirin 81 MG Chew, Take 81 mg by mouth once daily., Disp: , Rfl:     co-enzyme Q-10 30 mg capsule, Take 30 mg by mouth 3 (three) times daily., Disp: , Rfl:     hydrocodone-acetaminophen 7.5-325mg (NORCO) 7.5-325 mg per tablet, , Disp: , Rfl:     L GASSERI/B BIFIDUM/B LONGUM ([employer] ORAL), Take by mouth., Disp: , Rfl:     MULTIVITAMIN/IRON/FOLIC ACID (CENTRUM COMPLETE ORAL), Take by mouth., Disp: , Rfl:     pantoprazole (PROTONIX) 40 MG tablet, Take 40 mg by mouth once daily., Disp: , Rfl:     predniSONE (DELTASONE) 50 MG Tab, TK 3 TS PO ONCE D ON DAYS 2 THROUGH 5, Disp: , Rfl: 6    TOPROL XL 50 mg 24 hr tablet, Take 25 mg by mouth once daily., Disp: ,  Rfl:     vitamin D 1000 units Tab, Take 185 mg by mouth once daily., Disp: , Rfl:     vitamin E 100 UNIT capsule, Take 100 Units by mouth once daily., Disp: , Rfl:     leucovorin (WELLCOVORIN) 5 mg Tab, , Disp: , Rfl: 0    Current Facility-Administered Medications:     triptorelin pamoate Syrg 22.5 mg, 22.5 mg, Intramuscular, Q6 Months, Fauzia Maza, NP, 22.5 mg at 02/02/18 1012    PMHx/PSHx Updates:  See patient's last visit with me on 4/23/2018.  See H&P on 6/9/2017        Pathology:  See path 9/13/2017          Objective:     Vitals:  Blood pressure (!) 122/57, pulse 71, temperature 97.9 °F (36.6 °C), resp. rate 18, weight 108.9 kg (240 lb 1.6 oz).    Physical Examination:   GEN: no apparent distress, comfortable; AAOx3  HEAD: atraumatic and normocephalic  EYES: no pallor, no icterus, PERRLA  ENT: OMM, no pharyngeal erythema, external ears WNL; no nasal discharge; no thrush  NECK: no masses, thyroid normal, trachea midline,  LAD/LN's, supple; residual LN on right neck still; left looks good  CV: RRR with no murmur; normal pulse; normal S1 and S2; no pedal edema  CHEST: Normal respiratory effort; CTAB; normal breath sounds; no wheeze or crackles  ABDOM: nontender and nondistended; soft; normal bowel sounds; no rebound/guardingp; vertical wound healed well  MUSC/Skeletal: ROM normal; no crepitus; joints normal; no deformities or arthropathy  EXTREM: no clubbing, cyanosis, inflammation or swelling  SKIN: no rashes, lesions, ulcers, petechiae or subcutaneous nodules  : no cannon  NEURO: grossly intact; motor/sensory WNL; AAOx3; no tremors  PSYCH: normal mood, affect and behavior  LYMPH: normal cervical, supraclavicular, axillary and groin LN's            Labs:     5/24/2018  Lab Results   Component Value Date    WBC 2.9 (L) 05/24/2018    HGB 12.8 (L) 05/24/2018    HCT 39.2 05/24/2018    MCV 91 05/24/2018     05/24/2018     BMP  Lab Results   Component Value Date     05/24/2018    K 4.4  05/24/2018     05/24/2018    CO2 22 05/24/2018    BUN 13 05/24/2018    CREATININE 0.89 05/24/2018    CALCIUM 9.0 05/24/2018    ESTGFRAFRICA >60.0 11/25/2014    EGFRNONAA 87 05/24/2018     Lab Results   Component Value Date    ALT 15 05/24/2018    AST 18 05/24/2018    ALKPHOS 71 05/24/2018    BILITOT 0.3 05/24/2018           Radiology/Diagnostic Studies:    PET 12/27/2017:  IMPRESSION:  1. Diffusely increased FDG uptake throughout the visualized osseous structures  suggestive of chemotherapy and in keeping with reactive marrow.  However, this  pattern may mask underlying hypermetabolic osseous foci.  2. Marked interval decrease in size, number and FDG activity to the cervical,  retroperitoneal and upper abdominal lymphadenopathy, now with only soft tissue  fat stranding and FDG activity seen in the left supraclavicular soft tissue and  small bowel mesenteric root.  3. No new sites of FDG avid disease.  4. Status post prostatectomy without focal abnormal FDG activity in the pelvis  to specifically suggest residual/recurrent disease.  I have reviewed all available lab results and radiology reports.    Assessment/Plan:   (1) 68 y.o. male with diagnosis of development of right sided neck swelling since Feb 2017  - he has been referred by Dr Michaud with ID for an evaluation  - i spoke to Dr Michaud previously about the patient peripherally  - FNA bx on 2/24 which was nondiagnostic  - he had a  guided biopsy of an entire left supraclavicular LN on 3/6/2017 with Dr Alexander Oh which showed  necrotizing granulomatous lymphadenitis  - CT scans were from Feb 2017  - flow cytometry studies from 5/25 appear to have been negative for any abnormal cell populations  - PET scan on 7/6/17 with markedly hypermetabolic LAD  - he was referred to and seen by Dr Basilio at [facility] on 7/28 and again on 8/25  - he underwent repeat cervical LN biopsy with Dr Oh on 9/13/2017 and that patholgy is now showing a Diffuse Large B-cell  NHL (germ cell origin)  - he saw Dr Baislio again at [facility] and had bone marrow biopsy on 10/3/17  - the bone marrow showed no involvement of the lymphoma  - he received R-CHOP and then proceed with a HD-MTX regimen and Intra-thecal MTX at [facility]    - he has been on HD-MTX chemotherapy at [facility] as of lately  - he saw Dr Basilio 2 weeks ago but was supposed to finish up the last chemotherapy on May 23rd  but it was held due to low WBC's      (2) Hx/of prostate cancer - 1995; s/p prostatectomy followed by XRT; on lupron per Dr Menjivar with  and Fauzia Maza (NP)     (3) JAK - uses CPAP     (4) GERD     (5) Myocardial bridging congenital disorder - followed by Dr Santiago with cardiology   - recent echo with good EF at 60%     (6) recent subclavian vein thrombosis - on eliquis 5mg po bid     (7) WBC at 2.9 currently     (8) prior incarcerated hernia surgery with Dr Ellis on 4/4/18        PLAN:  1. Proceed remaining chemotherapy at [facility] per Dr Basilio at [facility] (June 7th)  2. Check labs weekly; PF q 4-6 weeks  3. Continue eliquis for now  4. F/U with Dr Basilio  5. Repeat scans in June 2018  RTC in 4 weeks  Fax note to Ronny Driscoll MD, Adriano Michaud, and  [place]    Discussion:     I have explained all of the above in detail and the patient understands all of the current recommendation(s). I have answered all of their questions to the best of my ability and to their complete satisfaction.   The patient is to continue with the current management plan.            Electronically signed by Fidencio Rdz MD

## 2018-05-31 ENCOUNTER — TELEPHONE (OUTPATIENT)
Dept: HEMATOLOGY/ONCOLOGY | Facility: CLINIC | Age: 68
End: 2018-05-31

## 2018-05-31 NOTE — TELEPHONE ENCOUNTER
Called pt to see if had labs done no answer  Left message to call back because Dr. Rdz wants to send these labs to [facility]

## 2018-06-01 ENCOUNTER — TELEPHONE (OUTPATIENT)
Dept: HEMATOLOGY/ONCOLOGY | Facility: CLINIC | Age: 68
End: 2018-06-01

## 2018-06-01 LAB
CHOLEST SERPL-MCNC: 236 MG/DL (ref 100–199)
HCV AB S/CO SERPL IA: 0.1 S/CO RATIO (ref 0–0.9)
HDLC SERPL-MCNC: 53 MG/DL
LDLC SERPL CALC-MCNC: 166 MG/DL (ref 0–99)
TRIGL SERPL-MCNC: 86 MG/DL (ref 0–149)
VLDLC SERPL CALC-MCNC: 17 MG/DL (ref 5–40)

## 2018-06-01 NOTE — TELEPHONE ENCOUNTER
----- Message from Bella Rogel sent at 6/1/2018 12:21 PM CDT -----  Contact: Amita Painting, spouse  Amita Painting called and requested that we fax the labs from [facility] that was done yesterday to Dr. Basilio at [facility].      Her CB#     Thanks,  Bella    Patient wife told labs were sent to Angi's office today

## 2018-06-05 ENCOUNTER — OFFICE VISIT (OUTPATIENT)
Dept: FAMILY MEDICINE | Facility: CLINIC | Age: 68
End: 2018-06-05
Payer: MEDICARE

## 2018-06-05 VITALS
HEIGHT: 68 IN | DIASTOLIC BLOOD PRESSURE: 60 MMHG | BODY MASS INDEX: 36.07 KG/M2 | SYSTOLIC BLOOD PRESSURE: 111 MMHG | HEART RATE: 64 BPM | WEIGHT: 238 LBS

## 2018-06-05 DIAGNOSIS — I10 BENIGN ESSENTIAL HYPERTENSION: Primary | ICD-10-CM

## 2018-06-05 DIAGNOSIS — C83.31 DIFFUSE LARGE B-CELL LYMPHOMA OF LYMPH NODES OF NECK: ICD-10-CM

## 2018-06-05 DIAGNOSIS — E78.2 MULTIPLE-TYPE HYPERLIPIDEMIA: ICD-10-CM

## 2018-06-05 PROCEDURE — 99213 OFFICE O/P EST LOW 20 MIN: CPT | Mod: ,,, | Performed by: INTERNAL MEDICINE

## 2018-06-05 PROCEDURE — 3078F DIAST BP <80 MM HG: CPT | Mod: ,,, | Performed by: INTERNAL MEDICINE

## 2018-06-05 PROCEDURE — 3074F SYST BP LT 130 MM HG: CPT | Mod: ,,, | Performed by: INTERNAL MEDICINE

## 2018-06-05 NOTE — PATIENT INSTRUCTIONS
Resources for People with Cancer    You cant fight cancer alone. Reach out. Seek support from family, friends, and others who care about you. Let other people assist you. It can help you feel better both during and after your treatment.  Support groups  When you have cancer, support groups can be a great help. Meeting and talking with others with cancer can help you cope. There are also support groups for families of people with cancer. To find a support group, start by talking to your hospitals patient education department. Ask your healthcare provider. You can also do a search for cancer support groups on the Internet.  For more information  Contact the sources below for more information about cancer and cancer support groups:  · American Cancer Society (ACS)  697.721.4545  www.cancer.org  · National Cancer [place]  240.870.6513  www.cancer.gov  Local resources  Ask your healthcare provider about your local ACS or other support groups:  _____________________________________________________________________  _____________________________________________________________________  _____________________________________________________________________  Date Last Reviewed: 1/6/2016  © 6352-2753 [employer]. [address] [place] [address], [place] [address]. All rights reserved. This information is not intended as a substitute for professional medical care. Always follow your healthcare professional's instructions.

## 2018-06-05 NOTE — PROGRESS NOTES
Subjective:       Patient ID: Naresh Painting is a 68 y.o. male.    Chief Complaint: Hospital Follow Up (chemo); Hypertension (lab review ); Hyperlipidemia; and Lymphoma    Mr. Naresh Steinberg is a pleasant 68-year-old male who comes for follow-up. Underlying history of hypertension and hyperlipidemia has been noted. He also has been diagnosed with lymphoma since the last couple of years. After that he had gone through surgery, chemotherapy which has significantly progressed non-Hodgkin's lymphoma.    He also had a hernia surgery in the interim after which his bowel movements have been fairly good. His appetite is good. Sleep is fair at night. Energy levels are fair. He has been advised against going out and exerting too much in the hot sun. He once in a while sneaks out the hot sun when his wife is not watching him and tries to do some yard work.    He has been told by his dentist that there are some dental changes because of chemotherapy in the maybe planning some restorative treatment in future.      Hypertension   This is a chronic problem. The current episode started more than 1 year ago. The problem is controlled. Associated symptoms include malaise/fatigue and neck pain. Pertinent negatives include no chest pain, palpitations or shortness of breath. Risk factors for coronary artery disease include sedentary lifestyle, male gender, obesity and dyslipidemia. Past treatments include beta blockers. There is no history of heart failure. There is no history of a hypertension causing med, pheochromocytoma or renovascular disease.   Hyperlipidemia   This is a chronic problem. The current episode started more than 1 year ago. Exacerbating diseases include obesity. Pertinent negatives include no chest pain or shortness of breath. Risk factors for coronary artery disease include male sex, a sedentary lifestyle, hypertension and dyslipidemia.       Past Medical History:   Diagnosis Date    Chemotherapy-induced neutropenia  10/5/2017    Cholelithiasis without cholecystitis July 2017-PET scan    Diffuse large B-cell lymphoma of lymph nodes of neck 9/26/2017    GERD (gastroesophageal reflux disease)     Granulomatous lymphadenitis 6/9/2017    Hyperlipidemia     Hypertension     Lymphadenopathy     Lymphoma     JAK (obstructive sleep apnea)     Prostate CA     Prostate cancer     Subclavian vein thrombosis 9/26/2017     Social History     Social History    Marital status:      Spouse name: N/A    Number of children: 2    Years of education: N/A     Occupational History    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 Son's name is Medina     Past Surgical History:   Procedure Laterality Date    EYE SURGERY      LYMPH NODE BIOPSY      PROCTECTOMY      PROSTATE SURGERY      UMBILICAL HERNIA REPAIR  04/05/2018    Incarcerated-      Family History   Problem Relation Age of Onset    Heart disease Mother     Diabetes Father        Review of Systems   Constitutional: Positive for malaise/fatigue. Negative for activity change, appetite change, fatigue and unexpected weight change (lost 28 lbs).   HENT: Negative for congestion, sneezing, sore throat and trouble swallowing.    Eyes: Negative for pain and visual disturbance.        Left eye Glass eye prosthesis   Respiratory: Negative for cough, chest tightness and shortness of breath.    Cardiovascular: Negative for chest pain, palpitations and leg swelling.        Left upper extremity deep vein thrombosis.   Gastrointestinal: Negative for  abdominal distention, abdominal pain, blood in stool, constipation and diarrhea.   Endocrine: Negative for cold intolerance, heat intolerance, polydipsia, polyphagia and polyuria.   Genitourinary: Negative for dysuria, hematuria and scrotal swelling.   Musculoskeletal: Positive for neck pain. Negative for arthralgias, back pain and gait problem.   Skin: Negative for pallor, rash and wound.        Patient's has lumps in the neck which have now been diagnosed to be lymphoma.   Allergic/Immunologic: Negative for environmental allergies, food allergies and immunocompromised state.   Neurological: Negative for dizziness, seizures, speech difficulty, light-headedness and numbness.        Patient has numbness in the left hand fingers. The numbness seems to be getting better gradually.   Hematological: Positive for adenopathy. Does not bruise/bleed easily.        Diagnosis of NHL   Psychiatric/Behavioral: Negative for agitation, behavioral problems and confusion. The patient is not nervous/anxious.        Objective:      Physical Exam    Assessment:       1. Benign essential hypertension    2. Multiple-type hyperlipidemia    3. Diffuse large B-cell lymphoma of lymph nodes of neck         WBC 3.4 - 10.8 x10E3/uL 1.6     RBC 4.14 - 5.80 x10E6/uL 3.89     Comment: Elliptocytes present.   Stafford cells present.   Macrocytes present.   Anisocytosis present.    Hemoglobin 13.0 - 17.7 g/dL 11.6     Hematocrit 37.5 - 51.0 % 35.5     MCV 79 - 97 fL 91    MCH 26.6 - 33.0 pg 29.8    MCHC 31.5 - 35.7 g/dL 32.7    RDW 12.3 - 15.4 % 14.3    Platelets 150 - 379 x10E3/uL 136       Patient's blood pressures are in agreeable range. He is not on any statin medications at this point. His weight seems to be more stable and it is intended to keep his weight slightly on the higher side in order for him to tolerate chemotherapy. He has leukopenia. He'll be following up with oncology for lymphoma. He continues on chronic anticoagulation also.  Plan:            Benign essential hypertension    Multiple-type hyperlipidemia    Diffuse large B-cell lymphoma of lymph nodes of neck    Patient's medications have been reviewed. His progressive lymphoma also has been reviewed.    He'll continue to watch his diet as much as possible under the circumstances of active chemotherapy. [place] exertion in moderation only. Keep hydrated. Avoid excess heat. The heat and temperature can dehydrate him quickly.

## 2018-06-14 ENCOUNTER — TELEPHONE (OUTPATIENT)
Dept: HEMATOLOGY/ONCOLOGY | Facility: CLINIC | Age: 68
End: 2018-06-14

## 2018-06-14 DIAGNOSIS — C61 MALIGNANT NEOPLASM OF PROSTATE: ICD-10-CM

## 2018-06-14 DIAGNOSIS — C85.91 NON-HODGKIN LYMPHOMA OF LYMPH NODES OF NECK, UNSPECIFIED NON-HODGKIN LYMPHOMA TYPE: Primary | ICD-10-CM

## 2018-06-14 DIAGNOSIS — C83.31 DIFFUSE LARGE B-CELL LYMPHOMA OF LYMPH NODES OF NECK: ICD-10-CM

## 2018-06-14 NOTE — TELEPHONE ENCOUNTER
----- Message from Tia Michael sent at 2018 10:17 AM CDT -----  [facility] called in stating that the patient is there and his standing order has . Please fax new order to [phone]. Thanks

## 2018-06-15 LAB
ALBUMIN SERPL-MCNC: 4 G/DL (ref 3.6–4.8)
ALBUMIN/GLOB SERPL: 2.1 {RATIO} (ref 1.2–2.2)
ALP SERPL-CCNC: 65 IU/L (ref 39–117)
ALT SERPL-CCNC: 12 IU/L (ref 0–44)
AST SERPL-CCNC: 15 IU/L (ref 0–40)
BASOPHILS # BLD AUTO: 0 X10E3/UL (ref 0–0.2)
BASOPHILS NFR BLD AUTO: 1 %
BILIRUB SERPL-MCNC: 0.2 MG/DL (ref 0–1.2)
BUN SERPL-MCNC: 11 MG/DL (ref 8–27)
BUN/CREAT SERPL: 14 (ref 10–24)
CALCIUM SERPL-MCNC: 8.9 MG/DL (ref 8.6–10.2)
CHLORIDE SERPL-SCNC: 105 MMOL/L (ref 96–106)
CO2 SERPL-SCNC: 21 MMOL/L (ref 20–29)
CREAT SERPL-MCNC: 0.78 MG/DL (ref 0.76–1.27)
EOSINOPHIL # BLD AUTO: 0.4 X10E3/UL (ref 0–0.4)
EOSINOPHIL NFR BLD AUTO: 11 %
ERYTHROCYTE [DISTWIDTH] IN BLOOD BY AUTOMATED COUNT: 15.1 % (ref 12.3–15.4)
GFR SERPLBLD CREATININE-BSD FMLA CKD-EPI: 106 ML/MIN/1.73
GFR SERPLBLD CREATININE-BSD FMLA CKD-EPI: 92 ML/MIN/1.73
GLOBULIN SER CALC-MCNC: 1.9 G/DL (ref 1.5–4.5)
GLUCOSE SERPL-MCNC: 87 MG/DL (ref 65–99)
HCT VFR BLD AUTO: 38.6 % (ref 37.5–51)
HGB BLD-MCNC: 12.3 G/DL (ref 13–17.7)
IMM GRANULOCYTES # BLD: 0 X10E3/UL (ref 0–0.1)
IMM GRANULOCYTES NFR BLD: 0 %
LYMPHOCYTES # BLD AUTO: 0.7 X10E3/UL (ref 0.7–3.1)
LYMPHOCYTES NFR BLD AUTO: 19 %
MCH RBC QN AUTO: 29.4 PG (ref 26.6–33)
MCHC RBC AUTO-ENTMCNC: 31.9 G/DL (ref 31.5–35.7)
MCV RBC AUTO: 92 FL (ref 79–97)
MONOCYTES # BLD AUTO: 0.5 X10E3/UL (ref 0.1–0.9)
MONOCYTES NFR BLD AUTO: 13 %
NEUTROPHILS # BLD AUTO: 2.2 X10E3/UL (ref 1.4–7)
NEUTROPHILS NFR BLD AUTO: 56 %
PLATELET # BLD AUTO: 231 X10E3/UL (ref 150–379)
POTASSIUM SERPL-SCNC: 4.2 MMOL/L (ref 3.5–5.2)
PROT SERPL-MCNC: 5.9 G/DL (ref 6–8.5)
RBC # BLD AUTO: 4.18 X10E6/UL (ref 4.14–5.8)
SODIUM SERPL-SCNC: 138 MMOL/L (ref 134–144)
WBC # BLD AUTO: 3.8 X10E3/UL (ref 3.4–10.8)

## 2018-06-29 LAB
ALBUMIN SERPL-MCNC: 3.8 G/DL (ref 3.6–4.8)
ALBUMIN/GLOB SERPL: 1.7 {RATIO} (ref 1.2–2.2)
ALP SERPL-CCNC: 106 IU/L (ref 39–117)
ALT SERPL-CCNC: 17 IU/L (ref 0–44)
AST SERPL-CCNC: 16 IU/L (ref 0–40)
BASOPHILS # BLD AUTO: 0 X10E3/UL (ref 0–0.2)
BASOPHILS NFR BLD AUTO: 0 %
BILIRUB SERPL-MCNC: 0.3 MG/DL (ref 0–1.2)
BUN SERPL-MCNC: 10 MG/DL (ref 8–27)
BUN/CREAT SERPL: 11 (ref 10–24)
CALCIUM SERPL-MCNC: 9 MG/DL (ref 8.6–10.2)
CHLORIDE SERPL-SCNC: 105 MMOL/L (ref 96–106)
CO2 SERPL-SCNC: 20 MMOL/L (ref 20–29)
CREAT SERPL-MCNC: 0.93 MG/DL (ref 0.76–1.27)
EGFR IF AFRICAN AMERICAN: 96 ML/MIN/1.73
EOSINOPHIL # BLD AUTO: 0.6 X10E3/UL (ref 0–0.4)
EOSINOPHIL NFR BLD AUTO: 2 %
ERYTHROCYTE [DISTWIDTH] IN BLOOD BY AUTOMATED COUNT: 16 % (ref 12.3–15.4)
EST. GFR  (NON AFRICAN AMERICAN): 83 ML/MIN/1.73
GLOBULIN SER CALC-MCNC: 2.3 G/DL (ref 1.5–4.5)
GLUCOSE SERPL-MCNC: 95 MG/DL (ref 65–99)
HCT VFR BLD AUTO: 38.9 % (ref 37.5–51)
HGB BLD-MCNC: 12.7 G/DL (ref 13–17.7)
IMM GRANULOCYTES # BLD: 0 X10E3/UL (ref 0–0.1)
IMM GRANULOCYTES NFR BLD: 0 %
LYMPHOCYTES # BLD AUTO: 0.9 X10E3/UL (ref 0.7–3.1)
LYMPHOCYTES NFR BLD AUTO: 3 %
MCH RBC QN AUTO: 29.7 PG (ref 26.6–33)
MCHC RBC AUTO-ENTMCNC: 32.6 G/DL (ref 31.5–35.7)
MCV RBC AUTO: 91 FL (ref 79–97)
MONOCYTES # BLD AUTO: 1.4 X10E3/UL (ref 0.1–0.9)
MONOCYTES NFR BLD AUTO: 4 %
NEUTROPHILS # BLD AUTO: 33 X10E3/UL (ref 1.4–7)
NEUTROPHILS NFR BLD AUTO: 91 %
PLATELET # BLD AUTO: 179 X10E3/UL (ref 150–379)
POTASSIUM SERPL-SCNC: 4.7 MMOL/L (ref 3.5–5.2)
PROT SERPL-MCNC: 6.1 G/DL (ref 6–8.5)
RBC # BLD AUTO: 4.28 X10E6/UL (ref 4.14–5.8)
SODIUM SERPL-SCNC: 141 MMOL/L (ref 134–144)
WBC # BLD AUTO: 36 X10E3/UL (ref 3.4–10.8)

## 2018-07-02 ENCOUNTER — OFFICE VISIT (OUTPATIENT)
Dept: HEMATOLOGY/ONCOLOGY | Facility: CLINIC | Age: 68
End: 2018-07-02
Payer: MEDICARE

## 2018-07-02 VITALS
DIASTOLIC BLOOD PRESSURE: 71 MMHG | RESPIRATION RATE: 18 BRPM | TEMPERATURE: 98 F | HEART RATE: 65 BPM | BODY MASS INDEX: 36.92 KG/M2 | SYSTOLIC BLOOD PRESSURE: 115 MMHG | WEIGHT: 242.81 LBS

## 2018-07-02 DIAGNOSIS — I82.B12 THROMBOSIS OF LEFT SUBCLAVIAN VEIN: ICD-10-CM

## 2018-07-02 DIAGNOSIS — C61 MALIGNANT NEOPLASM OF PROSTATE: ICD-10-CM

## 2018-07-02 DIAGNOSIS — C85.91 NON-HODGKIN LYMPHOMA OF LYMPH NODES OF NECK, UNSPECIFIED NON-HODGKIN LYMPHOMA TYPE: Primary | ICD-10-CM

## 2018-07-02 DIAGNOSIS — T45.1X5A CHEMOTHERAPY-INDUCED NEUTROPENIA: ICD-10-CM

## 2018-07-02 DIAGNOSIS — R59.0 CERVICAL LYMPHADENOPATHY: ICD-10-CM

## 2018-07-02 DIAGNOSIS — C83.31 DIFFUSE LARGE B-CELL LYMPHOMA OF LYMPH NODES OF NECK: ICD-10-CM

## 2018-07-02 DIAGNOSIS — D70.1 CHEMOTHERAPY-INDUCED NEUTROPENIA: ICD-10-CM

## 2018-07-02 PROCEDURE — 3074F SYST BP LT 130 MM HG: CPT | Mod: ,,, | Performed by: INTERNAL MEDICINE

## 2018-07-02 PROCEDURE — 3078F DIAST BP <80 MM HG: CPT | Mod: ,,, | Performed by: INTERNAL MEDICINE

## 2018-07-02 PROCEDURE — 99214 OFFICE O/P EST MOD 30 MIN: CPT | Mod: ,,, | Performed by: INTERNAL MEDICINE

## 2018-07-02 NOTE — PROGRESS NOTES
[facility] Hematology/Oncology  PROGRESS NOTE      Subjective:       Patient ID:   NAME: Naresh Painting : 1950     68 y.o. male    Referring Doc: Yamila  Other Physicians: Adriano Michaud Pernenkil, Saba    Chief Complaint:  NHL f/u    History of Present Illness:     Patient returns today for a regularly scheduled follow-up visit.  The patient is here with his wife.  No CP, SOB, HA's or N/V. He had his last chemotherapy on  through . He required neupogen injection the last week of . He goes back to [facility] at some point for follow-up        ROS:   GEN: normal without any fever, night sweats or weight loss  HEENT: normal with no HA's, sore throat, stiff neck, changes in vision  CV: normal with no CP, SOB, PND, COOPER or orthopnea  PULM: normal with no SOB, cough, hemoptysis, sputum or pleuritic pain  GI: normal with no abdominal pain, nausea, vomiting, , diarrhea, melanotic stools, BRBPR, or hematemesis;  : normal with no hematuria, dysuria  BREAST: normal with no mass, discharge, pain  SKIN: normal with no rash, erythema, bruising, or swelling    Allergies:  Review of patient's allergies indicates:  No Known Allergies    Medications:    Current Outpatient Prescriptions:     apixaban (ELIQUIS) 5 mg Tab, Take 1 tablet (5 mg total) by mouth 2 (two) times daily., Disp: 60 tablet, Rfl: 6    aspirin 81 MG Chew, Take 81 mg by mouth once daily., Disp: , Rfl:     co-enzyme Q-10 30 mg capsule, Take 30 mg by mouth 3 (three) times daily., Disp: , Rfl:     L GASSERI/B BIFIDUM/B LONGUM ([employer] ORAL), Take by mouth., Disp: , Rfl:     MULTIVITAMIN/IRON/FOLIC ACID (CENTRUM COMPLETE ORAL), Take by mouth., Disp: , Rfl:     pantoprazole (PROTONIX) 40 MG tablet, Take 40 mg by mouth once daily., Disp: , Rfl:     potassium acetate liquid 5 mEq / 5ml Liqd, Take by mouth., Disp: , Rfl:     TOPROL XL 50 mg 24 hr tablet, Take 25 mg by mouth once daily., Disp: , Rfl:     vitamin D 1000 units Tab,  Take 185 mg by mouth once daily., Disp: , Rfl:     vitamin E 100 UNIT capsule, Take 100 Units by mouth once daily., Disp: , Rfl:     leucovorin (WELLCOVORIN) 5 mg Tab, , Disp: , Rfl: 0    Current Facility-Administered Medications:     triptorelin pamoate Syrg 22.5 mg, 22.5 mg, Intramuscular, Q6 Months, Fauzia Maza, NP, 22.5 mg at 02/02/18 1012    PMHx/PSHx Updates:  See patient's last visit with me on 6/1/2018.  See H&P on 6/9/2017        Pathology:  See path 9/13/2017          Objective:     Vitals:  Blood pressure 115/71, pulse 65, temperature 97.9 °F (36.6 °C), resp. rate 18, weight 110.1 kg (242 lb 12.8 oz).    Physical Examination:   GEN: no apparent distress, comfortable; AAOx3  HEAD: atraumatic and normocephalic  EYES: no pallor, no icterus, PERRLA  ENT: OMM, no pharyngeal erythema, external ears WNL; no nasal discharge; no thrush  NECK: no masses, thyroid normal, trachea midline,  LAD/LN's, supple; residual LN on right neck still; left looks good  CV: RRR with no murmur; normal pulse; normal S1 and S2; no pedal edema  CHEST: Normal respiratory effort; CTAB; normal breath sounds; no wheeze or crackles  ABDOM: nontender and nondistended; soft; normal bowel sounds; no rebound/guardingp; vertical wound healed well  MUSC/Skeletal: ROM normal; no crepitus; joints normal; no deformities or arthropathy  EXTREM: no clubbing, cyanosis, inflammation or swelling  SKIN: no rashes, lesions, ulcers, petechiae or subcutaneous nodules  : no cannon  NEURO: grossly intact; motor/sensory WNL; AAOx3; no tremors  PSYCH: normal mood, affect and behavior  LYMPH: normal cervical, supraclavicular, axillary and groin LN's            Labs:     6/28/2018  Lab Results   Component Value Date    WBC 36.0 (HH) 06/28/2018    HGB 12.7 (L) 06/28/2018    HCT 38.9 06/28/2018    MCV 91 06/28/2018     06/28/2018     BMP  Lab Results   Component Value Date     06/28/2018    K 4.7 06/28/2018     06/28/2018    CO2 20  06/28/2018    BUN 10 06/28/2018    CREATININE 0.93 06/28/2018    CALCIUM 9.0 06/28/2018    ESTGFRAFRICA >60.0 11/25/2014    EGFRNONAA 83 06/28/2018     Lab Results   Component Value Date    ALT 17 06/28/2018    AST 16 06/28/2018    ALKPHOS 106 06/28/2018    BILITOT 0.3 06/28/2018           Radiology/Diagnostic Studies:    PET 12/27/2017:  IMPRESSION:  1. Diffusely increased FDG uptake throughout the visualized osseous structures  suggestive of chemotherapy and in keeping with reactive marrow.  However, this  pattern may mask underlying hypermetabolic osseous foci.  2. Marked interval decrease in size, number and FDG activity to the cervical,  retroperitoneal and upper abdominal lymphadenopathy, now with only soft tissue  fat stranding and FDG activity seen in the left supraclavicular soft tissue and  small bowel mesenteric root.  3. No new sites of FDG avid disease.  4. Status post prostatectomy without focal abnormal FDG activity in the pelvis  to specifically suggest residual/recurrent disease.  I have reviewed all available lab results and radiology reports.    Assessment/Plan:   (1) 68 y.o. male with diagnosis of development of right sided neck swelling since Feb 2017  - he has been referred by Dr Michaud with ID for an evaluation  - i spoke to Dr Michaud previously about the patient peripherally  - FNA bx on 2/24 which was nondiagnostic  - he had a  guided biopsy of an entire left supraclavicular LN on 3/6/2017 with Dr Alexander hO which showed  necrotizing granulomatous lymphadenitis  - CT scans were from Feb 2017  - flow cytometry studies from 5/25 appear to have been negative for any abnormal cell populations  - PET scan on 7/6/17 with markedly hypermetabolic LAD  - he was referred to and seen by Dr Basilio at [facility] on 7/28 and again on 8/25  - he underwent repeat cervical LN biopsy with Dr Oh on 9/13/2017 and that 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)  - he saw Dr Basilio  again at [facility] and had bone marrow biopsy on 10/3/17  - the bone marrow showed no involvement of the lymphoma  - he received R-CHOP and then proceed with a HD-MTX regimen and Intra-thecal MTX at [facility]    - he has been on HD-MTX chemotherapy at [facility] as of lately  - he completed last chemotherapy from June 18th through 21st  - he required neupogen the last week of June      (2) Hx/of prostate cancer - 1995; s/p prostatectomy followed by XRT; on lupron per Dr Menjivar with  and Fauzia Maza (NP)     (3) JAK - uses CPAP     (4) GERD     (5) Myocardial bridging congenital disorder - followed by Dr Santiago with cardiology   - recent echo with good EF at 60%     (6) recent subclavian vein thrombosis - on eliquis 5mg po bid     (7) WBC elevated due to neupoegn     (8) prior incarcerated hernia surgery with Dr Ellis on 4/4/18      1. Non-Hodgkin lymphoma of lymph nodes of neck, unspecified non-Hodgkin lymphoma type     2. Thrombosis of left subclavian vein     3. Malignant neoplasm of prostate     4. Diffuse large B-cell lymphoma of lymph nodes of neck     5. Chemotherapy-induced neutropenia     6. Cervical lymphadenopathy         PLAN:  1. F/u with Dr Basilio at [facility] in near future  2. Check labs weekly; PF q 4-6 weeks  3. Continue eliquis for now  4. F/U with Dr Basilio  5. Repeat PEt scan in near future - will wait for green light from [facility]  RTC in 4-6 weeks  Fax note to Ronny Driscoll MD, Adriano Michaud and Saba    Discussion:     I have explained all of the above in detail and the patient understands all of the current recommendation(s). I have answered all of their questions to the best of my ability and to their complete satisfaction.   The patient is to continue with the current management plan.            Electronically signed by Fidencio Rdz MD

## 2018-07-02 NOTE — LETTER
July 2, 2018      Ronny Driscoll MD  [address]  [address]  [place] [address]           [facility] - Hematology Oncology  [address]  [address]  [place] [address]  Phone: [phone]  Fax: [phone]          Patient: Naresh Painting   MR Number: [id number]   YOB: 1950   Date of Visit: 7/2/2018       Dear Dr. Ronny Driscoll:    Thank you for referring Naresh Painting to me for evaluation. Attached you will find relevant portions of my assessment and plan of care.    If you have questions, please do not hesitate to call me. I look forward to following Naresh Painting along with you.    Sincerely,    Fidencio Rdz MD    Enclosure  CC:  No Recipients    If you would like to receive this communication electronically, please contact [email or web address] or [phone] to request more information on [facility] access.    For providers and/or their staff who would like to refer a patient to [facility], please contact us through our one-stop-shop provider referral line, [facility], at [phone].    If you feel you have received this communication in error or would no longer like to receive these types of communications, please e-mail [email or web address]

## 2018-07-06 LAB
ALBUMIN SERPL-MCNC: 4.2 G/DL (ref 3.6–4.8)
ALBUMIN/GLOB SERPL: 2.2 {RATIO} (ref 1.2–2.2)
ALP SERPL-CCNC: 97 IU/L (ref 39–117)
ALT SERPL-CCNC: 14 IU/L (ref 0–44)
AST SERPL-CCNC: 15 IU/L (ref 0–40)
BASOPHILS # BLD AUTO: 0 X10E3/UL (ref 0–0.2)
BASOPHILS NFR BLD AUTO: 0 %
BILIRUB SERPL-MCNC: 0.5 MG/DL (ref 0–1.2)
BUN SERPL-MCNC: 10 MG/DL (ref 8–27)
BUN/CREAT SERPL: 12 (ref 10–24)
CALCIUM SERPL-MCNC: 9.1 MG/DL (ref 8.6–10.2)
CHLORIDE SERPL-SCNC: 105 MMOL/L (ref 96–106)
CO2 SERPL-SCNC: 23 MMOL/L (ref 20–29)
CREAT SERPL-MCNC: 0.81 MG/DL (ref 0.76–1.27)
EGFR IF AFRICAN AMERICAN: 105 ML/MIN/1.73
EOSINOPHIL # BLD AUTO: 0.3 X10E3/UL (ref 0–0.4)
EOSINOPHIL NFR BLD AUTO: 9 %
ERYTHROCYTE [DISTWIDTH] IN BLOOD BY AUTOMATED COUNT: 16 % (ref 12.3–15.4)
EST. GFR  (NON AFRICAN AMERICAN): 91 ML/MIN/1.73
GLOBULIN SER CALC-MCNC: 1.9 G/DL (ref 1.5–4.5)
GLUCOSE SERPL-MCNC: 89 MG/DL (ref 65–99)
HCT VFR BLD AUTO: 39.9 % (ref 37.5–51)
HGB BLD-MCNC: 13.1 G/DL (ref 13–17.7)
IMM GRANULOCYTES # BLD: 0 X10E3/UL (ref 0–0.1)
IMM GRANULOCYTES NFR BLD: 1 %
LYMPHOCYTES # BLD AUTO: 0.8 X10E3/UL (ref 0.7–3.1)
LYMPHOCYTES NFR BLD AUTO: 25 %
MCH RBC QN AUTO: 29.6 PG (ref 26.6–33)
MCHC RBC AUTO-ENTMCNC: 32.8 G/DL (ref 31.5–35.7)
MCV RBC AUTO: 90 FL (ref 79–97)
MONOCYTES # BLD AUTO: 0.5 X10E3/UL (ref 0.1–0.9)
MONOCYTES NFR BLD AUTO: 14 %
NEUTROPHILS # BLD AUTO: 1.6 X10E3/UL (ref 1.4–7)
NEUTROPHILS NFR BLD AUTO: 51 %
PLATELET # BLD AUTO: 250 X10E3/UL (ref 150–379)
POTASSIUM SERPL-SCNC: 4.4 MMOL/L (ref 3.5–5.2)
PROT SERPL-MCNC: 6.1 G/DL (ref 6–8.5)
RBC # BLD AUTO: 4.42 X10E6/UL (ref 4.14–5.8)
SODIUM SERPL-SCNC: 142 MMOL/L (ref 134–144)
WBC # BLD AUTO: 3.2 X10E3/UL (ref 3.4–10.8)

## 2018-07-20 LAB
ALBUMIN SERPL-MCNC: 4.2 G/DL (ref 3.6–4.8)
ALBUMIN/GLOB SERPL: 2.1 {RATIO} (ref 1.2–2.2)
ALP SERPL-CCNC: 79 IU/L (ref 39–117)
ALT SERPL-CCNC: 10 IU/L (ref 0–44)
AST SERPL-CCNC: 17 IU/L (ref 0–40)
BASOPHILS # BLD AUTO: 0.1 X10E3/UL (ref 0–0.2)
BASOPHILS NFR BLD AUTO: 2 %
BILIRUB SERPL-MCNC: 0.4 MG/DL (ref 0–1.2)
BUN SERPL-MCNC: 11 MG/DL (ref 8–27)
BUN/CREAT SERPL: 14 (ref 10–24)
CALCIUM SERPL-MCNC: 9 MG/DL (ref 8.6–10.2)
CHLORIDE SERPL-SCNC: 105 MMOL/L (ref 96–106)
CO2 SERPL-SCNC: 21 MMOL/L (ref 20–29)
CREAT SERPL-MCNC: 0.78 MG/DL (ref 0.76–1.27)
EGFR IF AFRICAN AMERICAN: 106 ML/MIN/1.73
EOSINOPHIL # BLD AUTO: 0.2 X10E3/UL (ref 0–0.4)
EOSINOPHIL NFR BLD AUTO: 7 %
ERYTHROCYTE [DISTWIDTH] IN BLOOD BY AUTOMATED COUNT: 15.8 % (ref 12.3–15.4)
EST. GFR  (NON AFRICAN AMERICAN): 92 ML/MIN/1.73
GLOBULIN SER CALC-MCNC: 2 G/DL (ref 1.5–4.5)
GLUCOSE SERPL-MCNC: 91 MG/DL (ref 65–99)
HCT VFR BLD AUTO: 39 % (ref 37.5–51)
HGB BLD-MCNC: 12.5 G/DL (ref 13–17.7)
IMM GRANULOCYTES # BLD: 0 X10E3/UL (ref 0–0.1)
IMM GRANULOCYTES NFR BLD: 0 %
LYMPHOCYTES # BLD AUTO: 0.7 X10E3/UL (ref 0.7–3.1)
LYMPHOCYTES NFR BLD AUTO: 23 %
MCH RBC QN AUTO: 29.7 PG (ref 26.6–33)
MCHC RBC AUTO-ENTMCNC: 32.1 G/DL (ref 31.5–35.7)
MCV RBC AUTO: 93 FL (ref 79–97)
MONOCYTES # BLD AUTO: 0.6 X10E3/UL (ref 0.1–0.9)
MONOCYTES NFR BLD AUTO: 19 %
NEUTROPHILS # BLD AUTO: 1.5 X10E3/UL (ref 1.4–7)
NEUTROPHILS NFR BLD AUTO: 49 %
PLATELET # BLD AUTO: 295 X10E3/UL (ref 150–379)
POTASSIUM SERPL-SCNC: 4.4 MMOL/L (ref 3.5–5.2)
PROT SERPL-MCNC: 6.2 G/DL (ref 6–8.5)
RBC # BLD AUTO: 4.21 X10E6/UL (ref 4.14–5.8)
SODIUM SERPL-SCNC: 143 MMOL/L (ref 134–144)
WBC # BLD AUTO: 3.1 X10E3/UL (ref 3.4–10.8)

## 2018-07-27 LAB
ALBUMIN SERPL-MCNC: 4.3 G/DL (ref 3.6–4.8)
ALBUMIN/GLOB SERPL: 2.2 {RATIO} (ref 1.2–2.2)
ALP SERPL-CCNC: 85 IU/L (ref 39–117)
ALT SERPL-CCNC: 15 IU/L (ref 0–44)
AST SERPL-CCNC: 19 IU/L (ref 0–40)
BASOPHILS # BLD AUTO: 0.1 X10E3/UL (ref 0–0.2)
BASOPHILS NFR BLD AUTO: 1 %
BILIRUB SERPL-MCNC: 0.3 MG/DL (ref 0–1.2)
BUN SERPL-MCNC: 13 MG/DL (ref 8–27)
BUN/CREAT SERPL: 16 (ref 10–24)
CALCIUM SERPL-MCNC: 9.3 MG/DL (ref 8.6–10.2)
CHLORIDE SERPL-SCNC: 104 MMOL/L (ref 96–106)
CO2 SERPL-SCNC: 22 MMOL/L (ref 20–29)
CREAT SERPL-MCNC: 0.82 MG/DL (ref 0.76–1.27)
EGFR IF AFRICAN AMERICAN: 104 ML/MIN/1.73
EOSINOPHIL # BLD AUTO: 0.4 X10E3/UL (ref 0–0.4)
EOSINOPHIL NFR BLD AUTO: 9 %
ERYTHROCYTE [DISTWIDTH] IN BLOOD BY AUTOMATED COUNT: 15.8 % (ref 12.3–15.4)
EST. GFR  (NON AFRICAN AMERICAN): 90 ML/MIN/1.73
GLOBULIN SER CALC-MCNC: 2 G/DL (ref 1.5–4.5)
GLUCOSE SERPL-MCNC: 94 MG/DL (ref 65–99)
HCT VFR BLD AUTO: 40.8 % (ref 37.5–51)
HGB BLD-MCNC: 12.9 G/DL (ref 13–17.7)
IMM GRANULOCYTES # BLD: 0 X10E3/UL (ref 0–0.1)
IMM GRANULOCYTES NFR BLD: 0 %
LYMPHOCYTES # BLD AUTO: 0.9 X10E3/UL (ref 0.7–3.1)
LYMPHOCYTES NFR BLD AUTO: 21 %
MCH RBC QN AUTO: 29.3 PG (ref 26.6–33)
MCHC RBC AUTO-ENTMCNC: 31.6 G/DL (ref 31.5–35.7)
MCV RBC AUTO: 93 FL (ref 79–97)
MONOCYTES # BLD AUTO: 0.7 X10E3/UL (ref 0.1–0.9)
MONOCYTES NFR BLD AUTO: 18 %
NEUTROPHILS # BLD AUTO: 2.1 X10E3/UL (ref 1.4–7)
NEUTROPHILS NFR BLD AUTO: 51 %
PLATELET # BLD AUTO: 269 X10E3/UL (ref 150–379)
POTASSIUM SERPL-SCNC: 5.1 MMOL/L (ref 3.5–5.2)
PROT SERPL-MCNC: 6.3 G/DL (ref 6–8.5)
RBC # BLD AUTO: 4.41 X10E6/UL (ref 4.14–5.8)
SODIUM SERPL-SCNC: 142 MMOL/L (ref 134–144)
WBC # BLD AUTO: 4.1 X10E3/UL (ref 3.4–10.8)

## 2018-08-14 ENCOUNTER — OFFICE VISIT (OUTPATIENT)
Dept: HEMATOLOGY/ONCOLOGY | Facility: CLINIC | Age: 68
End: 2018-08-14
Payer: MEDICARE

## 2018-08-14 VITALS
RESPIRATION RATE: 18 BRPM | WEIGHT: 245 LBS | BODY MASS INDEX: 37.13 KG/M2 | DIASTOLIC BLOOD PRESSURE: 67 MMHG | HEART RATE: 77 BPM | HEIGHT: 68 IN | TEMPERATURE: 98 F | SYSTOLIC BLOOD PRESSURE: 107 MMHG

## 2018-08-14 DIAGNOSIS — C83.31 DIFFUSE LARGE B-CELL LYMPHOMA OF LYMPH NODES OF NECK: ICD-10-CM

## 2018-08-14 DIAGNOSIS — R59.0 CERVICAL LYMPHADENOPATHY: ICD-10-CM

## 2018-08-14 DIAGNOSIS — C85.91 NON-HODGKIN LYMPHOMA OF LYMPH NODES OF NECK, UNSPECIFIED NON-HODGKIN LYMPHOMA TYPE: Primary | ICD-10-CM

## 2018-08-14 DIAGNOSIS — D70.1 CHEMOTHERAPY-INDUCED NEUTROPENIA: ICD-10-CM

## 2018-08-14 DIAGNOSIS — T45.1X5A CHEMOTHERAPY-INDUCED NEUTROPENIA: ICD-10-CM

## 2018-08-14 LAB
ALBUMIN SERPL-MCNC: 4.3 G/DL (ref 3.6–4.8)
ALBUMIN/GLOB SERPL: 2 {RATIO} (ref 1.2–2.2)
ALP SERPL-CCNC: 78 IU/L (ref 39–117)
ALT SERPL-CCNC: 10 IU/L (ref 0–44)
AST SERPL-CCNC: 15 IU/L (ref 0–40)
BASOPHILS # BLD AUTO: 0 X10E3/UL (ref 0–0.2)
BASOPHILS NFR BLD AUTO: 1 %
BILIRUB SERPL-MCNC: 0.4 MG/DL (ref 0–1.2)
BUN SERPL-MCNC: 14 MG/DL (ref 8–27)
BUN/CREAT SERPL: 16 (ref 10–24)
CALCIUM SERPL-MCNC: 9 MG/DL (ref 8.6–10.2)
CHLORIDE SERPL-SCNC: 106 MMOL/L (ref 96–106)
CO2 SERPL-SCNC: 21 MMOL/L (ref 20–29)
CREAT SERPL-MCNC: 0.88 MG/DL (ref 0.76–1.27)
EGFR IF AFRICAN AMERICAN: 101 ML/MIN/1.73
EOSINOPHIL # BLD AUTO: 0.3 X10E3/UL (ref 0–0.4)
EOSINOPHIL NFR BLD AUTO: 8 %
ERYTHROCYTE [DISTWIDTH] IN BLOOD BY AUTOMATED COUNT: 15.4 % (ref 12.3–15.4)
EST. GFR  (NON AFRICAN AMERICAN): 88 ML/MIN/1.73
GLOBULIN SER CALC-MCNC: 2.1 G/DL (ref 1.5–4.5)
GLUCOSE SERPL-MCNC: 90 MG/DL (ref 65–99)
HCT VFR BLD AUTO: 39.7 % (ref 37.5–51)
HGB BLD-MCNC: 13.1 G/DL (ref 13–17.7)
IMM GRANULOCYTES # BLD: 0 X10E3/UL (ref 0–0.1)
IMM GRANULOCYTES NFR BLD: 0 %
LYMPHOCYTES # BLD AUTO: 0.7 X10E3/UL (ref 0.7–3.1)
LYMPHOCYTES NFR BLD AUTO: 19 %
MCH RBC QN AUTO: 30 PG (ref 26.6–33)
MCHC RBC AUTO-ENTMCNC: 33 G/DL (ref 31.5–35.7)
MCV RBC AUTO: 91 FL (ref 79–97)
MONOCYTES # BLD AUTO: 0.6 X10E3/UL (ref 0.1–0.9)
MONOCYTES NFR BLD AUTO: 16 %
NEUTROPHILS # BLD AUTO: 2.1 X10E3/UL (ref 1.4–7)
NEUTROPHILS NFR BLD AUTO: 56 %
PLATELET # BLD AUTO: 261 X10E3/UL (ref 150–379)
POTASSIUM SERPL-SCNC: 4.1 MMOL/L (ref 3.5–5.2)
PROT SERPL-MCNC: 6.4 G/DL (ref 6–8.5)
RBC # BLD AUTO: 4.37 X10E6/UL (ref 4.14–5.8)
SODIUM SERPL-SCNC: 144 MMOL/L (ref 134–144)
WBC # BLD AUTO: 3.8 X10E3/UL (ref 3.4–10.8)

## 2018-08-14 PROCEDURE — 3074F SYST BP LT 130 MM HG: CPT | Mod: ,,, | Performed by: INTERNAL MEDICINE

## 2018-08-14 PROCEDURE — 99214 OFFICE O/P EST MOD 30 MIN: CPT | Mod: ,,, | Performed by: INTERNAL MEDICINE

## 2018-08-14 PROCEDURE — 3078F DIAST BP <80 MM HG: CPT | Mod: ,,, | Performed by: INTERNAL MEDICINE

## 2018-08-14 NOTE — PROGRESS NOTES
"   [facility] Hematology/Oncology  PROGRESS NOTE      Subjective:       Patient ID:   NAME: Naresh Painting : 1950     68 y.o. male    Referring Doc: Yamila  Other Physicians: Adriano Michaud Pernenkil, Saba    Chief Complaint:  NHL f/u    History of Present Illness:     Patient returns today for a regularly scheduled follow-up visit.  The patient is here with his wife.  No CP, SOB, HA's or N/V. He had his last chemotherapy on  through . He last saw Dr Basilio in 2018 and plans to see him again in six months. He has ordered a follow-up PET. He is feeling "good". He feel at home this past  "tripped" and did not lose consciousness.         ROS:   GEN: normal without any fever, night sweats or weight loss  HEENT: normal with no HA's, sore throat, stiff neck, changes in vision  CV: normal with no CP, SOB, PND, COOPER or orthopnea  PULM: normal with no SOB, cough, hemoptysis, sputum or pleuritic pain  GI: normal with no abdominal pain, nausea, vomiting, , diarrhea, melanotic stools, BRBPR, or hematemesis;  : normal with no hematuria, dysuria  BREAST: normal with no mass, discharge, pain  SKIN: normal with no rash, erythema, bruising, or swelling    Allergies:  Review of patient's allergies indicates:  No Known Allergies    Medications:    Current Outpatient Medications:     apixaban (ELIQUIS) 5 mg Tab, Take 1 tablet (5 mg total) by mouth 2 (two) times daily., Disp: 60 tablet, Rfl: 6    aspirin 81 MG Chew, Take 81 mg by mouth once daily., Disp: , Rfl:     co-enzyme Q-10 30 mg capsule, Take 30 mg by mouth 3 (three) times daily., Disp: , Rfl:     L GASSERI/B BIFIDUM/B LONGUM ([employer] ORAL), Take by mouth., Disp: , Rfl:     leucovorin (WELLCOVORIN) 5 mg Tab, , Disp: , Rfl: 0    MULTIVITAMIN/IRON/FOLIC ACID (CENTRUM COMPLETE ORAL), Take by mouth., Disp: , Rfl:     pantoprazole (PROTONIX) 40 MG tablet, Take 40 mg by mouth once daily., Disp: , Rfl:     potassium acetate liquid 5 mEq / " "5ml Liqd, Take by mouth., Disp: , Rfl:     TOPROL XL 50 mg 24 hr tablet, Take 25 mg by mouth once daily., Disp: , Rfl:     vitamin D 1000 units Tab, Take 185 mg by mouth once daily., Disp: , Rfl:     vitamin E 100 UNIT capsule, Take 100 Units by mouth once daily., Disp: , Rfl:     Current Facility-Administered Medications:     triptorelin pamoate Syrg 22.5 mg, 22.5 mg, Intramuscular, Q6 Months, Fauzia Maza NP, 22.5 mg at 02/02/18 1012    PMHx/PSHx Updates:  See patient's last visit with me on 6/1/2018.  See H&P on 6/9/2017        Pathology:  See path 9/13/2017          Objective:     Vitals:  Blood pressure 107/67, pulse 77, temperature 97.8 °F (36.6 °C), temperature source Oral, resp. rate 18, height 5' 8" (1.727 m), weight 111.1 kg (245 lb).    Physical Examination:   GEN: no apparent distress, comfortable; AAOx3  HEAD: atraumatic and normocephalic  EYES: no pallor, no icterus, PERRLA  ENT: OMM, no pharyngeal erythema, external ears WNL; no nasal discharge; no thrush  NECK: no masses, thyroid normal, trachea midline,  LAD/LN's, supple; resolved neck LAD; left looks good  CV: RRR with no murmur; normal pulse; normal S1 and S2; no pedal edema  CHEST: Normal respiratory effort; CTAB; normal breath sounds; no wheeze or crackles  ABDOM: nontender and nondistended; soft; normal bowel sounds; no rebound/guardingp; vertical wound healed well  MUSC/Skeletal: ROM normal; no crepitus; joints normal; no deformities or arthropathy  EXTREM: no clubbing, cyanosis, inflammation or swelling  SKIN: no rashes, lesions, ulcers, petechiae or subcutaneous nodules  : no cannon  NEURO: grossly intact; motor/sensory WNL; AAOx3; no tremors  PSYCH: normal mood, affect and behavior  LYMPH: normal cervical, supraclavicular, axillary and groin LN's            Labs:     8/13/2018  Lab Results   Component Value Date    WBC 3.8 08/13/2018    HGB 13.1 08/13/2018    HCT 39.7 08/13/2018    MCV 91 08/13/2018     08/13/2018 "     BMP  Lab Results   Component Value Date     08/13/2018    K 4.1 08/13/2018     08/13/2018    CO2 21 08/13/2018    BUN 14 08/13/2018    CREATININE 0.88 08/13/2018    CALCIUM 9.0 08/13/2018    ESTGFRAFRICA >60.0 11/25/2014    EGFRNONAA 88 08/13/2018     Lab Results   Component Value Date    ALT 10 08/13/2018    AST 15 08/13/2018    ALKPHOS 78 08/13/2018    BILITOT 0.4 08/13/2018           Radiology/Diagnostic Studies:    PET 12/27/2017:  IMPRESSION:  1. Diffusely increased FDG uptake throughout the visualized osseous structures  suggestive of chemotherapy and in keeping with reactive marrow.  However, this  pattern may mask underlying hypermetabolic osseous foci.  2. Marked interval decrease in size, number and FDG activity to the cervical,  retroperitoneal and upper abdominal lymphadenopathy, now with only soft tissue  fat stranding and FDG activity seen in the left supraclavicular soft tissue and  small bowel mesenteric root.  3. No new sites of FDG avid disease.  4. Status post prostatectomy without focal abnormal FDG activity in the pelvis  to specifically suggest residual/recurrent disease.  I have reviewed all available lab results and radiology reports.    Assessment/Plan:   (1) 68 y.o. male with diagnosis of development of right sided neck swelling since Feb 2017  - he has been referred by Dr Michaud with ID for an evaluation  - i spoke to Dr Michaud previously about the patient peripherally  - FNA bx on 2/24 which was nondiagnostic  - he had a  guided biopsy of an entire left supraclavicular LN on 3/6/2017 with Dr Alexander Oh which showed  necrotizing granulomatous lymphadenitis  - CT scans were from Feb 2017  - flow cytometry studies from 5/25 appear to have been negative for any abnormal cell populations  - PET scan on 7/6/17 with markedly hypermetabolic LAD  - he was referred to and seen by Dr Basilio at [facility] on 7/28 and again on 8/25  - he underwent repeat cervical LN biopsy with   Adriano on 9/13/2017 and that 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)  - he saw Dr Basilio again at [facility] and had bone marrow biopsy on 10/3/17  - the bone marrow showed no involvement of the lymphoma  - he received R-CHOP and then proceed with a HD-MTX regimen and Intra-thecal MTX at [facility]    - he has been on HD-MTX chemotherapy at [facility] as of lately  - he completed last chemotherapy from June 18th through 21st  - he required neupogen the last week of June  - he last saw Dr Basilio in July 2018 and they want him to have a PET scan; he sees Dr Basilio again in 6 months      (2) Hx/of prostate cancer - 1995; s/p prostatectomy followed by XRT; on lupron per Dr Menjivar with  and Fauzia Maza (NP)     (3) JAK - uses CPAP     (4) GERD     (5) Myocardial bridging congenital disorder - followed by Dr Santiago with cardiology   - recent echo with good EF at 60%     (6) recent subclavian vein thrombosis - on eliquis 5mg po bid     (7) WBC WNL currently     (8) prior incarcerated hernia surgery with Dr Ellis on 4/4/18      1. Non-Hodgkin lymphoma of lymph nodes of neck, unspecified non-Hodgkin lymphoma type     2. Diffuse large B-cell lymphoma of lymph nodes of neck     3. Chemotherapy-induced neutropenia     4. Cervical lymphadenopathy         PLAN:  1. F/u with Dr Basilio at [facility] in 6 months as planned  2. Check labs weekly; PF q 4-6 weeks  3. Continue eliquis for now through end of August - Dr Basilio is ok with him stopping per patient  4. Set up PET scan per request of Dr Basilio  5. Check labs monthly  RTC in 4-6 weeks  Fax note to Ronny Driscoll MD, Jaswant, Adriano, and  Chetna    Discussion:     I have explained all of the above in detail and the patient understands all of the current recommendation(s). I have answered all of their questions to the best of my ability and to their complete satisfaction.   The patient is to continue with the current management plan.            Electronically signed  by Fidencio Rdz MD

## 2018-08-17 ENCOUNTER — LAB VISIT (OUTPATIENT)
Dept: LAB | Facility: HOSPITAL | Age: 68
End: 2018-08-17
Attending: NURSE PRACTITIONER
Payer: MEDICARE

## 2018-08-17 DIAGNOSIS — R97.20 ELEVATED PSA: ICD-10-CM

## 2018-08-17 DIAGNOSIS — C61 PROSTATE CANCER: ICD-10-CM

## 2018-08-17 DIAGNOSIS — Z90.79 HISTORY OF RADICAL PROSTATECTOMY: ICD-10-CM

## 2018-08-17 LAB — COMPLEXED PSA SERPL-MCNC: 0.06 NG/ML

## 2018-08-17 PROCEDURE — 84153 ASSAY OF PSA TOTAL: CPT

## 2018-08-17 PROCEDURE — 36415 COLL VENOUS BLD VENIPUNCTURE: CPT | Mod: PO

## 2018-08-20 ENCOUNTER — TELEPHONE (OUTPATIENT)
Dept: HEMATOLOGY/ONCOLOGY | Facility: CLINIC | Age: 68
End: 2018-08-20

## 2018-08-20 DIAGNOSIS — R97.21 RISING PSA FOLLOWING TREATMENT FOR MALIGNANT NEOPLASM OF PROSTATE: ICD-10-CM

## 2018-08-20 DIAGNOSIS — C61 PROSTATE CANCER: Primary | ICD-10-CM

## 2018-08-20 NOTE — TELEPHONE ENCOUNTER
Patient and wife notified that Dr. Rdz overall pleased with PET but it does show the fx rib and notified that I will fax results to Dr. Basilio.  They voiced understanding

## 2018-08-20 NOTE — TELEPHONE ENCOUNTER
----- Message from Fidencio Rdz MD sent at 8/20/2018  1:01 PM CDT -----  Overall pleased with the scan - he did fall a break a rib per his report at last visist; fax scan to Dr Basilio please

## 2018-08-24 ENCOUNTER — OFFICE VISIT (OUTPATIENT)
Dept: UROLOGY | Facility: CLINIC | Age: 68
End: 2018-08-24
Payer: MEDICARE

## 2018-08-24 VITALS
DIASTOLIC BLOOD PRESSURE: 87 MMHG | HEART RATE: 73 BPM | BODY MASS INDEX: 37.39 KG/M2 | HEIGHT: 68 IN | WEIGHT: 246.69 LBS | SYSTOLIC BLOOD PRESSURE: 132 MMHG | RESPIRATION RATE: 18 BRPM

## 2018-08-24 DIAGNOSIS — C61 PROSTATE CANCER: Primary | ICD-10-CM

## 2018-08-24 PROCEDURE — 99214 OFFICE O/P EST MOD 30 MIN: CPT | Mod: 25,S$GLB,, | Performed by: NURSE PRACTITIONER

## 2018-08-24 PROCEDURE — 3075F SYST BP GE 130 - 139MM HG: CPT | Mod: CPTII,S$GLB,, | Performed by: NURSE PRACTITIONER

## 2018-08-24 PROCEDURE — 96402 CHEMO HORMON ANTINEOPL SQ/IM: CPT | Mod: S$GLB,,, | Performed by: NURSE PRACTITIONER

## 2018-08-24 PROCEDURE — 3079F DIAST BP 80-89 MM HG: CPT | Mod: CPTII,S$GLB,, | Performed by: NURSE PRACTITIONER

## 2018-08-24 PROCEDURE — 99999 PR PBB SHADOW E&M-EST. PATIENT-LVL IV: CPT | Mod: PBBFAC,,, | Performed by: NURSE PRACTITIONER

## 2018-08-24 NOTE — PATIENT INSTRUCTIONS
PSA                  0.06                08/17/2018                 PSA                  0.04                01/26/2018                 PSA                  0.17                07/25/2017                 PSA                  0.22                01/26/2017                 PSA                  0.19                07/22/2016                 PSA                  0.20                01/18/2016                 PSA                  0.19                07/24/2015                 PSA                  0.15                01/12/2015                 PSA                  0.17                06/06/2014                 PSA                  0.11                12/02/2013                 PSA                      0.13                05/31/2013                 PSA                      0.16                11/29/2012                 PSA                      0.12                05/25/2012                 PSA                      0.13                10/18/2011                 PSA                      0.11                03/17/2011                 PSA                      0.20                08/25/2010                 PSA                      0.11                06/25/2009                 PSA                      0.09                01/09/2009                 PSA                      0.1                 06/12/2008                 PSA                      0.1                 12/11/2007                      What Is Prostate Cancer?    Cancer is when cells in the body change and grow out of control. Cancer cells can form lumps of tissue called tumors. Cancer that starts in the prostate is called prostate cancer. It can grow and spread beyond the prostate. Cancer that spreads is harder to treat.  Understanding the prostate  The prostate is a gland in men about the size and shape of a walnut. It surrounds the upper part of the urethra. This is the tube that carries urine from the bladder. The prostate makes some of the fluid  thats part of semen. During orgasm, semen leaves the body through the urethra.  When prostate cancer forms  As a man ages, the cells of his prostate may change to form tumors or other growths. The types of growths include:  · Noncancerous growths. As a man ages, the prostate may grow larger. This is called benign prostatic hyperplasia (BPH). With BPH, extra prostate tissue often squeezes the urethra, causing symptoms such as trouble urinating. But BPH is not cancer and does not lead to cancer.  · Atypical cells. Sometimes prostate cells dont look like normal (typical) prostate cells. One type of abnormal growth is called prostatic intraepithelial neoplasia or PIN. Although PIN cells are not cancer cells, they may be a sign that cancer is likely to form.  · Cancer. When abnormal prostate cells grow out of control and start to invade other tissues, they are called cancer cells. These cells may or may not lead to symptoms. Some tumors can be felt during a physical exam, and some cant. Prostate cancer may grow into nearby organs or spread to nearby lymph nodes. Lymph nodes are small organs around the body that are part of the immune system. In some cases, the cancer spreads to bones or organs in distant parts of the body. This is called metastasis.  Diagnosing prostate cancer  Prostate cancer may not cause symptoms at first. Urinary problems are often not a sign of cancer, but of another condition, such as BPH. To find out if you have prostate cancer, your healthcare provider must examine you and order tests. The tests help confirm a diagnosis of cancer. They also help give more information about a cancerous tumor. Tests might include:  · Prostate specific antigen (PSA) testing. PSA is a chemical made by prostate tissue. The amount of PSA in the blood (PSA level) is tested to check a mans risk for prostate cancer. In general, a high or rising PSA level may mean an increased cancer risk. A PSA test by itself cannot  show if a man has prostate cancer. PSA testing is also used to check the success of cancer treatments.  · Core needle biopsy. This test is done to determine if a man has prostate cancer. A hollow needle is used to take small pieces of tissue from the prostate. This helps give more information about the cells. Before the test, pain medicine may be given to prevent pain. During the test, a small probe is inserted into the rectum. The probe sends an image of the prostate to a video monitor. With this image as a guide, the healthcare provider uses a thin, hollow needle to remove tiny tissue samples from the prostate. These are sent to a lab where they are looked at for cancer cells.  Date Last Reviewed: 5/1/2017 © 2000-2017 [employer]. [address] [place] [address], [place] [address]. All rights reserved. This information is not intended as a substitute for professional medical care. Always follow your healthcare professional's instructions.        Hormone Therapy for Prostate Cancer  Androgens are male hormones. Androgens such as testosterone are made in the testicles. Prostate cancer cells need androgens to grow. Reducing the amount of androgens in the body or blocking prostate cancer cells from using them can help treat prostate cancer. This therapy does not cure the cancer, but it can help control it. It may be used alone. Or it may be used with radiation therapy to help make this treatment more effective. Read below to learn more about this treatment.  How the therapy is done  Hormone therapy can be done with:  · LHRH (GnRH) agonists or antagonists. These are medicines that stop the testicles from making androgens. These are injected into a muscle or just under the skin. This is done every few weeks or months. Or they may be given with a small device put under the skin on the inside of the arm. This implant gives a steady dose of medicine over time. LHRH agonists and antagonists are often used with  anti-androgens (see below).  · Anti-androgens. These are medicines that stop cancer cells from using androgens as a way to grow. These come in pill form and are taken by mouth. They are often used along with other forms of hormone therapy.  · CYP17 inhibitors. These slow the amount of hormones made in prostate cancer cells and other body cells. They are given as pills. They are often used along with other forms of hormone therapy.  · Other medicines. These may include corticosteroids, estrogens, or anti-fungal medicines. These can also help lower the levels of androgens in the body. They are used less often than the medicines listed above.  · Orchiectomy. This is surgery to remove the testicles. This stops the body from making most androgens. Artificial (prosthetic) testicles can be placed afterward to give the look of real testicles.  Possible side effects   Side effects are similar for most types of hormone therapy, but they can vary a bit between medicines. Possible side effects can include:  · Sudden increase in body heat (hot flashes)  · Tiredness  · Less interest in sex  · Inability to get or keep an erection  · Decrease in size of penis and testicles  · Changes in facial hair  · Mood changes, such as depression, irritability, or anxiety  · Trouble with memory and concentration  · Loss of muscle  · Diarrhea  · Nausea  · Weight gain  · Breast-area tenderness or growth  · Low red blood cell count (anemia)  · Hair loss  · Bone thinning (osteoporosis)  · Increased risk of heart disease, stroke, and diabetes  Coping with side effects  Some of the side effects are temporary. Others are more long-lasting. This depends on the type of hormone therapy used, and how it affects your body. Most side effects of orchiectomy are permanent. Your health care provider can tell you more. To help cope with side effects, try the tips below.  · Talk to your health care provider about your symptoms. He or she may prescribe medicines  that can help you feel better and reduce problems.  · If you have hot flashes, dont take hot showers. Dont use hot tubs or saunas.  · Dont eat spicy food or drink alcohol. Dont have caffeine.  · Get regular physical activity.  · Eat a healthy diet.  · Keep mentally active.  · Work with your partner to manage sexual changes.  · Try counseling or support groups.  Follow-up care  During the course of your treatment, youll have regular visits with your health care provider. You may also have tests. These let your health care provider check your health and see how well the treatment is working. After treatment ends, you and your health care provider will discuss the results. Youll also discuss whether you need additional cancer treatments.  Resources  For more information about cancer and its treatment, visit the websites listed below:  · American Cancer Society   · National Cancer [place]   · [employer]   Date Last Reviewed: 3/30/2015  © 1018-8983 [employer]. [address] [place] [address]. All rights reserved. This information is not intended as a substitute for professional medical care. Always follow your healthcare professional's instructions.        Leuprolide injection  What is this medicine?  LEUPROLIDE (loo PROE lide) is a man-made hormone. It is used to treat the symptoms of prostate cancer. This medicine may also be used to treat children with early onset of puberty. It may be used for other hormonal conditions.  How should I use this medicine?  This medicine is for injection under the skin or into a muscle. You will be taught how to prepare and give this medicine. Use exactly as directed. Take your medicine at regular intervals. Do not take your medicine more often than directed.  It is important that you put your used needles and syringes in a special sharps container. Do not put them in a trash can. If you do not have a sharps container, call your pharmacist or healthcare  provider to get one.  Talk to your pediatrician regarding the use of this medicine in children. While this medicine may be prescribed for children as young as 8 years for selected conditions, precautions do apply.  What side effects may I notice from receiving this medicine?  Side effects that you should report to your doctor or health care professional as soon as possible:  · allergic reactions like skin rash, itching or hives, swelling of the face, lips, or tongue  · breathing problems  · chest pain  · depression or memory disorders  · pain in your legs or groin  · pain at site where injected  · severe headache  · swelling of the feet and legs  · visual changes  · vomiting  Side effects that usually do not require medical attention (report to your doctor or health care professional if they continue or are bothersome):  · breast swelling or tenderness  · decrease in sex drive or performance  · diarrhea  · hot flashes  · loss of appetite  · muscle, joint, or bone pains  · nausea  · redness or irritation at site where injected  · skin problems or acne  What may interact with this medicine?  Do not take this medicine with any of the following medications:  · chasteberry  This medicine may also interact with the following medications:  · herbal or dietary supplements, like black cohosh or DHEA  · female hormones, like estrogens or progestins and birth control pills, patches, rings, or injections  · male hormones, like testosterone  What if I miss a dose?  If you miss a dose, take it as soon as you can. If it is almost time for your next dose, take only that dose. Do not take double or extra doses.  Where should I keep my medicine?  Keep out of the reach of children.  Store below 25 degrees C (77 degrees F). Do not freeze. Protect from light. Do not use if it is not clear or if there are particles present. Throw away any unused medicine after the expiration date.  What should I tell my health care provider before I take  this medicine?  They need to know if you have any of these conditions:  · diabetes  · heart disease or previous heart attack  · high blood pressure  · high cholesterol  · pain or difficulty passing urine  · spinal cord metastasis  · stroke  · tobacco smoker  · an unusual or allergic reaction to leuprolide, benzyl alcohol, other medicines, foods, dyes, or preservatives  · pregnant or trying to get pregnant  · breast-feeding  What should I watch for while using this medicine?  Visit your doctor or health care professional for regular checks on your progress. During the first week, your symptoms may get worse, but then will improve as you continue your treatment. You may get hot flashes, increased bone pain, increased difficulty passing urine, or an aggravation of nerve symptoms. Discuss these effects with your doctor or health care professional, some of them may improve with continued use of this medicine.  Female patients may experience a menstrual cycle or spotting during the first 2 months of therapy with this medicine. If this continues, contact your doctor or health care professional.  NOTE:This sheet is a summary. It may not cover all possible information. If you have questions about this medicine, talk to your doctor, pharmacist, or health care provider. Copyright© 2017 [employer]

## 2018-08-24 NOTE — PROGRESS NOTES
Subjective:       Patient ID: Naresh Painting is a 68 y.o. male.    Chief Complaint: Prostate Cancer (6 month f/u visit; Lupron Injection)    Mr. Painting is a 68-year-old patient that underwent a radical prostatectomy in December of 1995 by Dr. Partida at hospitals.  He was first seen by Dr. Menjivar in 2005 where He was currently on GnRH agaonist therapy/Eligard 45 mg every six months due to rise in PSA.  He was last seen in clinic & received Trelstar on 02/02/2018    He denies any urological problems or concerns.  Has been battling a cold; now with just a cough.  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  First seen at [facility], but now followed by Dr. Rdz  He also just completed another round of preventive chemo.  Tolerated well.    Just had wedding anniversary (1/26)  Eating healtier;   Getting plenty of rest.  Overall feeling better.                      PSA                  0.06                08/17/2018                 PSA                  0.04                01/26/2018                 PSA                  0.17                07/25/2017                 PSA                  0.22                01/26/2017                 PSA                  0.19                07/22/2016                 PSA                  0.20                01/18/2016                 PSA                  0.19                07/24/2015                 PSA                  0.15                01/12/2015                 PSA                  0.17                06/06/2014                 PSA                  0.11                12/02/2013                 PSA                      0.13                05/31/2013                 PSA                      0.16                11/29/2012                 PSA                      0.12                 05/25/2012                 PSA                      0.13                10/18/2011                 PSA                      0.11                03/17/2011                 PSA                      0.20                08/25/2010                 PSA                      0.11                06/25/2009                 PSA                      0.09                01/09/2009                 PSA                      0.1                 06/12/2008                 PSA                      0.1                 12/11/2007                                   Past Medical History:    Prostate cancer                                               Past Surgical History:    PROSTATE SURGERY                                               HERNIA REPAIR                                                  EYE SURGERY                                                    No family history on file.      Social History    Marital Status:              Spouse Name:                       Years of Education:                 Number of children:               Occupational History    None on file    Social History Main Topics    Smoking Status: Former Smoker                   Packs/Day: 0.00  Years:          Smokeless Status: Never Used                        Comment: quit 1995    Alcohol Use: No              Drug Use: Not on file     Sexual Activity: Not on file          Other Topics            Concern    None on file    Social History Narrative    None on file        Allergies:  Review of patient's allergies indicates no known allergies.    Medications:  Current outpatient prescriptions: aspirin 81 MG Chew, Take 81 mg by mouth once daily., Disp: , Rfl: ;  pravastatin (PRAVACHOL) 40 MG tablet, , Disp: , Rfl: ;  TOPROL XL 50 mg 24 hr tablet, , Disp: , Rfl:   Current facility-administered medications: triptorelin pamoate Syrg 22.5 mg, 22.5 mg, Intramuscular, 1 time in Clinic/HOD, Fauzia Maza NP                Review of Systems   Constitutional:  Negative for activity change, appetite change, chills and fever.   HENT: Negative for facial swelling and trouble swallowing.    Eyes: Negative for visual disturbance.   Respiratory: Negative for chest tightness and shortness of breath.    Cardiovascular: Negative for chest pain and palpitations.   Gastrointestinal: Negative.  Negative for abdominal pain, constipation, diarrhea, nausea and vomiting.   Genitourinary: Positive for frequency. Negative for difficulty urinating, dysuria, flank pain, hematuria, penile pain, penile swelling, scrotal swelling and testicular pain.        He pleased with urination.     Musculoskeletal: Negative for back pain, gait problem, myalgias and neck stiffness.   Skin: Negative for rash.   Neurological: Negative for dizziness and speech difficulty.   Hematological: Does not bruise/bleed easily.   Psychiatric/Behavioral: Negative for behavioral problems.       Objective:      Physical Exam   Nursing note and vitals reviewed.  Constitutional: He is oriented to person, place, and time. Vital signs are normal. He appears well-developed and well-nourished. He is active and cooperative.  Non-toxic appearance. He does not have a sickly appearance.   HENT:   Head: Normocephalic and atraumatic.   Right Ear: External ear normal.   Left Ear: External ear normal.   Nose: Nose normal.   Mouth/Throat: Mucous membranes are normal.   Eyes: Conjunctivae and lids are normal. No scleral icterus.   Neck: Trachea normal, normal range of motion and full passive range of motion without pain. Neck supple. No JVD present. No tracheal deviation present.   Cardiovascular: Normal rate, S1 normal and S2 normal.    Pulmonary/Chest: Effort normal. No respiratory distress. He exhibits no tenderness.   Abdominal: Soft. Normal appearance and bowel sounds are normal. There is no hepatosplenomegaly. There is no tenderness. There is no CVA tenderness.   Genitourinary: Testes normal and penis normal. Right testis shows no  swelling and no tenderness. Left testis shows no swelling and no tenderness. No penile tenderness.   Musculoskeletal: Normal range of motion.   Lymphadenopathy: No inguinal adenopathy noted on the right or left side.   Neurological: He is alert and oriented to person, place, and time. He has normal strength.   Skin: Skin is warm, dry and intact.     Psychiatric: He has a normal mood and affect. His behavior is normal. Judgment and thought content normal.       Assessment:       1. Prostate cancer        Plan:         I spent 25 minutes with the patient of which more than half was spent in direct consultation with the patient in regards to our treatment and plan.    Education and recommendations of today's plan of care including home remedies.  We discussed his PSA results.  Diet modifications/exerise.   We discussed transition to LUPRON;   Lupron was given today  RTC 6 months with new PSA and Lupron 45mg injection

## 2018-09-18 LAB
ALBUMIN SERPL-MCNC: 4 G/DL (ref 3.6–4.8)
ALBUMIN/GLOB SERPL: 1.7 {RATIO} (ref 1.2–2.2)
ALP SERPL-CCNC: 71 IU/L (ref 39–117)
ALT SERPL-CCNC: 14 IU/L (ref 0–44)
AST SERPL-CCNC: 17 IU/L (ref 0–40)
BASOPHILS # BLD AUTO: 0 X10E3/UL (ref 0–0.2)
BASOPHILS NFR BLD AUTO: 1 %
BILIRUB SERPL-MCNC: 0.4 MG/DL (ref 0–1.2)
BUN SERPL-MCNC: 11 MG/DL (ref 8–27)
BUN/CREAT SERPL: 13 (ref 10–24)
CALCIUM SERPL-MCNC: 9.2 MG/DL (ref 8.6–10.2)
CHLORIDE SERPL-SCNC: 105 MMOL/L (ref 96–106)
CO2 SERPL-SCNC: 21 MMOL/L (ref 20–29)
CREAT SERPL-MCNC: 0.84 MG/DL (ref 0.76–1.27)
EGFR IF AFRICAN AMERICAN: 103 ML/MIN/1.73
EOSINOPHIL # BLD AUTO: 0.3 X10E3/UL (ref 0–0.4)
EOSINOPHIL NFR BLD AUTO: 6 %
ERYTHROCYTE [DISTWIDTH] IN BLOOD BY AUTOMATED COUNT: 14.5 % (ref 12.3–15.4)
EST. GFR  (NON AFRICAN AMERICAN): 89 ML/MIN/1.73
GLOBULIN SER CALC-MCNC: 2.4 G/DL (ref 1.5–4.5)
GLUCOSE SERPL-MCNC: 100 MG/DL (ref 65–99)
HCT VFR BLD AUTO: 41.4 % (ref 37.5–51)
HGB BLD-MCNC: 13.2 G/DL (ref 13–17.7)
IMM GRANULOCYTES # BLD: 0 X10E3/UL (ref 0–0.1)
IMM GRANULOCYTES NFR BLD: 0 %
LYMPHOCYTES # BLD AUTO: 1.1 X10E3/UL (ref 0.7–3.1)
LYMPHOCYTES NFR BLD AUTO: 24 %
MCH RBC QN AUTO: 29.4 PG (ref 26.6–33)
MCHC RBC AUTO-ENTMCNC: 31.9 G/DL (ref 31.5–35.7)
MCV RBC AUTO: 92 FL (ref 79–97)
MONOCYTES # BLD AUTO: 0.5 X10E3/UL (ref 0.1–0.9)
MONOCYTES NFR BLD AUTO: 10 %
NEUTROPHILS # BLD AUTO: 2.7 X10E3/UL (ref 1.4–7)
NEUTROPHILS NFR BLD AUTO: 59 %
PLATELET # BLD AUTO: 243 X10E3/UL (ref 150–379)
POTASSIUM SERPL-SCNC: 4.9 MMOL/L (ref 3.5–5.2)
PROT SERPL-MCNC: 6.4 G/DL (ref 6–8.5)
RBC # BLD AUTO: 4.49 X10E6/UL (ref 4.14–5.8)
SODIUM SERPL-SCNC: 143 MMOL/L (ref 134–144)
WBC # BLD AUTO: 4.5 X10E3/UL (ref 3.4–10.8)

## 2018-09-25 ENCOUNTER — OFFICE VISIT (OUTPATIENT)
Dept: HEMATOLOGY/ONCOLOGY | Facility: CLINIC | Age: 68
End: 2018-09-25
Payer: MEDICARE

## 2018-09-25 VITALS
TEMPERATURE: 98 F | SYSTOLIC BLOOD PRESSURE: 120 MMHG | BODY MASS INDEX: 37.3 KG/M2 | HEART RATE: 61 BPM | HEIGHT: 68 IN | DIASTOLIC BLOOD PRESSURE: 66 MMHG | WEIGHT: 246.13 LBS

## 2018-09-25 DIAGNOSIS — C83.31 DIFFUSE LARGE B-CELL LYMPHOMA OF LYMPH NODES OF NECK: ICD-10-CM

## 2018-09-25 DIAGNOSIS — I82.B12 THROMBOSIS OF LEFT SUBCLAVIAN VEIN: ICD-10-CM

## 2018-09-25 DIAGNOSIS — R59.0 CERVICAL LYMPHADENOPATHY: ICD-10-CM

## 2018-09-25 DIAGNOSIS — C61 MALIGNANT NEOPLASM OF PROSTATE: ICD-10-CM

## 2018-09-25 DIAGNOSIS — C85.91 NON-HODGKIN LYMPHOMA OF LYMPH NODES OF NECK, UNSPECIFIED NON-HODGKIN LYMPHOMA TYPE: Primary | ICD-10-CM

## 2018-09-25 PROCEDURE — 1101F PT FALLS ASSESS-DOCD LE1/YR: CPT | Mod: ,,, | Performed by: INTERNAL MEDICINE

## 2018-09-25 PROCEDURE — 3074F SYST BP LT 130 MM HG: CPT | Mod: ,,, | Performed by: INTERNAL MEDICINE

## 2018-09-25 PROCEDURE — 99214 OFFICE O/P EST MOD 30 MIN: CPT | Mod: ,,, | Performed by: INTERNAL MEDICINE

## 2018-09-25 PROCEDURE — 3078F DIAST BP <80 MM HG: CPT | Mod: ,,, | Performed by: INTERNAL MEDICINE

## 2018-09-25 RX ORDER — HEPARIN 100 UNIT/ML
500 SYRINGE INTRAVENOUS
Status: CANCELLED | OUTPATIENT
Start: 2018-09-25

## 2018-09-25 RX ORDER — SODIUM CHLORIDE 0.9 % (FLUSH) 0.9 %
10 SYRINGE (ML) INJECTION
Status: CANCELLED | OUTPATIENT
Start: 2018-09-25

## 2018-09-25 NOTE — LETTER
September 25, 2018      Ronny Driscoll MD  [address]  [address]  [place] [address]           [facility]  [address]  [address]  [place] [address]  Phone: [phone]  Fax: [phone]          Patient: Naresh Painting   MR Number: [id number]   YOB: 1950   Date of Visit: 9/25/2018       Dear Dr. Ronny Driscoll:    Thank you for referring Naresh Painting to me for evaluation. Attached you will find relevant portions of my assessment and plan of care.    If you have questions, please do not hesitate to call me. I look forward to following Naresh Painting along with you.    Sincerely,    Fidencio Rdz MD    Enclosure  CC:  No Recipients    If you would like to receive this communication electronically, please contact [email or web address] or [phone] to request more information on [employer] access.    For providers and/or their staff who would like to refer a patient to [facility], please contact us through our one-stop-shop provider referral line, [facility], at [phone].    If you feel you have received this communication in error or would no longer like to receive these types of communications, please e-mail [email or web address]

## 2018-09-25 NOTE — PROGRESS NOTES
"   [facility] Hematology/Oncology  PROGRESS NOTE      Subjective:       Patient ID:   NAME: Naresh Painting : 1950     68 y.o. male    Referring Doc: Yaimla  Other Physicians: Adriano Michaud Pernenkil, Saba    Chief Complaint:  NHL f/u    History of Present Illness:     Patient returns today for a regularly scheduled follow-up visit.  The patient is here with his wife.  No CP, SOB, HA's or N/V. He had his last chemotherapy on  through . He last saw Dr Basilio in 2018 and plans to see him again in six months. He had PET on 2018. He is feeling "good".         ROS:   GEN: normal without any fever, night sweats or weight loss  HEENT: normal with no HA's, sore throat, stiff neck, changes in vision  CV: normal with no CP, SOB, PND, COOPER or orthopnea  PULM: normal with no SOB, cough, hemoptysis, sputum or pleuritic pain  GI: normal with no abdominal pain, nausea, vomiting, , diarrhea, melanotic stools, BRBPR, or hematemesis;  : normal with no hematuria, dysuria  BREAST: normal with no mass, discharge, pain  SKIN: normal with no rash, erythema, bruising, or swelling    Allergies:  Review of patient's allergies indicates:  No Known Allergies    Medications:    Current Outpatient Medications:     apixaban (ELIQUIS) 5 mg Tab, Take 1 tablet (5 mg total) by mouth 2 (two) times daily., Disp: 60 tablet, Rfl: 6    aspirin 81 MG Chew, Take 81 mg by mouth once daily., Disp: , Rfl:     co-enzyme Q-10 30 mg capsule, Take 30 mg by mouth 3 (three) times daily., Disp: , Rfl:     L GASSERI/B BIFIDUM/B LONGUM (Micropelt ORAL), Take by mouth., Disp: , Rfl:     leucovorin (WELLCOVORIN) 5 mg Tab, , Disp: , Rfl: 0    MULTIVITAMIN/IRON/FOLIC ACID (CENTRUM COMPLETE ORAL), Take by mouth., Disp: , Rfl:     pantoprazole (PROTONIX) 40 MG tablet, Take 40 mg by mouth once daily., Disp: , Rfl:     potassium acetate liquid 5 mEq / 5ml Liqd, Take by mouth., Disp: , Rfl:     TOPROL XL 50 mg 24 hr tablet, Take 25 " "mg by mouth once daily., Disp: , Rfl:     vitamin D 1000 units Tab, Take 185 mg by mouth once daily., Disp: , Rfl:     vitamin E 100 UNIT capsule, Take 100 Units by mouth once daily., Disp: , Rfl:     Current Facility-Administered Medications:     leuprolide (6 month) SyKt 45 mg, 45 mg, Intramuscular, Q6 Months, Fauzia Maza, ROSEY, 45 mg at 08/24/18 1508    PMHx/PSHx Updates:  See patient's last visit with me on 8/14/2018.  See H&P on 6/9/2017        Pathology:  See path 9/13/2017          Objective:     Vitals:  Blood pressure 120/66, pulse 61, temperature 97.6 °F (36.4 °C), height 5' 8" (1.727 m), weight 111.6 kg (246 lb 1.6 oz).    Physical Examination:   GEN: no apparent distress, comfortable; AAOx3  HEAD: atraumatic and normocephalic  EYES: no pallor, no icterus, PERRLA  ENT: OMM, no pharyngeal erythema, external ears WNL; no nasal discharge; no thrush  NECK: no masses, thyroid normal, trachea midline,  LAD/LN's, supple; resolved neck LAD; left looks good  CV: RRR with no murmur; normal pulse; normal S1 and S2; no pedal edema  CHEST: Normal respiratory effort; CTAB; normal breath sounds; no wheeze or crackles  ABDOM: nontender and nondistended; soft; normal bowel sounds; no rebound/guardingp; vertical wound healed well  MUSC/Skeletal: ROM normal; no crepitus; joints normal; no deformities or arthropathy  EXTREM: no clubbing, cyanosis, inflammation or swelling  SKIN: no rashes, lesions, ulcers, petechiae or subcutaneous nodules  : no cannon  NEURO: grossly intact; motor/sensory WNL; AAOx3; no tremors  PSYCH: normal mood, affect and behavior  LYMPH: normal cervical, supraclavicular, axillary and groin LN's            Labs:     9/17/2018  Lab Results   Component Value Date    WBC 4.5 09/17/2018    HGB 13.2 09/17/2018    HCT 41.4 09/17/2018    MCV 92 09/17/2018     09/17/2018     BMP  Lab Results   Component Value Date     09/17/2018    K 4.9 09/17/2018     09/17/2018    CO2 21 09/17/2018 "    BUN 11 09/17/2018    CREATININE 0.84 09/17/2018    CALCIUM 9.2 09/17/2018    ESTGFRAFRICA >60.0 11/25/2014    EGFRNONAA 89 09/17/2018     Lab Results   Component Value Date    ALT 14 09/17/2018    AST 17 09/17/2018    ALKPHOS 71 09/17/2018    BILITOT 0.4 09/17/2018           Radiology/Diagnostic Studies:    PET 8/20/2018:  IMPRESSION:    1. Increased FDG uptake involving the anterolateral left sixth rib could be related to nondisplaced fracture here. Correlate with focal tenderness and any history of trauma. (he had a fall)  2. Otherwise, there is evidence of FDG avid malignancy.  3. Persistent enlarged left supraclavicular lymph node, stable from prior.  4. Improvement of previously seen reactive marrow.  5. Prostate gland is surgically absent.        Assessment/Plan:   (1) 68 y.o. male with diagnosis of development of right sided neck swelling since Feb 2017  - he has been referred by Dr Michaud with ID for an evaluation  - i spoke to Dr Michaud previously about the patient peripherally  - FNA bx on 2/24 which was nondiagnostic  - he had a  guided biopsy of an entire left supraclavicular LN on 3/6/2017 with Dr Alexander Oh which showed  necrotizing granulomatous lymphadenitis  - CT scans were from Feb 2017  - flow cytometry studies from 5/25 appear to have been negative for any abnormal cell populations  - PET scan on 7/6/17 with markedly hypermetabolic LAD  - he was referred to and seen by Dr Basilio at [facility] on 7/28 and again on 8/25  - he underwent repeat cervical LN biopsy with Dr Oh on 9/13/2017 and that 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)  - he saw Dr Basilio again at [facility] and had bone marrow biopsy on 10/3/17  - the bone marrow showed no involvement of the lymphoma  - he received R-CHOP and then proceed with a HD-MTX regimen and Intra-thecal MTX at [facility]    - he has been on HD-MTX chemotherapy at [facility] as of lately  - he completed last chemotherapy from June 18th  through 21st  - he required neupogen the last week of June  - he last saw Dr Basilio in July 2018 and he had a PET scan on 8/20/2018; he sees Dr Basilio again in 6 months      (2) Hx/of prostate cancer - 1995; s/p prostatectomy followed by XRT; on lupron per Dr Menjivar with  and Fauzia Maza (NP)     (3) JAK - uses CPAP     (4) GERD     (5) Myocardial bridging congenital disorder - followed by Dr Santiago with cardiology   - recent echo with good EF at 60%     (6) recent subclavian vein thrombosis - on eliquis 5mg po bid     (7) WBC WNL currently     (8) prior incarcerated hernia surgery with Dr Ellis on 4/4/18      1. Non-Hodgkin lymphoma of lymph nodes of neck, unspecified non-Hodgkin lymphoma type     2. Thrombosis of left subclavian vein     3. Malignant neoplasm of prostate     4. Diffuse large B-cell lymphoma of lymph nodes of neck     5. Cervical lymphadenopathy         PLAN:  1. F/u with Dr Basilio at [facility] in 6 months as planned  2. Check labs q 3 months; PF q 4-6 weeks  3. He is now off eliquis  4. Set up PET scan in 6 months  5.  RTC in 4-6 weeks  Fax note to Ronny Driscoll MD, Adriano Michaud, and  Chetna    Discussion:     I have explained all of the above in detail and the patient understands all of the current recommendation(s). I have answered all of their questions to the best of my ability and to their complete satisfaction.   The patient is to continue with the current management plan.            Electronically signed by Fidencio Rdz MD

## 2018-10-01 ENCOUNTER — OFFICE VISIT (OUTPATIENT)
Dept: FAMILY MEDICINE | Facility: CLINIC | Age: 68
End: 2018-10-01
Payer: MEDICARE

## 2018-10-01 VITALS
SYSTOLIC BLOOD PRESSURE: 114 MMHG | DIASTOLIC BLOOD PRESSURE: 85 MMHG | HEART RATE: 62 BPM | WEIGHT: 245 LBS | HEIGHT: 68 IN | BODY MASS INDEX: 37.13 KG/M2

## 2018-10-01 DIAGNOSIS — I10 BENIGN ESSENTIAL HYPERTENSION: Primary | ICD-10-CM

## 2018-10-01 DIAGNOSIS — E78.2 MULTIPLE-TYPE HYPERLIPIDEMIA: ICD-10-CM

## 2018-10-01 DIAGNOSIS — Z23 INFLUENZA VACCINE ADMINISTERED: ICD-10-CM

## 2018-10-01 PROCEDURE — 99213 OFFICE O/P EST LOW 20 MIN: CPT | Mod: 25,,, | Performed by: INTERNAL MEDICINE

## 2018-10-01 PROCEDURE — 90662 IIV NO PRSV INCREASED AG IM: CPT | Mod: ,,, | Performed by: INTERNAL MEDICINE

## 2018-10-01 PROCEDURE — 1101F PT FALLS ASSESS-DOCD LE1/YR: CPT | Mod: ,,, | Performed by: INTERNAL MEDICINE

## 2018-10-01 PROCEDURE — 3074F SYST BP LT 130 MM HG: CPT | Mod: ,,, | Performed by: INTERNAL MEDICINE

## 2018-10-01 PROCEDURE — 3079F DIAST BP 80-89 MM HG: CPT | Mod: ,,, | Performed by: INTERNAL MEDICINE

## 2018-10-01 PROCEDURE — G0008 ADMIN INFLUENZA VIRUS VAC: HCPCS | Mod: ,,, | Performed by: INTERNAL MEDICINE

## 2018-10-01 NOTE — PATIENT INSTRUCTIONS
Taking Your Blood Pressure  Blood pressure is the force of blood against the artery wall as it moves from the heart through the blood vessels. You can take your own blood pressure reading using a digital monitor. Take your readings the same each time, using the same arm. Take readings as often as your healthcare provider instructs.  About blood pressure monitors  Blood pressure monitors are designed for certain ages and cases. You can find monitors for older adults, for pregnant women, and for children. Make sure the one you choose is the right one for your age and situation.  The American Heart Association recommends an automatic cuff monitor that fits on your upper arm (bicep). The cuff should fit your arm size. A cuff thats too large or too small will not give an accurate reading. Measure around your upper arm to find your size.  Monitors that attach to your finger or wrist are not as accurate as monitors for your upper arm.  Ask your healthcare provider for help in choosing a monitor. Bring your monitor to your next provider visit if you need help in using it the correct way.  The steps below are general instructions for using an automatic digital monitor.  Step 1. Relax    · Take your blood pressure at the same time every day, such as in the morning or evening, or at the time your healthcare provider recommends.  · Wait at least a half-hour after smoking, eating, or exercising. Don't drink coffee, tea, soda, or other caffeinated beverages before checking your blood pressure.  · Sit comfortably at a table with both feet on the floor. Do not cross your legs or feet. Place the monitor near you.  · Rest for a few minutes before you begin.  Step 2. Wrap the cuff    · Place your arm on the table, palm up. Your arm should be at the level of your heart. Wrap the cuff around your upper arm, just above your elbow. Its best done on bare skin, not over clothing. Most cuffs will indicate where the brachial artery (the  blood vessel in the middle of the arm at the inner side of the elbow) should line up with the cuff. Look in your monitor's instruction booklet for an illustration. You can also bring your cuff to your healthcare provider and have them show you how to correctly place the cuff.  Step 3. Inflate the cuff    · Push the button that starts the pump.  · The cuff will tighten, then loosen.  · The numbers will change. When they stop changing, your blood pressure reading will appear.  · Take 2 or 3 readings one minute apart.  Step 4. Write down the results of each reading    · Write down your blood pressure numbers for each reading. Note the date and time. Keep your results in one place, such as a notebook. Even if your monitor has a built-in memory, keep a hard copy of the readings.  · Remove the cuff from your arm. Turn off the machine.  · Bring your blood pressure records with your healthcare providers at each visit.  · If you start a new blood pressure medicine, note the day you started the new medicine. Also note the day if you change the dose of your medicine. This information goes on your blood pressure recording sheet. This will help your healthcare provider monitor how well the medicine changes are working.  · Ask your healthcare provider what numbers should prompt you to call him or her. Also ask what numbers should prompt you to get help right away.  Date Last Reviewed: 11/1/2016  © 9309-1916 [employer] [facility]. [address] [place] [address], [place] [address]. All rights reserved. This information is not intended as a substitute for professional medical care. Always follow your healthcare professional's instructions.

## 2018-10-01 NOTE — PROGRESS NOTES
Subjective:       Patient ID: Naresh Painting is a 68 y.o. male.    Chief Complaint: Hypertension and Hyperlipidemia    The symptoms comes for follow-up.      Hypertension   This is a chronic problem. The current episode started more than 1 year ago. The problem is controlled. Associated symptoms include malaise/fatigue. Pertinent negatives include no chest pain, neck pain, palpitations or shortness of breath. Risk factors for coronary artery disease include sedentary lifestyle, male gender and obesity. Past treatments include beta blockers. There is no history of coarctation of the aorta, hyperaldosteronism, pheochromocytoma or renovascular disease.   Hyperlipidemia   This is a chronic problem. The current episode started more than 1 year ago. The problem is controlled. Exacerbating diseases include obesity. Pertinent negatives include no chest pain or shortness of breath.     Patient's history of non-Hodgkin's lymphoma and subsequent chemotherapy also has been noted. His neck lymphadenopathy has significantly subsided. Overall he feels better. In the interim he also had developed a blood clot in the left arm for which she was treated for a shot. Of time with anticoagulation. He is currently off the anticoagulation.  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     Social History     Socioeconomic History    Marital status:      Spouse name: Not on file    Number of children: 2    Years of education: Not on file    Highest education level: Not on file   Social Needs     Financial resource strain: Not on file    Food insecurity - worry: Not on file    Food insecurity - inability: Not on file    Transportation needs - medical: Not on file    Transportation needs - non-medical: Not on file   Occupational History    Occupation: Retired      Employer: [employer]     Comment: Meat Dept- 3 yrs    Occupation:      Employer: [employer]     Comment: 26 yrs    Occupation: Reproduction     Employer: [employer]     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 Son's name is Medina     Past Surgical History:   Procedure Laterality Date    EYE SURGERY      LYMPH NODE BIOPSY      PROCTECTOMY      PROSTATE SURGERY      UMBILICAL HERNIA REPAIR  04/05/2018    Incarcerated-      Family History   Problem Relation Age of Onset    Heart disease Mother     Diabetes Father        Review of Systems   Constitutional: Positive for malaise/fatigue. Negative for activity change, appetite change and fatigue. Unexpected weight change: lost 28 lbs. but no further major loss.   HENT: Negative for congestion, sneezing, sore throat and trouble swallowing.    Eyes: Negative for pain and visual disturbance.        Left eye Glass eye prosthesis   Respiratory: Negative for cough, chest tightness and shortness of breath.    Cardiovascular: Negative for chest pain, palpitations and leg swelling.        H/O Left upper extremity deep vein thrombosis. Treated with anticoagulation   Gastrointestinal: Negative for abdominal distention, abdominal pain, blood in stool, constipation and diarrhea.   Endocrine: Negative for cold intolerance, heat intolerance,  "polydipsia, polyphagia and polyuria.   Genitourinary: Negative for dysuria, hematuria and scrotal swelling.   Musculoskeletal: Negative for arthralgias, back pain, gait problem and neck pain.   Skin: Negative for pallor, rash and wound.        Patient's has lumps in the neck which have now been diagnosed to be lymphoma.   Allergic/Immunologic: Negative for environmental allergies, food allergies and immunocompromised state.   Neurological: Negative for dizziness, seizures, speech difficulty, light-headedness and numbness.        Patient has numbness in the left hand fingers. The numbness seems to be getting better gradually.   Hematological: Positive for adenopathy. Does not bruise/bleed easily.        Diagnosis of NHL under chemotherapy. Significant reduction in the size of lymphadenopathy.   Psychiatric/Behavioral: Negative for agitation, behavioral problems and confusion. The patient is not nervous/anxious.          Objective:      Blood pressure 114/85, pulse 62, height 5' 8" (1.727 m), weight 111.1 kg (245 lb). Body mass index is 37.25 kg/m².  Physical Exam   Constitutional: He appears well-developed. No distress.   BMI is 37   HENT:   Head: Normocephalic and atraumatic.   Nose: Nose normal.   Mouth/Throat: Oropharynx is clear and moist. No oropharyngeal exudate.   Eyes: Conjunctivae and EOM are normal. Right eye exhibits no discharge and no exudate. Right conjunctiva is not injected. Right conjunctiva has no hemorrhage.   Left eye is prosthetic.   Neck: Normal range of motion. Neck supple. No JVD present. No neck rigidity. No tracheal deviation and normal range of motion present. No thyromegaly present.       The lymph nodes on right and left side have dramatically shrunken now. A small lymph node is felt on the right side which is firm, nontender and adherent to the underlying structures.   Cardiovascular: Normal rate, regular rhythm and normal heart sounds. Exam reveals no gallop and no friction rub.   No " murmur heard.  Pulmonary/Chest: Effort normal and breath sounds normal. No respiratory distress. He has no wheezes. He has no rales.   Abdominal: Soft. Bowel sounds are normal. He exhibits no distension. There is no tenderness.   Musculoskeletal: He exhibits edema (1 +). He exhibits no tenderness or deformity.   Examination of the neck does not reveal any deformity.   Lymphadenopathy:     He has cervical adenopathy.        Right cervical: Deep cervical adenopathy present.     He has no axillary adenopathy.   Right-sided cervical lymphadenopathy. No left-sided lymphadenopathy. No axillary lymphadenopathy.   Neurological: He is alert. He has normal reflexes.   Skin: Skin is warm and dry.   Psychiatric: He has a normal mood and affect. His mood appears not anxious.   Patient appears to be happier and less anxious today.   Nursing note and vitals reviewed.        Assessment:       1. Benign essential hypertension    2. Multiple-type hyperlipidemia    3. Influenza vaccine administered           Pt history of NHL has been noted. He is also to get Lupron for history of prostate cancer. He sees Dr. Fauzia Maza for the same.      Plan:           Benign essential hypertension    Multiple-type hyperlipidemia    Influenza vaccine administered  -     Influenza - High Dose (65+) (PF) (IM)      Patient has been advised to watch diet and exercise. Avoid fried and fatty food. Compliance to medications and follow up urged.    The patient is asked to make an attempt to improve diet and exercise patterns to aid in medical management of this problem.    I'm tracking his improvement with non-Hodgkin's lymphoma.    Current Outpatient Medications:     aspirin 81 MG Chew, Take 81 mg by mouth once daily., Disp: , Rfl:     co-enzyme Q-10 30 mg capsule, Take 30 mg by mouth 3 (three) times daily., Disp: , Rfl:     L GASSERI/B BIFIDUM/B LONGUM ([employer] ORAL), Take by mouth., Disp: , Rfl:     MULTIVITAMIN/IRON/FOLIC ACID  (CENTRUM COMPLETE ORAL), Take by mouth., Disp: , Rfl:     pantoprazole (PROTONIX) 40 MG tablet, Take 40 mg by mouth once daily., Disp: , Rfl:     potassium acetate liquid 5 mEq / 5ml Liqd, Take by mouth., Disp: , Rfl:     TOPROL XL 50 mg 24 hr tablet, Take 25 mg by mouth once daily., Disp: , Rfl:     vitamin D 1000 units Tab, Take 185 mg by mouth once daily., Disp: , Rfl:     vitamin E 100 UNIT capsule, Take 100 Units by mouth once daily., Disp: , Rfl:     Current Facility-Administered Medications:     leuprolide (6 month) SyKt 45 mg, 45 mg, Intramuscular, Q6 Months, Fauzia Maza NP, 45 mg at 08/24/18 3127

## 2018-11-13 RX ORDER — SODIUM CHLORIDE 0.9 % (FLUSH) 0.9 %
10 SYRINGE (ML) INJECTION
Status: CANCELLED | OUTPATIENT
Start: 2018-11-13

## 2018-11-13 RX ORDER — HEPARIN 100 UNIT/ML
500 SYRINGE INTRAVENOUS
Status: CANCELLED | OUTPATIENT
Start: 2018-11-13

## 2018-12-18 RX ORDER — SODIUM CHLORIDE 0.9 % (FLUSH) 0.9 %
10 SYRINGE (ML) INJECTION
Status: CANCELLED | OUTPATIENT
Start: 2018-12-18

## 2018-12-18 RX ORDER — HEPARIN 100 UNIT/ML
500 SYRINGE INTRAVENOUS
Status: CANCELLED | OUTPATIENT
Start: 2018-12-18

## 2018-12-20 ENCOUNTER — OFFICE VISIT (OUTPATIENT)
Dept: HEMATOLOGY/ONCOLOGY | Facility: CLINIC | Age: 68
End: 2018-12-20
Payer: MEDICARE

## 2018-12-20 VITALS
HEART RATE: 72 BPM | WEIGHT: 247.63 LBS | RESPIRATION RATE: 20 BRPM | TEMPERATURE: 98 F | SYSTOLIC BLOOD PRESSURE: 145 MMHG | DIASTOLIC BLOOD PRESSURE: 85 MMHG | BODY MASS INDEX: 37.65 KG/M2

## 2018-12-20 DIAGNOSIS — C85.91 NON-HODGKIN LYMPHOMA OF LYMPH NODES OF NECK, UNSPECIFIED NON-HODGKIN LYMPHOMA TYPE: Primary | ICD-10-CM

## 2018-12-20 DIAGNOSIS — C61 MALIGNANT NEOPLASM OF PROSTATE: ICD-10-CM

## 2018-12-20 DIAGNOSIS — B36.9 FUNGAL RASH OF TRUNK: ICD-10-CM

## 2018-12-20 DIAGNOSIS — I82.B12 THROMBOSIS OF LEFT SUBCLAVIAN VEIN: ICD-10-CM

## 2018-12-20 DIAGNOSIS — C83.31 DIFFUSE LARGE B-CELL LYMPHOMA OF LYMPH NODES OF NECK: ICD-10-CM

## 2018-12-20 DIAGNOSIS — R59.0 CERVICAL LYMPHADENOPATHY: ICD-10-CM

## 2018-12-20 PROCEDURE — 1101F PT FALLS ASSESS-DOCD LE1/YR: CPT | Mod: ,,, | Performed by: INTERNAL MEDICINE

## 2018-12-20 PROCEDURE — 3079F DIAST BP 80-89 MM HG: CPT | Mod: ,,, | Performed by: INTERNAL MEDICINE

## 2018-12-20 PROCEDURE — 3077F SYST BP >= 140 MM HG: CPT | Mod: ,,, | Performed by: INTERNAL MEDICINE

## 2018-12-20 PROCEDURE — 99214 OFFICE O/P EST MOD 30 MIN: CPT | Mod: ,,, | Performed by: INTERNAL MEDICINE

## 2018-12-20 RX ORDER — NYSTATIN 100000 [USP'U]/G
POWDER TOPICAL
Refills: 0 | COMMUNITY
Start: 2018-10-29 | End: 2019-02-13 | Stop reason: SDUPTHER

## 2018-12-20 RX ORDER — FLUCONAZOLE 100 MG/1
100 TABLET ORAL DAILY
Qty: 14 TABLET | Refills: 0 | Status: SHIPPED | OUTPATIENT
Start: 2018-12-20 | End: 2019-01-03

## 2018-12-20 NOTE — LETTER
December 20, 2018      Ronny Driscoll MD  [address]  [address]  [place] [address]           [facility] - Hematology Oncology  [address]  [address]  [place] [address]  Phone: [phone]  Fax: [phone]          Patient: Naresh Painting   MR Number: [id number]   YOB: 1950   Date of Visit: 12/20/2018       Dear Dr. Ronny Driscoll:    Thank you for referring Naresh Painting to me for evaluation. Attached you will find relevant portions of my assessment and plan of care.    If you have questions, please do not hesitate to call me. I look forward to following Naresh Painting along with you.    Sincerely,    Fidencio Rdz MD    Enclosure  CC:  No Recipients    If you would like to receive this communication electronically, please contact [email or web address] or [phone] to request more information on [employer] access.    For providers and/or their staff who would like to refer a patient to [facility], please contact us through our one-stop-shop provider referral line, [facility], at [phone].    If you feel you have received this communication in error or would no longer like to receive these types of communications, please e-mail [email or web address]

## 2018-12-20 NOTE — PROGRESS NOTES
"   [facility] Hematology/Oncology  PROGRESS NOTE      Subjective:       Patient ID:   NAME: Naresh Painting : 1950     68 y.o. male    Referring Doc: Yamila  Other Physicians: Adriano Michaud Pernenkil, Saba    Chief Complaint:  NHL f/u    History of Present Illness:     Patient returns today for a regularly scheduled follow-up visit.  The patient is here with his wife.  No CP, SOB, HA's or N/V. He previously had his last chemotherapy on  through . He last saw Dr Basilio in 2018 and plans to see him again in  on the . He had PET on 2018. He is feeling "good". He had a prior rash under the right arm for which he is Nystop cream.         ROS:   GEN: normal without any fever, night sweats or weight loss  HEENT: normal with no HA's, sore throat, stiff neck, changes in vision  CV: normal with no CP, SOB, PND, COOPER or orthopnea  PULM: normal with no SOB, cough, hemoptysis, sputum or pleuritic pain  GI: normal with no abdominal pain, nausea, vomiting, , diarrhea, melanotic stools, BRBPR, or hematemesis;  : normal with no hematuria, dysuria  BREAST: normal with no mass, discharge, pain  SKIN: rash - looks fungal under right arm    Allergies:  Review of patient's allergies indicates:  No Known Allergies    Medications:    Current Outpatient Medications:     aspirin 81 MG Chew, Take 81 mg by mouth once daily., Disp: , Rfl:     co-enzyme Q-10 30 mg capsule, Take 30 mg by mouth 3 (three) times daily., Disp: , Rfl:     L GASSERI/B BIFIDUM/B LONGUM ([employer] ORAL), Take by mouth., Disp: , Rfl:     MULTIVITAMIN/IRON/FOLIC ACID (CENTRUM COMPLETE ORAL), Take by mouth., Disp: , Rfl:     NYSTOP powder, MIX SMALL QUANTITY WITH GRISELDA CREAM AND APPLY TO DRY SKIN TID FOR A WEEK, Disp: , Rfl: 0    pantoprazole (PROTONIX) 40 MG tablet, Take 40 mg by mouth once daily., Disp: , Rfl:     potassium acetate liquid 5 mEq / 5ml Liqd, Take by mouth., Disp: , Rfl:     TOPROL XL 50 mg 24 hr tablet, " Take 25 mg by mouth once daily., Disp: , Rfl:     vitamin D 1000 units Tab, Take 185 mg by mouth once daily., Disp: , Rfl:     vitamin E 100 UNIT capsule, Take 100 Units by mouth once daily., Disp: , Rfl:     Current Facility-Administered Medications:     leuprolide (6 month) SyKt 45 mg, 45 mg, Intramuscular, Q6 Months, Fauzia Maza, ROSEY, 45 mg at 08/24/18 1508    PMHx/PSHx Updates:  See patient's last visit with me on 8/14/2018.  See H&P on 6/9/2017        Pathology:  See path 9/13/2017          Objective:     Vitals:  Blood pressure (!) 145/85, pulse 72, temperature 98.1 °F (36.7 °C), resp. rate 20, weight 112.3 kg (247 lb 9.6 oz).    Physical Examination:   GEN: no apparent distress, comfortable; AAOx3  HEAD: atraumatic and normocephalic  EYES: no pallor, no icterus, PERRLA  ENT: OMM, no pharyngeal erythema, external ears WNL; no nasal discharge; no thrush  NECK: no masses, thyroid normal, trachea midline,  LAD/LN's, supple; resolved neck LAD; left looks good  CV: RRR with no murmur; normal pulse; normal S1 and S2; no pedal edema  CHEST: Normal respiratory effort; CTAB; normal breath sounds; no wheeze or crackles  ABDOM: nontender and nondistended; soft; normal bowel sounds; no rebound/guardingp; vertical wound healed well  MUSC/Skeletal: ROM normal; no crepitus; joints normal; no deformities or arthropathy  EXTREM: no clubbing, cyanosis, inflammation or swelling  SKIN: no lesions, ulcers, petechiae or subcutaneous nodules; fungal like rash under right axilla  : no cannon  NEURO: grossly intact; motor/sensory WNL; AAOx3; no tremors  PSYCH: normal mood, affect and behavior  LYMPH: normal cervical, supraclavicular, axillary and groin LN's            Labs:     12/17/2018  Lab Results   Component Value Date    WBC 5.4 12/17/2018    HGB 12.2 (L) 12/17/2018    HCT 37.7 12/17/2018    MCV 87 12/17/2018     12/17/2018     BMP  Lab Results   Component Value Date     12/17/2018    K 4.4 12/17/2018    CL  104 12/17/2018    CO2 20 12/17/2018    BUN 15 12/17/2018    CREATININE 1.00 12/17/2018    CALCIUM 8.9 12/17/2018    ESTGFRAFRICA >60.0 11/25/2014    EGFRNONAA 76 12/17/2018     Lab Results   Component Value Date    ALT 12 12/17/2018    AST 20 12/17/2018    ALKPHOS 84 12/17/2018    BILITOT 0.5 12/17/2018           Radiology/Diagnostic Studies:    PET 8/20/2018:  IMPRESSION:    1. Increased FDG uptake involving the anterolateral left sixth rib could be related to nondisplaced fracture here. Correlate with focal tenderness and any history of trauma. (he had a fall)  2. Otherwise, there is evidence of FDG avid malignancy.  3. Persistent enlarged left supraclavicular lymph node, stable from prior.  4. Improvement of previously seen reactive marrow.  5. Prostate gland is surgically absent.        Assessment/Plan:   (1) 68 y.o. male with diagnosis of development of right sided neck swelling since Feb 2017  - he has been referred by Dr Michaud with ID for an evaluation  - i spoke to Dr Michaud previously about the patient peripherally  - FNA bx on 2/24 which was nondiagnostic  - he had a  guided biopsy of an entire left supraclavicular LN on 3/6/2017 with Dr Alexander Oh which showed  necrotizing granulomatous lymphadenitis  - CT scans were from Feb 2017  - flow cytometry studies from 5/25 appear to have been negative for any abnormal cell populations  - PET scan on 7/6/17 with markedly hypermetabolic LAD  - he was referred to and seen by Dr Basilio at [facility] on 7/28 and again on 8/25  - he underwent repeat cervical LN biopsy with Dr Oh on 9/13/2017 and that 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)  - he saw Dr Basilio again at [facility] and had bone marrow biopsy on 10/3/17  - the bone marrow showed no involvement of the lymphoma  - he received R-CHOP and then proceed with a HD-MTX regimen and Intra-thecal MTX at [facility]    - he has been on HD-MTX chemotherapy at [facility] as of lately  - he completed  last chemotherapy from June 18th through 21st  - he required neupogen the last week of June  - he last saw Dr Basilio in July 2018 and he had a PET scan on 8/20/2018; he sees Dr Basilio again in 6 months on Jan 12th 2019  - next PEt scheduled for March 2019      (2) Hx/of prostate cancer - 1995; s/p prostatectomy followed by XRT; on lupron per Dr Menjivar with  and Fauzia Maza (NP)     (3) JAK - uses CPAP     (4) GERD     (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 Non-Hodgkin lymphoma of lymph nodes of neck, unspecified non-Hodgkin lymphoma type     2. Thrombosis of left subclavian vein     3. Malignant neoplasm of prostate     4. Diffuse large B-cell lymphoma of lymph nodes of neck     5. Cervical lymphadenopathy         PLAN:  1. F/u with Dr Basilio at [facility] on Jan 12th 2019  2. Check labs q 3 months; PF q 4-6 weeks  3. He is now off eliquis  4. PET due in March 2019  5.  RTC in 4-6 weeks  6. Add diflucan for the rash  Fax note to Ronny Driscoll MD, Adriano Michaud, and  Chetna    Discussion:     I have explained all of the above in detail and the patient understands all of the current recommendation(s). I have answered all of their questions to the best of my ability and to their complete satisfaction.   The patient is to continue with the current management plan.            Electronically signed by Fidencio Rdz MD

## 2019-01-23 ENCOUNTER — OFFICE VISIT (OUTPATIENT)
Dept: FAMILY MEDICINE | Facility: CLINIC | Age: 69
End: 2019-01-23
Payer: MEDICARE

## 2019-01-23 VITALS
SYSTOLIC BLOOD PRESSURE: 138 MMHG | HEIGHT: 68 IN | TEMPERATURE: 98 F | BODY MASS INDEX: 36.37 KG/M2 | RESPIRATION RATE: 20 BRPM | DIASTOLIC BLOOD PRESSURE: 88 MMHG | WEIGHT: 240 LBS | HEART RATE: 108 BPM | OXYGEN SATURATION: 97 %

## 2019-01-23 DIAGNOSIS — I10 BENIGN ESSENTIAL HYPERTENSION: ICD-10-CM

## 2019-01-23 DIAGNOSIS — G47.33 OBSTRUCTIVE SLEEP APNEA: ICD-10-CM

## 2019-01-23 DIAGNOSIS — R06.00 DYSPNEA, UNSPECIFIED TYPE: ICD-10-CM

## 2019-01-23 DIAGNOSIS — R53.0 NEOPLASTIC MALIGNANT RELATED FATIGUE: Primary | ICD-10-CM

## 2019-01-23 DIAGNOSIS — C83.31 DIFFUSE LARGE B-CELL LYMPHOMA OF LYMPH NODES OF NECK: ICD-10-CM

## 2019-01-23 PROCEDURE — 3079F DIAST BP 80-89 MM HG: CPT | Mod: ,,, | Performed by: INTERNAL MEDICINE

## 2019-01-23 PROCEDURE — 99214 OFFICE O/P EST MOD 30 MIN: CPT | Mod: ,,, | Performed by: INTERNAL MEDICINE

## 2019-01-23 PROCEDURE — 1101F PR PT FALLS ASSESS DOC 0-1 FALLS W/OUT INJ PAST YR: ICD-10-PCS | Mod: ,,, | Performed by: INTERNAL MEDICINE

## 2019-01-23 PROCEDURE — 3079F PR MOST RECENT DIASTOLIC BLOOD PRESSURE 80-89 MM HG: ICD-10-PCS | Mod: ,,, | Performed by: INTERNAL MEDICINE

## 2019-01-23 PROCEDURE — 99214 PR OFFICE/OUTPT VISIT, EST, LEVL IV, 30-39 MIN: ICD-10-PCS | Mod: ,,, | Performed by: INTERNAL MEDICINE

## 2019-01-23 PROCEDURE — 3075F SYST BP GE 130 - 139MM HG: CPT | Mod: ,,, | Performed by: INTERNAL MEDICINE

## 2019-01-23 PROCEDURE — 1101F PT FALLS ASSESS-DOCD LE1/YR: CPT | Mod: ,,, | Performed by: INTERNAL MEDICINE

## 2019-01-23 PROCEDURE — 3075F PR MOST RECENT SYSTOLIC BLOOD PRESS GE 130-139MM HG: ICD-10-PCS | Mod: ,,, | Performed by: INTERNAL MEDICINE

## 2019-01-23 NOTE — PROGRESS NOTES
Subjective:       Patient ID: Naresh Painting is a 68 y.o. male.    Chief Complaint: Fatigue (upper resp ); Shortness of Breath; Lymphoma; Sleep Apnea; and Obesity    Mr. Steinberg comes for follow-up. He is accompanied with his wife. Wife is concerned about some upper respiratory symptoms, increasing sense of fatigue, feeling tired and somewhat short winded. History of non-Hodgkin's lymphoma of the neck nodes status post chemotherapy has been noted.      .1 Non-Hodgkin lymphoma of lymph nodes of neck, unspecified non-Hodgkin lymphoma type     2. Thrombosis of left subclavian vein     3. Malignant neoplasm of prostate     4. Diffuse large B-cell lymphoma of lymph nodes of neck     5. Cervical lymphadenopathy            Fatigue   This is a new problem. The current episode started more than 1 month ago. The problem occurs constantly. The problem has been waxing and waning. Associated symptoms include congestion, fatigue, a rash and swollen glands. Pertinent negatives include no abdominal pain, arthralgias, chest pain, coughing, fever, headaches, neck pain, numbness or sore throat. The symptoms are aggravated by exertion. He has tried rest and relaxation for the symptoms. The treatment provided mild relief.   Shortness of Breath   This is a new problem. The current episode started more than 1 month ago. The problem occurs intermittently. The problem has been waxing and waning. Associated symptoms include coryza, a rash and swollen glands. Pertinent negatives include no abdominal pain, chest pain, fever, headaches, hemoptysis, leg swelling, neck pain, sore throat or sputum production. The symptoms are aggravated by any activity. Risk factors: Lymphoma. The treatment provided mild relief. There is no history of aspirin allergies, asthma, COPD, DVT, PE, pneumonia or a recent surgery.       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    Hyperlipidemia     Hypertension     Lymphadenopathy     Lymphoma     JAK (obstructive sleep apnea)     Prostate CA     Prostate cancer     Subclavian vein thrombosis 9/26/2017     Social History     Socioeconomic History    Marital status:      Spouse name: Amita Painting    Number of children: 2    Years of education: Not on file    Highest education level: Not on file   Social Needs    Financial resource strain: Not on file    Food insecurity - worry: Not on file    Food insecurity - inability: Not on file    Transportation needs - medical: Not on file    Transportation needs - non-medical: Not on file   Occupational History    Occupation: Retired      Employer: [employer]     Comment: Meat Dept- 3 yrs    Occupation:      Employer: [employer]     Comment: 26 yrs    Occupation: Reproduction     Employer: [employer]     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 Son's name is Naresh-MICHELLE     Past Surgical History:   Procedure Laterality Date    EYE SURGERY      LYMPH NODE BIOPSY      PROCTECTOMY      PROSTATE SURGERY      UMBILICAL HERNIA REPAIR  04/05/2018    Incarcerated-      Family History   Problem Relation Age of Onset    Heart disease Mother     Diabetes Father        Review of Systems   Constitutional: Positive for activity change (slowed down), appetite change and fatigue. Negative for fever and unexpected weight change (Lost hemal 40 lbs since feb 2017).   HENT:  "Positive for congestion and postnasal drip. Negative for sneezing, sore throat and trouble swallowing.    Eyes: Negative for pain, discharge and visual disturbance.        Left eye Glass eye prosthesis   Respiratory: Positive for apnea (obstructive sleep apnea) and shortness of breath. Negative for cough, hemoptysis, sputum production and chest tightness.    Cardiovascular: Negative for chest pain, palpitations and leg swelling.        H/O Left upper extremity deep vein thrombosis. Treated with anticoagulation   Gastrointestinal: Negative for abdominal distention, abdominal pain, blood in stool, constipation and diarrhea.   Endocrine: Negative for cold intolerance, heat intolerance, polydipsia, polyphagia and polyuria.   Genitourinary: Negative for dysuria, hematuria and scrotal swelling.   Musculoskeletal: Negative for arthralgias, back pain, gait problem and neck pain.   Skin: Positive for pallor and rash. Negative for wound.        Patient's has lumps in the neck which have now been diagnosed to be lymphoma.   Allergic/Immunologic: Negative for environmental allergies, food allergies and immunocompromised state.   Neurological: Negative for dizziness, seizures, speech difficulty, light-headedness, numbness and headaches.        Patient has numbness in the left hand fingers. The numbness seems to be getting better gradually.   Hematological: Positive for adenopathy. Does not bruise/bleed easily.        Diagnosis of NHL under chemotherapy. Significant reduction in the size of lymphadenopathy.   Psychiatric/Behavioral: Negative for agitation, behavioral problems and confusion. The patient is not nervous/anxious.          Objective:      Blood pressure 138/88, pulse 108, temperature 98.3 °F (36.8 °C), resp. rate 20, height 5' 8" (1.727 m), weight 108.9 kg (240 lb), SpO2 97 %. Body mass index is 36.49 kg/m².  Physical Exam   Constitutional: He appears well-developed. No distress.   BMI is 36-somewhat chronically sick "   HENT:   Head: Normocephalic and atraumatic.   Nose: Nose normal.   Mouth/Throat: Oropharynx is clear and moist. No oropharyngeal exudate.   Eyes: Conjunctivae and EOM are normal. Right eye exhibits no discharge and no exudate. Right conjunctiva is not injected. Right conjunctiva has no hemorrhage.   Left eye is prosthetic.   Neck: Normal range of motion. Neck supple. No JVD present. No neck rigidity. No tracheal deviation and normal range of motion present. No thyromegaly present.       The lymph nodes on right and left side have dramatically shrunken now. A small lymph node is felt on the right side which is firm, nontender and adherent to the underlying structures.   Cardiovascular: Normal rate, regular rhythm and normal heart sounds. Exam reveals no gallop and no friction rub.   No murmur heard.  Pulmonary/Chest: Effort normal. No respiratory distress. He has decreased breath sounds in the right lower field and the left lower field. He has no wheezes. He has no rales.   Abdominal: Soft. Bowel sounds are normal. He exhibits no distension. There is no tenderness.   Musculoskeletal: He exhibits edema (1 +). He exhibits no tenderness or deformity.   Examination of the neck does not reveal any deformity.   Lymphadenopathy:     He has cervical adenopathy.        Right cervical: Deep cervical adenopathy present.     He has no axillary adenopathy.   Right-sided cervical lymphadenopathy. No left-sided lymphadenopathy. No axillary lymphadenopathy.   Neurological: He is alert. He has normal reflexes.   Skin: Skin is warm and dry.   Psychiatric: He has a normal mood and affect. His mood appears not anxious.   Patient appears to be happier and less anxious today.   Nursing note and vitals reviewed.        Assessment:       1. Neoplastic malignant related fatigue    2. Diffuse large B-cell lymphoma of lymph nodes of neck    3. Benign essential hypertension    4. Dyspnea, unspecified type    5. Obstructive sleep apnea            No visits with results within 3 Month(s) from this visit.   Latest known visit with results is:   Orders Only on 09/17/2018   Component Date Value Ref Range Status    WBC 09/17/2018 4.5  3.4 - 10.8 x10E3/uL Final    RBC 09/17/2018 4.49  4.14 - 5.80 x10E6/uL Final    Hemoglobin 09/17/2018 13.2  13.0 - 17.7 g/dL Final    Hematocrit 09/17/2018 41.4  37.5 - 51.0 % Final    MCV 09/17/2018 92  79 - 97 fL Final    MCH 09/17/2018 29.4  26.6 - 33.0 pg Final    MCHC 09/17/2018 31.9  31.5 - 35.7 g/dL Final    RDW 09/17/2018 14.5  12.3 - 15.4 % Final    Platelets 09/17/2018 243  150 - 379 x10E3/uL Final    Neutrophils 09/17/2018 59  Not Estab. % Final    Lymph% 09/17/2018 24  Not Estab. % Final    Mono% 09/17/2018 10  Not Estab. % Final    Eosinophil% 09/17/2018 6  Not Estab. % Final    Basophil% 09/17/2018 1  Not Estab. % Final    Neutrophils Absolute 09/17/2018 2.7  1.4 - 7.0 x10E3/uL Final    Lymph # 09/17/2018 1.1  0.7 - 3.1 x10E3/uL Final    Mono # 09/17/2018 0.5  0.1 - 0.9 x10E3/uL Final    Eos # 09/17/2018 0.3  0.0 - 0.4 x10E3/uL Final    Baso # 09/17/2018 0.0  0.0 - 0.2 x10E3/uL Final    Immature Granulocytes 09/17/2018 0  Not Estab. % Final    Immature Grans (Abs) 09/17/2018 0.0  0.0 - 0.1 x10E3/uL Final    Glucose 09/17/2018 100* 65 - 99 mg/dL Final    BUN, Bld 09/17/2018 11  8 - 27 mg/dL Final    Creatinine 09/17/2018 0.84  0.76 - 1.27 mg/dL Final    eGFR if non African American 09/17/2018 89  >59 mL/min/1.73 Final    eGFR if  09/17/2018 103  >59 mL/min/1.73 Final    BUN/Creatinine Ratio 09/17/2018 13  10 - 24 Final    Sodium 09/17/2018 143  134 - 144 mmol/L Final    Potassium 09/17/2018 4.9  3.5 - 5.2 mmol/L Final    Chloride 09/17/2018 105  96 - 106 mmol/L Final    CO2 09/17/2018 21  20 - 29 mmol/L Final    Calcium 09/17/2018 9.2  8.6 - 10.2 mg/dL Final    Total Protein 09/17/2018 6.4  6.0 - 8.5 g/dL Final    Albumin 09/17/2018 4.0  3.6 - 4.8 g/dL Final     Globulin, Total 09/17/2018 2.4  1.5 - 4.5 g/dL Final    Albumin/Globulin Ratio 09/17/2018 1.7  1.2 - 2.2 Final    Total Bilirubin 09/17/2018 0.4  0.0 - 1.2 mg/dL Final    Alkaline Phosphatase 09/17/2018 71  39 - 117 IU/L Final    AST 09/17/2018 17  0 - 40 IU/L Final    ALT 09/17/2018 14  0 - 44 IU/L Final     Patient has somewhat relatively new onset of fatigue and lassitude. Wife is concerned about his lack of being active and walking slowly.    He also has some upper respiratory symptoms with sinus congestion. No fevers.    History of being treated with chemotherapy for lymphoma has been noted.    Plan:           Neoplastic malignant related fatigue    Diffuse large B-cell lymphoma of lymph nodes of neck    Benign essential hypertension    Dyspnea, unspecified type  -     X-Ray Chest PA And Lateral    Obstructive sleep apnea      Patient has some work related to the new onset of fatigue. He has gone through chemotherapy and expect some of the fatigue will be expected. He had a chest x-ray done 2 weeks ago by Dr. Santiago which had shown a small sized effusion.    He is recently been diagnosed with sleep apnea and has been started on a mask which give him a good sleep.     Perhaps lack of a good sleep might be causing him some fatigue and tiredness during the day.  He shows chronic mild anemia.    As for his URI is concerned he be treated conservatively with saltwater gargles and steam inhalation. He exhibits no fever or no yellow mucus to warrant antibiotics at this point.    I'll check a chest x-ray to be sure that there is no increase in effusion.    Follow-up next week.            Current Outpatient Medications:     aspirin 81 MG Chew, Take 81 mg by mouth once daily., Disp: , Rfl:     co-enzyme Q-10 30 mg capsule, Take 30 mg by mouth 3 (three) times daily., Disp: , Rfl:     L GASSERI/B BIFIDUM/B LONGUM ([employer] ORAL), Take by mouth., Disp: , Rfl:     MULTIVITAMIN/IRON/FOLIC ACID  (CENTRUM COMPLETE ORAL), Take by mouth., Disp: , Rfl:     NYSTOP powder, MIX SMALL QUANTITY WITH GRISELDA CREAM AND APPLY TO DRY SKIN TID FOR A WEEK, Disp: , Rfl: 0    pantoprazole (PROTONIX) 40 MG tablet, Take 40 mg by mouth once daily., Disp: , Rfl:     potassium acetate liquid 5 mEq / 5ml Liqd, Take by mouth., Disp: , Rfl:     TOPROL XL 50 mg 24 hr tablet, Take 25 mg by mouth once daily., Disp: , Rfl:     vitamin D 1000 units Tab, Take 185 mg by mouth once daily., Disp: , Rfl:     vitamin E 100 UNIT capsule, Take 100 Units by mouth once daily., Disp: , Rfl:     Current Facility-Administered Medications:     leuprolide (6 month) SyKt 45 mg, 45 mg, Intramuscular, Q6 Months, Fauzia Maza NP, 45 mg at 08/24/18 1261

## 2019-01-23 NOTE — PATIENT INSTRUCTIONS

## 2019-01-25 NOTE — PROGRESS NOTES
Please this his  Fax X-ray results to   Attention    Aman Basilio MD  [facility]    Please find fax # from [facility] Dept of Med  [phone]

## 2019-01-28 ENCOUNTER — TELEPHONE (OUTPATIENT)
Dept: FAMILY MEDICINE | Facility: CLINIC | Age: 69
End: 2019-01-28

## 2019-01-28 NOTE — TELEPHONE ENCOUNTER
----- Message from Ronny Driscoll MD sent at 1/25/2019  2:02 PM CST -----  Please this his  Fax X-ray results to   Attention    Aman Basilio MD  [facility]    Please find fax # from [facility] Dept of Med  [phone]

## 2019-01-29 ENCOUNTER — DOCUMENTATION ONLY (OUTPATIENT)
Dept: RADIATION ONCOLOGY | Facility: CLINIC | Age: 69
End: 2019-01-29

## 2019-01-29 ENCOUNTER — TELEPHONE (OUTPATIENT)
Dept: HEMATOLOGY/ONCOLOGY | Facility: CLINIC | Age: 69
End: 2019-01-29

## 2019-01-29 DIAGNOSIS — C85.91 NON-HODGKIN LYMPHOMA OF LYMPH NODES OF NECK, UNSPECIFIED NON-HODGKIN LYMPHOMA TYPE: Primary | ICD-10-CM

## 2019-01-29 DIAGNOSIS — R93.89 ABNORMAL X-RAY: ICD-10-CM

## 2019-01-29 DIAGNOSIS — R93.89 ABNORMAL CHEST X-RAY: ICD-10-CM

## 2019-01-29 DIAGNOSIS — C83.31 DIFFUSE LARGE B-CELL LYMPHOMA OF LYMPH NODES OF NECK: Primary | ICD-10-CM

## 2019-01-29 DIAGNOSIS — C61 MALIGNANT NEOPLASM OF PROSTATE: ICD-10-CM

## 2019-01-29 NOTE — TELEPHONE ENCOUNTER
----- Message from Fidencio Rdz MD sent at 1/28/2019  8:34 AM CST -----  Set up a Chest CT and also refer him to Dr Webster for increasing pleural effusion      ----- Message -----  From: Ronny Driscoll MD  Sent: 1/25/2019   1:57 PM  To: Fidencio Rdz MD    Showing slight increase in effusion. Patient is slightly short winded with cough. Not any worse. No fevers.

## 2019-01-29 NOTE — PROGRESS NOTES
NUTRITION NOTE - REFERRAL FROM DR. LIU    Familiar with Naresh and his wife as I followed him during his cancer treatment for his lumphoma.  He is being followed by Dr. Basilio at [facility].    His wife wanted me to  him on a cardiac diet because he was told by his GP that he needed to meet with a dietitian since his cholesterol is elevated.    Plan: Spent over an hour educating on cardiac diet.    1. Went over his lipid profile and discussed what eat lab value meant.  2. Listed ways he could decrease his cholesterol with diet such as eating nuts, using olive oil etc.  3. Reviewed the DASH diet and gave sample menu plan.

## 2019-01-29 NOTE — TELEPHONE ENCOUNTER
Called patient told them about setting up CT scan and a referral to Dr. Webster        ----- Message from Fidencio Rdz MD sent at 1/28/2019  8:34 AM CST -----  Set up a Chest CT and also refer him to Dr Webster for increasing pleural effusion      ----- Message -----  From: Ronny Driscoll MD  Sent: 1/25/2019   1:57 PM  To: Fidencio Rdz MD    Showing slight increase in effusion. Patient is slightly short winded with cough. Not any worse. No fevers.

## 2019-01-30 ENCOUNTER — TELEPHONE (OUTPATIENT)
Dept: PULMONOLOGY | Facility: CLINIC | Age: 69
End: 2019-01-30

## 2019-01-30 ENCOUNTER — TELEPHONE (OUTPATIENT)
Dept: FAMILY MEDICINE | Facility: CLINIC | Age: 69
End: 2019-01-30

## 2019-01-30 NOTE — TELEPHONE ENCOUNTER
Thania from Dr. Basilio office in [facility] requested Rt Pleural Effusion orders and CXR to be faxed to both our office and [facility]. Wants us to Coordinate with Jeovany to get  Drained and Test and would like the results faxed back [phone].

## 2019-01-30 NOTE — TELEPHONE ENCOUNTER
office contacted Marilyn in scheduling dept to get an ASAP appt for this patient. Marilyn booked and we told her we would forward for review. Pleural effusion.

## 2019-01-30 NOTE — PROGRESS NOTES
[facility] Hematology/Oncology  PROGRESS NOTE      Subjective:       Patient ID:   NAME: Naresh Painting : 1950     68 y.o. male    Referring Doc: Yamila  Other Physicians: Adriano Michaud Pernenkil, Saba, Schuller    Chief Complaint:  NHL f/u    History of Present Illness:     Patient returns today for a regularly scheduled follow-up visit.  The patient is here with his wife.  No CP, SOB, HA's or N/V. He previously had his last chemotherapy on  through . He last saw Dr Basilio this past Monday. He has been having some COOPER. He had recent bout of the flu and his appetite is low. He is seeing Dr Corona today.       ROS:   GEN: normal without any fever, night sweats or weight loss  HEENT: normal with no HA's, sore throat, stiff neck, changes in vision  CV: some COOPER  PULM: normal with no SOB, cough, hemoptysis, sputum or pleuritic pain  GI: normal with no abdominal pain, nausea, vomiting, , diarrhea, melanotic stools, BRBPR, or hematemesis;  : normal with no hematuria, dysuria  BREAST: normal with no mass, discharge, pain  SKIN: rash resolved    Allergies:  Review of patient's allergies indicates:  No Known Allergies    Medications:    Current Outpatient Medications:     aspirin 81 MG Chew, Take 81 mg by mouth once daily., Disp: , Rfl:     co-enzyme Q-10 30 mg capsule, Take 30 mg by mouth 3 (three) times daily., Disp: , Rfl:     L GASSERI/B BIFIDUM/B LONGUM ([employer] ORAL), Take by mouth., Disp: , Rfl:     MULTIVITAMIN/IRON/FOLIC ACID (CENTRUM COMPLETE ORAL), Take by mouth., Disp: , Rfl:     NYSTOP powder, MIX SMALL QUANTITY WITH GRISELDA CREAM AND APPLY TO DRY SKIN TID FOR A WEEK, Disp: , Rfl: 0    pantoprazole (PROTONIX) 40 MG tablet, Take 40 mg by mouth once daily., Disp: , Rfl:     potassium acetate liquid 5 mEq / 5ml Liqd, Take by mouth., Disp: , Rfl:     TOPROL XL 50 mg 24 hr tablet, Take 25 mg by mouth once daily., Disp: , Rfl:     vitamin D 1000 units Tab, Take 185 mg by  mouth once daily., Disp: , Rfl:     vitamin E 100 UNIT capsule, Take 100 Units by mouth once daily., Disp: , Rfl:     Current Facility-Administered Medications:     leuprolide (6 month) SyKt 45 mg, 45 mg, Intramuscular, Q6 Months, Fauzia Maza NP, 45 mg at 08/24/18 1508    PMHx/PSHx Updates:  See patient's last visit with me on 8/14/2018.  See H&P on 6/9/2017        Pathology:  See path 9/13/2017          Objective:     Vitals:  Blood pressure 108/70, pulse 104, temperature 97.6 °F (36.4 °C), resp. rate 20, weight 110.1 kg (242 lb 11.2 oz), SpO2 (!) 94 %.    Physical Examination:   GEN: no apparent distress, comfortable; AAOx3  HEAD: atraumatic and normocephalic  EYES: no pallor, no icterus, PERRLA  ENT: OMM, no pharyngeal erythema, external ears WNL; no nasal discharge; no thrush  NECK: no masses, thyroid normal, trachea midline,  LAD/LN's, supple; resolved neck LAD; left looks good  CV: RRR with no murmur; normal pulse; normal S1 and S2; no pedal edema  CHEST: decreased BS on Right lung  ABDOM: nontender and nondistended; soft; normal bowel sounds; no rebound/guardingp; vertical wound healed well  MUSC/Skeletal: ROM normal; no crepitus; joints normal; no deformities or arthropathy  EXTREM: no clubbing, cyanosis, inflammation or swelling  SKIN: no lesions, ulcers, petechiae or subcutaneous nodules; fungal like rash under right axilla resolved  : no cannon  NEURO: grossly intact; motor/sensory WNL; AAOx3; no tremors  PSYCH: normal mood, affect and behavior  LYMPH: normal cervical, supraclavicular, axillary and groin LN's            Labs:     12/17/2018  Lab Results   Component Value Date    WBC 5.4 12/17/2018    HGB 12.2 (L) 12/17/2018    HCT 37.7 12/17/2018    MCV 87 12/17/2018     12/17/2018     BMP  Lab Results   Component Value Date     12/17/2018    K 4.4 12/17/2018     12/17/2018    CO2 20 12/17/2018    BUN 15 12/17/2018    CREATININE 1.00 12/17/2018    CALCIUM 8.9 12/17/2018     ESTGFRAFRICA >60.0 11/25/2014    EGFRNONAA 76 12/17/2018     Lab Results   Component Value Date    ALT 12 12/17/2018    AST 20 12/17/2018    ALKPHOS 84 12/17/2018    BILITOT 0.5 12/17/2018           Radiology/Diagnostic Studies:    CT Chest  1/31/2019 - on chartT    IMPRESSION:    Interval development of moderate-large right-sided pleural effusion and  associated atelectatic changes within the right lung.    Development of conglomerate soft tissue densities within the visualized upper  abdomen likely representing interval progression of known lymphoma. Correlation  with dedicated CT of the abdomen with contrast is recommended. Additional soft  tissue density within the base of the neck on the left may also represent  pathologic adenopathy.    Additional observations as above.          PET 8/20/2018:  IMPRESSION:    1. Increased FDG uptake involving the anterolateral left sixth rib could be related to nondisplaced fracture here. Correlate with focal tenderness and any history of trauma. (he had a fall)  2. Otherwise, there is evidence of FDG avid malignancy.  3. Persistent enlarged left supraclavicular lymph node, stable from prior.  4. Improvement of previously seen reactive marrow.  5. Prostate gland is surgically absent.        Assessment/Plan:   (1) 68 y.o. male with diagnosis of development of right sided neck swelling since Feb 2017  - he has been referred by Dr Michaud with ID for an evaluation  - i spoke to Dr Michaud previously about the patient peripherally  - FNA bx on 2/24 which was nondiagnostic  - he had a  guided biopsy of an entire left supraclavicular LN on 3/6/2017 with Dr Alexander Oh which showed  necrotizing granulomatous lymphadenitis  - CT scans were from Feb 2017  - flow cytometry studies from 5/25 appear to have been negative for any abnormal cell populations  - PET scan on 7/6/17 with markedly hypermetabolic LAD  - he was referred to and seen by Dr Basilio at [facility] on 7/28 and again on  8/25  - he underwent repeat cervical LN biopsy with Dr Oh on 9/13/2017 and that 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)  - he saw Dr Basilio again at [facility] this past Monday  - the prior bone marrow showed no involvement of the lymphoma  - he received R-CHOP and then proceed with a HD-MTX regimen and Intra-thecal MTX at [facility]    - he has been on HD-MTX chemotherapy at [facility] as of lately  - he completed last chemotherapy from June 18th through 21st  - he required neupogen the last week of June  - he last saw Dr Basilio in July 2018 and he had a PET scan on 8/20/2018; he sees Dr Basilio again in 6 months on Jan 12th 2019  - next PEt scheduled for March 2019      (2) Hx/of prostate cancer - 1995; s/p prostatectomy followed by XRT; on lupron per Dr Menjivar with  and Fauzia Maza (NP)     (3) JAK - uses CPAP     (4) GERD     (5) Myocardial bridging congenital disorder - followed by Dr Santiago with cardiology   - recent echo with good EF at 60%     (6) recent subclavian vein thrombosis - on eliquis 5mg po bid     (7) WBC WNL currently     (8) prior incarcerated hernia surgery with Dr Ellis on 4/4/18    (9) rash - right axilla - looked fungal - resolved    (10) Right pleural effusion - CT Chest on chart from today - will set up Interventional radiology for thoracentesis of the pleural effusion - he is also seeing Dr Corona with pulm today  - will ask them to send fluid off for flow cytometry      1. Non-Hodgkin lymphoma of lymph nodes of neck, unspecified non-Hodgkin lymphoma type     2. Thrombosis of left subclavian vein     3. History of prostatectomy (December/1995)     4. Malignant neoplasm of prostate     5. Diffuse large B-cell lymphoma of lymph nodes of neck     6. Chemotherapy-induced neutropenia         PLAN:  1. F/u with Dr Basilio at [facility] in July 2019 tentatively  2. Check labs q 3 months; PF q 4-6 weeks  3. He is now off eliquis  4. PET due in March 2019  5. Set up  Interventional radiology for thoracentesis and send fluid off for flow  6. To see Dr Corona today with pulmonay  7.  RTC in 4 weeks    Fax note to Ronny Driscoll MD, Adriano Michaud Schuller and  [place]    Discussion:     I have explained all of the above in detail and the patient understands all of the current recommendation(s). I have answered all of their questions to the best of my ability and to their complete satisfaction.   The patient is to continue with the current management plan.            Electronically signed by Fidencio Rdz MD

## 2019-01-30 NOTE — TELEPHONE ENCOUNTER
CT ordered, spoke to pt, he said it is scheduled for tomorrow, explained that  will review it and we will call him back with a sooner appointment tomorrow

## 2019-01-31 ENCOUNTER — TELEPHONE (OUTPATIENT)
Dept: HEMATOLOGY/ONCOLOGY | Facility: CLINIC | Age: 69
End: 2019-01-31

## 2019-01-31 ENCOUNTER — OFFICE VISIT (OUTPATIENT)
Dept: HEMATOLOGY/ONCOLOGY | Facility: CLINIC | Age: 69
End: 2019-01-31
Payer: MEDICARE

## 2019-01-31 VITALS
OXYGEN SATURATION: 94 % | HEART RATE: 104 BPM | WEIGHT: 242.69 LBS | SYSTOLIC BLOOD PRESSURE: 108 MMHG | TEMPERATURE: 98 F | DIASTOLIC BLOOD PRESSURE: 70 MMHG | RESPIRATION RATE: 20 BRPM | BODY MASS INDEX: 36.9 KG/M2

## 2019-01-31 DIAGNOSIS — Z90.79 HISTORY OF PROSTATECTOMY: ICD-10-CM

## 2019-01-31 DIAGNOSIS — C85.91 NON-HODGKIN LYMPHOMA OF LYMPH NODES OF NECK, UNSPECIFIED NON-HODGKIN LYMPHOMA TYPE: Primary | ICD-10-CM

## 2019-01-31 DIAGNOSIS — R79.1 ABNORMAL COAGULATION PROFILE: ICD-10-CM

## 2019-01-31 DIAGNOSIS — C83.31 DIFFUSE LARGE B-CELL LYMPHOMA OF LYMPH NODES OF NECK: Primary | ICD-10-CM

## 2019-01-31 DIAGNOSIS — C61 MALIGNANT NEOPLASM OF PROSTATE: ICD-10-CM

## 2019-01-31 DIAGNOSIS — I82.B12 THROMBOSIS OF LEFT SUBCLAVIAN VEIN: ICD-10-CM

## 2019-01-31 DIAGNOSIS — T45.1X5A CHEMOTHERAPY-INDUCED NEUTROPENIA: ICD-10-CM

## 2019-01-31 DIAGNOSIS — D70.1 CHEMOTHERAPY-INDUCED NEUTROPENIA: ICD-10-CM

## 2019-01-31 DIAGNOSIS — C83.31 DIFFUSE LARGE B-CELL LYMPHOMA OF LYMPH NODES OF NECK: ICD-10-CM

## 2019-01-31 LAB
ALBUMIN SERPL-MCNC: 3.2 G/DL (ref 3.1–4.7)
ALP SERPL-CCNC: 82 IU/L (ref 40–104)
ALT (SGPT): 23 IU/L (ref 3–33)
APTT PPP: 27.2 SEC (ref 26.2–34.7)
AST SERPL-CCNC: 43 IU/L (ref 10–40)
BASOPHILS NFR BLD: 0 K/UL (ref 0–0.2)
BASOPHILS NFR BLD: 0.4 %
BILIRUB SERPL-MCNC: 1.2 MG/DL (ref 0.3–1)
BUN SERPL-MCNC: 18 MG/DL (ref 8–20)
CALCIUM SERPL-MCNC: 9.4 MG/DL (ref 7.7–10.4)
CHLORIDE: 99 MMOL/L (ref 98–110)
CO2 SERPL-SCNC: 20 MMOL/L (ref 22.8–31.6)
CREATININE: 1.03 MG/DL (ref 0.6–1.4)
EOSINOPHIL NFR BLD: 0.1 K/UL (ref 0–0.7)
EOSINOPHIL NFR BLD: 1 %
ERYTHROCYTE [DISTWIDTH] IN BLOOD BY AUTOMATED COUNT: 15.7 % (ref 12.5–14.5)
GLUCOSE: 130 MG/DL (ref 70–99)
GRAN #: 5.7 K/UL (ref 1.4–6.5)
GRAN%: 83.5 %
HCT VFR BLD AUTO: 41.1 % (ref 39–55)
HGB BLD-MCNC: 13.1 G/DL (ref 14–16)
IMMATURE GRANS (ABS): 0 K/UL (ref 0–1)
IMMATURE GRANULOCYTES: 0.4 %
INR PPP: 1.1
LYMPH #: 0.3 K/UL (ref 1.2–3.4)
LYMPH%: 4.7 %
MCH RBC QN AUTO: 28.2 PG (ref 25–35)
MCHC RBC AUTO-ENTMCNC: 31.9 G/DL (ref 31–36)
MCV RBC AUTO: 88.4 FL (ref 80–100)
MONO #: 0.7 K/UL (ref 0.1–0.6)
MONO%: 10 %
NUCLEATED RBCS: 0 %
NUCLEATED RED BLOOD CELLS: 0 /100 WBC
PERFORMED BY:: ABNORMAL
PLATELET # BLD AUTO: 553 K/UL (ref 140–440)
PMV BLD AUTO: 8.4 FL (ref 8.8–12.7)
POTASSIUM SERPL-SCNC: 3.9 MMOL/L (ref 3.5–5)
PROT SERPL-MCNC: 6.6 G/DL (ref 6–8.2)
PROTHROMBIN TIME: 13.6 SEC (ref 11.7–14)
RBC # BLD AUTO: 4.65 M/UL (ref 4.3–5.9)
SODIUM: 133 MMOL/L (ref 134–144)
WBC # BLD: 6.9 K/UL (ref 5–10)

## 2019-01-31 PROCEDURE — 1101F PT FALLS ASSESS-DOCD LE1/YR: CPT | Mod: ,,, | Performed by: INTERNAL MEDICINE

## 2019-01-31 PROCEDURE — 3074F PR MOST RECENT SYSTOLIC BLOOD PRESSURE < 130 MM HG: ICD-10-PCS | Mod: ,,, | Performed by: INTERNAL MEDICINE

## 2019-01-31 PROCEDURE — 3078F PR MOST RECENT DIASTOLIC BLOOD PRESSURE < 80 MM HG: ICD-10-PCS | Mod: ,,, | Performed by: INTERNAL MEDICINE

## 2019-01-31 PROCEDURE — 1101F PR PT FALLS ASSESS DOC 0-1 FALLS W/OUT INJ PAST YR: ICD-10-PCS | Mod: ,,, | Performed by: INTERNAL MEDICINE

## 2019-01-31 PROCEDURE — 3074F SYST BP LT 130 MM HG: CPT | Mod: ,,, | Performed by: INTERNAL MEDICINE

## 2019-01-31 PROCEDURE — 3078F DIAST BP <80 MM HG: CPT | Mod: ,,, | Performed by: INTERNAL MEDICINE

## 2019-01-31 PROCEDURE — 99214 PR OFFICE/OUTPT VISIT, EST, LEVL IV, 30-39 MIN: ICD-10-PCS | Mod: ,,, | Performed by: INTERNAL MEDICINE

## 2019-01-31 PROCEDURE — 99214 OFFICE O/P EST MOD 30 MIN: CPT | Mod: ,,, | Performed by: INTERNAL MEDICINE

## 2019-01-31 NOTE — TELEPHONE ENCOUNTER
Patients wife called back and patient has seen Dr. Corona this morning and is scheduled for a Thoracentesis tomorrow morning @ [facility]. Does not need to see Dr. Webster.

## 2019-01-31 NOTE — LETTER
January 31, 2019      Ronny Driscoll MD  [address]  [address]  [place] [address]           [facility]  [address]  [address]  [place] [address]  Phone: [phone]  Fax: [phone]          Patient: Naresh Painting   MR Number: [id number]   YOB: 1950   Date of Visit: 1/31/2019       Dear Dr. Ronny Driscoll:    Thank you for referring Naresh Painting to me for evaluation. Attached you will find relevant portions of my assessment and plan of care.    If you have questions, please do not hesitate to call me. I look forward to following Naresh Painting along with you.    Sincerely,    Fidencio Rdz MD    Enclosure  CC:  No Recipients    If you would like to receive this communication electronically, please contact [email or web address] or [phone] to request more information on [employer] access.    For providers and/or their staff who would like to refer a patient to [facility], please contact us through our one-stop-shop provider referral line, [facility], at [phone].    If you feel you have received this communication in error or would no longer like to receive these types of communications, please e-mail [email or web address]

## 2019-01-31 NOTE — TELEPHONE ENCOUNTER
Notified pt-wife will call back after shes out of the store to see if 0930 Monday 4th is okay for NP appt.

## 2019-01-31 NOTE — TELEPHONE ENCOUNTER
CBC, CMP, PT/INR; Ptt in Epic to be done today. He is going to have a thorencentesis done in the morning at [facility] intervential radiology

## 2019-02-01 ENCOUNTER — OFFICE VISIT (OUTPATIENT)
Dept: FAMILY MEDICINE | Facility: CLINIC | Age: 69
End: 2019-02-01
Payer: MEDICARE

## 2019-02-01 VITALS
SYSTOLIC BLOOD PRESSURE: 137 MMHG | HEART RATE: 97 BPM | DIASTOLIC BLOOD PRESSURE: 81 MMHG | HEIGHT: 68 IN | BODY MASS INDEX: 36.68 KG/M2 | WEIGHT: 242 LBS

## 2019-02-01 DIAGNOSIS — R06.00 DYSPNEA, UNSPECIFIED TYPE: ICD-10-CM

## 2019-02-01 DIAGNOSIS — J90 PLEURAL EFFUSION ON RIGHT: ICD-10-CM

## 2019-02-01 DIAGNOSIS — I10 BENIGN ESSENTIAL HYPERTENSION: Primary | ICD-10-CM

## 2019-02-01 DIAGNOSIS — R53.0 NEOPLASTIC MALIGNANT RELATED FATIGUE: ICD-10-CM

## 2019-02-01 DIAGNOSIS — C85.91 NON-HODGKIN LYMPHOMA OF LYMPH NODES OF NECK, UNSPECIFIED NON-HODGKIN LYMPHOMA TYPE: ICD-10-CM

## 2019-02-01 PROCEDURE — 1101F PT FALLS ASSESS-DOCD LE1/YR: CPT | Mod: ,,, | Performed by: INTERNAL MEDICINE

## 2019-02-01 PROCEDURE — 1101F PR PT FALLS ASSESS DOC 0-1 FALLS W/OUT INJ PAST YR: ICD-10-PCS | Mod: ,,, | Performed by: INTERNAL MEDICINE

## 2019-02-01 PROCEDURE — 99214 OFFICE O/P EST MOD 30 MIN: CPT | Mod: ,,, | Performed by: INTERNAL MEDICINE

## 2019-02-01 PROCEDURE — 3075F SYST BP GE 130 - 139MM HG: CPT | Mod: ,,, | Performed by: INTERNAL MEDICINE

## 2019-02-01 PROCEDURE — 3079F PR MOST RECENT DIASTOLIC BLOOD PRESSURE 80-89 MM HG: ICD-10-PCS | Mod: ,,, | Performed by: INTERNAL MEDICINE

## 2019-02-01 PROCEDURE — 99214 PR OFFICE/OUTPT VISIT, EST, LEVL IV, 30-39 MIN: ICD-10-PCS | Mod: ,,, | Performed by: INTERNAL MEDICINE

## 2019-02-01 PROCEDURE — 3079F DIAST BP 80-89 MM HG: CPT | Mod: ,,, | Performed by: INTERNAL MEDICINE

## 2019-02-01 PROCEDURE — 3075F PR MOST RECENT SYSTOLIC BLOOD PRESS GE 130-139MM HG: ICD-10-PCS | Mod: ,,, | Performed by: INTERNAL MEDICINE

## 2019-02-01 NOTE — PATIENT INSTRUCTIONS
Taking Your Blood Pressure  Blood pressure is the force of blood against the artery wall as it moves from the heart through the blood vessels. You can take your own blood pressure reading using a digital monitor. Take your readings the same each time, using the same arm. Take readings as often as your healthcare provider instructs.  About blood pressure monitors  Blood pressure monitors are designed for certain ages and cases. You can find monitors for older adults, for pregnant women, and for children. Make sure the one you choose is the right one for your age and situation.  The American Heart Association recommends an automatic cuff monitor that fits on your upper arm (bicep). The cuff should fit your arm size. A cuff thats too large or too small will not give an accurate reading. Measure around your upper arm to find your size.  Monitors that attach to your finger or wrist are not as accurate as monitors for your upper arm.  Ask your healthcare provider for help in choosing a monitor. Bring your monitor to your next provider visit if you need help in using it the correct way.  The steps below are general instructions for using an automatic digital monitor.  Step 1. Relax    · Take your blood pressure at the same time every day, such as in the morning or evening, or at the time your healthcare provider recommends.  · Wait at least a half-hour after smoking, eating, or exercising. Don't drink coffee, tea, soda, or other caffeinated beverages before checking your blood pressure.  · Sit comfortably at a table with both feet on the floor. Do not cross your legs or feet. Place the monitor near you.  · Rest for a few minutes before you begin.  Step 2. Wrap the cuff    · Place your arm on the table, palm up. Your arm should be at the level of your heart. Wrap the cuff around your upper arm, just above your elbow. Its best done on bare skin, not over clothing. Most cuffs will indicate where the brachial artery (the  blood vessel in the middle of the arm at the inner side of the elbow) should line up with the cuff. Look in your monitor's instruction booklet for an illustration. You can also bring your cuff to your healthcare provider and have them show you how to correctly place the cuff.  Step 3. Inflate the cuff    · Push the button that starts the pump.  · The cuff will tighten, then loosen.  · The numbers will change. When they stop changing, your blood pressure reading will appear.  · Take 2 or 3 readings one minute apart.  Step 4. Write down the results of each reading    · Write down your blood pressure numbers for each reading. Note the date and time. Keep your results in one place, such as a notebook. Even if your monitor has a built-in memory, keep a hard copy of the readings.  · Remove the cuff from your arm. Turn off the machine.  · Bring your blood pressure records with your healthcare providers at each visit.  · If you start a new blood pressure medicine, note the day you started the new medicine. Also note the day if you change the dose of your medicine. This information goes on your blood pressure recording sheet. This will help your healthcare provider monitor how well the medicine changes are working.  · Ask your healthcare provider what numbers should prompt you to call him or her. Also ask what numbers should prompt you to get help right away.  Date Last Reviewed: 11/1/2016  © 0342-0824 [employer]. [address] [place] [address], [place] [address]. All rights reserved. This information is not intended as a substitute for professional medical care. Always follow your healthcare professional's instructions.

## 2019-02-01 NOTE — PROGRESS NOTES
Subjective:       Patient ID: Naresh Painting is a 68 y.o. male.    Chief Complaint: Hypertension; Shortness of Breath; Pleural Effusion; and Lymphoma    Mr. Naresh Steinberg is a 68-year-old male patient who comes for follow-up. Underlying history of non-Hodgkin's lymphoma status post chemotherapy has been noted. He has been progressively getting short of breath for the last several weeks or so. Initial chest x-ray had shown a minimal pleural effusion on the right side which got progressively worse. He was seen by his oncologist Dr. Basilio @ [facility] in [place] who advised him that this needs to be drained. He was seen by Dr. Amezquita also yesterday and was advised to go to the hospital for drainage. Today morning he had approximately 1700 mL of dark color aspirate. CT scan prior to the procedure did show moderate-sized effusion. There has been a mention of increased lymphadenopathy in the abdomen.    While, he overall feels relieved with return of appetite and improvement in his shortness of breath, he continues to be weak and appears to be deconditioned.      Hypertension   This is a chronic problem. The current episode started more than 1 year ago. The problem has been waxing and waning since onset. Associated symptoms include malaise/fatigue, peripheral edema and shortness of breath. Pertinent negatives include no chest pain, headaches, neck pain or palpitations. Risk factors for coronary artery disease include sedentary lifestyle, male gender and obesity. The current treatment provides moderate improvement.   Shortness of Breath   This is a new problem. The current episode started more than 1 month ago. The problem occurs constantly. The problem has been waxing and waning. Associated symptoms include swollen glands. Pertinent negatives include no abdominal pain, chest pain, ear pain, fever, headaches, hemoptysis, leg swelling, neck pain or sore throat. Treatments tried: Drainage of pleural effusion. The  treatment provided moderate (Pleural effusion) relief. There is no history of PE.   Pleural Effusion   This is a new problem. The current episode started more than 1 month ago. The problem occurs constantly. The problem has been gradually improving (drained 2 bottles at [facility]). Associated symptoms include fatigue and swollen glands. Pertinent negatives include no abdominal pain, arthralgias, chest pain, congestion, coughing, fever, headaches, neck pain, numbness or sore throat. Treatments tried: Drained. The treatment provided moderate relief.       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     Social History     Socioeconomic History    Marital status:      Spouse name: Amita Painting    Number of children: 2    Years of education: Not on file    Highest education level: Not on file   Social Needs    Financial resource strain: Not on file    Food insecurity - worry: Not on file    Food insecurity - inability: Not on file    Transportation needs - medical: Not on file    Transportation needs - non-medical: Not on file   Occupational History    Occupation: Retired      Employer: [employer]     Comment: [employer]- 3 yrs    Occupation:      Employer: [employer]     Comment: 26 yrs    Occupation: Reproduction     Employer: [employer]     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 Son's name is Naresh-MICHELLE     Past Surgical History:   Procedure Laterality Date    EYE SURGERY      LYMPH NODE BIOPSY      PROCTECTOMY      PROSTATE SURGERY      UMBILICAL HERNIA REPAIR  04/05/2018    Incarcerated-      Family History   Problem Relation Age of Onset    Heart disease Mother     Diabetes Father        Review of Systems   Constitutional: Positive for activity change (slowed down), appetite change, fatigue and malaise/fatigue. Negative for fever and unexpected weight change (Lost hemal 40 lbs since feb 2017).   HENT: Negative for congestion, ear pain, postnasal drip, sneezing, sore throat and trouble swallowing.    Eyes: Negative for pain, discharge and visual disturbance.        Left eye Glass eye prosthesis   Respiratory: Positive for apnea (obstructive sleep apnea) and shortness of breath. Negative for cough, hemoptysis and chest tightness.    Cardiovascular: Negative for chest pain, palpitations and leg swelling.        H/O Left upper extremity deep vein thrombosis. Treated with anticoagulation   Gastrointestinal: Negative for abdominal distention, abdominal pain, blood in stool, constipation and diarrhea.   Endocrine: Negative for cold intolerance, heat intolerance, polydipsia, polyphagia and polyuria.   Genitourinary: Negative for dysuria, hematuria and scrotal swelling.   Musculoskeletal: Negative for arthralgias, back pain, gait problem and neck pain.   Skin: Positive for pallor. Negative for wound.        Patient's has lumps in the neck which have now been diagnosed to be lymphoma.   Allergic/Immunologic: Negative for environmental allergies, food allergies and immunocompromised state.   Neurological: Negative for dizziness, seizures, speech difficulty, light-headedness, numbness and headaches.        Patient has numbness  "in the left hand fingers. The numbness seems to be getting better gradually.   Hematological: Positive for adenopathy. Does not bruise/bleed easily.        Diagnosis of NHL under chemotherapy. Significant reduction in the size of lymphadenopathy.   Psychiatric/Behavioral: Negative for agitation, behavioral problems and confusion. The patient is not nervous/anxious.          Objective:      Blood pressure 137/81, pulse 97, height 5' 8" (1.727 m), weight 109.8 kg (242 lb). Body mass index is 36.8 kg/m².  Physical Exam   Constitutional: He appears well-developed. He appears distressed.   BMI is 36-somewhat chronically sick   HENT:   Head: Normocephalic and atraumatic.   Nose: Nose normal.   Mouth/Throat: Oropharynx is clear and moist. No oropharyngeal exudate.   Eyes: Conjunctivae and EOM are normal. Right eye exhibits no discharge and no exudate. Right conjunctiva is not injected. Right conjunctiva has no hemorrhage.   Left eye is prosthetic.   Neck: Normal range of motion. Neck supple. No JVD present. No neck rigidity. No tracheal deviation and normal range of motion present. No thyromegaly present.       The lymph nodes on right and left side have dramatically shrunken now. A small lymph node is felt on the right side which is firm, nontender and adherent to the underlying structures.   Cardiovascular: Normal rate, regular rhythm and normal heart sounds. Exam reveals no gallop and no friction rub.   No murmur heard.  Pulmonary/Chest: Effort normal. No respiratory distress. He has decreased breath sounds in the right middle field, the right lower field and the left lower field. He has no wheezes. He has no rales.   Abdominal: Soft. Bowel sounds are normal. He exhibits no distension. There is no tenderness.   Musculoskeletal: He exhibits edema (1 +). He exhibits no tenderness or deformity.   Examination of the neck does not reveal any deformity.   Lymphadenopathy:     He has cervical adenopathy.        Right cervical: " Deep cervical adenopathy present.     He has no axillary adenopathy.   Right-sided cervical lymphadenopathy. No left-sided lymphadenopathy. No axillary lymphadenopathy.   Neurological: He is alert. He has normal reflexes.   Skin: Skin is warm and dry.   Psychiatric: He has a normal mood and affect. His mood appears not anxious. Thought content is not delusional.   While patient is relief from his distress and appears to be more happier, the news of recurrence of cancer and possible cancer related effusion has made him somewhat anxious.   Nursing note and vitals reviewed.        Assessment:       1. Benign essential hypertension    2. Neoplastic malignant related fatigue    3. Dyspnea, unspecified type    4. Pleural effusion on right    5. Non-Hodgkin lymphoma of lymph nodes of neck, unspecified non-Hodgkin lymphoma type             This patient has been taken from patient's wife's cell phone. These were drained in the hospital.  Telephone on 01/31/2019   Component Date Value Ref Range Status    Glucose 01/31/2019 130* 70 - 99 mg/dL Final    BUN, Bld 01/31/2019 18  8 - 20 mg/dL Final    Creatinine 01/31/2019 1.03  0.60 - 1.40 mg/dL Final    Calcium 01/31/2019 9.4  7.7 - 10.4 mg/dL Final    Sodium 01/31/2019 133* 134 - 144 mmol/L Final    Potassium 01/31/2019 3.9  3.5 - 5.0 mmol/L Final    Chloride 01/31/2019 99  98 - 110 mmol/L Final    CO2 01/31/2019 20.0* 22.8 - 31.6 mmol/L Final    Albumin 01/31/2019 3.2  3.1 - 4.7 g/dL Final    Total Bilirubin 01/31/2019 1.2* 0.3 - 1.0 mg/dL Final    Alkaline Phosphatase 01/31/2019 82  40 - 104 IU/L Final    Total Protein 01/31/2019 6.6  6.0 - 8.2 g/dL Final    ALT (SGPT) 01/31/2019 23  3 - 33 IU/L Final    AST 01/31/2019 43* 10 - 40 IU/L Final    WBC 01/31/2019 6.9  5.0 - 10.0 K/ul Final    RBC 01/31/2019 4.65  4.30 - 5.90 M/ul Final    Hemoglobin 01/31/2019 13.1* 14.0 - 16.0 g/dl Final    Hematocrit 01/31/2019 41.1  39.0 - 55.0 % Final    MCV 01/31/2019 88.4   80.0 - 100.0 fl Final    MCH 2019 28.2  25.0 - 35.0 pg Final    MCHC 2019 31.9  31.0 - 36.0 g/dl Final    RDW 2019 15.7* 12.5 - 14.5 % Final    Platelets 2019 553* 140 - 440 K/ul Final    MPV 2019 8.4* 8.8 - 12.7 fl Final    Gran% 2019 83.5  % Final    Lymph% 2019 4.7  % Final    Mono% 2019 10.0  % Final    Eosinophil% 2019 1.0  % Final    Basophil% 2019 0.4  % Final    Gran # (ANC) 2019 5.7  1.4 - 6.5 K/ul Final    Lymph # 2019 0.3* 1.2 - 3.4 K/ul Final    Mono # 2019 0.7* 0.1 - 0.6 K/ul Final    Eos # 2019 0.1  0.0 - 0.7 K/ul Final    Baso # 2019 0.0  0.0 - 0.2 K/ul Final    nRBC# 2019 0  /100 WBC Final    nRBC% 2019 0  % Final    Immature Granulocytes 2019 0.4  % Final    Immature Grans (Abs) 2019 0.0  0.0 - 1.0 K/uL Final    Performed by: 2019 Message:   Final    PT 2019 13.6  11.7 - 14.0 sec Final    INR 2019 1.1   Final    aPTT 2019 27.2  26.2 - 34.7 sec Final         Plan:           Benign essential hypertension    Neoplastic malignant related fatigue    Dyspnea, unspecified type    Pleural effusion on right    Non-Hodgkin lymphoma of lymph nodes of neck, unspecified non-Hodgkin lymphoma type      Pts new diagnosis of moderate right-sided pleural effusion has been noted. He had a successful drainage today and his distress/sob much better. The fluid looked dark and probably hemorrhagic. Final pathology is still pending at this point. CT scan also had shown some increase of lymphoma. He states that he does not have a heart condition or diagnosis of heart failure and  at rest it at that. Certainly need toconfirm this information from Dr. Santiago also.    At least for the time being he is doing better and lets hope he continues to be better. Will follow the pathology results and take it from there.      Current Outpatient Medications:     aspirin  81 MG Chew, Take 81 mg by mouth once daily., Disp: , Rfl:     co-enzyme Q-10 30 mg capsule, Take 30 mg by mouth 3 (three) times daily., Disp: , Rfl:     L GASSERI/B BIFIDUM/B LONGUM (ESCALERA [employer] ORAL), Take by mouth., Disp: , Rfl:     MULTIVITAMIN/IRON/FOLIC ACID (CENTRUM COMPLETE ORAL), Take by mouth., Disp: , Rfl:     NYSTOP powder, MIX SMALL QUANTITY WITH GRISELDA CREAM AND APPLY TO DRY SKIN TID FOR A WEEK, Disp: , Rfl: 0    pantoprazole (PROTONIX) 40 MG tablet, Take 40 mg by mouth once daily., Disp: , Rfl:     potassium acetate liquid 5 mEq / 5ml Liqd, Take by mouth., Disp: , Rfl:     TOPROL XL 50 mg 24 hr tablet, Take 25 mg by mouth once daily., Disp: , Rfl:     vitamin D 1000 units Tab, Take 185 mg by mouth once daily., Disp: , Rfl:     vitamin E 100 UNIT capsule, Take 100 Units by mouth once daily., Disp: , Rfl:     Current Facility-Administered Medications:     leuprolide (6 month) SyKt 45 mg, 45 mg, Intramuscular, Q6 Months, Fauzia Maza NP, 45 mg at 08/24/18 9258

## 2019-02-02 ENCOUNTER — TELEPHONE (OUTPATIENT)
Dept: PEDIATRICS | Facility: CLINIC | Age: 69
End: 2019-02-02

## 2019-02-04 LAB — FLOW CYTOMETRY ANTIBODIES ANALYZED: NORMAL

## 2019-02-05 ENCOUNTER — OFFICE VISIT (OUTPATIENT)
Dept: FAMILY MEDICINE | Facility: CLINIC | Age: 69
End: 2019-02-05
Payer: MEDICARE

## 2019-02-05 VITALS
WEIGHT: 239 LBS | HEART RATE: 102 BPM | SYSTOLIC BLOOD PRESSURE: 134 MMHG | DIASTOLIC BLOOD PRESSURE: 73 MMHG | RESPIRATION RATE: 24 BRPM | HEIGHT: 68 IN | BODY MASS INDEX: 36.22 KG/M2

## 2019-02-05 DIAGNOSIS — R06.02 SHORTNESS OF BREATH: ICD-10-CM

## 2019-02-05 DIAGNOSIS — C83.31 DIFFUSE LARGE B-CELL LYMPHOMA OF LYMPH NODES OF NECK: ICD-10-CM

## 2019-02-05 DIAGNOSIS — R10.33 PERIUMBILICAL ABDOMINAL PAIN: Primary | ICD-10-CM

## 2019-02-05 DIAGNOSIS — J90 PLEURAL EFFUSION ON RIGHT: ICD-10-CM

## 2019-02-05 DIAGNOSIS — R53.0 NEOPLASTIC MALIGNANT RELATED FATIGUE: ICD-10-CM

## 2019-02-05 LAB
ALBUMIN SERPL-MCNC: 3.3 G/DL (ref 3.1–4.7)
ALP SERPL-CCNC: 90 IU/L (ref 40–104)
ALT (SGPT): 23 IU/L (ref 3–33)
AST SERPL-CCNC: 46 IU/L (ref 10–40)
BASOPHILS NFR BLD: 0 K/UL (ref 0–0.2)
BASOPHILS NFR BLD: 0.6 %
BILIRUB SERPL-MCNC: 1.2 MG/DL (ref 0.3–1)
BUN SERPL-MCNC: 12 MG/DL (ref 8–20)
CALCIUM SERPL-MCNC: 9.4 MG/DL (ref 7.7–10.4)
CHLORIDE: 98 MMOL/L (ref 98–110)
CO2 SERPL-SCNC: 19.7 MMOL/L (ref 22.8–31.6)
CREATININE: 1.06 MG/DL (ref 0.6–1.4)
EOSINOPHIL NFR BLD: 0.1 K/UL (ref 0–0.7)
EOSINOPHIL NFR BLD: 1.3 %
ERYTHROCYTE [DISTWIDTH] IN BLOOD BY AUTOMATED COUNT: 16.2 % (ref 11.7–14.9)
GLUCOSE: 104 MG/DL (ref 70–99)
GRAN #: 6 K/UL (ref 1.4–6.5)
GRAN%: 83.7 %
HCT VFR BLD AUTO: 43.8 % (ref 39–55)
HGB BLD-MCNC: 13.9 G/DL (ref 14–16)
IMMATURE GRANS (ABS): 0 K/UL (ref 0–1)
IMMATURE GRANULOCYTES: 0.3 %
LYMPH #: 0.4 K/UL (ref 1.2–3.4)
LYMPH%: 5.3 %
MCH RBC QN AUTO: 28 PG (ref 25–35)
MCHC RBC AUTO-ENTMCNC: 31.7 G/DL (ref 31–36)
MCV RBC AUTO: 88.1 FL (ref 80–100)
MONO #: 0.6 K/UL (ref 0.1–0.6)
MONO%: 8.8 %
NUCLEATED RBCS: 0 %
PLATELET # BLD AUTO: 614 K/UL (ref 140–440)
PMV BLD AUTO: 8.9 FL (ref 8.8–12.7)
POTASSIUM SERPL-SCNC: 4.4 MMOL/L (ref 3.5–5)
PROT SERPL-MCNC: 6.5 G/DL (ref 6–8.2)
RBC # BLD AUTO: 4.97 M/UL (ref 4.3–5.9)
SODIUM: 132 MMOL/L (ref 134–144)
WBC # BLD AUTO: 7.2 K/UL (ref 5–10)

## 2019-02-05 PROCEDURE — 99215 OFFICE O/P EST HI 40 MIN: CPT | Mod: ,,, | Performed by: INTERNAL MEDICINE

## 2019-02-05 PROCEDURE — 3075F PR MOST RECENT SYSTOLIC BLOOD PRESS GE 130-139MM HG: ICD-10-PCS | Mod: ,,, | Performed by: INTERNAL MEDICINE

## 2019-02-05 PROCEDURE — 3075F SYST BP GE 130 - 139MM HG: CPT | Mod: ,,, | Performed by: INTERNAL MEDICINE

## 2019-02-05 PROCEDURE — 1101F PR PT FALLS ASSESS DOC 0-1 FALLS W/OUT INJ PAST YR: ICD-10-PCS | Mod: ,,, | Performed by: INTERNAL MEDICINE

## 2019-02-05 PROCEDURE — 3078F DIAST BP <80 MM HG: CPT | Mod: ,,, | Performed by: INTERNAL MEDICINE

## 2019-02-05 PROCEDURE — 1101F PT FALLS ASSESS-DOCD LE1/YR: CPT | Mod: ,,, | Performed by: INTERNAL MEDICINE

## 2019-02-05 PROCEDURE — 3078F PR MOST RECENT DIASTOLIC BLOOD PRESSURE < 80 MM HG: ICD-10-PCS | Mod: ,,, | Performed by: INTERNAL MEDICINE

## 2019-02-05 PROCEDURE — 99215 PR OFFICE/OUTPT VISIT, EST, LEVL V, 40-54 MIN: ICD-10-PCS | Mod: ,,, | Performed by: INTERNAL MEDICINE

## 2019-02-05 NOTE — PROGRESS NOTES
Subjective:       Patient ID: Naresh Painting is a 68 y.o. male.    Chief Complaint: Abdominal Pain; Anorexia; Fatigue; and Nausea    Mr. Steinberg had a pleurocentesis last Friday for a moderately large pleural effusion with underlying lymphoma. He felt extremely better that day.  Young complains of repeat shortness of breath and not feeling good. No appetite. Intermittent abdominal pains. Has to take Pepto-Bismol.    He's feeling fatigued and tired. No energy.      Abdominal Pain   This is a new problem. The current episode started yesterday. The onset quality is sudden. The problem occurs intermittently. The problem has been waxing and waning. The pain is located in the periumbilical region. The pain is at a severity of 4/10. The pain is moderate. The quality of the pain is a sensation of fullness and cramping. The abdominal pain radiates to the periumbilical region. Associated symptoms include anorexia and nausea. Pertinent negatives include no arthralgias, constipation, diarrhea, dysuria, fever, headaches or hematuria. Treatments tried: peptobismol. There is no history of abdominal surgery.   Fatigue   This is a chronic problem. The current episode started more than 1 year ago. The problem occurs constantly. The problem has been gradually worsening. Associated symptoms include abdominal pain, anorexia, fatigue and nausea. Pertinent negatives include no arthralgias, chest pain, congestion, coughing, fever, headaches, neck pain, numbness or sore throat. The treatment provided no relief.   Nausea   This is a recurrent problem. The current episode started more than 1 month ago. The problem occurs intermittently. The problem has been gradually worsening. Associated symptoms include abdominal pain, anorexia, fatigue and nausea. Pertinent negatives include no arthralgias, chest pain, congestion, coughing, fever, headaches, neck pain, numbness or sore throat.       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     Social History     Socioeconomic History    Marital status:      Spouse name: Amita Painting    Number of children: 2    Years of education: Not on file    Highest education level: Not on file   Social Needs    Financial resource strain: Not on file    Food insecurity - worry: Not on file    Food insecurity - inability: Not on file    Transportation needs - medical: Not on file    Transportation needs - non-medical: Not on file   Occupational History    Occupation: Retired      Employer: [employer]     Comment: Meat Dept- 3 yrs    Occupation:      Employer: [employer]     Comment: 26 yrs    Occupation: Reproduction     Employer: [employer]     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 Son's name is Naresh-III     Past Surgical History:   Procedure Laterality Date    EYE SURGERY      LYMPH NODE BIOPSY      PROCTECTOMY      PROSTATE SURGERY      UMBILICAL HERNIA REPAIR  04/05/2018    Incarcerated-      Family History   Problem Relation Age of Onset    Heart disease Mother     Diabetes Father        Review of Systems   Constitutional: Positive for activity change (slowed down), appetite change and  "fatigue. Negative for fever and unexpected weight change (Lost hemal 40 lbs since feb 2017).   HENT: Negative for congestion, ear pain, postnasal drip, sneezing, sore throat and trouble swallowing.    Eyes: Negative for pain, discharge and visual disturbance.        Left eye Glass eye prosthesis   Respiratory: Positive for apnea (obstructive sleep apnea) and shortness of breath. Negative for cough and chest tightness.    Cardiovascular: Negative for chest pain, palpitations and leg swelling.        H/O Left upper extremity deep vein thrombosis. Treated with anticoagulation   Gastrointestinal: Positive for abdominal pain, anorexia and nausea. Negative for abdominal distention, blood in stool, constipation and diarrhea.   Endocrine: Negative for cold intolerance, heat intolerance, polydipsia, polyphagia and polyuria.   Genitourinary: Negative for dysuria, hematuria and scrotal swelling.   Musculoskeletal: Negative for arthralgias, back pain, gait problem and neck pain.   Skin: Positive for pallor. Negative for wound.        Patient's has lumps in the neck which have now been diagnosed to be lymphoma.   Allergic/Immunologic: Negative for environmental allergies, food allergies and immunocompromised state.   Neurological: Positive for light-headedness. Negative for dizziness, seizures, speech difficulty, numbness and headaches.   Hematological: Positive for adenopathy. Does not bruise/bleed easily.        Diagnosis of NHL under chemotherapy. Significant reduction in the size of lymphadenopathy.   Psychiatric/Behavioral: Negative for agitation, behavioral problems and confusion. The patient is not nervous/anxious.          Objective:      Blood pressure 134/73, pulse 102, resp. rate (!) 24, height 5' 8" (1.727 m), weight 108.4 kg (239 lb). Body mass index is 36.34 kg/m².  Physical Exam   Constitutional: He appears well-developed. He appears distressed.   BMI is 36-somewhat chronically sick   HENT:   Head: Normocephalic and " atraumatic.   Nose: Nose normal.   Mouth/Throat: Oropharynx is clear and moist. No oropharyngeal exudate.   Eyes: Conjunctivae and EOM are normal. Right eye exhibits no discharge and no exudate. Right conjunctiva is not injected. Right conjunctiva has no hemorrhage.   Left eye is prosthetic.   Neck: Normal range of motion. Neck supple. No JVD present. No neck rigidity. No tracheal deviation and normal range of motion present. No thyromegaly present.       The lymph nodes on right and left side have dramatically shrunken now. A small lymph node is felt on the right side which is firm, nontender and adherent to the underlying structures.   Cardiovascular: Normal rate, regular rhythm and normal heart sounds. Exam reveals no gallop and no friction rub.   No murmur heard.  Pulmonary/Chest: Effort normal. No respiratory distress. He has decreased breath sounds in the right middle field and the right lower field. He has no wheezes. He has no rales.   Diminished air entry on the right side in the base.   Abdominal: Soft. Bowel sounds are normal. He exhibits distension. There is no tenderness. There is no guarding.       Bowel sounds are high portable. No rigidity or rebound. Scar of prior hernia surgery has been noted.   Musculoskeletal: He exhibits edema (1 +). He exhibits no tenderness or deformity.   Examination of the neck does not reveal any deformity.   Lymphadenopathy:     He has cervical adenopathy.        Right cervical: Deep cervical adenopathy present.     He has no axillary adenopathy.   Right-sided cervical lymphadenopathy. No left-sided lymphadenopathy. No axillary lymphadenopathy.   Neurological: He is alert. He has normal reflexes.   Skin: Skin is warm and dry. There is pallor.   Psychiatric: He has a normal mood and affect. His mood appears not anxious. Thought content is not delusional.   While patient is relief from his distress and appears to be more happier, the news of recurrence of cancer and possible  cancer related effusion has made him somewhat anxious.   Nursing note and vitals reviewed.        Assessment:       1. Periumbilical abdominal pain    2. Shortness of breath    3. Diffuse large B-cell lymphoma of lymph nodes of neck    4. Pleural effusion on right    5. Neoplastic malignant related fatigue           Orders Only on 02/01/2019   Component Date Value Ref Range Status    Flow Cytometry Antibodies Analyzed 02/01/2019 see below   Final   Telephone on 01/31/2019   Component Date Value Ref Range Status    Glucose 01/31/2019 130* 70 - 99 mg/dL Final    BUN, Bld 01/31/2019 18  8 - 20 mg/dL Final    Creatinine 01/31/2019 1.03  0.60 - 1.40 mg/dL Final    Calcium 01/31/2019 9.4  7.7 - 10.4 mg/dL Final    Sodium 01/31/2019 133* 134 - 144 mmol/L Final    Potassium 01/31/2019 3.9  3.5 - 5.0 mmol/L Final    Chloride 01/31/2019 99  98 - 110 mmol/L Final    CO2 01/31/2019 20.0* 22.8 - 31.6 mmol/L Final    Albumin 01/31/2019 3.2  3.1 - 4.7 g/dL Final    Total Bilirubin 01/31/2019 1.2* 0.3 - 1.0 mg/dL Final    Alkaline Phosphatase 01/31/2019 82  40 - 104 IU/L Final    Total Protein 01/31/2019 6.6  6.0 - 8.2 g/dL Final    ALT (SGPT) 01/31/2019 23  3 - 33 IU/L Final    AST 01/31/2019 43* 10 - 40 IU/L Final    WBC 01/31/2019 6.9  5.0 - 10.0 K/ul Final    RBC 01/31/2019 4.65  4.30 - 5.90 M/ul Final    Hemoglobin 01/31/2019 13.1* 14.0 - 16.0 g/dl Final    Hematocrit 01/31/2019 41.1  39.0 - 55.0 % Final    MCV 01/31/2019 88.4  80.0 - 100.0 fl Final    MCH 01/31/2019 28.2  25.0 - 35.0 pg Final    MCHC 01/31/2019 31.9  31.0 - 36.0 g/dl Final    RDW 01/31/2019 15.7* 12.5 - 14.5 % Final    Platelets 01/31/2019 553* 140 - 440 K/ul Final    MPV 01/31/2019 8.4* 8.8 - 12.7 fl Final    Gran% 01/31/2019 83.5  % Final    Lymph% 01/31/2019 4.7  % Final    Mono% 01/31/2019 10.0  % Final    Eosinophil% 01/31/2019 1.0  % Final    Basophil% 01/31/2019 0.4  % Final    Gran # (ANC) 01/31/2019 5.7  1.4 - 6.5 K/ul  Final    Lymph # 01/31/2019 0.3* 1.2 - 3.4 K/ul Final    Mono # 01/31/2019 0.7* 0.1 - 0.6 K/ul Final    Eos # 01/31/2019 0.1  0.0 - 0.7 K/ul Final    Baso # 01/31/2019 0.0  0.0 - 0.2 K/ul Final    nRBC# 01/31/2019 0  /100 WBC Final    nRBC% 01/31/2019 0  % Final    Immature Granulocytes 01/31/2019 0.4  % Final    Immature Grans (Abs) 01/31/2019 0.0  0.0 - 1.0 K/uL Final    Performed by: 01/31/2019 Message:   Final    PT 01/31/2019 13.6  11.7 - 14.0 sec Final    INR 01/31/2019 1.1   Final    aPTT 01/31/2019 27.2  26.2 - 34.7 sec Final     ology Report **FINAL** FINAL DIAGNOSIS: RIGHT PLEURAL FLUID: -ATYPICAL LYMPHOID INFILTRATE. -THE RESULTS OF FLOW CYTOMETRY ANALYSIS (INSERTED BELOW) ARE COMPATIBLE WITH CONTINUED PRESENCE OF CD10+ B-CELL LYMPHOMA WITH INCREASED SIZE. SPECIMEN: PLEURAL FLUID, RIGHT PRE GROSS DESCRIPTION: Number of Slides: 3 Volume: 50 ml; Color: Green; Consistency: Cloudy; Fixed: Y; Other: DARK GREEN CLOUDY LIQUID IN SACCOMANNO ;Number of Cell Blocks: 1 ;Number of Cytocentrifuges: 2 MICROSCOPIC DESCRIPTION: Examined are two cytospin preparations and sections from cell block. The microscopic findings and results of flow cytometry analysis performed and reported by [facility] support the final diagnosis. <Sign Out  Signature> MICHEL FU MD 02/04/2019 [facility] Cc: MERCED LIU MD Page 1 of 1 Pathology ESPINOZA MILLIE ROB Printed: 2/4/2019 2:44 PM Report  Patient's pleurocentesis has been reviewed and it shows lymphoma cells.    Plan:           Periumbilical abdominal pain  -     X-Ray Abdomen Flat And Erect; Future; Expected date: 02/05/2019    Shortness of breath  -     X-Ray Chest PA And Lateral  -     CBC auto differential; Future; Expected date: 02/05/2019  -     Comprehensive metabolic panel; Future; Expected date: 02/05/2019    Diffuse large B-cell lymphoma of lymph nodes of neck    Pleural effusion on right  -     X-Ray Chest PA And Lateral    Neoplastic  malignant related fatigue      It seems patient might have recurrence of pleural effusion given his lymphoma.    I'll obtain a chest x-ray and decide accordingly. Will need to get an touch other consultants also or edema need hospitalization.    It would be difficult to get an abdominal CT scan done today. I will get an abdominal x-ray and decide accordingly.    Will get in touch with Dr. Amezquita also.    Overall prognosis continues to be guarded with a chance that he'll be frequently hospitalized in future and possibly today also.      Current Outpatient Medications:     aspirin 81 MG Chew, Take 81 mg by mouth once daily., Disp: , Rfl:     co-enzyme Q-10 30 mg capsule, Take 30 mg by mouth 3 (three) times daily., Disp: , Rfl:     L GASSERI/B BIFIDUM/B LONGUM ([employer] ORAL), Take by mouth., Disp: , Rfl:     MULTIVITAMIN/IRON/FOLIC ACID (CENTRUM COMPLETE ORAL), Take by mouth., Disp: , Rfl:     NYSTOP powder, MIX SMALL QUANTITY WITH GRISELDA CREAM AND APPLY TO DRY SKIN TID FOR A WEEK, Disp: , Rfl: 0    pantoprazole (PROTONIX) 40 MG tablet, Take 40 mg by mouth once daily., Disp: , Rfl:     potassium acetate liquid 5 mEq / 5ml Liqd, Take by mouth., Disp: , Rfl:     TOPROL XL 50 mg 24 hr tablet, Take 25 mg by mouth once daily., Disp: , Rfl:     vitamin D 1000 units Tab, Take 185 mg by mouth once daily., Disp: , Rfl:     vitamin E 100 UNIT capsule, Take 100 Units by mouth once daily., Disp: , Rfl:     Current Facility-Administered Medications:     leuprolide (6 month) SyKt 45 mg, 45 mg, Intramuscular, Q6 Months, Fauzia Maza NP, 45 mg at 08/24/18 9508

## 2019-02-06 NOTE — PROGRESS NOTES
Patient's chest x-ray shows no significant increase in effusion at this point. He requests an early follow-up with Dr. Amezquita and Dr. Corona

## 2019-02-11 ENCOUNTER — TELEPHONE (OUTPATIENT)
Dept: FAMILY MEDICINE | Facility: CLINIC | Age: 69
End: 2019-02-11

## 2019-02-11 DIAGNOSIS — C85.91 NON-HODGKIN LYMPHOMA OF LYMPH NODES OF NECK, UNSPECIFIED NON-HODGKIN LYMPHOMA TYPE: Primary | ICD-10-CM

## 2019-02-13 ENCOUNTER — OFFICE VISIT (OUTPATIENT)
Dept: FAMILY MEDICINE | Facility: CLINIC | Age: 69
End: 2019-02-13
Payer: MEDICARE

## 2019-02-13 VITALS — WEIGHT: 226 LBS | BODY MASS INDEX: 34.25 KG/M2 | HEIGHT: 68 IN

## 2019-02-13 DIAGNOSIS — B35.9 DERMATOPHYTOSES: Primary | ICD-10-CM

## 2019-02-13 PROCEDURE — 1101F PR PT FALLS ASSESS DOC 0-1 FALLS W/OUT INJ PAST YR: ICD-10-PCS | Mod: ,,, | Performed by: INTERNAL MEDICINE

## 2019-02-13 PROCEDURE — 1101F PT FALLS ASSESS-DOCD LE1/YR: CPT | Mod: ,,, | Performed by: INTERNAL MEDICINE

## 2019-02-13 PROCEDURE — 99212 PR OFFICE/OUTPT VISIT, EST, LEVL II, 10-19 MIN: ICD-10-PCS | Mod: ,,, | Performed by: INTERNAL MEDICINE

## 2019-02-13 PROCEDURE — 99212 OFFICE O/P EST SF 10 MIN: CPT | Mod: ,,, | Performed by: INTERNAL MEDICINE

## 2019-02-13 RX ORDER — ACYCLOVIR 400 MG/1
1 TABLET ORAL 2 TIMES DAILY
COMMUNITY
Start: 2019-02-11 | End: 2019-06-11

## 2019-02-13 RX ORDER — ONDANSETRON HYDROCHLORIDE 8 MG/1
1 TABLET, FILM COATED ORAL DAILY PRN
COMMUNITY
Start: 2019-02-11 | End: 2019-06-03

## 2019-02-13 RX ORDER — ALLOPURINOL 300 MG/1
1 TABLET ORAL DAILY
COMMUNITY
Start: 2019-02-11 | End: 2019-04-18

## 2019-02-13 RX ORDER — NYSTATIN 100000 [USP'U]/G
POWDER TOPICAL
Qty: 60 G | Refills: 1 | Status: SHIPPED | OUTPATIENT
Start: 2019-02-13 | End: 2019-06-03

## 2019-02-13 RX ORDER — SULFAMETHOXAZOLE AND TRIMETHOPRIM 800; 160 MG/1; MG/1
1 TABLET ORAL
Refills: 0 | COMMUNITY
Start: 2019-02-11 | End: 2020-01-01

## 2019-02-13 NOTE — PROGRESS NOTES
Subjective:       Patient ID: Naresh Painting is a 68 y.o. male.    Chief Complaint: Rash (lower abd )    Rash   This is a new problem. The current episode started in the past 7 days. The problem is unchanged. The affected locations include the groin and abdomen. Associated symptoms include fatigue and shortness of breath. Pertinent negatives include no cough, diarrhea, eye pain or fever.       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     Social History     Socioeconomic History    Marital status:      Spouse name: Amita Painting    Number of children: 2    Years of education: Not on file    Highest education level: Not on file   Social Needs    Financial resource strain: Not on file    Food insecurity - worry: Not on file    Food insecurity - inability: Not on file    Transportation needs - medical: Not on file    Transportation needs - non-medical: Not on file   Occupational History    Occupation: Retired      Employer: [employer]     Comment: Meat Dept- 3 yrs    Occupation:      Employer: [employer]     Comment: 26 yrs    Occupation: Reproduction     Employer: [employer]     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 Son's name is Alfie     Past Surgical History:   Procedure Laterality Date    EYE SURGERY      LYMPH NODE BIOPSY      PROCTECTOMY      PROSTATE SURGERY      UMBILICAL HERNIA REPAIR  04/05/2018    Incarcerated-      Family History   Problem Relation Age of Onset    Heart disease Mother     Diabetes Father        Review of Systems   Constitutional: Positive for activity change (slowed down), appetite change and fatigue. Negative for fever and unexpected weight change (Lost hemal 40 lbs since feb 2017).   Eyes: Negative for pain, discharge and visual disturbance.        Left eye Glass eye prosthesis   Respiratory: Positive for apnea (obstructive sleep apnea) and shortness of breath. Negative for cough and chest tightness.    Cardiovascular: Negative for chest pain, palpitations and leg swelling.        H/O Left upper extremity deep vein thrombosis. Treated with anticoagulation   Gastrointestinal: Positive for abdominal pain and nausea. Negative for abdominal distention, blood in stool, constipation and diarrhea.        Anorexia   Endocrine: Negative for cold intolerance, heat intolerance, polydipsia, polyphagia and polyuria.   Genitourinary: Negative for dysuria, hematuria and scrotal swelling.   Musculoskeletal: Negative for arthralgias, back pain, gait problem and neck pain.   Skin: Positive for pallor and rash. Negative for wound.        Patient's has lumps in the neck which have now been diagnosed to be lymphoma.   Allergic/Immunologic: Negative for environmental allergies, food allergies and immunocompromised state.   Neurological: Positive for light-headedness. Negative for dizziness, seizures, speech difficulty, numbness and headaches.   Hematological: Positive for adenopathy. Does not bruise/bleed easily.        Diagnosis of NHL under chemotherapy. Significant reduction in the size of lymphadenopathy.   Psychiatric/Behavioral: Negative for agitation, behavioral problems and confusion.  "The patient is not nervous/anxious.          Objective:      Height 5' 8" (1.727 m), weight 102.5 kg (226 lb). Body mass index is 34.36 kg/m².  Physical Exam   Constitutional: He appears well-developed. No distress.   BMI is 36-somewhat chronically sick   HENT:   Head: Normocephalic and atraumatic.   Mouth/Throat: No oropharyngeal exudate.   Eyes: Conjunctivae and EOM are normal. Right eye exhibits no discharge and no exudate. Right conjunctiva is not injected. Right conjunctiva has no hemorrhage.   Left eye is prosthetic.   Neck: Normal range of motion. Neck supple. No JVD present. No neck rigidity. Normal range of motion present.   The lymph nodes on right and left side have dramatically shrunken now. A small lymph node is felt on the right side which is firm, nontender and adherent to the underlying structures.   Cardiovascular: Normal rate and regular rhythm. Exam reveals no friction rub.   No murmur heard.  Pulmonary/Chest: Effort normal. No respiratory distress. He has decreased breath sounds in the right middle field and the right lower field. He has no wheezes.   Diminished air entry on the right side in the base.   Abdominal: Soft. Bowel sounds are normal. He exhibits distension.   Genitourinary:         Musculoskeletal: He exhibits edema (1 +). He exhibits no tenderness or deformity.   Examination of the neck does not reveal any deformity.   Lymphadenopathy:     He has cervical adenopathy.        Right cervical: Deep cervical adenopathy present.     He has no axillary adenopathy.   Right-sided cervical lymphadenopathy. No left-sided lymphadenopathy. No axillary lymphadenopathy.   Neurological: He is alert. He has normal reflexes.   Skin: Skin is warm and dry. Rash noted. There is pallor.   Psychiatric: His mood appears anxious. Thought content is not delusional.   Nursing note and vitals reviewed.        Assessment:       1. Dermatophytoses                 Plan:           Dermatophytoses  -     NYSTOP " powder; MIX SMALL QUANTITY WITH GRISELDA CREAM AND APPLY TO DRY SKIN TID FOR A WEEK  Dispense: 60 g; Refill: 1      Keep Area Dry. Avoid wet groin    Avoid neosprin. Nystop Cream with desitin    Keep Oncology follow up      Current Outpatient Medications:     acyclovir (ZOVIRAX) 400 MG tablet, 1 tablet 2 (two) times daily., Disp: , Rfl:     allopurinol (ZYLOPRIM) 300 MG tablet, 1 tablet once daily., Disp: , Rfl:     aspirin 81 MG Chew, Take 81 mg by mouth once daily., Disp: , Rfl:     co-enzyme Q-10 30 mg capsule, Take 30 mg by mouth 3 (three) times daily., Disp: , Rfl:     filgrastim (NEUPOGEN) 300 mcg/0.5 mL injection, Inject 1 mcg into the skin once daily., Disp: , Rfl:     L GASSERI/B BIFIDUM/B LONGUM ([facility] ORAL), Take by mouth., Disp: , Rfl:     MULTIVITAMIN/IRON/FOLIC ACID (CENTRUM COMPLETE ORAL), Take by mouth., Disp: , Rfl:     NYSTOP powder, MIX SMALL QUANTITY WITH GRISELDA CREAM AND APPLY TO DRY SKIN TID FOR A WEEK, Disp: 60 g, Rfl: 1    ondansetron (ZOFRAN) 8 MG tablet, 1 tablet daily as needed., Disp: , Rfl:     pantoprazole (PROTONIX) 40 MG tablet, Take 40 mg by mouth once daily., Disp: , Rfl:     potassium acetate liquid 5 mEq / 5ml Liqd, Take by mouth., Disp: , Rfl:     sulfamethoxazole-trimethoprim 800-160mg (BACTRIM DS) 800-160 mg Tab, 1 tablet 3 (three) times a week., Disp: , Rfl: 0    TOPROL XL 50 mg 24 hr tablet, Take 25 mg by mouth once daily., Disp: , Rfl:     vitamin D 1000 units Tab, Take 185 mg by mouth once daily., Disp: , Rfl:     vitamin E 100 UNIT capsule, Take 100 Units by mouth once daily., Disp: , Rfl:     Current Facility-Administered Medications:     leuprolide (6 month) SyKt 45 mg, 45 mg, Intramuscular, Q6 Months, Fauzia Maza, ROSEY, 45 mg at 08/24/18 3510

## 2019-02-13 NOTE — PATIENT INSTRUCTIONS
Fungal Skin Infection (Tinea)  A fungal infection occurs when too much fungus grows on or in the body. Fungus normally lives on the skin in small amounts and does not cause harm. But when too much grows on the skin, it causes an infection. This is also known as tinea. Fungal skin infections are common and not usually serious.  The infection often starts as a small red area the size of a pea. The skin may turn dry and flaky. The area may itch. As the fungus grows, it spreads out in a red [place]. Because of how it looks, fungal skin infection is often called ringworm, but it is not caused by a worm. Fungal skin infections can occur on many parts of the body. They can grow on the head, chest, arms, or legs. They can occur on the buttocks. On the feet, fungal infection is known as athletes foot. It causes itchy, sometimes painful sores between the toes and the bottom or sides of the feet. In the groin, the rash is called jock itch.  People with weak immune systems can get a fungal infection more easily. This includes people with diabetes or HIV, or who are being treated for cancer. In these cases, the fungal infection can spread and cause severe illness. Fungal infections are also more common in people who are overweight.  In most cases, treatment is done with antifungal cream or ointment. If the infection is on your scalp, you may take oral medicine. In some cases, a tiny piece of the skin (biopsy) may be taken. This is so it can be tested in a lab.  Common fungal infections are treated with creams on the skin or oral medicine.  Home care  Follow all instructions when using antifungal cream or ointment on your skin. Your healthcare provider may advise using cornstarch powder to keep your skin dry or petroleum jelly to provide a barrier.  General care:  · If you were prescribed an oral medicine, read the patient information. Talk with your healthcare provider about the risks and side effects.  · Let your skin dry  completely after bathing. Carefully dry your feet and between your toes.  · Dress in loose cotton clothing.  · Dont scratch the affected area. This can delay healing and may spread the infection. It can also cause a bacterial infection.  · Keep your skin clean, but dont wash the skin too much. This can irritate your skin.  · Keep in mind that it may take a week before the fungus starts to go away. It can take 2 to 4 weeks to fully clear. To prevent it from coming back, use the medicine until the rash is all gone.  Follow-up care  Follow up with your healthcare provider if the rash does not get better after 10 days of treatment. Also follow up if the rash spreads to other parts of your body.  When to seek medical advice  Call your healthcare provider right away if any of these occur:  · Fever of 100.4°F (38°C) or higher  · Redness or swelling that gets worse  · Pain that gets worse  · Foul-smelling fluid leaking from the skin  Date Last Reviewed: 11/1/2016 © 2000-2017 [employer] [address] [place] [address]. All rights reserved. This information is not intended as a substitute for professional medical care. Always follow your healthcare professional's instructions.        Ringworm of the Skin    Ringworm is a fungal infection of the skin. Despite the name, a worm doesn't cause it. The cause of ringworm is a fungus that infects the outer layers of the skin. It is also not caused by bed bugs, scabies, or lice. These are totally different.  The medical term for ringworm is tinea. It can affect most parts of your body, although it seems to do better in moist areas of the body and around hair. It can be on almost any part of your body, including:  · Arms, hands, legs, chest, feet, and back  · Scalp  · Beard  · Groin  · Between the toes  Depending on where it is located, sometimes the name changes:  · Tinea capitis (scalp)  · Tinea cruris (groin)  · Tinea corporis (body)  · Tinea pedis  (feet)  Causes  Ringworm is very common all over the world, including the [place] It can take less than 1 week up to 2 weeks before you develop the infection after being exposed. So, you may not figure out the exact cause.  It is spread through direct contact with:  · An infected person or animal  · Infected soil, or objects such as towels, clothing, and cooper  Symptoms  At first you might not notice ringworm. Or you may just see a small, red, often raised itchy spot or pimple. Sometimes there may only be one spot. At other times there may be several. Ringworm can look slightly different on different parts of the body, but there are some things are always present:  · Irregular, round, oval or ring-shaped, which is why it's called ringworm  · Clearer or lighter color at the center, since it spreads from the center of the spot outward  · Red or inflamed look  · Raised  · Itchy  · Scaly, dry, or flaky  Home care  Follow these tips to help care for yourself at home:  · Leave it alone. Don't scratch at the rash or pick it. This can increase the chance of infection and scarring.  · Take medicine as prescribed. If you were prescribed a cream, apply it exactly as directed. Make sure to put the cream not just on the rash, but also on the skin 1 or 2 inches around it. Medicine by mouth is sometimes needed, particularly for ringworm on the scalp. Take it as directed and until your healthcare provider says to stop.  · Keep it from spreading to others. Untreated ringworm of the skin is contagious by skin-to-skin contact. Your child may return to school 2 days after treatment has started.  Prevention  To some degree, prevention depends on what part of your body was affected. In general, the following good hygiene can help.  · Clean up after you get dirty or sweaty, or after using a locker room.  · When possible, dont share cooper and brushes.  · Avoid having your skin and feet wet or damp for long periods.  · Wear clean,  loose-fitting underwear.  Follow-up care  Follow up with your healthcare provider as advised by our staff if the rash does not improve after 10 days of treatment or if the rash spreads to other areas of the body.  When to seek medical advice  Call your healthcare provider right away if any of these occur:  · Redness around the rash gets worse  · Fluid drains from the rash  · Fever of 100.4ºF (38ºC) or higher, or as directed by your healthcare provider  Date Last Reviewed: 8/1/2016  © 7101-2049 [employer]. [address] [place] [address]. All rights reserved. This information is not intended as a substitute for professional medical care. Always follow your healthcare professional's instructions.

## 2019-02-14 LAB
ALBUMIN SERPL-MCNC: 3.2 G/DL (ref 3.1–4.7)
ALP SERPL-CCNC: 83 IU/L (ref 40–104)
ALT (SGPT): 45 IU/L (ref 3–33)
AST SERPL-CCNC: 52 IU/L (ref 10–40)
BASOPHILS NFR BLD: 0.1 K/UL (ref 0–0.2)
BASOPHILS NFR BLD: 0.3 %
BILIRUB SERPL-MCNC: 0.8 MG/DL (ref 0.3–1)
BUN SERPL-MCNC: 45 MG/DL (ref 8–20)
CALCIUM SERPL-MCNC: 9 MG/DL (ref 7.7–10.4)
CHLORIDE: 92 MMOL/L (ref 98–110)
CO2 SERPL-SCNC: 28.4 MMOL/L (ref 22.8–31.6)
CREATININE: 1.19 MG/DL (ref 0.6–1.4)
EOSINOPHIL NFR BLD: 0.1 K/UL (ref 0–0.7)
EOSINOPHIL NFR BLD: 0.3 %
ERYTHROCYTE [DISTWIDTH] IN BLOOD BY AUTOMATED COUNT: 16.7 % (ref 11.7–14.9)
GLUCOSE: 113 MG/DL (ref 70–99)
GRAN #: 24.2 K/UL (ref 1.4–6.5)
GRAN%: 87.5 %
HCT VFR BLD AUTO: 43.3 % (ref 39–55)
HGB BLD-MCNC: 14 G/DL (ref 14–16)
IMMATURE GRANS (ABS): 3.2 K/UL (ref 0–1)
IMMATURE GRANULOCYTES: 11.6 %
LDH SERPL L TO P-CCNC: 681 IU/L (ref 100–194)
LYMPH #: 0.1 K/UL (ref 1.2–3.4)
LYMPH%: 0.2 %
MAGNESIUM SERPL-MCNC: 2.2 MG/DL (ref 1.5–2.6)
MCH RBC QN AUTO: 28.5 PG (ref 25–35)
MCHC RBC AUTO-ENTMCNC: 32.3 G/DL (ref 31–36)
MCV RBC AUTO: 88.2 FL (ref 80–100)
MONO #: 0 K/UL (ref 0.1–0.6)
MONO%: 0.1 %
NUCLEATED RBCS: 0 %
PHOSPHATE FLD-MCNC: 3 MG/DL (ref 2.5–4.9)
PLATELET # BLD AUTO: 183 K/UL (ref 140–440)
PMV BLD AUTO: 9.5 FL (ref 8.8–12.7)
POTASSIUM SERPL-SCNC: 3.9 MMOL/L (ref 3.5–5)
PROT SERPL-MCNC: 6.5 G/DL (ref 6–8.2)
RBC # BLD AUTO: 4.91 M/UL (ref 4.3–5.9)
SODIUM: 136 MMOL/L (ref 134–144)
URATE SERPL-MCNC: 4.9 MG/DL (ref 2.6–7.8)
WBC # BLD AUTO: 27.6 K/UL (ref 5–10)

## 2019-02-25 ENCOUNTER — TELEPHONE (OUTPATIENT)
Dept: HEMATOLOGY/ONCOLOGY | Facility: CLINIC | Age: 69
End: 2019-02-25

## 2019-02-25 NOTE — TELEPHONE ENCOUNTER
Faxed all records requested     ----- Message from Bella Rogel sent at 2/22/2019  3:05 PM CST -----  Contact: Mrs. Mert Painting requested to have the hosp records pulled and sent to Dr. Basilio's office since they didn't have time to do any labs or anything before Dr. Basilio's  Visit on Monday.  Mr. Painting was in the hospital yesterday and was discharged today.    CB# (cell)

## 2019-02-27 NOTE — PROGRESS NOTES
[facility] Hematology/Oncology  PROGRESS NOTE      Subjective:       Patient ID:   NAME: Naresh Painting : 1950     68 y.o. male    Referring Doc: Yamila  Other Physicians: Adriano Michaud Pernenkil, Saba, Schuller    Chief Complaint:  NHL f/u    History of Present Illness:     Patient returns today for a regularly scheduled follow-up visit.  The patient is here with his wife.  No CP, SOB, HA's or N/V. He has since been at [facility] and given chemotherapy with cisplatin, rituximab and cytarabine in preparation for a BMT . He last saw Dr Basilio this past Monday. He was admitted over the past weekend for platelets. He is going to [facility] again tomorrow for admission for next chemotherapy. He has PET scheduled for PET on . He feels great. He denies any CP, SOB, HA's or N/V. He is breathing good.       ROS:   GEN: normal without any fever, night sweats or weight loss  HEENT: normal with no HA's, sore throat, stiff neck, changes in vision  CV: no CP, CP, COOPER  PULM: normal with no SOB, cough, hemoptysis, sputum or pleuritic pain  GI: normal with no abdominal pain, nausea, vomiting, , diarrhea, melanotic stools, BRBPR, or hematemesis;  : normal with no hematuria, dysuria  BREAST: normal with no mass, discharge, pain  SKIN: rash resolved    Allergies:  Review of patient's allergies indicates:  No Known Allergies    Medications:    Current Outpatient Medications:     acyclovir (ZOVIRAX) 400 MG tablet, 1 tablet 2 (two) times daily., Disp: , Rfl:     allopurinol (ZYLOPRIM) 300 MG tablet, 1 tablet once daily., Disp: , Rfl:     aspirin 81 MG Chew, Take 81 mg by mouth once daily., Disp: , Rfl:     co-enzyme Q-10 30 mg capsule, Take 30 mg by mouth 3 (three) times daily., Disp: , Rfl:     filgrastim (NEUPOGEN) 300 mcg/0.5 mL injection, Inject 1 mcg into the skin once daily., Disp: , Rfl:     L GASSERI/B BIFIDUM/B LONGUM (ESCALERA [employer] ORAL), Take by mouth., Disp: , Rfl:     MULTIVITAMIN/IRON/FOLIC ACID  "(CENTRUM COMPLETE ORAL), Take by mouth., Disp: , Rfl:     NYSTOP powder, MIX SMALL QUANTITY WITH GRISELDA CREAM AND APPLY TO DRY SKIN TID FOR A WEEK, Disp: 60 g, Rfl: 1    ondansetron (ZOFRAN) 8 MG tablet, 1 tablet daily as needed., Disp: , Rfl:     pantoprazole (PROTONIX) 40 MG tablet, Take 40 mg by mouth once daily., Disp: , Rfl:     potassium acetate liquid 5 mEq / 5ml Liqd, Take by mouth., Disp: , Rfl:     sulfamethoxazole-trimethoprim 800-160mg (BACTRIM DS) 800-160 mg Tab, 1 tablet 3 (three) times a week., Disp: , Rfl: 0    TOPROL XL 50 mg 24 hr tablet, Take 25 mg by mouth once daily., Disp: , Rfl:     vitamin D 1000 units Tab, Take 185 mg by mouth once daily., Disp: , Rfl:     vitamin E 100 UNIT capsule, Take 100 Units by mouth once daily., Disp: , Rfl:     Current Facility-Administered Medications:     leuprolide (6 month) SyKt 45 mg, 45 mg, Intramuscular, Q6 Months, Fauzia Maza NP, 45 mg at 08/24/18 1508    PMHx/PSHx Updates:  See patient's last visit with me on 8/14/2018.  See H&P on 6/9/2017        Pathology:  See path 9/13/2017          Objective:     Vitals:  Blood pressure 107/68, pulse 92, temperature 98.1 °F (36.7 °C), resp. rate 20, height 5' 8" (1.727 m), weight 99.8 kg (220 lb).    Physical Examination:   GEN: no apparent distress, comfortable; AAOx3  HEAD: atraumatic and normocephalic  EYES: no pallor, no icterus, PERRLA  ENT: OMM, no pharyngeal erythema, external ears WNL; no nasal discharge; no thrush  NECK: no masses, thyroid normal, trachea midline,  LAD/LN's, supple; resolved neck LAD; left looks good  CV: RRR with no murmur; normal pulse; normal S1 and S2; no pedal edema  CHEST: good BS bilaterlally currently  ABDOM: nontender and nondistended; soft; normal bowel sounds; no rebound/guardingp; vertical wound healed well  MUSC/Skeletal: ROM normal; no crepitus; joints normal; no deformities or arthropathy  EXTREM: no clubbing, cyanosis, inflammation or swelling  SKIN: no lesions, " ulcers, petechiae or subcutaneous nodules; fungal like rash under right axilla resolved  : no cannon  NEURO: grossly intact; motor/sensory WNL; AAOx3; no tremors  PSYCH: normal mood, affect and behavior  LYMPH: normal cervical, supraclavicular, axillary and groin LN's            Labs:     2/14/2019  Lab Results   Component Value Date    WBC 27.6 (H) 02/14/2019    HGB 14.0 02/14/2019    HCT 43.3 02/14/2019    MCV 88.2 02/14/2019     02/14/2019     BMP  Lab Results   Component Value Date     02/14/2019    K 3.9 02/14/2019    CL 92 (L) 02/14/2019    CO2 28.4 02/14/2019    BUN 45 (H) 02/14/2019    CREATININE 1.19 02/14/2019    CALCIUM 9.0 02/14/2019    ESTGFRAFRICA >60.0 11/25/2014    EGFRNONAA 76 12/17/2018     Lab Results   Component Value Date    ALT 12 12/17/2018    AST 52 (H) 02/14/2019    ALKPHOS 83 02/14/2019    BILITOT 0.8 02/14/2019           Radiology/Diagnostic Studies:    CT Chest  1/31/2019 - on chartT    IMPRESSION:    Interval development of moderate-large right-sided pleural effusion and  associated atelectatic changes within the right lung.    Development of conglomerate soft tissue densities within the visualized upper  abdomen likely representing interval progression of known lymphoma. Correlation  with dedicated CT of the abdomen with contrast is recommended. Additional soft  tissue density within the base of the neck on the left may also represent  pathologic adenopathy.    Additional observations as above.          PET 8/20/2018:  IMPRESSION:    1. Increased FDG uptake involving the anterolateral left sixth rib could be related to nondisplaced fracture here. Correlate with focal tenderness and any history of trauma. (he had a fall)  2. Otherwise, there is evidence of FDG avid malignancy.  3. Persistent enlarged left supraclavicular lymph node, stable from prior.  4. Improvement of previously seen reactive marrow.  5. Prostate gland is surgically absent.        Assessment/Plan:   (1)  68 y.o. male with diagnosis of development of right sided neck swelling since Feb 2017  - he has been referred by Dr Michaud with ID for an evaluation  - i spoke to Dr Michaud previously about the patient peripherally  - FNA bx on 2/24 which was nondiagnostic  - he had a  guided biopsy of an entire left supraclavicular LN on 3/6/2017 with Dr Alexander Oh which showed  necrotizing granulomatous lymphadenitis  - CT scans were from Feb 2017  - flow cytometry studies from 5/25 appear to have been negative for any abnormal cell populations  - PET scan on 7/6/17 with markedly hypermetabolic LAD  - he was referred to and seen by Dr Basilio at [facility] on 7/28 and again on 8/25  - he underwent repeat cervical LN biopsy with Dr Oh on 9/13/2017 and that 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)  - he saw Dr Basilio again at [facility] this past Monday  - the prior bone marrow showed no involvement of the lymphoma  - he received R-CHOP and then proceed with a HD-MTX regimen and Intra-thecal MTX at [facility]    - he has been on HD-MTX chemotherapy at [facility] as of lately  - he completed last chemotherapy from June 18th through 21st  - he required neupogen the last week of June  - he recent developed a pleural effusion  - he was since found to have recurrent  lymphoma in the pleural fluid and was seen by Dr Basilio again and started on Retuximab, Cisplatin and cytarabine in preparation for a BMT  - he last saw Dr Basilio last Monday  - he is being admitted tomorrow at [facility] for additional chemotherapy and he has PET planned for March 13th with visit there on March 14th to go over the BMT      (2) Hx/of prostate cancer - 1995; s/p prostatectomy followed by XRT; on lupron per Dr Menjivar with  and Fauzia Maza (NP)     (3) JAK - uses CPAP     (4) GERD     (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 Non-Hodgkin lymphoma of lymph nodes of neck, unspecified non-Hodgkin lymphoma type     2. Malignant neoplasm of prostate     3. Diffuse large B-cell lymphoma of lymph nodes of neck     4. Thrombosis of left subclavian vein     5. Chemotherapy-induced neutropenia     6. Cervical lymphadenopathy         PLAN:  1. F/u with Dr Basilio at [facility] and planned BMT: being admitted tomorrow at [facility] for additional chemotherapy and he has PET planned for March 13th with visit there on March 14th to go over the BMT  2. Check labs weekly for now  3. He is now off eliquis  4.  F/u with pulmonary, PCP, Etc  5.  RTC 3-4 weeks    Fax note to Ronny Driscoll MD, Adriano Michaud Schuller and  Negro Santiago    Discussion:     I have explained all of the above in detail and the patient understands all of the current recommendation(s). I have answered all of their questions to the best of my ability and to their complete satisfaction.   The patient is to continue with the current management plan.            Electronically signed by Fidencio Rdz MD

## 2019-02-28 ENCOUNTER — OFFICE VISIT (OUTPATIENT)
Dept: HEMATOLOGY/ONCOLOGY | Facility: CLINIC | Age: 69
End: 2019-02-28
Payer: MEDICARE

## 2019-02-28 VITALS
WEIGHT: 220 LBS | DIASTOLIC BLOOD PRESSURE: 68 MMHG | SYSTOLIC BLOOD PRESSURE: 107 MMHG | TEMPERATURE: 98 F | HEART RATE: 92 BPM | RESPIRATION RATE: 20 BRPM | BODY MASS INDEX: 33.34 KG/M2 | HEIGHT: 68 IN

## 2019-02-28 DIAGNOSIS — I82.B12 THROMBOSIS OF LEFT SUBCLAVIAN VEIN: ICD-10-CM

## 2019-02-28 DIAGNOSIS — C61 MALIGNANT NEOPLASM OF PROSTATE: ICD-10-CM

## 2019-02-28 DIAGNOSIS — C85.91 NON-HODGKIN LYMPHOMA OF LYMPH NODES OF NECK, UNSPECIFIED NON-HODGKIN LYMPHOMA TYPE: Primary | ICD-10-CM

## 2019-02-28 DIAGNOSIS — D70.1 CHEMOTHERAPY-INDUCED NEUTROPENIA: ICD-10-CM

## 2019-02-28 DIAGNOSIS — R59.0 CERVICAL LYMPHADENOPATHY: ICD-10-CM

## 2019-02-28 DIAGNOSIS — T45.1X5A CHEMOTHERAPY-INDUCED NEUTROPENIA: ICD-10-CM

## 2019-02-28 DIAGNOSIS — C83.31 DIFFUSE LARGE B-CELL LYMPHOMA OF LYMPH NODES OF NECK: ICD-10-CM

## 2019-02-28 PROCEDURE — 1101F PT FALLS ASSESS-DOCD LE1/YR: CPT | Mod: ,,, | Performed by: INTERNAL MEDICINE

## 2019-02-28 PROCEDURE — 3078F PR MOST RECENT DIASTOLIC BLOOD PRESSURE < 80 MM HG: ICD-10-PCS | Mod: ,,, | Performed by: INTERNAL MEDICINE

## 2019-02-28 PROCEDURE — 3074F PR MOST RECENT SYSTOLIC BLOOD PRESSURE < 130 MM HG: ICD-10-PCS | Mod: ,,, | Performed by: INTERNAL MEDICINE

## 2019-02-28 PROCEDURE — 99214 OFFICE O/P EST MOD 30 MIN: CPT | Mod: ,,, | Performed by: INTERNAL MEDICINE

## 2019-02-28 PROCEDURE — 1101F PR PT FALLS ASSESS DOC 0-1 FALLS W/OUT INJ PAST YR: ICD-10-PCS | Mod: ,,, | Performed by: INTERNAL MEDICINE

## 2019-02-28 PROCEDURE — 99214 PR OFFICE/OUTPT VISIT, EST, LEVL IV, 30-39 MIN: ICD-10-PCS | Mod: ,,, | Performed by: INTERNAL MEDICINE

## 2019-02-28 PROCEDURE — 3074F SYST BP LT 130 MM HG: CPT | Mod: ,,, | Performed by: INTERNAL MEDICINE

## 2019-02-28 PROCEDURE — 3078F DIAST BP <80 MM HG: CPT | Mod: ,,, | Performed by: INTERNAL MEDICINE

## 2019-03-12 ENCOUNTER — TELEPHONE (OUTPATIENT)
Dept: HEMATOLOGY/ONCOLOGY | Facility: CLINIC | Age: 69
End: 2019-03-12

## 2019-03-12 NOTE — TELEPHONE ENCOUNTER
Called patients wife told them calling in potassium refill to follow directions     ----- Message from Fidencio Rdz MD sent at 3/11/2019  9:32 PM CDT -----  Potassium is low

## 2019-03-12 NOTE — TELEPHONE ENCOUNTER
I have called back 4 times no answer   ----- Message from Janeth Deluca sent at 3/12/2019 11:10 AM CDT -----  Annika at [facility] called and said she needs some information on the prescription you called in for this patient. Please call her back at [phone].

## 2019-03-13 ENCOUNTER — TELEPHONE (OUTPATIENT)
Dept: HEMATOLOGY/ONCOLOGY | Facility: CLINIC | Age: 69
End: 2019-03-13

## 2019-03-13 ENCOUNTER — TELEPHONE (OUTPATIENT)
Dept: FAMILY MEDICINE | Facility: CLINIC | Age: 69
End: 2019-03-13

## 2019-03-13 NOTE — TELEPHONE ENCOUNTER
----- Message from Shoshana Wong sent at 3/13/2019 10:56 AM CDT -----  Pt's wife said that the meds that was called in was something he has never taken. They need [facility] to call the pharmacy because they won't fill it till the get something from [facility].    Mrs. Painting [phone]

## 2019-03-13 NOTE — TELEPHONE ENCOUNTER
Patient was discharged from [facility] on 03/10/2019. Please schedule f/u ov with patient by 03/24/2019. We need to document discharged medicines with current medicine list and bill the 1111F code on claim. Patient needs to be scheduled within 14 days of discharge from hospital.    *YOU CAN SCHEDULE HOSPITAL F/U WITH A NURSE PRACTITIONER    Please send me back date of scheduled appointment. Thank you!

## 2019-03-14 ENCOUNTER — TELEPHONE (OUTPATIENT)
Dept: FAMILY MEDICINE | Facility: CLINIC | Age: 69
End: 2019-03-14

## 2019-03-14 ENCOUNTER — OFFICE VISIT (OUTPATIENT)
Dept: FAMILY MEDICINE | Facility: CLINIC | Age: 69
End: 2019-03-14
Payer: MEDICARE

## 2019-03-14 VITALS
SYSTOLIC BLOOD PRESSURE: 138 MMHG | HEIGHT: 68 IN | HEART RATE: 88 BPM | DIASTOLIC BLOOD PRESSURE: 80 MMHG | WEIGHT: 224 LBS | BODY MASS INDEX: 33.95 KG/M2

## 2019-03-14 DIAGNOSIS — C83.31 DIFFUSE LARGE B-CELL LYMPHOMA OF LYMPH NODES OF NECK: ICD-10-CM

## 2019-03-14 DIAGNOSIS — R06.02 SHORTNESS OF BREATH: ICD-10-CM

## 2019-03-14 DIAGNOSIS — D63.0 ANEMIA COMPLICATING NEOPLASTIC DISEASE: ICD-10-CM

## 2019-03-14 DIAGNOSIS — R53.0 NEOPLASTIC MALIGNANT RELATED FATIGUE: ICD-10-CM

## 2019-03-14 DIAGNOSIS — E78.2 MULTIPLE-TYPE HYPERLIPIDEMIA: ICD-10-CM

## 2019-03-14 DIAGNOSIS — I10 BENIGN ESSENTIAL HYPERTENSION: Primary | ICD-10-CM

## 2019-03-14 DIAGNOSIS — E87.6 LOW BLOOD POTASSIUM: ICD-10-CM

## 2019-03-14 DIAGNOSIS — I82.B12 THROMBOSIS OF LEFT SUBCLAVIAN VEIN: ICD-10-CM

## 2019-03-14 LAB
ALBUMIN SERPL-MCNC: 3.2 G/DL (ref 3.1–4.7)
ALP SERPL-CCNC: 101 IU/L (ref 40–104)
ALT (SGPT): 23 IU/L (ref 3–33)
AST SERPL-CCNC: 24 IU/L (ref 10–40)
BASOPHILS NFR BLD: 0.1 K/UL (ref 0–0.2)
BASOPHILS NFR BLD: 1.7 %
BILIRUB SERPL-MCNC: 1 MG/DL (ref 0.3–1)
BUN SERPL-MCNC: 24 MG/DL (ref 8–20)
CALCIUM SERPL-MCNC: 8.7 MG/DL (ref 7.7–10.4)
CHLORIDE: 110 MMOL/L (ref 98–110)
CO2 SERPL-SCNC: 21.2 MMOL/L (ref 22.8–31.6)
CREATININE: 1.34 MG/DL (ref 0.6–1.4)
EOSINOPHIL NFR BLD: 0.1 K/UL (ref 0–0.7)
EOSINOPHIL NFR BLD: 2 %
ERYTHROCYTE [DISTWIDTH] IN BLOOD BY AUTOMATED COUNT: 18.3 % (ref 11.7–14.9)
GLUCOSE: 90 MG/DL (ref 70–99)
GRAN #: 3 K/UL (ref 1.4–6.5)
GRAN%: 49.9 %
HCT VFR BLD AUTO: 30.9 % (ref 39–55)
HGB BLD-MCNC: 9.7 G/DL (ref 14–16)
IMMATURE GRANS (ABS): 2.5 K/UL (ref 0–1)
IMMATURE GRANULOCYTES: 42.7 %
LDH SERPL L TO P-CCNC: 233 IU/L (ref 100–194)
LYMPH #: 0.2 K/UL (ref 1.2–3.4)
LYMPH%: 2.5 %
MAGNESIUM SERPL-MCNC: 2.2 MG/DL (ref 1.5–2.6)
MCH RBC QN AUTO: 28.4 PG (ref 25–35)
MCHC RBC AUTO-ENTMCNC: 31.4 G/DL (ref 31–36)
MCV RBC AUTO: 90.4 FL (ref 80–100)
MONO #: 0.1 K/UL (ref 0.1–0.6)
MONO%: 1.2 %
NUCLEATED RBCS: 0 %
PHOSPHATE FLD-MCNC: 2.2 MG/DL (ref 2.5–4.9)
PLATELET # BLD AUTO: 184 K/UL (ref 140–440)
PMV BLD AUTO: 10.1 FL (ref 8.8–12.7)
POTASSIUM SERPL-SCNC: 3.5 MMOL/L (ref 3.5–5)
PROT SERPL-MCNC: 6.3 G/DL (ref 6–8.2)
RBC # BLD AUTO: 3.42 M/UL (ref 4.3–5.9)
SODIUM: 139 MMOL/L (ref 134–144)
URATE SERPL-MCNC: 4.5 MG/DL (ref 2.6–7.8)
WBC # BLD AUTO: 5.9 K/UL (ref 5–10)

## 2019-03-14 PROCEDURE — 1111F DSCHRG MED/CURRENT MED MERGE: CPT | Mod: ,,, | Performed by: INTERNAL MEDICINE

## 2019-03-14 PROCEDURE — 99214 PR OFFICE/OUTPT VISIT, EST, LEVL IV, 30-39 MIN: ICD-10-PCS | Mod: ,,, | Performed by: INTERNAL MEDICINE

## 2019-03-14 PROCEDURE — 3075F SYST BP GE 130 - 139MM HG: CPT | Mod: ,,, | Performed by: INTERNAL MEDICINE

## 2019-03-14 PROCEDURE — 1101F PT FALLS ASSESS-DOCD LE1/YR: CPT | Mod: ,,, | Performed by: INTERNAL MEDICINE

## 2019-03-14 PROCEDURE — 3075F PR MOST RECENT SYSTOLIC BLOOD PRESS GE 130-139MM HG: ICD-10-PCS | Mod: ,,, | Performed by: INTERNAL MEDICINE

## 2019-03-14 PROCEDURE — 3079F DIAST BP 80-89 MM HG: CPT | Mod: ,,, | Performed by: INTERNAL MEDICINE

## 2019-03-14 PROCEDURE — 1111F PR DISCHARGE MEDS RECONCILED W/ CURRENT OUTPATIENT MED LIST: ICD-10-PCS | Mod: ,,, | Performed by: INTERNAL MEDICINE

## 2019-03-14 PROCEDURE — 1101F PR PT FALLS ASSESS DOC 0-1 FALLS W/OUT INJ PAST YR: ICD-10-PCS | Mod: ,,, | Performed by: INTERNAL MEDICINE

## 2019-03-14 PROCEDURE — 99214 OFFICE O/P EST MOD 30 MIN: CPT | Mod: ,,, | Performed by: INTERNAL MEDICINE

## 2019-03-14 PROCEDURE — 3079F PR MOST RECENT DIASTOLIC BLOOD PRESSURE 80-89 MM HG: ICD-10-PCS | Mod: ,,, | Performed by: INTERNAL MEDICINE

## 2019-03-14 RX ORDER — FLUCONAZOLE 200 MG/1
1 TABLET ORAL DAILY
Refills: 0 | COMMUNITY
Start: 2019-02-14 | End: 2019-04-18 | Stop reason: ALTCHOICE

## 2019-03-14 RX ORDER — LEVOFLOXACIN 500 MG/1
1 TABLET, FILM COATED ORAL DAILY
Refills: 0 | COMMUNITY
Start: 2019-03-10 | End: 2019-04-18

## 2019-03-14 RX ORDER — MUPIROCIN 20 MG/G
1 OINTMENT TOPICAL
Refills: 0 | COMMUNITY
Start: 2019-02-18 | End: 2019-04-18

## 2019-03-14 NOTE — TELEPHONE ENCOUNTER
----- Message from Ronny Driscoll MD sent at 3/14/2019  1:10 PM CDT -----  Results are somewhat abnormal. Please keep regular follow up.

## 2019-03-14 NOTE — TELEPHONE ENCOUNTER
Report faxed to [facility]----- Message from Fidencio Rdz MD sent at 3/13/2019 11:31 AM CDT -----  Fax this report to Dr Basilio

## 2019-03-14 NOTE — PROGRESS NOTES
Subjective:       Patient ID: Naresh Painting is a 68 y.o. male.    Chief Complaint: Hospital Follow Up; Diffuse large cell lymphoma; Hypertension; General debility; Anemia; and Low Potassium    Mr Naresh Steinberg is a 68-year-old male who comes for follow-up. Recent diagnosis of large cell lymphoma has been noted since last year. He had presented with significant lymphadenopathy in the neck and after couple of attempts of biopsy he was diagnosed to have a cell lymphoma. He did go through chemotherapy initially with good resolution of symptoms but unfortunately recurrence are covered with pleural effusion which needed multiple attempts at drainage.    Given recurrence of his lymphoma, he has been started on chemotherapy again with a goal for bone marrow transplant.    Therapy includes cisplatin, rituximab and cytarabine. He did have a PET scan yesterday. Generally he shows significant movement the point that he is very excited and has to be frequently redirected for pointed history.      Hypertension   This is a chronic problem. The current episode started more than 1 year ago. The problem has been waxing and waning since onset. The problem is controlled. Associated symptoms include malaise/fatigue, peripheral edema and shortness of breath. Pertinent negatives include no headaches. Risk factors for coronary artery disease include sedentary lifestyle, male gender, obesity and dyslipidemia. Past treatments include beta blockers. The current treatment provides moderate improvement. Compliance problems include psychosocial issues.    Hyperlipidemia   This is a chronic problem. The current episode started more than 1 year ago. Exacerbating diseases include obesity. Associated symptoms include shortness of breath. Current antihyperlipidemic treatment includes statins. The current treatment provides moderate improvement of lipids. Risk factors for coronary artery disease include hypertension, male sex, a sedentary lifestyle and  dyslipidemia.   Fatigue   This is a chronic problem. The current episode started more than 1 month ago. The problem occurs constantly. The problem has been gradually improving. Associated symptoms include fatigue. Pertinent negatives include no anorexia, arthralgias, headaches or rash. Associated symptoms comments:   Large cell lymphoma going through therapy and being prepared for bone marrow transplant.. The symptoms are aggravated by stress. He has tried rest (Treatment of large cell lymphoma.) for the symptoms. The treatment provided moderate relief.   Shortness of Breath   This is a recurrent problem. The current episode started more than 1 month ago. The problem occurs intermittently. The problem has been gradually improving. Pertinent negatives include no headaches, hemoptysis or rash. Improvement on treatment: Significant improvement after drainage of pleural effusion. (Large cell Lymphoma)   Anemia   Presents for follow-up visit. Symptoms include malaise/fatigue. There has been no anorexia, leg swelling or weight loss. Signs of blood loss that are not present include hematemesis. (Large cell Lymphoma)     labs show a moderate degree of anemia which is attributed to underlying malignancy.   Patient is on regular monitoring program for electrolytes, chemistry, CBC and lactic dehydrogenase. Recently his potassium was 3.2 which has been supplemented by Dr. Amezquita.    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     Social History     Socioeconomic History    Marital status:  "     Spouse name: Amita Painting    Number of children: 2    Years of education: Not on file    Highest education level: Not on file   Social Needs    Financial resource strain: Not on file    Food insecurity - worry: Not on file    Food insecurity - inability: Not on file    Transportation needs - medical: Not on file    Transportation needs - non-medical: Not on file   Occupational History    Occupation: Retired      Employer: [employer]     Comment: Meat Dept- 3 yrs    Occupation:      Employer: [employer]     Comment: 26 yrs    Occupation: Reproduction     Employer: [employer]     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 Son's name is Alfie     Past Surgical History:   Procedure Laterality Date    EYE SURGERY      LYMPH NODE BIOPSY      PROCTECTOMY      PROSTATE SURGERY      UMBILICAL HERNIA REPAIR  04/05/2018    Incarcerated-      Family History   Problem Relation Age of Onset    Heart disease Mother     Diabetes Father        Review of Systems   Constitutional: Positive for fatigue and malaise/fatigue. Negative for weight loss.   Respiratory: Positive for shortness of breath. Negative for hemoptysis.    Gastrointestinal: Negative for anorexia and hematemesis.   Musculoskeletal: Negative for arthralgias.   Skin: Negative for rash.   Neurological: Negative for headaches.         Objective:      Blood pressure 138/80, pulse 88, height 5' 8" (1.727 m), weight 101.6 kg (224 lb). Body mass index is 34.06 kg/m².  Physical Exam   Constitutional: He appears well-developed. No distress.   BMI is 34-somewhat chronically sick but better " than last time.   HENT:   Head: Normocephalic and atraumatic.   Mouth/Throat: No oropharyngeal exudate.   Eyes: Conjunctivae and EOM are normal. Right eye exhibits no discharge and no exudate. Right conjunctiva is not injected. Right conjunctiva has no hemorrhage.   Left eye is prosthetic.   Neck: Normal range of motion. Neck supple. No JVD present. No neck rigidity. Normal range of motion present.   The lymph nodes on right and left side have dramatically shrunken now. A small lymph node is felt on the right side which is firm, nontender and adherent to the underlying structures.   Cardiovascular: Normal rate and regular rhythm. Exam reveals no friction rub.   No murmur heard.  Pulmonary/Chest: Effort normal and breath sounds normal. No respiratory distress. He has no wheezes. He exhibits no tenderness.   Diminished air entry on the right side in the base.   Abdominal: Soft. Bowel sounds are normal. He exhibits distension.   Musculoskeletal: He exhibits edema (1 +). He exhibits no tenderness or deformity.   Examination of the neck does not reveal any deformity.   Lymphadenopathy:     He has cervical adenopathy.        Right cervical: Deep cervical adenopathy present.     He has no axillary adenopathy.   Right-sided cervical lymphadenopathy. No left-sided lymphadenopathy. No axillary lymphadenopathy.   Neurological: He is alert. He has normal reflexes.   Skin: Skin is warm and dry. There is pallor.   Psychiatric: His mood appears anxious. Thought content is not delusional.   Nursing note and vitals reviewed.      That patient has gained some weight back. He has only one significant node on the right side. Other nodes have dissipated significantly.  Assessment:       1. Benign essential hypertension    2. Shortness of breath    3. Multiple-type hyperlipidemia    4. Neoplastic malignant related fatigue    5. Diffuse large B-cell lymphoma of lymph nodes of neck    6. Thrombosis of left subclavian vein    7. Anemia  complicating neoplastic disease    8. Low blood potassium           Orders Only on 03/14/2019   Component Date Value Ref Range Status    Glucose 03/14/2019 90  70 - 99 mg/dL Final    BUN, Bld 03/14/2019 24* 8 - 20 mg/dL Final    Creatinine 03/14/2019 1.34  0.60 - 1.40 mg/dL Final    Calcium 03/14/2019 8.7  7.7 - 10.4 mg/dL Final    Sodium 03/14/2019 139  134 - 144 mmol/L Final    Potassium 03/14/2019 3.5  3.5 - 5.0 mmol/L Final    Chloride 03/14/2019 110  98 - 110 mmol/L Final    CO2 03/14/2019 21.2* 22.8 - 31.6 mmol/L Final    Albumin 03/14/2019 3.2  3.1 - 4.7 g/dL Final    Total Bilirubin 03/14/2019 1.0  0.3 - 1.0 mg/dL Final    Alkaline Phosphatase 03/14/2019 101  40 - 104 IU/L Final    Total Protein 03/14/2019 6.3  6.0 - 8.2 g/dL Final    ALT (SGPT) 03/14/2019 23  3 - 33 IU/L Final    AST 03/14/2019 24  10 - 40 IU/L Final    Uric Acid 03/14/2019 4.5  2.6 - 7.8 mg/dL Final    LD 03/14/2019 233* 100 - 194 IU/L Final    Magnesium 03/14/2019 2.2  1.5 - 2.6 mg/dL Final    Phosphorus 03/14/2019 2.2* 2.5 - 4.9 mg/dL Final    WBC 03/14/2019 5.9  5.0 - 10.0 K/uL Final    RBC 03/14/2019 3.42* 4.30 - 5.90 M/uL Final    Hemoglobin 03/14/2019 9.7* 14.0 - 16.0 g/dL Final    Hematocrit 03/14/2019 30.9* 39.0 - 55.0 % Final    MCV 03/14/2019 90.4  80.0 - 100.0 fL Final    MCH 03/14/2019 28.4  25.0 - 35.0 pg Final    MCHC 03/14/2019 31.4  31.0 - 36.0 g/dL Final    RDW 03/14/2019 18.3* 11.7 - 14.9 % Final    Platelets 03/14/2019 184  140 - 440 K/uL Final    MPV 03/14/2019 10.1  8.8 - 12.7 fL Final    Gran% 03/14/2019 49.9  % Final    Lymph% 03/14/2019 2.5  % Final    Mono% 03/14/2019 1.2  % Final    Eosinophil% 03/14/2019 2.0  % Final    Basophil% 03/14/2019 1.7  % Final    Gran # (ANC) 03/14/2019 3.0  1.4 - 6.5 K/uL Final    Lymph # 03/14/2019 0.2* 1.2 - 3.4 K/uL Final    Mono # 03/14/2019 0.1  0.1 - 0.6 K/uL Final    Eos # 03/14/2019 0.1  0.0 - 0.7 K/uL Final    Baso # 03/14/2019 0.1  0.0 -  0.2 K/uL Final    Immature Grans (Abs) 03/14/2019 2.5* 0.0 - 1.0 K/uL Final    Immature Granulocytes 03/14/2019 42.7  % Final    nRBC% 03/14/2019 0  % Final   Ancillary Orders on 03/08/2019   Component Date Value Ref Range Status    WBC 03/11/2019 31.6* 5.0 - 10.0 K/uL Final    RBC 03/11/2019 3.44* 4.30 - 5.90 M/uL Final    Hemoglobin 03/11/2019 9.7* 14.0 - 16.0 g/dL Final    Hematocrit 03/11/2019 31.1* 39.0 - 55.0 % Final    MCV 03/11/2019 90.4  80.0 - 100.0 fL Final    MCH 03/11/2019 28.2  25.0 - 35.0 pg Final    MCHC 03/11/2019 31.2  31.0 - 36.0 g/dL Final    RDW 03/11/2019 18.4* 11.7 - 14.9 % Final    Platelets 03/11/2019 326  140 - 440 K/uL Final    MPV 03/11/2019 9.7  8.8 - 12.7 fL Final    Gran% 03/11/2019 91.6  % Final    Lymph% 03/11/2019 1.4  % Final    Mono% 03/11/2019 0.5  % Final    Eosinophil% 03/11/2019 0.2  % Final    Basophil% 03/11/2019 0.3  % Final    Gran # (ANC) 03/11/2019 28.9* 1.4 - 6.5 K/uL Final    Lymph # 03/11/2019 0.4* 1.2 - 3.4 K/uL Final    Mono # 03/11/2019 0.2  0.1 - 0.6 K/uL Final    Eos # 03/11/2019 0.1  0.0 - 0.7 K/uL Final    Baso # 03/11/2019 0.1  0.0 - 0.2 K/uL Final    Immature Grans (Abs) 03/11/2019 1.9* 0.0 - 1.0 K/uL Final    Immature Granulocytes 03/11/2019 6.0  % Final    nRBC% 03/11/2019 0  % Final    Glucose 03/11/2019 83  70 - 99 mg/dL Final    BUN, Bld 03/11/2019 22* 8 - 20 mg/dL Final    Creatinine 03/11/2019 1.03  0.60 - 1.40 mg/dL Final    Calcium 03/11/2019 8.8  7.7 - 10.4 mg/dL Final    Sodium 03/11/2019 140  134 - 144 mmol/L Final    Potassium 03/11/2019 3.2* 3.5 - 5.0 mmol/L Final    Chloride 03/11/2019 110  98 - 110 mmol/L Final    CO2 03/11/2019 23.8  22.8 - 31.6 mmol/L Final    Albumin 03/11/2019 3.0* 3.1 - 4.7 g/dL Final    Total Bilirubin 03/11/2019 0.8  0.3 - 1.0 mg/dL Final    Alkaline Phosphatase 03/11/2019 89  40 - 104 IU/L Final    Total Protein 03/11/2019 5.8* 6.0 - 8.2 g/dL Final    ALT (SGPT) 03/11/2019 25  3 -  33 IU/L Final    AST 03/11/2019 26  10 - 40 IU/L Final    Uric Acid 03/11/2019 3.8  2.6 - 7.8 mg/dL Final    Magnesium 03/11/2019 2.0  1.5 - 2.6 mg/dL Final    Phosphorus 03/11/2019 3.0  2.5 - 4.9 mg/dL Final    LD 03/11/2019 230* 100 - 194 IU/L Final   Orders Only on 02/14/2019   Component Date Value Ref Range Status    WBC 02/14/2019 27.6* 5.0 - 10.0 K/uL Final    RBC 02/14/2019 4.91  4.30 - 5.90 M/uL Final    Hemoglobin 02/14/2019 14.0  14.0 - 16.0 g/dL Final    Hematocrit 02/14/2019 43.3  39.0 - 55.0 % Final    MCV 02/14/2019 88.2  80.0 - 100.0 fL Final    MCH 02/14/2019 28.5  25.0 - 35.0 pg Final    MCHC 02/14/2019 32.3  31.0 - 36.0 g/dL Final    RDW 02/14/2019 16.7* 11.7 - 14.9 % Final    Platelets 02/14/2019 183  140 - 440 K/uL Final    MPV 02/14/2019 9.5  8.8 - 12.7 fL Final    Gran% 02/14/2019 87.5  % Final    Lymph% 02/14/2019 0.2  % Final    Mono% 02/14/2019 0.1  % Final    Eosinophil% 02/14/2019 0.3  % Final    Basophil% 02/14/2019 0.3  % Final    Gran # (ANC) 02/14/2019 24.2* 1.4 - 6.5 K/uL Final    Lymph # 02/14/2019 0.1* 1.2 - 3.4 K/uL Final    Mono # 02/14/2019 0.0* 0.1 - 0.6 K/uL Final    Eos # 02/14/2019 0.1  0.0 - 0.7 K/uL Final    Baso # 02/14/2019 0.1  0.0 - 0.2 K/uL Final    Immature Grans (Abs) 02/14/2019 3.2* 0.0 - 1.0 K/uL Final    Immature Granulocytes 02/14/2019 11.6  % Final    nRBC% 02/14/2019 0  % Final    Glucose 02/14/2019 113* 70 - 99 mg/dL Final    BUN, Bld 02/14/2019 45* 8 - 20 mg/dL Final    Creatinine 02/14/2019 1.19  0.60 - 1.40 mg/dL Final    Calcium 02/14/2019 9.0  7.7 - 10.4 mg/dL Final    Sodium 02/14/2019 136  134 - 144 mmol/L Final    Potassium 02/14/2019 3.9  3.5 - 5.0 mmol/L Final    Chloride 02/14/2019 92* 98 - 110 mmol/L Final    CO2 02/14/2019 28.4  22.8 - 31.6 mmol/L Final    Albumin 02/14/2019 3.2  3.1 - 4.7 g/dL Final    Total Bilirubin 02/14/2019 0.8  0.3 - 1.0 mg/dL Final    Alkaline Phosphatase 02/14/2019 83  40 - 104 IU/L  Final    Total Protein 02/14/2019 6.5  6.0 - 8.2 g/dL Final    ALT (SGPT) 02/14/2019 45* 3 - 33 IU/L Final    AST 02/14/2019 52* 10 - 40 IU/L Final    Magnesium 02/14/2019 2.2  1.5 - 2.6 mg/dL Final    Uric Acid 02/14/2019 4.9  2.6 - 7.8 mg/dL Final    LD 02/14/2019 681* 100 - 194 IU/L Final    Phosphorus 02/14/2019 3.0  2.5 - 4.9 mg/dL Final   Office Visit on 02/05/2019   Component Date Value Ref Range Status    WBC 02/05/2019 7.2  5.0 - 10.0 K/uL Final    RBC 02/05/2019 4.97  4.30 - 5.90 M/uL Final    Hemoglobin 02/05/2019 13.9* 14.0 - 16.0 g/dL Final    Hematocrit 02/05/2019 43.8  39.0 - 55.0 % Final    MCV 02/05/2019 88.1  80.0 - 100.0 fL Final    MCH 02/05/2019 28.0  25.0 - 35.0 pg Final    MCHC 02/05/2019 31.7  31.0 - 36.0 g/dL Final    RDW 02/05/2019 16.2* 11.7 - 14.9 % Final    Platelets 02/05/2019 614* 140 - 440 K/uL Final    MPV 02/05/2019 8.9  8.8 - 12.7 fL Final    Gran% 02/05/2019 83.7  % Final    Lymph% 02/05/2019 5.3  % Final    Mono% 02/05/2019 8.8  % Final    Eosinophil% 02/05/2019 1.3  % Final    Basophil% 02/05/2019 0.6  % Final    Gran # (ANC) 02/05/2019 6.0  1.4 - 6.5 K/uL Final    Lymph # 02/05/2019 0.4* 1.2 - 3.4 K/uL Final    Mono # 02/05/2019 0.6  0.1 - 0.6 K/uL Final    Eos # 02/05/2019 0.1  0.0 - 0.7 K/uL Final    Baso # 02/05/2019 0.0  0.0 - 0.2 K/uL Final    Immature Grans (Abs) 02/05/2019 0.0  0.0 - 1.0 K/uL Final    Immature Granulocytes 02/05/2019 0.3  % Final    nRBC% 02/05/2019 0  % Final    Glucose 02/05/2019 104* 70 - 99 mg/dL Final    BUN, Bld 02/05/2019 12  8 - 20 mg/dL Final    Creatinine 02/05/2019 1.06  0.60 - 1.40 mg/dL Final    Calcium 02/05/2019 9.4  7.7 - 10.4 mg/dL Final    Sodium 02/05/2019 132* 134 - 144 mmol/L Final    Potassium 02/05/2019 4.4  3.5 - 5.0 mmol/L Final    Chloride 02/05/2019 98  98 - 110 mmol/L Final    CO2 02/05/2019 19.7* 22.8 - 31.6 mmol/L Final    Albumin 02/05/2019 3.3  3.1 - 4.7 g/dL Final    Total Bilirubin  02/05/2019 1.2* 0.3 - 1.0 mg/dL Final    Alkaline Phosphatase 02/05/2019 90  40 - 104 IU/L Final    Total Protein 02/05/2019 6.5  6.0 - 8.2 g/dL Final    ALT (SGPT) 02/05/2019 23  3 - 33 IU/L Final    AST 02/05/2019 46* 10 - 40 IU/L Final   Orders Only on 02/01/2019   Component Date Value Ref Range Status    Flow Cytometry Antibodies Analyzed 02/01/2019 see below   Final   Telephone on 01/31/2019   Component Date Value Ref Range Status    Glucose 01/31/2019 130* 70 - 99 mg/dL Final    BUN, Bld 01/31/2019 18  8 - 20 mg/dL Final    Creatinine 01/31/2019 1.03  0.60 - 1.40 mg/dL Final    Calcium 01/31/2019 9.4  7.7 - 10.4 mg/dL Final    Sodium 01/31/2019 133* 134 - 144 mmol/L Final    Potassium 01/31/2019 3.9  3.5 - 5.0 mmol/L Final    Chloride 01/31/2019 99  98 - 110 mmol/L Final    CO2 01/31/2019 20.0* 22.8 - 31.6 mmol/L Final    Albumin 01/31/2019 3.2  3.1 - 4.7 g/dL Final    Total Bilirubin 01/31/2019 1.2* 0.3 - 1.0 mg/dL Final    Alkaline Phosphatase 01/31/2019 82  40 - 104 IU/L Final    Total Protein 01/31/2019 6.6  6.0 - 8.2 g/dL Final    ALT (SGPT) 01/31/2019 23  3 - 33 IU/L Final    AST 01/31/2019 43* 10 - 40 IU/L Final    WBC 01/31/2019 6.9  5.0 - 10.0 K/ul Final    RBC 01/31/2019 4.65  4.30 - 5.90 M/ul Final    Hemoglobin 01/31/2019 13.1* 14.0 - 16.0 g/dl Final    Hematocrit 01/31/2019 41.1  39.0 - 55.0 % Final    MCV 01/31/2019 88.4  80.0 - 100.0 fl Final    MCH 01/31/2019 28.2  25.0 - 35.0 pg Final    MCHC 01/31/2019 31.9  31.0 - 36.0 g/dl Final    RDW 01/31/2019 15.7* 12.5 - 14.5 % Final    Platelets 01/31/2019 553* 140 - 440 K/ul Final    MPV 01/31/2019 8.4* 8.8 - 12.7 fl Final    Gran% 01/31/2019 83.5  % Final    Lymph% 01/31/2019 4.7  % Final    Mono% 01/31/2019 10.0  % Final    Eosinophil% 01/31/2019 1.0  % Final    Basophil% 01/31/2019 0.4  % Final    Gran # (ANC) 01/31/2019 5.7  1.4 - 6.5 K/ul Final    Lymph # 01/31/2019 0.3* 1.2 - 3.4 K/ul Final    Mono #  01/31/2019 0.7* 0.1 - 0.6 K/ul Final    Eos # 01/31/2019 0.1  0.0 - 0.7 K/ul Final    Baso # 01/31/2019 0.0  0.0 - 0.2 K/ul Final    nRBC# 01/31/2019 0  /100 WBC Final    nRBC% 01/31/2019 0  % Final    Immature Granulocytes 01/31/2019 0.4  % Final    Immature Grans (Abs) 01/31/2019 0.0  0.0 - 1.0 K/uL Final    Performed by: 01/31/2019 Message:   Final    PT 01/31/2019 13.6  11.7 - 14.0 sec Final    INR 01/31/2019 1.1   Final    aPTT 01/31/2019 27.2  26.2 - 34.7 sec Final         Plan:           Benign essential hypertension    Shortness of breath    Multiple-type hyperlipidemia    Neoplastic malignant related fatigue    Diffuse large B-cell lymphoma of lymph nodes of neck    Thrombosis of left subclavian vein    Anemia complicating neoplastic disease    Low blood potassium      Pt's recent hospitalization and underlying medical issues have been noted. He had a large pleural effusion which is drained and is above his breathing. He is going to salvage chemotherapy with a past bone marrow transplant. Physically he is feeling better.    Blood pressures are borderline controlled. He is compliant with his medications including lipid lowering therapy.    Potassium has been corrected. Anemia will be addressed by oncology.    Have reviewed his current labs and spent about half of the time of 20 minutes with face-to-face counseling.    Fup with me in 3 months time or earlier if needed.      Neutrophils Manual             90 H                      60-72  %           Lymphocytes Manual              3 L                      25-35  %           Monocytes Manual                1 L                        3-8  %           Eosinophils Manual              0 L                        1-4  %           Basophils Manual                0                          0-1  %           Bands Manual                    6                          2-8  %           Anisocytosis                   1+    WBC 5.0 - 10.0 K/uL 5.9    Comment: Delta  Check Reviewed.   RBC 4.30 - 5.90 M/uL 3.42 Abnormally low     Hemoglobin 14.0 - 16.0 g/dL 9.7 Abnormally low     Hematocrit 39.0 - 55.0 % 30.9 Abnormally low       Glucose 70 - 99 mg/dL 90    BUN, Bld 8 - 20 mg/dL 24 Abnormally high     Creatinine 0.60 - 1.40 mg/dL 1.34    Calcium 7.7 - 10.4 mg/dL 8.7    Sodium 134 - 144 mmol/L 139    Potassium 3.5 - 5.0 mmol/L 3.5    Chloride 98 - 110 mmol/L 110    CO2 22.8 - 31.6 mmol/L 21.2 Abnormally low        - 194 IU/L 233 Abnormally high     Uric Acid 2.6 - 7.8 mg/dL 4.5              Current Outpatient Medications:     acyclovir (ZOVIRAX) 400 MG tablet, 1 tablet 2 (two) times daily., Disp: , Rfl:     allopurinol (ZYLOPRIM) 300 MG tablet, 1 tablet once daily., Disp: , Rfl:     aspirin 81 MG Chew, Take 81 mg by mouth once daily., Disp: , Rfl:     co-enzyme Q-10 30 mg capsule, Take 30 mg by mouth 3 (three) times daily., Disp: , Rfl:     filgrastim (NEUPOGEN) 300 mcg/0.5 mL injection, Inject 1 mcg into the skin once daily., Disp: , Rfl:     fluconazole (DIFLUCAN) 200 MG Tab, Take 1 tablet by mouth once daily., Disp: , Rfl: 0    L GASSERI/B BIFIDUM/B LONGUM ([facility] COLON [facility] ORAL), Take by mouth., Disp: , Rfl:     levoFLOXacin (LEVAQUIN) 500 MG tablet, Take 1 tablet by mouth once daily., Disp: , Rfl: 0    MULTIVITAMIN/IRON/FOLIC ACID (CENTRUM COMPLETE ORAL), Take by mouth., Disp: , Rfl:     mupirocin (BACTROBAN) 2 % ointment, Apply 1 Tube topically as needed., Disp: , Rfl: 0    NYSTOP powder, MIX SMALL QUANTITY WITH GRISELDA CREAM AND APPLY TO DRY SKIN TID FOR A WEEK, Disp: 60 g, Rfl: 1    ondansetron (ZOFRAN) 8 MG tablet, 1 tablet daily as needed., Disp: , Rfl:     pantoprazole (PROTONIX) 40 MG tablet, Take 40 mg by mouth once daily., Disp: , Rfl:     potassium acetate liquid 5 mEq / 5ml Liqd, Take by mouth., Disp: , Rfl:     sulfamethoxazole-trimethoprim 800-160mg (BACTRIM DS) 800-160 mg Tab, 1 tablet 3 (three) times a week., Disp: , Rfl: 0    TOPROL  XL 50 mg 24 hr tablet, Take 25 mg by mouth once daily., Disp: , Rfl:     vitamin D 1000 units Tab, Take 185 mg by mouth once daily., Disp: , Rfl:     vitamin E 100 UNIT capsule, Take 100 Units by mouth once daily., Disp: , Rfl:     Current Facility-Administered Medications:     leuprolide (6 month) SyKt 45 mg, 45 mg, Intramuscular, Q6 Months, Fauzia Maza NP, 45 mg at 08/24/18 9649

## 2019-03-14 NOTE — PATIENT INSTRUCTIONS
Taking Your Blood Pressure  Blood pressure is the force of blood against the artery wall as it moves from the heart through the blood vessels. You can take your own blood pressure reading using a digital monitor. Take your readings the same each time, using the same arm. Take readings as often as your healthcare provider instructs.  About blood pressure monitors  Blood pressure monitors are designed for certain ages and cases. You can find monitors for older adults, for pregnant women, and for children. Make sure the one you choose is the right one for your age and situation.  The American Heart Association recommends an automatic cuff monitor that fits on your upper arm (bicep). The cuff should fit your arm size. A cuff thats too large or too small will not give an accurate reading. Measure around your upper arm to find your size.  Monitors that attach to your finger or wrist are not as accurate as monitors for your upper arm.  Ask your healthcare provider for help in choosing a monitor. Bring your monitor to your next provider visit if you need help in using it the correct way.  The steps below are general instructions for using an automatic digital monitor.  Step 1. Relax    · Take your blood pressure at the same time every day, such as in the morning or evening, or at the time your healthcare provider recommends.  · Wait at least a half-hour after smoking, eating, or exercising. Don't drink coffee, tea, soda, or other caffeinated beverages before checking your blood pressure.  · Sit comfortably at a table with both feet on the floor. Do not cross your legs or feet. Place the monitor near you.  · Rest for a few minutes before you begin.  Step 2. Wrap the cuff    · Place your arm on the table, palm up. Your arm should be at the level of your heart. Wrap the cuff around your upper arm, just above your elbow. Its best done on bare skin, not over clothing. Most cuffs will indicate where the brachial artery (the  blood vessel in the middle of the arm at the inner side of the elbow) should line up with the cuff. Look in your monitor's instruction booklet for an illustration. You can also bring your cuff to your healthcare provider and have them show you how to correctly place the cuff.  Step 3. Inflate the cuff    · Push the button that starts the pump.  · The cuff will tighten, then loosen.  · The numbers will change. When they stop changing, your blood pressure reading will appear.  · Take 2 or 3 readings one minute apart.  Step 4. Write down the results of each reading    · Write down your blood pressure numbers for each reading. Note the date and time. Keep your results in one place, such as a notebook. Even if your monitor has a built-in memory, keep a hard copy of the readings.  · Remove the cuff from your arm. Turn off the machine.  · Bring your blood pressure records with your healthcare providers at each visit.  · If you start a new blood pressure medicine, note the day you started the new medicine. Also note the day if you change the dose of your medicine. This information goes on your blood pressure recording sheet. This will help your healthcare provider monitor how well the medicine changes are working.  · Ask your healthcare provider what numbers should prompt you to call him or her. Also ask what numbers should prompt you to get help right away.  Date Last Reviewed: 11/1/2016  © 7395-2900 [employer]. [address] [place] [address], [place] [address]. All rights reserved. This information is not intended as a substitute for professional medical care. Always follow your healthcare professional's instructions.

## 2019-03-20 ENCOUNTER — TELEPHONE (OUTPATIENT)
Dept: HEMATOLOGY/ONCOLOGY | Facility: CLINIC | Age: 69
End: 2019-03-20

## 2019-03-20 NOTE — TELEPHONE ENCOUNTER
Note sent to sylvie     ----- Message from Fidencio Rdz MD sent at 3/20/2019  8:22 AM CDT -----  See if Dr Basilio wants him to get platelets and neupogen or does he want to hold off since he just had a BMT

## 2019-03-22 ENCOUNTER — LAB VISIT (OUTPATIENT)
Dept: LAB | Facility: HOSPITAL | Age: 69
End: 2019-03-22
Attending: NURSE PRACTITIONER
Payer: MEDICARE

## 2019-03-22 DIAGNOSIS — C61 PROSTATE CANCER: ICD-10-CM

## 2019-03-22 LAB — COMPLEXED PSA SERPL-MCNC: 0.06 NG/ML

## 2019-03-22 PROCEDURE — 36415 COLL VENOUS BLD VENIPUNCTURE: CPT | Mod: PO

## 2019-03-22 PROCEDURE — 84153 ASSAY OF PSA TOTAL: CPT

## 2019-03-24 ENCOUNTER — TELEPHONE (OUTPATIENT)
Dept: FAMILY MEDICINE | Facility: CLINIC | Age: 69
End: 2019-03-24

## 2019-03-25 ENCOUNTER — TELEPHONE (OUTPATIENT)
Dept: HEMATOLOGY/ONCOLOGY | Facility: CLINIC | Age: 69
End: 2019-03-25

## 2019-03-25 ENCOUNTER — TELEPHONE (OUTPATIENT)
Dept: FAMILY MEDICINE | Facility: CLINIC | Age: 69
End: 2019-03-25

## 2019-03-25 DIAGNOSIS — C61 PROSTATE CANCER: Primary | ICD-10-CM

## 2019-03-25 NOTE — TELEPHONE ENCOUNTER
Check with patient if he was followed for low white cell count recently by his oncologist at [facility].

## 2019-03-25 NOTE — TELEPHONE ENCOUNTER
They do not have mail box set up for patient cant leave message     ----- Message from Janeth Deluca sent at 3/25/2019  1:57 PM CDT -----  The patient's wife called and said the patient's WBC is low from the labs he did last Thursday.He saw his PCP and he sent him to have more labs done today and he had these done at [facility] lab. He will be getting his 3rd chemo treatment next week.She wants to know if there is anything they can be doing to help boost his WBC. Please call back at [phone].

## 2019-03-25 NOTE — TELEPHONE ENCOUNTER
Sent DR. juan a note about wheather we need to give blood     ----- Message from Fidencio Rdz MD sent at 3/25/2019  2:21 PM CDT -----  See if Dr Juan wants him to get blood transfusion

## 2019-03-27 ENCOUNTER — OFFICE VISIT (OUTPATIENT)
Dept: UROLOGY | Facility: CLINIC | Age: 69
End: 2019-03-27
Payer: MEDICARE

## 2019-03-27 VITALS
HEIGHT: 68 IN | HEART RATE: 82 BPM | SYSTOLIC BLOOD PRESSURE: 127 MMHG | WEIGHT: 225.31 LBS | BODY MASS INDEX: 34.15 KG/M2 | DIASTOLIC BLOOD PRESSURE: 64 MMHG

## 2019-03-27 DIAGNOSIS — Z90.79 HISTORY OF PROSTATECTOMY: ICD-10-CM

## 2019-03-27 DIAGNOSIS — R97.21 RISING PSA FOLLOWING TREATMENT FOR MALIGNANT NEOPLASM OF PROSTATE: Primary | ICD-10-CM

## 2019-03-27 DIAGNOSIS — C61 MALIGNANT NEOPLASM OF PROSTATE: ICD-10-CM

## 2019-03-27 PROCEDURE — 96402 PR CHEMOTHER HORMON ANTINEOPL SUB-Q/IM: ICD-10-PCS | Mod: S$GLB,,, | Performed by: NURSE PRACTITIONER

## 2019-03-27 PROCEDURE — 3074F PR MOST RECENT SYSTOLIC BLOOD PRESSURE < 130 MM HG: ICD-10-PCS | Mod: CPTII,S$GLB,, | Performed by: NURSE PRACTITIONER

## 2019-03-27 PROCEDURE — 99214 PR OFFICE/OUTPT VISIT, EST, LEVL IV, 30-39 MIN: ICD-10-PCS | Mod: 25,S$GLB,, | Performed by: NURSE PRACTITIONER

## 2019-03-27 PROCEDURE — 99999 PR PBB SHADOW E&M-EST. PATIENT-LVL V: CPT | Mod: PBBFAC,,, | Performed by: NURSE PRACTITIONER

## 2019-03-27 PROCEDURE — 3074F SYST BP LT 130 MM HG: CPT | Mod: CPTII,S$GLB,, | Performed by: NURSE PRACTITIONER

## 2019-03-27 PROCEDURE — 1101F PR PT FALLS ASSESS DOC 0-1 FALLS W/OUT INJ PAST YR: ICD-10-PCS | Mod: CPTII,S$GLB,, | Performed by: NURSE PRACTITIONER

## 2019-03-27 PROCEDURE — 1101F PT FALLS ASSESS-DOCD LE1/YR: CPT | Mod: CPTII,S$GLB,, | Performed by: NURSE PRACTITIONER

## 2019-03-27 PROCEDURE — 99999 PR PBB SHADOW E&M-EST. PATIENT-LVL V: ICD-10-PCS | Mod: PBBFAC,,, | Performed by: NURSE PRACTITIONER

## 2019-03-27 PROCEDURE — 3078F DIAST BP <80 MM HG: CPT | Mod: CPTII,S$GLB,, | Performed by: NURSE PRACTITIONER

## 2019-03-27 PROCEDURE — 96402 CHEMO HORMON ANTINEOPL SQ/IM: CPT | Mod: S$GLB,,, | Performed by: NURSE PRACTITIONER

## 2019-03-27 PROCEDURE — 3078F PR MOST RECENT DIASTOLIC BLOOD PRESSURE < 80 MM HG: ICD-10-PCS | Mod: CPTII,S$GLB,, | Performed by: NURSE PRACTITIONER

## 2019-03-27 PROCEDURE — 99214 OFFICE O/P EST MOD 30 MIN: CPT | Mod: 25,S$GLB,, | Performed by: NURSE PRACTITIONER

## 2019-03-27 NOTE — PROGRESS NOTES
Subjective:       Patient ID: Naresh Painting is a 68 y.o. male.    Chief Complaint: Prostate Cancer    Mr. Painting is a 68-year-old patient that underwent a radical prostatectomy in December of 1995 by Dr. Partida at [facility].  He was first seen by Dr. Menjivar in 2005 where He was currently on GnRH agaonist therapy/Eligard 45 mg every six months due to rise in PSA.  He was last seen in clinic & received Lupron on 08/24/2018    He denies any urological problems or concerns.  Has been battling a cold; now with just a cough.  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  First seen at [facility], but now followed by Dr. Rdz  He completed another round of preventive chemo.  Tolerated well.    Today he states lymphoma returned; Monday starting 5 day course of weaker chemo at [facility].  Hopefully then stem cell transplant.    Just had wedding anniversary (1/26)  Eating healtier;   Getting plenty of rest.  Overall feeling better.                     PSA                  0.06                03/22/2019                 PSA                  0.06                08/17/2018                 PSA                  0.04                01/26/2018                 PSA                  0.17                07/25/2017                 PSA                  0.22                01/26/2017                 PSA                  0.19                07/22/2016                 PSA                  0.20                01/18/2016                 PSA                  0.19                07/24/2015                 PSA                  0.15                01/12/2015                 PSA                  0.17                06/06/2014                 PSA                  0.11                12/02/2013                 PSA                      0.13                 05/31/2013                 PSA                      0.16                11/29/2012                 PSA                      0.12                05/25/2012                 PSA                      0.13                10/18/2011                 PSA                      0.11                03/17/2011                 PSA                      0.20                08/25/2010                 PSA                      0.11                06/25/2009                 PSA                      0.09                01/09/2009                 PSA                      0.1                 06/12/2008                 PSA                      0.1                 12/11/2007                                   Past Medical History:    Prostate cancer                                               Past Surgical History:    PROSTATE SURGERY                                               HERNIA REPAIR                                                  EYE SURGERY                                                    No family history on file.      Social History    Marital Status:              Spouse Name:                       Years of Education:                 Number of children:               Occupational History    None on file    Social History Main Topics    Smoking Status: Former Smoker                   Packs/Day: 0.00  Years:          Smokeless Status: Never Used                        Comment: quit 1995    Alcohol Use: No              Drug Use: Not on file     Sexual Activity: Not on file          Other Topics            Concern    None on file    Social History Narrative    None on file        Allergies:  Review of patient's allergies indicates no known allergies.    Medications:  Current outpatient prescriptions: aspirin 81 MG Chew, Take 81 mg by mouth once daily., Disp: , Rfl: ;  pravastatin (PRAVACHOL) 40 MG tablet, , Disp: , Rfl: ;  TOPROL XL 50 mg 24 hr tablet, , Disp: , Rfl:   Current facility-administered medications:  triptorelin pamoate Syrg 22.5 mg, 22.5 mg, Intramuscular, 1 time in Clinic/HOD, Fauzia Maza NP              Review of Systems   Constitutional: Negative for activity change, appetite change, chills and fever.   HENT: Negative for facial swelling and trouble swallowing.    Eyes: Negative for visual disturbance.   Respiratory: Negative for chest tightness and shortness of breath.         Breathing better since fluid removed from right lung.  (2 large bottles).  Wearing protective mask.       Cardiovascular: Negative for chest pain and palpitations.   Gastrointestinal: Negative.  Negative for abdominal pain, constipation, diarrhea, nausea and vomiting.   Genitourinary: Negative for difficulty urinating, dysuria, flank pain, hematuria, penile pain, penile swelling, scrotal swelling and testicular pain.        Ok with urination     Musculoskeletal: Negative for back pain, gait problem, myalgias and neck stiffness.   Skin: Negative for rash.   Neurological: Negative for dizziness and speech difficulty.   Hematological: Does not bruise/bleed easily.   Psychiatric/Behavioral: Negative for behavioral problems.       Objective:      Physical Exam   Nursing note and vitals reviewed.  Constitutional: He is oriented to person, place, and time. Vital signs are normal. He appears well-developed and well-nourished. He is active and cooperative.  Non-toxic appearance. He does not have a sickly appearance.   HENT:   Head: Normocephalic and atraumatic.   Right Ear: External ear normal.   Left Ear: External ear normal.   Nose: Nose normal.   Mouth/Throat: Mucous membranes are normal.   Eyes: Conjunctivae and lids are normal. No scleral icterus.   Neck: Trachea normal, normal range of motion and full passive range of motion without pain. Neck supple. No JVD present. No tracheal deviation present.   Cardiovascular: Normal rate, S1 normal and S2 normal.    Pulmonary/Chest: Effort normal. No respiratory distress. He exhibits no  tenderness.   Abdominal: Soft. Normal appearance and bowel sounds are normal. There is no hepatosplenomegaly. There is no tenderness. There is no CVA tenderness.   Genitourinary: Testes normal and penis normal. No penile tenderness.       Musculoskeletal: Normal range of motion.   Neurological: He is alert and oriented to person, place, and time. He has normal strength.   Skin: Skin is warm, dry and intact.     Psychiatric: He has a normal mood and affect. His behavior is normal. Judgment and thought content normal.       Assessment:       1. Rising PSA following treatment for malignant neoplasm of prostate    2. History of prostatectomy    3. Malignant neoplasm of prostate        Plan:       I spent 25 minutes with the patient of which more than half was spent in direct consultation with the patient in regards to our treatment and plan.    Education and recommendations of today's plan of care including home remedies.  Diet modifications  Reviewed psa results.  Lupron 45mg IM today  RTC 6 months with psa and LUPRON         Small bowel obstruction

## 2019-03-27 NOTE — PROGRESS NOTES
[facility]  PROGRESS NOTE      Subjective:       Patient ID:   NAME: Naresh Painting : 1950     68 y.o. male    Referring Doc: Yamila  Other Physicians: Adriano Michaud, Pamela, Chetna, Chloe; Link ()    Chief Complaint:  NHL f/u    History of Present Illness:     Patient returns today for a regularly scheduled follow-up visit.  The patient is here with his wife.  No CP, SOB, HA's or N/V. He is seeing Dr Basilio again on . He feels good. He denies any CP, SOB, HA's or N/V. He is breathing good. He has chemotherapy again on  at [facility]. We have been faxing his labs to [facility]. He has not received any transfusions.       ROS:   GEN: normal without any fever, night sweats or weight loss  HEENT: normal with no HA's, sore throat, stiff neck, changes in vision  CV: no CP, CP, COOPER  PULM: normal with no SOB, cough, hemoptysis, sputum or pleuritic pain  GI: normal with no abdominal pain, nausea, vomiting, , diarrhea, melanotic stools, BRBPR, or hematemesis;  : normal with no hematuria, dysuria  BREAST: normal with no mass, discharge, pain  SKIN: prior rash resolved    Allergies:  Review of patient's allergies indicates:  No Known Allergies    Medications:    Current Outpatient Medications:     acyclovir (ZOVIRAX) 400 MG tablet, 1 tablet 2 (two) times daily., Disp: , Rfl:     allopurinol (ZYLOPRIM) 300 MG tablet, 1 tablet once daily., Disp: , Rfl:     aspirin 81 MG Chew, Take 81 mg by mouth once daily., Disp: , Rfl:     co-enzyme Q-10 30 mg capsule, Take 30 mg by mouth 3 (three) times daily., Disp: , Rfl:     filgrastim (NEUPOGEN) 300 mcg/0.5 mL injection, Inject 1 mcg into the skin once daily., Disp: , Rfl:     fluconazole (DIFLUCAN) 200 MG Tab, Take 1 tablet by mouth once daily., Disp: , Rfl: 0    L GASSERI/B BIFIDUM/B LONGUM ([facility] ORAL), Take by mouth., Disp: , Rfl:     levoFLOXacin (LEVAQUIN) 500 MG tablet, Take 1 tablet by mouth once daily., Disp:  , Rfl: 0    MULTIVITAMIN/IRON/FOLIC ACID (CENTRUM COMPLETE ORAL), Take by mouth., Disp: , Rfl:     mupirocin (BACTROBAN) 2 % ointment, Apply 1 Tube topically as needed., Disp: , Rfl: 0    NYSTOP powder, MIX SMALL QUANTITY WITH GRISELDA CREAM AND APPLY TO DRY SKIN TID FOR A WEEK, Disp: 60 g, Rfl: 1    ondansetron (ZOFRAN) 8 MG tablet, 1 tablet daily as needed., Disp: , Rfl:     pantoprazole (PROTONIX) 40 MG tablet, Take 40 mg by mouth once daily., Disp: , Rfl:     potassium chloride SA (K-DUR,KLOR-CON) 10 MEQ tablet, TK 1 T PO QD, Disp: , Rfl: 3    sulfamethoxazole-trimethoprim 800-160mg (BACTRIM DS) 800-160 mg Tab, 1 tablet 3 (three) times a week., Disp: , Rfl: 0    TOPROL XL 50 mg 24 hr tablet, Take 25 mg by mouth once daily., Disp: , Rfl:     vitamin D 1000 units Tab, Take 185 mg by mouth once daily., Disp: , Rfl:     vitamin E 100 UNIT capsule, Take 100 Units by mouth once daily., Disp: , Rfl:     Current Facility-Administered Medications:     leuprolide (6 month) SyKt 45 mg, 45 mg, Intramuscular, Q6 Months, Fauzia Maza NP, 45 mg at 03/27/19 1121    PMHx/PSHx Updates:  See patient's last visit with me on 2/28/2019  See H&P on 6/9/2017        Pathology:  See path 9/13/2017          Objective:     Vitals:  Blood pressure (!) 141/85, pulse 81, temperature 98.3 °F (36.8 °C), resp. rate 20, weight 102 kg (224 lb 14.4 oz).    Physical Examination:   GEN: no apparent distress, comfortable; AAOx3  HEAD: atraumatic and normocephalic  EYES: no pallor, no icterus, PERRLA  ENT: OMM, no pharyngeal erythema, external ears WNL; no nasal discharge; no thrush  NECK: no masses, thyroid normal, trachea midline,  LAD/LN's, supple; resolved neck LAD; left looks good  CV: RRR with no murmur; normal pulse; normal S1 and S2; no pedal edema  CHEST: good BS bilaterlally currently  ABDOM: nontender and nondistended; soft; normal bowel sounds; no rebound/guardingp; vertical wound healed well  MUSC/Skeletal: ROM normal; no  crepitus; joints normal; no deformities or arthropathy  EXTREM: no clubbing, cyanosis, inflammation or swelling  SKIN: no lesions, ulcers, petechiae or subcutaneous nodules; fungal like rash under right axilla resolved  : no cannon  NEURO: grossly intact; motor/sensory WNL; AAOx3; no tremors  PSYCH: normal mood, affect and behavior  LYMPH: normal cervical, supraclavicular, axillary and groin LN's            Labs:     3/25/2019  Lab Results   Component Value Date    WBC 4.2 (L) 03/25/2019    HGB 7.5 (LL) 03/25/2019    HCT 24.0 (L) 03/25/2019    MCV 89.2 03/25/2019    PLT 64 (L) 03/25/2019     BMP  Lab Results   Component Value Date     03/25/2019    K 3.9 03/25/2019     03/25/2019    CO2 23.3 03/25/2019    BUN 11 03/25/2019    CREATININE 0.81 03/25/2019    CALCIUM 8.8 03/25/2019    ESTGFRAFRICA >60.0 11/25/2014    EGFRNONAA 76 12/17/2018     Lab Results   Component Value Date    ALT 12 12/17/2018    AST 22 03/25/2019    ALKPHOS 88 03/25/2019    BILITOT 0.6 03/25/2019           Radiology/Diagnostic Studies:    CT Chest  1/31/2019 - on chartT    IMPRESSION:    Interval development of moderate-large right-sided pleural effusion and  associated atelectatic changes within the right lung.    Development of conglomerate soft tissue densities within the visualized upper  abdomen likely representing interval progression of known lymphoma. Correlation  with dedicated CT of the abdomen with contrast is recommended. Additional soft  tissue density within the base of the neck on the left may also represent  pathologic adenopathy.    Additional observations as above.          PET 8/20/2018:  IMPRESSION:    1. Increased FDG uptake involving the anterolateral left sixth rib could be related to nondisplaced fracture here. Correlate with focal tenderness and any history of trauma. (he had a fall)  2. Otherwise, there is evidence of FDG avid malignancy.  3. Persistent enlarged left supraclavicular lymph node, stable from  prior.  4. Improvement of previously seen reactive marrow.  5. Prostate gland is surgically absent.        Assessment/Plan:   (1) 68 y.o. male with diagnosis of development of right sided neck swelling since Feb 2017  - he has been referred by Dr Michaud with ID for an evaluation  - i spoke to Dr Michaud previously about the patient peripherally  - FNA bx on 2/24 which was nondiagnostic  - he had a  guided biopsy of an entire left supraclavicular LN on 3/6/2017 with Dr Alexander Oh which showed  necrotizing granulomatous lymphadenitis  - CT scans were from Feb 2017  - flow cytometry studies from 5/25 appear to have been negative for any abnormal cell populations  - PET scan on 7/6/17 with markedly hypermetabolic LAD  - he was referred to and seen by Dr Basilio at [facility] on 7/28 and again on 8/25  - he underwent repeat cervical LN biopsy with Dr Oh on 9/13/2017 and that 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)  - he saw Dr Basilio again at [facility] this past Monday  - the prior bone marrow showed no involvement of the lymphoma  - he received R-CHOP and then proceed with a HD-MTX regimen and Intra-thecal MTX at [facility]    - he has been on HD-MTX chemotherapy at [facility] as of lately  - he completed last chemotherapy from June 18th through 21st  - he required neupogen the last week of June  - he recent developed a pleural effusion  - he was since found to have recurrent  lymphoma in the pleural fluid and was seen by Dr Basilio again and started on Retuximab, Cisplatin and cytarabine in preparation for a BMT  - he has been receiving chemotherapy under direction of Dr Basilio and is awaiting BMT  - his counts have been low and we have been faxing the labs to [facility]  - he sees Dr Basilio again on April 15th      (2) Hx/of prostate cancer - 1995; s/p prostatectomy followed by XRT; on lupron per Dr Menjivar with  and Fauzia Maza (NP); he had recent lupron injection per      (3) JAK - uses CPAP     (4)  GERD     (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 Non-Hodgkin lymphoma of lymph nodes of neck, unspecified non-Hodgkin lymphoma type     2. Thrombosis of left subclavian vein     3. Malignant neoplasm of prostate     4. Diffuse large B-cell lymphoma of lymph nodes of neck     5. Chemotherapy-induced neutropenia     6. Anemia complicating neoplastic disease     7. Cervical lymphadenopathy         PLAN:  1. F/u with Dr Basilio at [facility] for chemotherapy on April 1st; to see Dr Basilio on April 15th  2. Check labs weekly for now and faxing them to [facility] each time  3. He is now off eliquis  4.  F/u with pulmonary, PCP, Etc  5.  RTC 4-6 weeks    Fax note to Ronny Driscoll MD, Adriano Michaud Schuller and  Negro Santiago    Discussion:     I have explained all of the above in detail and the patient understands all of the current recommendation(s). I have answered all of their questions to the best of my ability and to their complete satisfaction.   The patient is to continue with the current management plan.            Electronically signed by Fidencio Rdz MD

## 2019-03-27 NOTE — PATIENT INSTRUCTIONS
PSA                  0.06                03/22/2019                 PSA                  0.06                08/17/2018                 PSA                  0.04                01/26/2018                 PSA                  0.17                07/25/2017                 PSA                  0.22                01/26/2017                 PSA                  0.19                07/22/2016                 PSA                  0.20                01/18/2016                 PSA                  0.19                07/24/2015                 PSA                  0.15                01/12/2015                 PSA                  0.17                06/06/2014                 PSA                  0.11                12/02/2013                 PSA                      0.13                05/31/2013                 PSA                      0.16                11/29/2012                 PSA                      0.12                05/25/2012                 PSA                      0.13                10/18/2011                 PSA                      0.11                03/17/2011                 PSA                      0.20                08/25/2010                 PSA                      0.11                06/25/2009                 PSA                      0.09                01/09/2009                 PSA                      0.1                 06/12/2008                 PSA                      0.1                 12/11/2007                        What Is Prostate Cancer?    Cancer is when cells in the body change and grow out of control. Cancer cells can form lumps of tissue called tumors. Cancer that starts in the prostate is called prostate cancer. It can grow and spread beyond the prostate. Cancer that spreads is harder to treat.  Understanding the prostate  The prostate is a gland in men about the size and shape of a walnut. It surrounds the upper part of the urethra. This is the tube that  carries urine from the bladder. The prostate makes some of the fluid thats part of semen. During orgasm, semen leaves the body through the urethra.  When prostate cancer forms  As a man ages, the cells of his prostate may change to form tumors or other growths. The types of growths include:  · Noncancerous growths. As a man ages, the prostate may grow larger. This is called benign prostatic hyperplasia (BPH). With BPH, extra prostate tissue often squeezes the urethra, causing symptoms such as trouble urinating. But BPH is not cancer and does not lead to cancer.  · Atypical cells. Sometimes prostate cells dont look like normal (typical) prostate cells. One type of abnormal growth is called prostatic intraepithelial neoplasia or PIN. Although PIN cells are not cancer cells, they may be a sign that cancer is likely to form.  · Cancer. When abnormal prostate cells grow out of control and start to invade other tissues, they are called cancer cells. These cells may or may not lead to symptoms. Some tumors can be felt during a physical exam, and some cant. Prostate cancer may grow into nearby organs or spread to nearby lymph nodes. Lymph nodes are small organs around the body that are part of the immune system. In some cases, the cancer spreads to bones or organs in distant parts of the body. This is called metastasis.  Diagnosing prostate cancer  Prostate cancer may not cause symptoms at first. Urinary problems are often not a sign of cancer, but of another condition, such as BPH. To find out if you have prostate cancer, your healthcare provider must examine you and order tests. The tests help confirm a diagnosis of cancer. They also help give more information about a cancerous tumor. Tests might include:  · Prostate specific antigen (PSA) testing. PSA is a chemical made by prostate tissue. The amount of PSA in the blood (PSA level) is tested to check a mans risk for prostate cancer. In general, a high or rising PSA  level may mean an increased cancer risk. A PSA test by itself cannot show if a man has prostate cancer. PSA testing is also used to check the success of cancer treatments.  · Core needle biopsy. This test is done to determine if a man has prostate cancer. A hollow needle is used to take small pieces of tissue from the prostate. This helps give more information about the cells. Before the test, pain medicine may be given to prevent pain. During the test, a small probe is inserted into the rectum. The probe sends an image of the prostate to a video monitor. With this image as a guide, the healthcare provider uses a thin, hollow needle to remove tiny tissue samples from the prostate. These are sent to a lab where they are looked at for cancer cells.  Date Last Reviewed: 5/1/2017 © 2000-2017 [employer]. [address] [place] [address]. All rights reserved. This information is not intended as a substitute for professional medical care. Always follow your healthcare professional's instructions.        Hormone Therapy for Prostate Cancer  Androgens are male hormones. Androgens such as testosterone are made in the testicles. Prostate cancer cells need androgens to grow. Reducing the amount of androgens in the body or blocking prostate cancer cells from using them can help treat prostate cancer. This therapy does not cure the cancer, but it can help control it. It may be used alone. Or it may be used with radiation therapy to help make this treatment more effective. Read below to learn more about this treatment.  How the therapy is done  Hormone therapy can be done with:  · LHRH (GnRH) agonists or antagonists. These are medicines that stop the testicles from making androgens. These are injected into a muscle or just under the skin. This is done every few weeks or months. Or they may be given with a small device put under the skin on the inside of the arm. This implant gives a steady dose of medicine  over time. LHRH agonists and antagonists are often used with anti-androgens (see below).  · Anti-androgens. These are medicines that stop cancer cells from using androgens as a way to grow. These come in pill form and are taken by mouth. They are often used along with other forms of hormone therapy.  · CYP17 inhibitors. These slow the amount of hormones made in prostate cancer cells and other body cells. They are given as pills. They are often used along with other forms of hormone therapy.  · Other medicines. These may include corticosteroids, estrogens, or anti-fungal medicines. These can also help lower the levels of androgens in the body. They are used less often than the medicines listed above.  · Orchiectomy. This is surgery to remove the testicles. This stops the body from making most androgens. Artificial (prosthetic) testicles can be placed afterward to give the look of real testicles.  Possible side effects   Side effects are similar for most types of hormone therapy, but they can vary a bit between medicines. Possible side effects can include:  · Sudden increase in body heat (hot flashes)  · Tiredness  · Less interest in sex  · Inability to get or keep an erection  · Decrease in size of penis and testicles  · Changes in facial hair  · Mood changes, such as depression, irritability, or anxiety  · Trouble with memory and concentration  · Loss of muscle  · Diarrhea  · Nausea  · Weight gain  · Breast-area tenderness or growth  · Low red blood cell count (anemia)  · Hair loss  · Bone thinning (osteoporosis)  · Increased risk of heart disease, stroke, and diabetes  Coping with side effects  Some of the side effects are temporary. Others are more long-lasting. This depends on the type of hormone therapy used, and how it affects your body. Most side effects of orchiectomy are permanent. Your health care provider can tell you more. To help cope with side effects, try the tips below.  · Talk to your health care  provider about your symptoms. He or she may prescribe medicines that can help you feel better and reduce problems.  · If you have hot flashes, dont take hot showers. Dont use hot tubs or saunas.  · Dont eat spicy food or drink alcohol. Dont have caffeine.  · Get regular physical activity.  · Eat a healthy diet.  · Keep mentally active.  · Work with your partner to manage sexual changes.  · Try counseling or support groups.  Follow-up care  During the course of your treatment, youll have regular visits with your health care provider. You may also have tests. These let your health care provider check your health and see how well the treatment is working. After treatment ends, you and your health care provider will discuss the results. Youll also discuss whether you need additional cancer treatments.  Resources  For more information about cancer and its treatment, visit the websites listed below:  · American Cancer Society   · National Cancer [place]   · [employer]   Date Last Reviewed: 3/30/2015  © 5605-1301 [employer]. [address] [place] [address]. All rights reserved. This information is not intended as a substitute for professional medical care. Always follow your healthcare professional's instructions.        Leuprolide injection  What is this medicine?  LEUPROLIDE (loo PROE lide) is a man-made hormone. It is used to treat the symptoms of prostate cancer. This medicine may also be used to treat children with early onset of puberty. It may be used for other hormonal conditions.  How should I use this medicine?  This medicine is for injection under the skin or into a muscle. You will be taught how to prepare and give this medicine. Use exactly as directed. Take your medicine at regular intervals. Do not take your medicine more often than directed.  It is important that you put your used needles and syringes in a special sharps container. Do not put them in a trash can. If you do not  have a sharps container, call your pharmacist or healthcare provider to get one.  Talk to your pediatrician regarding the use of this medicine in children. While this medicine may be prescribed for children as young as 8 years for selected conditions, precautions do apply.  What side effects may I notice from receiving this medicine?  Side effects that you should report to your doctor or health care professional as soon as possible:  · allergic reactions like skin rash, itching or hives, swelling of the face, lips, or tongue  · breathing problems  · chest pain  · depression or memory disorders  · pain in your legs or groin  · pain at site where injected  · severe headache  · swelling of the feet and legs  · visual changes  · vomiting  Side effects that usually do not require medical attention (report to your doctor or health care professional if they continue or are bothersome):  · breast swelling or tenderness  · decrease in sex drive or performance  · diarrhea  · hot flashes  · loss of appetite  · muscle, joint, or bone pains  · nausea  · redness or irritation at site where injected  · skin problems or acne  What may interact with this medicine?  Do not take this medicine with any of the following medications:  · chasteberry  This medicine may also interact with the following medications:  · herbal or dietary supplements, like black cohosh or DHEA  · female hormones, like estrogens or progestins and birth control pills, patches, rings, or injections  · male hormones, like testosterone  What if I miss a dose?  If you miss a dose, take it as soon as you can. If it is almost time for your next dose, take only that dose. Do not take double or extra doses.  Where should I keep my medicine?  Keep out of the reach of children.  Store below 25 degrees C (77 degrees F). Do not freeze. Protect from light. Do not use if it is not clear or if there are particles present. Throw away any unused medicine after the expiration  date.  What should I tell my health care provider before I take this medicine?  They need to know if you have any of these conditions:  · diabetes  · heart disease or previous heart attack  · high blood pressure  · high cholesterol  · pain or difficulty passing urine  · spinal cord metastasis  · stroke  · tobacco smoker  · an unusual or allergic reaction to leuprolide, benzyl alcohol, other medicines, foods, dyes, or preservatives  · pregnant or trying to get pregnant  · breast-feeding  What should I watch for while using this medicine?  Visit your doctor or health care professional for regular checks on your progress. During the first week, your symptoms may get worse, but then will improve as you continue your treatment. You may get hot flashes, increased bone pain, increased difficulty passing urine, or an aggravation of nerve symptoms. Discuss these effects with your doctor or health care professional, some of them may improve with continued use of this medicine.  Female patients may experience a menstrual cycle or spotting during the first 2 months of therapy with this medicine. If this continues, contact your doctor or health care professional.  NOTE:This sheet is a summary. It may not cover all possible information. If you have questions about this medicine, talk to your doctor, pharmacist, or health care provider. Copyright© 2017 [employer]

## 2019-03-28 ENCOUNTER — OFFICE VISIT (OUTPATIENT)
Dept: HEMATOLOGY/ONCOLOGY | Facility: CLINIC | Age: 69
End: 2019-03-28
Payer: MEDICARE

## 2019-03-28 ENCOUNTER — TELEPHONE (OUTPATIENT)
Dept: FAMILY MEDICINE | Facility: CLINIC | Age: 69
End: 2019-03-28

## 2019-03-28 VITALS
HEART RATE: 81 BPM | DIASTOLIC BLOOD PRESSURE: 85 MMHG | BODY MASS INDEX: 34.2 KG/M2 | TEMPERATURE: 98 F | SYSTOLIC BLOOD PRESSURE: 141 MMHG | WEIGHT: 224.88 LBS | RESPIRATION RATE: 20 BRPM

## 2019-03-28 DIAGNOSIS — C83.31 DIFFUSE LARGE B-CELL LYMPHOMA OF LYMPH NODES OF NECK: ICD-10-CM

## 2019-03-28 DIAGNOSIS — C61 MALIGNANT NEOPLASM OF PROSTATE: ICD-10-CM

## 2019-03-28 DIAGNOSIS — D70.1 CHEMOTHERAPY-INDUCED NEUTROPENIA: ICD-10-CM

## 2019-03-28 DIAGNOSIS — T45.1X5A CHEMOTHERAPY-INDUCED NEUTROPENIA: ICD-10-CM

## 2019-03-28 DIAGNOSIS — I82.B12 THROMBOSIS OF LEFT SUBCLAVIAN VEIN: ICD-10-CM

## 2019-03-28 DIAGNOSIS — R59.0 CERVICAL LYMPHADENOPATHY: ICD-10-CM

## 2019-03-28 DIAGNOSIS — C85.91 NON-HODGKIN LYMPHOMA OF LYMPH NODES OF NECK, UNSPECIFIED NON-HODGKIN LYMPHOMA TYPE: Primary | ICD-10-CM

## 2019-03-28 DIAGNOSIS — D63.0 ANEMIA COMPLICATING NEOPLASTIC DISEASE: ICD-10-CM

## 2019-03-28 LAB
ALBUMIN SERPL-MCNC: 3.4 G/DL (ref 3.1–4.7)
ALP SERPL-CCNC: 93 IU/L (ref 40–104)
ALT (SGPT): 16 IU/L (ref 3–33)
AST SERPL-CCNC: 22 IU/L (ref 10–40)
BASOPHILS NFR BLD: 0 K/UL (ref 0–0.2)
BASOPHILS NFR BLD: 0.4 %
BILIRUB SERPL-MCNC: 0.4 MG/DL (ref 0.3–1)
BUN SERPL-MCNC: 9 MG/DL (ref 8–20)
CALCIUM SERPL-MCNC: 8.7 MG/DL (ref 7.7–10.4)
CHLORIDE: 104 MMOL/L (ref 98–110)
CO2 SERPL-SCNC: 24.4 MMOL/L (ref 22.8–31.6)
CREATININE: 0.88 MG/DL (ref 0.6–1.4)
EOSINOPHIL NFR BLD: 0 K/UL (ref 0–0.7)
EOSINOPHIL NFR BLD: 0.7 %
ERYTHROCYTE [DISTWIDTH] IN BLOOD BY AUTOMATED COUNT: 17.4 % (ref 11.7–14.9)
GLUCOSE: 89 MG/DL (ref 70–99)
GRAN #: 3.6 K/UL (ref 1.4–6.5)
GRAN%: 64.4 %
HCT VFR BLD AUTO: 25.2 % (ref 39–55)
HGB BLD-MCNC: 7.7 G/DL (ref 14–16)
IMMATURE GRANS (ABS): 0.1 K/UL (ref 0–1)
IMMATURE GRANULOCYTES: 1.2 %
LDH SERPL L TO P-CCNC: 194 IU/L (ref 100–194)
LYMPH #: 0.6 K/UL (ref 1.2–3.4)
LYMPH%: 11.2 %
MAGNESIUM SERPL-MCNC: 2.1 MG/DL (ref 1.5–2.6)
MCH RBC QN AUTO: 28 PG (ref 25–35)
MCHC RBC AUTO-ENTMCNC: 30.6 G/DL (ref 31–36)
MCV RBC AUTO: 91.6 FL (ref 80–100)
MONO #: 1.2 K/UL (ref 0.1–0.6)
MONO%: 22.1 %
NUCLEATED RBCS: 0 %
PHOSPHATE FLD-MCNC: 2.5 MG/DL (ref 2.5–4.9)
PLATELET # BLD AUTO: 200 K/UL (ref 140–440)
PMV BLD AUTO: 9.6 FL (ref 8.8–12.7)
POTASSIUM SERPL-SCNC: 3.9 MMOL/L (ref 3.5–5)
PROT SERPL-MCNC: 6.5 G/DL (ref 6–8.2)
RBC # BLD AUTO: 2.75 M/UL (ref 4.3–5.9)
SODIUM: 138 MMOL/L (ref 134–144)
URATE SERPL-MCNC: 3.8 MG/DL (ref 2.6–7.8)
WBC # BLD AUTO: 5.6 K/UL (ref 5–10)

## 2019-03-28 PROCEDURE — 3079F PR MOST RECENT DIASTOLIC BLOOD PRESSURE 80-89 MM HG: ICD-10-PCS | Mod: ,,, | Performed by: INTERNAL MEDICINE

## 2019-03-28 PROCEDURE — 1101F PT FALLS ASSESS-DOCD LE1/YR: CPT | Mod: ,,, | Performed by: INTERNAL MEDICINE

## 2019-03-28 PROCEDURE — 99214 OFFICE O/P EST MOD 30 MIN: CPT | Mod: ,,, | Performed by: INTERNAL MEDICINE

## 2019-03-28 PROCEDURE — 1101F PR PT FALLS ASSESS DOC 0-1 FALLS W/OUT INJ PAST YR: ICD-10-PCS | Mod: ,,, | Performed by: INTERNAL MEDICINE

## 2019-03-28 PROCEDURE — 99214 PR OFFICE/OUTPT VISIT, EST, LEVL IV, 30-39 MIN: ICD-10-PCS | Mod: ,,, | Performed by: INTERNAL MEDICINE

## 2019-03-28 PROCEDURE — 3077F PR MOST RECENT SYSTOLIC BLOOD PRESSURE >= 140 MM HG: ICD-10-PCS | Mod: ,,, | Performed by: INTERNAL MEDICINE

## 2019-03-28 PROCEDURE — 3077F SYST BP >= 140 MM HG: CPT | Mod: ,,, | Performed by: INTERNAL MEDICINE

## 2019-03-28 PROCEDURE — 3079F DIAST BP 80-89 MM HG: CPT | Mod: ,,, | Performed by: INTERNAL MEDICINE

## 2019-03-28 RX ORDER — POTASSIUM CHLORIDE 750 MG/1
TABLET, EXTENDED RELEASE ORAL
Refills: 3 | COMMUNITY
Start: 2019-03-13 | End: 2019-04-18 | Stop reason: SDUPTHER

## 2019-03-28 NOTE — LETTER
March 28, 2019      Ronny Driscoll MD  [address]  [address]  [place] [address]           [facility] - Hematology Oncology  [address]  [address]  [place] [address]  Phone: [phone]  Fax: [phone]          Patient: Naresh Painting   MR Number: [id number]   YOB: 1950   Date of Visit: 3/28/2019       Dear Dr. Ronny Driscoll:    Thank you for referring Naresh Painting to me for evaluation. Attached you will find relevant portions of my assessment and plan of care.    If you have questions, please do not hesitate to call me. I look forward to following Naresh Painting along with you.    Sincerely,    Fidencio Rdz MD    Enclosure  CC:  No Recipients    If you would like to receive this communication electronically, please contact [email or web address] or [phone] to request more information on [employer] access.    For providers and/or their staff who would like to refer a patient to [facility], please contact us through our one-stop-shop provider referral line, [facility], at [phone].    If you feel you have received this communication in error or would no longer like to receive these types of communications, please e-mail [email or web address]

## 2019-04-04 ENCOUNTER — TELEPHONE (OUTPATIENT)
Dept: HEMATOLOGY/ONCOLOGY | Facility: CLINIC | Age: 69
End: 2019-04-04

## 2019-04-04 NOTE — TELEPHONE ENCOUNTER
Spoke with the pt wife and gave here my personal fax contact information for the  so they can send us an official request for what is need to help here w/ her case w/ round up products.    She will pass the information on to the  and I await any contact/ requests that they may have.

## 2019-04-04 NOTE — TELEPHONE ENCOUNTER
----- Message from Bella Rogel sent at 4/2/2019  2:49 PM CDT -----  Contact: Spouse  Spouse called in requesting to  information on patient related to round up and patients diagnosis of non-hodgkins lymphoma diagnosis.  Said she discussed with Dr. mills on last office visit and he would see what he could find in the chart for her.    They have upcoming  appt and needed to pickup information if it was available.  I saw no notes referring to this and checked with Guerita who will check with Dr. DORSEY and call pt back.      CB# [phone]

## 2019-04-09 LAB — MAGNESIUM SERPL-MCNC: 2.1 MG/DL (ref 1.5–2.6)

## 2019-04-11 ENCOUNTER — TELEPHONE (OUTPATIENT)
Dept: FAMILY MEDICINE | Facility: CLINIC | Age: 69
End: 2019-04-11

## 2019-04-11 NOTE — TELEPHONE ENCOUNTER
"In order to keep this patient from having to come in for a hospital discharge follow up appointment would it be possible to create a telephone encounter and conduct a medication reconciliation with the patient.  This would have to be done by the physician.    The documentation needed would have to be a long the lines of,  "I have reconciled patient's discharged medications with current outpatient medications."      If you have any questions please let Lita or myself know.    Thank you.  "

## 2019-04-12 ENCOUNTER — TELEPHONE (OUTPATIENT)
Dept: HEMATOLOGY/ONCOLOGY | Facility: CLINIC | Age: 69
End: 2019-04-12

## 2019-04-12 LAB
LDH SERPL L TO P-CCNC: 198 IU/L (ref 100–194)
MAGNESIUM SERPL-MCNC: 2.1 MG/DL (ref 1.5–2.6)
PHOSPHATE FLD-MCNC: 2.5 MG/DL (ref 2.5–4.9)
URATE SERPL-MCNC: 3.2 MG/DL (ref 2.6–7.8)

## 2019-04-15 ENCOUNTER — TELEPHONE (OUTPATIENT)
Dept: HEMATOLOGY/ONCOLOGY | Facility: CLINIC | Age: 69
End: 2019-04-15

## 2019-04-15 NOTE — TELEPHONE ENCOUNTER
Message sent to yessica  ----- Message from Fidencio Rdz MD sent at 4/15/2019 10:11 AM CDT -----  He probably needs two units of blood (irradiated) - but check with Dr Yessica pearson

## 2019-04-15 NOTE — TELEPHONE ENCOUNTER
Message sent to Dr. Basilio  Waiting for reply     ----- Message from Fidencio Rdz MD sent at 4/15/2019 10:11 AM CDT -----  He probably needs two units of blood (irradiated) - but check with Dr Basilio first

## 2019-04-18 ENCOUNTER — OFFICE VISIT (OUTPATIENT)
Dept: FAMILY MEDICINE | Facility: CLINIC | Age: 69
End: 2019-04-18
Payer: MEDICARE

## 2019-04-18 VITALS
WEIGHT: 220 LBS | DIASTOLIC BLOOD PRESSURE: 67 MMHG | BODY MASS INDEX: 33.34 KG/M2 | HEART RATE: 93 BPM | HEIGHT: 68 IN | SYSTOLIC BLOOD PRESSURE: 127 MMHG

## 2019-04-18 DIAGNOSIS — I10 BENIGN ESSENTIAL HYPERTENSION: ICD-10-CM

## 2019-04-18 DIAGNOSIS — C85.91 NON-HODGKIN LYMPHOMA OF LYMPH NODES OF NECK, UNSPECIFIED NON-HODGKIN LYMPHOMA TYPE: Primary | ICD-10-CM

## 2019-04-18 DIAGNOSIS — E78.2 MULTIPLE-TYPE HYPERLIPIDEMIA: ICD-10-CM

## 2019-04-18 DIAGNOSIS — E87.6 LOW BLOOD POTASSIUM: Primary | ICD-10-CM

## 2019-04-18 LAB
ALBUMIN SERPL-MCNC: 3.3 G/DL (ref 3.1–4.7)
ALP SERPL-CCNC: 89 IU/L (ref 40–104)
ALT (SGPT): 11 IU/L (ref 3–33)
AST SERPL-CCNC: 18 IU/L (ref 10–40)
BILIRUB SERPL-MCNC: 0.5 MG/DL (ref 0.3–1)
BUN SERPL-MCNC: 15 MG/DL (ref 8–20)
CALCIUM SERPL-MCNC: 8.5 MG/DL (ref 7.7–10.4)
CHLORIDE: 108 MMOL/L (ref 98–110)
CO2 SERPL-SCNC: 20.8 MMOL/L (ref 22.8–31.6)
CREATININE: 1.08 MG/DL (ref 0.6–1.4)
GLUCOSE: 101 MG/DL (ref 70–99)
LDH SERPL L TO P-CCNC: 140 IU/L (ref 100–194)
MAGNESIUM SERPL-MCNC: 2.1 MG/DL (ref 1.5–2.6)
PHOSPHATE FLD-MCNC: 1.3 MG/DL (ref 2.5–4.9)
POTASSIUM SERPL-SCNC: 3.3 MMOL/L (ref 3.5–5)
PROT SERPL-MCNC: 6.1 G/DL (ref 6–8.2)
SODIUM: 137 MMOL/L (ref 134–144)
URATE SERPL-MCNC: 2.2 MG/DL (ref 2.6–7.8)

## 2019-04-18 PROCEDURE — 99214 PR OFFICE/OUTPT VISIT, EST, LEVL IV, 30-39 MIN: ICD-10-PCS | Mod: ,,, | Performed by: INTERNAL MEDICINE

## 2019-04-18 PROCEDURE — 1111F PR DISCHARGE MEDS RECONCILED W/ CURRENT OUTPATIENT MED LIST: ICD-10-PCS | Mod: ,,, | Performed by: INTERNAL MEDICINE

## 2019-04-18 PROCEDURE — 3074F PR MOST RECENT SYSTOLIC BLOOD PRESSURE < 130 MM HG: ICD-10-PCS | Mod: ,,, | Performed by: INTERNAL MEDICINE

## 2019-04-18 PROCEDURE — 3078F DIAST BP <80 MM HG: CPT | Mod: ,,, | Performed by: INTERNAL MEDICINE

## 2019-04-18 PROCEDURE — 99214 OFFICE O/P EST MOD 30 MIN: CPT | Mod: ,,, | Performed by: INTERNAL MEDICINE

## 2019-04-18 PROCEDURE — 3078F PR MOST RECENT DIASTOLIC BLOOD PRESSURE < 80 MM HG: ICD-10-PCS | Mod: ,,, | Performed by: INTERNAL MEDICINE

## 2019-04-18 PROCEDURE — 3074F SYST BP LT 130 MM HG: CPT | Mod: ,,, | Performed by: INTERNAL MEDICINE

## 2019-04-18 PROCEDURE — 1111F DSCHRG MED/CURRENT MED MERGE: CPT | Mod: ,,, | Performed by: INTERNAL MEDICINE

## 2019-04-18 PROCEDURE — 1101F PR PT FALLS ASSESS DOC 0-1 FALLS W/OUT INJ PAST YR: ICD-10-PCS | Mod: ,,, | Performed by: INTERNAL MEDICINE

## 2019-04-18 PROCEDURE — 1101F PT FALLS ASSESS-DOCD LE1/YR: CPT | Mod: ,,, | Performed by: INTERNAL MEDICINE

## 2019-04-18 RX ORDER — POTASSIUM CHLORIDE 750 MG/1
10 TABLET, EXTENDED RELEASE ORAL 2 TIMES DAILY
Qty: 180 TABLET | Refills: 3 | Status: SHIPPED | OUTPATIENT
Start: 2019-04-18 | End: 2020-01-01

## 2019-04-18 NOTE — PATIENT INSTRUCTIONS
Taking Your Blood Pressure  Blood pressure is the force of blood against the artery wall as it moves from the heart through the blood vessels. You can take your own blood pressure reading using a digital monitor. Take your readings the same each time, using the same arm. Take readings as often as your healthcare provider instructs.  About blood pressure monitors  Blood pressure monitors are designed for certain ages and cases. You can find monitors for older adults, for pregnant women, and for children. Make sure the one you choose is the right one for your age and situation.  The American Heart Association recommends an automatic cuff monitor that fits on your upper arm (bicep). The cuff should fit your arm size. A cuff thats too large or too small will not give an accurate reading. Measure around your upper arm to find your size.  Monitors that attach to your finger or wrist are not as accurate as monitors for your upper arm.  Ask your healthcare provider for help in choosing a monitor. Bring your monitor to your next provider visit if you need help in using it the correct way.  The steps below are general instructions for using an automatic digital monitor.  Step 1. Relax    · Take your blood pressure at the same time every day, such as in the morning or evening, or at the time your healthcare provider recommends.  · Wait at least a half-hour after smoking, eating, or exercising. Don't drink coffee, tea, soda, or other caffeinated beverages before checking your blood pressure.  · Sit comfortably at a table with both feet on the floor. Do not cross your legs or feet. Place the monitor near you.  · Rest for a few minutes before you begin.  Step 2. Wrap the cuff    · Place your arm on the table, palm up. Your arm should be at the level of your heart. Wrap the cuff around your upper arm, just above your elbow. Its best done on bare skin, not over clothing. Most cuffs will indicate where the brachial artery (the  blood vessel in the middle of the arm at the inner side of the elbow) should line up with the cuff. Look in your monitor's instruction booklet for an illustration. You can also bring your cuff to your healthcare provider and have them show you how to correctly place the cuff.  Step 3. Inflate the cuff    · Push the button that starts the pump.  · The cuff will tighten, then loosen.  · The numbers will change. When they stop changing, your blood pressure reading will appear.  · Take 2 or 3 readings one minute apart.  Step 4. Write down the results of each reading    · Write down your blood pressure numbers for each reading. Note the date and time. Keep your results in one place, such as a notebook. Even if your monitor has a built-in memory, keep a hard copy of the readings.  · Remove the cuff from your arm. Turn off the machine.  · Bring your blood pressure records with your healthcare providers at each visit.  · If you start a new blood pressure medicine, note the day you started the new medicine. Also note the day if you change the dose of your medicine. This information goes on your blood pressure recording sheet. This will help your healthcare provider monitor how well the medicine changes are working.  · Ask your healthcare provider what numbers should prompt you to call him or her. Also ask what numbers should prompt you to get help right away.  Date Last Reviewed: 11/1/2016  © 8956-0385 [employer]. [address] [place] [address], [place] [address]. All rights reserved. This information is not intended as a substitute for professional medical care. Always follow your healthcare professional's instructions.

## 2019-04-18 NOTE — PROGRESS NOTES
Subjective:       Patient ID: Naresh Painting is a 68 y.o. male.    Chief Complaint: Hospital Follow Up (needs new standing order ); Hypertension; and Lymphoma    Mr. Naresh Steinberg is a 68-year-old gentleman who comes for follow-up. He has a diagnosis of non-Hodgkin's lymphoma and has recurrence. He is going through chemotherapy for the same. He has been chronically anemic off late. He is doing fairly well for his stage of disease. The ultimate plan is a bone marrow transplant.    Hypertension   This is a chronic problem. The current episode started more than 1 year ago. The problem is unchanged. The problem is controlled. Associated symptoms include malaise/fatigue. Pertinent negatives include no chest pain, headaches, neck pain, palpitations or shortness of breath. Risk factors for coronary artery disease include sedentary lifestyle, male gender, obesity and dyslipidemia. Past treatments include beta blockers.       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     Social History     Socioeconomic History    Marital status:      Spouse name: Amita Painting    Number of children: 2    Years of education: Not on file    Highest education level: Not on file   Occupational History    Occupation: Retired      Employer: [employer]     Comment: [employer]- 3 yrs    Occupation:      Employer: [employer]     Comment: 26 yrs    Occupation: Reproduction     Employer: [employer]     Comment: [employer]   Social Needs    Financial resource strain:  Not on file    Food insecurity:     Worry: Not on file     Inability: Not on file    Transportation needs:     Medical: Not on file     Non-medical: Not on file   Tobacco Use    Smoking status: Former Smoker    Smokeless tobacco: Never Used    Tobacco comment: quit 1995   Substance and Sexual Activity    Alcohol use: Yes    Drug use: No    Sexual activity: Not Currently     Partners: Female   Lifestyle    Physical activity:     Days per week: Not on file     Minutes per session: Not on file    Stress: To some extent   Relationships    Social connections:     Talks on phone: Not on file     Gets together: Not on file     Attends Confucianist service: Not on file     Active member of club or organization: Not on file     Attends meetings of clubs or organizations: Not on file     Relationship status: Not on file   Other Topics Concern    Not on file   Social History Narrative    One daughter name is Meagan. Son's name is Alfie     Past Surgical History:   Procedure Laterality Date    EYE SURGERY      LYMPH NODE BIOPSY      PROCTECTOMY      PROSTATE SURGERY      UMBILICAL HERNIA REPAIR  04/05/2018    Incarcerated-      Family History   Problem Relation Age of Onset    Heart disease Mother     Diabetes Father        Review of Systems   Constitutional: Positive for activity change (slowed down), fatigue and malaise/fatigue. Negative for appetite change, fever and unexpected weight change (Lost 4 more lbs).   Eyes: Negative for pain, discharge and visual disturbance.        Left eye Glass eye prosthesis   Respiratory: Positive for apnea (obstructive sleep apnea). Negative for cough, chest tightness and shortness of breath.    Cardiovascular: Negative for chest pain, palpitations and leg swelling.        H/O Left upper extremity deep vein thrombosis. Treated with anticoagulation   Gastrointestinal: Negative for abdominal distention, abdominal pain, blood in stool, constipation, diarrhea and  "nausea.        Anorexia   Endocrine: Negative for cold intolerance, heat intolerance, polydipsia, polyphagia and polyuria.   Genitourinary: Negative for dysuria, hematuria and scrotal swelling.   Musculoskeletal: Negative for arthralgias, back pain, gait problem and neck pain.   Skin: Positive for pallor. Negative for rash and wound.        Patient's has lumps in the neck which have now been diagnosed to be lymphoma.   Allergic/Immunologic: Negative for environmental allergies, food allergies and immunocompromised state.   Neurological: Positive for light-headedness. Negative for dizziness, seizures, speech difficulty, numbness and headaches.   Hematological: Positive for adenopathy. Does not bruise/bleed easily.        Diagnosis of NHL under chemotherapy. Significant reduction in the size of lymphadenopathy.   Psychiatric/Behavioral: Negative for agitation, behavioral problems and confusion. The patient is not nervous/anxious.          Objective:      Blood pressure 127/67, pulse 93, height 5' 8" (1.727 m), weight 99.8 kg (220 lb). Body mass index is 33.45 kg/m².  Physical Exam   Constitutional: He appears well-developed. No distress.   BMI is 36-somewhat chronically sick   HENT:   Head: Normocephalic and atraumatic.   Mouth/Throat: No oropharyngeal exudate.   Eyes: Conjunctivae and EOM are normal. Right eye exhibits no discharge and no exudate. Right conjunctiva is not injected. Right conjunctiva has no hemorrhage.   Left eye is prosthetic.   Neck: Normal range of motion. Neck supple. No JVD present. No neck rigidity. Normal range of motion present.   The lymph nodes on right and left side have dramatically shrunken now. A small lymph node is felt on the right side which is firm, nontender and adherent to the underlying structures.   Cardiovascular: Normal rate and regular rhythm. Exam reveals no friction rub.   No murmur heard.  Pulmonary/Chest: Effort normal. No respiratory distress. He has decreased breath " sounds in the right middle field and the right lower field. He has no wheezes.   Diminished air entry on the right side in the base.   Abdominal: Soft. Bowel sounds are normal. He exhibits distension.   Musculoskeletal: He exhibits edema (1 +). He exhibits no tenderness or deformity.   Examination of the neck does not reveal any deformity.   Lymphadenopathy:     He has cervical adenopathy.        Right cervical: Deep cervical adenopathy present.     He has no axillary adenopathy.   Right-sided cervical lymphadenopathy. No left-sided lymphadenopathy. No axillary lymphadenopathy.   Neurological: He is alert. He has normal reflexes.   Skin: Skin is warm and dry. There is pallor.   Psychiatric: His mood appears anxious. Thought content is not delusional.   Nursing note and vitals reviewed.        Assessment:       1. Non-Hodgkin lymphoma of lymph nodes of neck, unspecified non-Hodgkin lymphoma type    2. Benign essential hypertension    3. Multiple-type hyperlipidemia           Orders Only on 04/12/2019   Component Date Value Ref Range Status    Magnesium 04/12/2019 2.1  1.5 - 2.6 mg/dL Final    Uric Acid 04/12/2019 3.2  2.6 - 7.8 mg/dL Final    Phosphorus 04/12/2019 2.5  2.5 - 4.9 mg/dL Final    LD 04/12/2019 198* 100 - 194 IU/L Final   Ancillary Orders on 04/12/2019   Component Date Value Ref Range Status    WBC 04/12/2019 1.4* 5.0 - 10.0 K/uL Final    RBC 04/12/2019 2.51* 4.30 - 5.90 M/uL Final    Hemoglobin 04/12/2019 7.3* 14.0 - 16.0 g/dL Final    Hematocrit 04/12/2019 23.9* 39.0 - 55.0 % Final    MCV 04/12/2019 95.2  80.0 - 100.0 fL Final    MCH 04/12/2019 29.1  25.0 - 35.0 pg Final    MCHC 04/12/2019 30.5* 31.0 - 36.0 g/dL Final    RDW 04/12/2019 19.9* 11.7 - 14.9 % Final    Platelets 04/12/2019 129* 140 - 440 K/uL Final    MPV 04/12/2019 10.0  8.8 - 12.7 fL Final    Gran% 04/12/2019 65.5  % Final    Lymph% 04/12/2019 12.9  % Final    Mono% 04/12/2019 5.0  % Final    Eosinophil% 04/12/2019 11.5   % Final    Basophil% 04/12/2019 2.9  % Final    Gran # (ANC) 04/12/2019 0.9* 1.4 - 6.5 K/uL Final    Lymph # 04/12/2019 0.2* 1.2 - 3.4 K/uL Final    Mono # 04/12/2019 0.1  0.1 - 0.6 K/uL Final    Eos # 04/12/2019 0.2  0.0 - 0.7 K/uL Final    Baso # 04/12/2019 0.0  0.0 - 0.2 K/uL Final    Immature Grans (Abs) 04/12/2019 0.0  0.0 - 1.0 K/uL Final    Immature Granulocytes 04/12/2019 2.2  % Final    nRBC% 04/12/2019 0  % Final    Glucose 04/12/2019 89  70 - 99 mg/dL Final    BUN, Bld 04/12/2019 26* 8 - 20 mg/dL Final    Creatinine 04/12/2019 1.66* 0.60 - 1.40 mg/dL Final    Calcium 04/12/2019 9.1  7.7 - 10.4 mg/dL Final    Sodium 04/12/2019 139  134 - 144 mmol/L Final    Potassium 04/12/2019 3.6  3.5 - 5.0 mmol/L Final    Chloride 04/12/2019 114* 98 - 110 mmol/L Final    CO2 04/12/2019 17.7* 22.8 - 31.6 mmol/L Final    Albumin 04/12/2019 3.5  3.1 - 4.7 g/dL Final    Total Bilirubin 04/12/2019 0.7  0.3 - 1.0 mg/dL Final    Alkaline Phosphatase 04/12/2019 87  40 - 104 IU/L Final    Total Protein 04/12/2019 6.5  6.0 - 8.2 g/dL Final    ALT (SGPT) 04/12/2019 14  3 - 33 IU/L Final    AST 04/12/2019 21  10 - 40 IU/L Final   Orders Only on 04/09/2019   Component Date Value Ref Range Status    Magnesium 04/09/2019 2.1  1.5 - 2.6 mg/dL Final   Ancillary Orders on 03/29/2019   Component Date Value Ref Range Status    WBC 04/02/2019 6.7  5.0 - 10.0 K/uL Final    RBC 04/02/2019 2.78* 4.30 - 5.90 M/uL Final    Hemoglobin 04/02/2019 8.0* 14.0 - 16.0 g/dL Final    Hematocrit 04/02/2019 26.5* 39.0 - 55.0 % Final    MCV 04/02/2019 95.3  80.0 - 100.0 fL Final    MCH 04/02/2019 28.8  25.0 - 35.0 pg Final    MCHC 04/02/2019 30.2* 31.0 - 36.0 g/dL Final    RDW 04/02/2019 19.9* 11.7 - 14.9 % Final    Platelets 04/02/2019 472* 140 - 440 K/uL Final    MPV 04/02/2019 9.4  8.8 - 12.7 fL Final    Gran% 04/02/2019 68.5  % Final    Lymph% 04/02/2019 9.7  % Final    Mono% 04/02/2019 19.5  % Final    Eosinophil%  04/02/2019 0.3  % Final    Basophil% 04/02/2019 1.3  % Final    Gran # (ANC) 04/02/2019 4.6  1.4 - 6.5 K/uL Final    Lymph # 04/02/2019 0.7* 1.2 - 3.4 K/uL Final    Mono # 04/02/2019 1.3* 0.1 - 0.6 K/uL Final    Eos # 04/02/2019 0.0  0.0 - 0.7 K/uL Final    Baso # 04/02/2019 0.1  0.0 - 0.2 K/uL Final    Immature Grans (Abs) 04/02/2019 0.1  0.0 - 1.0 K/uL Final    Immature Granulocytes 04/02/2019 0.7  % Final    nRBC% 04/02/2019 0  % Final    Glucose 04/02/2019 83  70 - 99 mg/dL Final    BUN, Bld 04/02/2019 12  8 - 20 mg/dL Final    Creatinine 04/02/2019 0.89  0.60 - 1.40 mg/dL Final    Calcium 04/02/2019 9.1  7.7 - 10.4 mg/dL Final    Sodium 04/02/2019 138  134 - 144 mmol/L Final    Potassium 04/02/2019 3.9  3.5 - 5.0 mmol/L Final    Chloride 04/02/2019 107  98 - 110 mmol/L Final    CO2 04/02/2019 24.7  22.8 - 31.6 mmol/L Final    Albumin 04/02/2019 3.3  3.1 - 4.7 g/dL Final    Total Bilirubin 04/02/2019 0.5  0.3 - 1.0 mg/dL Final    Alkaline Phosphatase 04/02/2019 81  40 - 104 IU/L Final    Total Protein 04/02/2019 6.4  6.0 - 8.2 g/dL Final    ALT (SGPT) 04/02/2019 13  3 - 33 IU/L Final    AST 04/02/2019 23  10 - 40 IU/L Final    Magnesium 04/02/2019 2.0  1.5 - 2.6 mg/dL Final    Uric Acid 04/02/2019 4.2  2.6 - 7.8 mg/dL Final    LD 04/02/2019 190  100 - 194 IU/L Final    Phosphorus 04/02/2019 2.6  2.5 - 4.9 mg/dL Final   Orders Only on 03/28/2019   Component Date Value Ref Range Status    WBC 03/28/2019 5.6  5.0 - 10.0 K/uL Final    RBC 03/28/2019 2.75* 4.30 - 5.90 M/uL Final    Hemoglobin 03/28/2019 7.7* 14.0 - 16.0 g/dL Final    Hematocrit 03/28/2019 25.2* 39.0 - 55.0 % Final    MCV 03/28/2019 91.6  80.0 - 100.0 fL Final    MCH 03/28/2019 28.0  25.0 - 35.0 pg Final    MCHC 03/28/2019 30.6* 31.0 - 36.0 g/dL Final    RDW 03/28/2019 17.4* 11.7 - 14.9 % Final    Platelets 03/28/2019 200  140 - 440 K/uL Final    MPV 03/28/2019 9.6  8.8 - 12.7 fL Final    Gran% 03/28/2019 64.4  %  Final    Lymph% 03/28/2019 11.2  % Final    Mono% 03/28/2019 22.1  % Final    Eosinophil% 03/28/2019 0.7  % Final    Basophil% 03/28/2019 0.4  % Final    Gran # (ANC) 03/28/2019 3.6  1.4 - 6.5 K/uL Final    Lymph # 03/28/2019 0.6* 1.2 - 3.4 K/uL Final    Mono # 03/28/2019 1.2* 0.1 - 0.6 K/uL Final    Eos # 03/28/2019 0.0  0.0 - 0.7 K/uL Final    Baso # 03/28/2019 0.0  0.0 - 0.2 K/uL Final    Immature Grans (Abs) 03/28/2019 0.1  0.0 - 1.0 K/uL Final    Immature Granulocytes 03/28/2019 1.2  % Final    nRBC% 03/28/2019 0  % Final    Glucose 03/28/2019 89  70 - 99 mg/dL Final    BUN, Bld 03/28/2019 9  8 - 20 mg/dL Final    Creatinine 03/28/2019 0.88  0.60 - 1.40 mg/dL Final    Calcium 03/28/2019 8.7  7.7 - 10.4 mg/dL Final    Sodium 03/28/2019 138  134 - 144 mmol/L Final    Potassium 03/28/2019 3.9  3.5 - 5.0 mmol/L Final    Chloride 03/28/2019 104  98 - 110 mmol/L Final    CO2 03/28/2019 24.4  22.8 - 31.6 mmol/L Final    Albumin 03/28/2019 3.4  3.1 - 4.7 g/dL Final    Total Bilirubin 03/28/2019 0.4  0.3 - 1.0 mg/dL Final    Alkaline Phosphatase 03/28/2019 93  40 - 104 IU/L Final    Total Protein 03/28/2019 6.5  6.0 - 8.2 g/dL Final    ALT (SGPT) 03/28/2019 16  3 - 33 IU/L Final    AST 03/28/2019 22  10 - 40 IU/L Final    Phosphorus 03/28/2019 2.5  2.5 - 4.9 mg/dL Final    LD 03/28/2019 194  100 - 194 IU/L Final    Uric Acid 03/28/2019 3.8  2.6 - 7.8 mg/dL Final    Magnesium 03/28/2019 2.1  1.5 - 2.6 mg/dL Final   Lab Visit on 03/22/2019   Component Date Value Ref Range Status    PSA DIAGNOSTIC 03/22/2019 0.06  0.00 - 4.00 ng/mL Final   Ancillary Orders on 03/22/2019   Component Date Value Ref Range Status    WBC 03/25/2019 4.2* 5.0 - 10.0 K/uL Final    RBC 03/25/2019 2.69* 4.30 - 5.90 M/uL Final    Hemoglobin 03/25/2019 7.5* 14.0 - 16.0 g/dL Final    Hematocrit 03/25/2019 24.0* 39.0 - 55.0 % Final    MCV 03/25/2019 89.2  80.0 - 100.0 fL Final    MCH 03/25/2019 27.9  25.0 - 35.0 pg  Final    MCHC 03/25/2019 31.3  31.0 - 36.0 g/dL Final    RDW 03/25/2019 16.0* 11.7 - 14.9 % Final    Platelets 03/25/2019 64* 140 - 440 K/uL Final    MPV 03/25/2019 10.2  8.8 - 12.7 fL Final    Gran% 03/25/2019 53.8  % Final    Lymph% 03/25/2019 15.4  % Final    Mono% 03/25/2019 25.5  % Final    Eosinophil% 03/25/2019 1.0  % Final    Basophil% 03/25/2019 0.2  % Final    Gran # (ANC) 03/25/2019 2.2  1.4 - 6.5 K/uL Final    Lymph # 03/25/2019 0.6* 1.2 - 3.4 K/uL Final    Mono # 03/25/2019 1.1* 0.1 - 0.6 K/uL Final    Eos # 03/25/2019 0.0  0.0 - 0.7 K/uL Final    Baso # 03/25/2019 0.0  0.0 - 0.2 K/uL Final    Immature Grans (Abs) 03/25/2019 0.2  0.0 - 1.0 K/uL Final    Immature Granulocytes 03/25/2019 4.1  % Final    nRBC% 03/25/2019 1  % Final    Glucose 03/25/2019 89  70 - 99 mg/dL Final    BUN, Bld 03/25/2019 11  8 - 20 mg/dL Final    Creatinine 03/25/2019 0.81  0.60 - 1.40 mg/dL Final    Calcium 03/25/2019 8.8  7.7 - 10.4 mg/dL Final    Sodium 03/25/2019 139  134 - 144 mmol/L Final    Potassium 03/25/2019 3.9  3.5 - 5.0 mmol/L Final    Chloride 03/25/2019 105  98 - 110 mmol/L Final    CO2 03/25/2019 23.3  22.8 - 31.6 mmol/L Final    Albumin 03/25/2019 3.3  3.1 - 4.7 g/dL Final    Total Bilirubin 03/25/2019 0.6  0.3 - 1.0 mg/dL Final    Alkaline Phosphatase 03/25/2019 88  40 - 104 IU/L Final    Total Protein 03/25/2019 6.2  6.0 - 8.2 g/dL Final    ALT (SGPT) 03/25/2019 13  3 - 33 IU/L Final    AST 03/25/2019 22  10 - 40 IU/L Final    Magnesium 03/25/2019 2.1  1.5 - 2.6 mg/dL Final    Uric Acid 03/25/2019 3.2  2.6 - 7.8 mg/dL Final    LD 03/25/2019 180  100 - 194 IU/L Final    Phosphorus 03/25/2019 2.7  2.5 - 4.9 mg/dL Final   Ancillary Orders on 03/15/2019   Component Date Value Ref Range Status    WBC 03/19/2019 0.5* 5.0 - 10.0 K/uL Final    RBC 03/19/2019 2.92* 4.30 - 5.90 M/uL Final    Hemoglobin 03/19/2019 8.0* 14.0 - 16.0 g/dL Final    Hematocrit 03/19/2019 26.0* 39.0 -  55.0 % Final    MCV 03/19/2019 89.0  80.0 - 100.0 fL Final    MCH 03/19/2019 27.4  25.0 - 35.0 pg Final    MCHC 03/19/2019 30.8* 31.0 - 36.0 g/dL Final    RDW 03/19/2019 16.7* 11.7 - 14.9 % Final    Platelets 03/19/2019 16* 140 - 440 K/uL Final    MPV 03/19/2019 10.7  8.8 - 12.7 fL Final    Gran% 03/19/2019 31.3  % Final    Lymph% 03/19/2019 45.1  % Final    Mono% 03/19/2019 15.7  % Final    Eosinophil% 03/19/2019 5.9  % Final    Basophil% 03/19/2019 2.0  % Final    Gran # (ANC) 03/19/2019 0.2* 1.4 - 6.5 K/uL Final    Lymph # 03/19/2019 0.2* 1.2 - 3.4 K/uL Final    Mono # 03/19/2019 0.1  0.1 - 0.6 K/uL Final    Eos # 03/19/2019 0.0  0.0 - 0.7 K/uL Final    Baso # 03/19/2019 0.0  0.0 - 0.2 K/uL Final    Immature Grans (Abs) 03/19/2019 0.0  0.0 - 1.0 K/uL Final    Immature Granulocytes 03/19/2019 0.0  % Final    nRBC% 03/19/2019 0  % Final    Glucose 03/19/2019 88  70 - 99 mg/dL Final    BUN, Bld 03/19/2019 18  8 - 20 mg/dL Final    Creatinine 03/19/2019 0.87  0.60 - 1.40 mg/dL Final    Calcium 03/19/2019 8.6  7.7 - 10.4 mg/dL Final    Sodium 03/19/2019 142  134 - 144 mmol/L Final    Potassium 03/19/2019 3.5  3.5 - 5.0 mmol/L Final    Chloride 03/19/2019 112* 98 - 110 mmol/L Final    CO2 03/19/2019 23.9  22.8 - 31.6 mmol/L Final    Albumin 03/19/2019 3.5  3.1 - 4.7 g/dL Final    Total Bilirubin 03/19/2019 0.5  0.3 - 1.0 mg/dL Final    Alkaline Phosphatase 03/19/2019 96  40 - 104 IU/L Final    Total Protein 03/19/2019 6.4  6.0 - 8.2 g/dL Final    ALT (SGPT) 03/19/2019 15  3 - 33 IU/L Final    AST 03/19/2019 18  10 - 40 IU/L Final    Magnesium 03/19/2019 2.2  1.5 - 2.6 mg/dL Final    Uric Acid 03/19/2019 2.5* 2.6 - 7.8 mg/dL Final    Phosphorus 03/19/2019 2.6  2.5 - 4.9 mg/dL Final    LD 03/19/2019 147  100 - 194 IU/L Final   Orders Only on 03/14/2019   Component Date Value Ref Range Status    Glucose 03/14/2019 90  70 - 99 mg/dL Final    BUN, Bld 03/14/2019 24* 8 - 20 mg/dL Final     Creatinine 03/14/2019 1.34  0.60 - 1.40 mg/dL Final    Calcium 03/14/2019 8.7  7.7 - 10.4 mg/dL Final    Sodium 03/14/2019 139  134 - 144 mmol/L Final    Potassium 03/14/2019 3.5  3.5 - 5.0 mmol/L Final    Chloride 03/14/2019 110  98 - 110 mmol/L Final    CO2 03/14/2019 21.2* 22.8 - 31.6 mmol/L Final    Albumin 03/14/2019 3.2  3.1 - 4.7 g/dL Final    Total Bilirubin 03/14/2019 1.0  0.3 - 1.0 mg/dL Final    Alkaline Phosphatase 03/14/2019 101  40 - 104 IU/L Final    Total Protein 03/14/2019 6.3  6.0 - 8.2 g/dL Final    ALT (SGPT) 03/14/2019 23  3 - 33 IU/L Final    AST 03/14/2019 24  10 - 40 IU/L Final    Uric Acid 03/14/2019 4.5  2.6 - 7.8 mg/dL Final    LD 03/14/2019 233* 100 - 194 IU/L Final    Magnesium 03/14/2019 2.2  1.5 - 2.6 mg/dL Final    Phosphorus 03/14/2019 2.2* 2.5 - 4.9 mg/dL Final    WBC 03/14/2019 5.9  5.0 - 10.0 K/uL Final    RBC 03/14/2019 3.42* 4.30 - 5.90 M/uL Final    Hemoglobin 03/14/2019 9.7* 14.0 - 16.0 g/dL Final    Hematocrit 03/14/2019 30.9* 39.0 - 55.0 % Final    MCV 03/14/2019 90.4  80.0 - 100.0 fL Final    MCH 03/14/2019 28.4  25.0 - 35.0 pg Final    MCHC 03/14/2019 31.4  31.0 - 36.0 g/dL Final    RDW 03/14/2019 18.3* 11.7 - 14.9 % Final    Platelets 03/14/2019 184  140 - 440 K/uL Final    MPV 03/14/2019 10.1  8.8 - 12.7 fL Final    Gran% 03/14/2019 49.9  % Final    Lymph% 03/14/2019 2.5  % Final    Mono% 03/14/2019 1.2  % Final    Eosinophil% 03/14/2019 2.0  % Final    Basophil% 03/14/2019 1.7  % Final    Gran # (ANC) 03/14/2019 3.0  1.4 - 6.5 K/uL Final    Lymph # 03/14/2019 0.2* 1.2 - 3.4 K/uL Final    Mono # 03/14/2019 0.1  0.1 - 0.6 K/uL Final    Eos # 03/14/2019 0.1  0.0 - 0.7 K/uL Final    Baso # 03/14/2019 0.1  0.0 - 0.2 K/uL Final    Immature Grans (Abs) 03/14/2019 2.5* 0.0 - 1.0 K/uL Final    Immature Granulocytes 03/14/2019 42.7  % Final    nRBC% 03/14/2019 0  % Final   Ancillary Orders on 03/08/2019   Component Date Value Ref Range  Status    WBC 03/11/2019 31.6* 5.0 - 10.0 K/uL Final    RBC 03/11/2019 3.44* 4.30 - 5.90 M/uL Final    Hemoglobin 03/11/2019 9.7* 14.0 - 16.0 g/dL Final    Hematocrit 03/11/2019 31.1* 39.0 - 55.0 % Final    MCV 03/11/2019 90.4  80.0 - 100.0 fL Final    MCH 03/11/2019 28.2  25.0 - 35.0 pg Final    MCHC 03/11/2019 31.2  31.0 - 36.0 g/dL Final    RDW 03/11/2019 18.4* 11.7 - 14.9 % Final    Platelets 03/11/2019 326  140 - 440 K/uL Final    MPV 03/11/2019 9.7  8.8 - 12.7 fL Final    Gran% 03/11/2019 91.6  % Final    Lymph% 03/11/2019 1.4  % Final    Mono% 03/11/2019 0.5  % Final    Eosinophil% 03/11/2019 0.2  % Final    Basophil% 03/11/2019 0.3  % Final    Gran # (ANC) 03/11/2019 28.9* 1.4 - 6.5 K/uL Final    Lymph # 03/11/2019 0.4* 1.2 - 3.4 K/uL Final    Mono # 03/11/2019 0.2  0.1 - 0.6 K/uL Final    Eos # 03/11/2019 0.1  0.0 - 0.7 K/uL Final    Baso # 03/11/2019 0.1  0.0 - 0.2 K/uL Final    Immature Grans (Abs) 03/11/2019 1.9* 0.0 - 1.0 K/uL Final    Immature Granulocytes 03/11/2019 6.0  % Final    nRBC% 03/11/2019 0  % Final    Glucose 03/11/2019 83  70 - 99 mg/dL Final    BUN, Bld 03/11/2019 22* 8 - 20 mg/dL Final    Creatinine 03/11/2019 1.03  0.60 - 1.40 mg/dL Final    Calcium 03/11/2019 8.8  7.7 - 10.4 mg/dL Final    Sodium 03/11/2019 140  134 - 144 mmol/L Final    Potassium 03/11/2019 3.2* 3.5 - 5.0 mmol/L Final    Chloride 03/11/2019 110  98 - 110 mmol/L Final    CO2 03/11/2019 23.8  22.8 - 31.6 mmol/L Final    Albumin 03/11/2019 3.0* 3.1 - 4.7 g/dL Final    Total Bilirubin 03/11/2019 0.8  0.3 - 1.0 mg/dL Final    Alkaline Phosphatase 03/11/2019 89  40 - 104 IU/L Final    Total Protein 03/11/2019 5.8* 6.0 - 8.2 g/dL Final    ALT (SGPT) 03/11/2019 25  3 - 33 IU/L Final    AST 03/11/2019 26  10 - 40 IU/L Final    Uric Acid 03/11/2019 3.8  2.6 - 7.8 mg/dL Final    Magnesium 03/11/2019 2.0  1.5 - 2.6 mg/dL Final    Phosphorus 03/11/2019 3.0  2.5 - 4.9 mg/dL Final    LD  03/11/2019 230* 100 - 194 IU/L Final   There may be more visits with results that are not included.         Plan:           Non-Hodgkin lymphoma of lymph nodes of neck, unspecified non-Hodgkin lymphoma type    Benign essential hypertension    Multiple-type hyperlipidemia      Patient's medical conditions and current medications noted. Some of the medications have been discontinued. Blood pressures are stable. Patient continues to feel fatigued but this seems to be stable at this point.  He is looking forward to continuation of treatment.    Follow-up as per regular scheduled appointment.    Patient has been advised to watch diet and exercise. Avoid fried and fatty food. Compliance to medications and follow up urged.    Med list reviewed and updated        Current Outpatient Medications:     acyclovir (ZOVIRAX) 400 MG tablet, 1 tablet 2 (two) times daily., Disp: , Rfl:     aspirin 81 MG Chew, Take 81 mg by mouth once daily., Disp: , Rfl:     filgrastim (NEUPOGEN) 300 mcg/0.5 mL injection, Inject 1 mcg into the skin once daily., Disp: , Rfl:     L GASSERI/B BIFIDUM/B LONGUM ([employer] HEALTH ORAL), Take by mouth., Disp: , Rfl:     MULTIVITAMIN/IRON/FOLIC ACID (CENTRUM COMPLETE ORAL), Take by mouth., Disp: , Rfl:     NYSTOP powder, MIX SMALL QUANTITY WITH GRISELDA CREAM AND APPLY TO DRY SKIN TID FOR A WEEK, Disp: 60 g, Rfl: 1    ondansetron (ZOFRAN) 8 MG tablet, 1 tablet daily as needed., Disp: , Rfl:     pantoprazole (PROTONIX) 40 MG tablet, Take 40 mg by mouth once daily., Disp: , Rfl:     potassium chloride SA (K-DUR,KLOR-CON) 10 MEQ tablet, TK 1 T PO QD, Disp: , Rfl: 3    sulfamethoxazole-trimethoprim 800-160mg (BACTRIM DS) 800-160 mg Tab, 1 tablet 3 (three) times a week., Disp: , Rfl: 0    TOPROL XL 50 mg 24 hr tablet, Take 25 mg by mouth once daily., Disp: , Rfl:     vitamin D 1000 units Tab, Take 185 mg by mouth once daily., Disp: , Rfl:     vitamin E 100 UNIT capsule, Take 100 Units by mouth  once daily., Disp: , Rfl:     Current Facility-Administered Medications:     leuprolide (6 month) SyKt 45 mg, 45 mg, Intramuscular, Q6 Months, Fauzia Maza NP, 45 mg at 03/27/19 1123

## 2019-04-22 ENCOUNTER — TELEPHONE (OUTPATIENT)
Dept: FAMILY MEDICINE | Facility: CLINIC | Age: 69
End: 2019-04-22

## 2019-04-22 ENCOUNTER — TELEPHONE (OUTPATIENT)
Dept: HEMATOLOGY/ONCOLOGY | Facility: CLINIC | Age: 69
End: 2019-04-22

## 2019-04-22 LAB
ALBUMIN SERPL-MCNC: 3.3 G/DL (ref 3.1–4.7)
ALP SERPL-CCNC: 88 IU/L (ref 40–104)
ALT (SGPT): 12 IU/L (ref 3–33)
AST SERPL-CCNC: 19 IU/L (ref 10–40)
BASOPHILS NFR BLD: 0 K/UL (ref 0–0.2)
BASOPHILS NFR BLD: 0.3 %
BILIRUB SERPL-MCNC: 0.3 MG/DL (ref 0.3–1)
BUN SERPL-MCNC: 12 MG/DL (ref 8–20)
CALCIUM SERPL-MCNC: 8.8 MG/DL (ref 7.7–10.4)
CHLORIDE: 111 MMOL/L (ref 98–110)
CO2 SERPL-SCNC: 23.5 MMOL/L (ref 22.8–31.6)
CREATININE: 1.08 MG/DL (ref 0.6–1.4)
EOSINOPHIL NFR BLD: 0 K/UL (ref 0–0.7)
EOSINOPHIL NFR BLD: 0.3 %
ERYTHROCYTE [DISTWIDTH] IN BLOOD BY AUTOMATED COUNT: 18.2 % (ref 11.7–14.9)
GLUCOSE: 98 MG/DL (ref 70–99)
GRAN #: 4.5 K/UL (ref 1.4–6.5)
GRAN%: 62.1 %
HCT VFR BLD AUTO: 17.4 % (ref 39–55)
HGB BLD-MCNC: 5.4 G/DL (ref 14–16)
IMMATURE GRANS (ABS): 0.5 K/UL (ref 0–1)
IMMATURE GRANULOCYTES: 7.5 %
LDH SERPL L TO P-CCNC: 166 IU/L (ref 100–194)
LYMPH #: 0.7 K/UL (ref 1.2–3.4)
LYMPH%: 9.6 %
MAGNESIUM SERPL-MCNC: 2.2 MG/DL (ref 1.5–2.6)
MCH RBC QN AUTO: 29.3 PG (ref 25–35)
MCHC RBC AUTO-ENTMCNC: 31 G/DL (ref 31–36)
MCV RBC AUTO: 94.6 FL (ref 80–100)
MONO #: 1.5 K/UL (ref 0.1–0.6)
MONO%: 20.2 %
NUCLEATED RBCS: 1 %
PHOSPHATE FLD-MCNC: 1.7 MG/DL (ref 2.5–4.9)
PLATELET # BLD AUTO: 15 K/UL (ref 140–440)
PMV BLD AUTO: 11 FL (ref 8.8–12.7)
POTASSIUM SERPL-SCNC: 3.7 MMOL/L (ref 3.5–5)
PROT SERPL-MCNC: 6.1 G/DL (ref 6–8.2)
RBC # BLD AUTO: 1.84 M/UL (ref 4.3–5.9)
SODIUM: 143 MMOL/L (ref 134–144)
URATE SERPL-MCNC: 2.5 MG/DL (ref 2.6–7.8)
WBC # BLD AUTO: 7.2 K/UL (ref 5–10)

## 2019-04-22 NOTE — PROGRESS NOTES
I've advised the patient to go to the ED because of extremely low platelets and significant anemia. I notified Dr. Tellez

## 2019-04-22 NOTE — TELEPHONE ENCOUNTER
----- Message from Ronyn Driscoll MD sent at 4/18/2019  6:00 PM CDT -----  Results are somewhat abnormal. Please keep regular follow up.

## 2019-04-22 NOTE — TELEPHONE ENCOUNTER
----- Message from Ronny Driscoll MD sent at 4/18/2019  4:08 PM CDT -----  Results are somewhat abnormal. Please keep regular follow up.

## 2019-04-22 NOTE — TELEPHONE ENCOUNTER
----- Message from Guerita Dumont MA sent at 3/28/2019 10:51 AM CDT -----  Chetna Ronquillo send the latest labs and scans

## 2019-04-23 NOTE — TELEPHONE ENCOUNTER
Patient is in hospital     ----- Message from Fidencio Rdz MD sent at 4/23/2019  7:19 AM CDT -----  See what Dr Basilio wants us to do          ----- Message -----  From: Kevin Proctor LPN  Sent: 4/22/2019   3:04 PM  To: Fidencio Rdz MD    Lab called Platelets 15, Hematocrit 17.4, hemoglobin 5.4

## 2019-04-24 ENCOUNTER — TELEPHONE (OUTPATIENT)
Dept: HEMATOLOGY/ONCOLOGY | Facility: CLINIC | Age: 69
End: 2019-04-24

## 2019-04-24 NOTE — TELEPHONE ENCOUNTER
Pt wife called as the pt was being d/c from the hospital. She was under the impression that we had the pt admitted under observation while in the hospital, I let her know that Dr. Driscoll was the admitting dr so he would have to be the dr to change the hospital stay.  LUIS

## 2019-04-29 LAB
ALBUMIN SERPL-MCNC: 3.5 G/DL (ref 3.1–4.7)
ALP SERPL-CCNC: 79 IU/L (ref 40–104)
ALT (SGPT): 15 IU/L (ref 3–33)
AST SERPL-CCNC: 22 IU/L (ref 10–40)
BASOPHILS NFR BLD: 0.1 K/UL (ref 0–0.2)
BASOPHILS NFR BLD: 1.2 %
BILIRUB SERPL-MCNC: 0.7 MG/DL (ref 0.3–1)
BUN SERPL-MCNC: 17 MG/DL (ref 8–20)
CALCIUM SERPL-MCNC: 8.7 MG/DL (ref 7.7–10.4)
CHLORIDE: 106 MMOL/L (ref 98–110)
CO2 SERPL-SCNC: 21.9 MMOL/L (ref 22.8–31.6)
CREATININE: 1.13 MG/DL (ref 0.6–1.4)
EOSINOPHIL NFR BLD: 0 K/UL (ref 0–0.7)
EOSINOPHIL NFR BLD: 0.1 %
ERYTHROCYTE [DISTWIDTH] IN BLOOD BY AUTOMATED COUNT: 18.1 % (ref 11.7–14.9)
GLUCOSE: 97 MG/DL (ref 70–99)
GRAN #: 5.3 K/UL (ref 1.4–6.5)
GRAN%: 71.5 %
HCT VFR BLD AUTO: 30.3 % (ref 39–55)
HGB BLD-MCNC: 9.6 G/DL (ref 14–16)
IMMATURE GRANS (ABS): 0 K/UL (ref 0–1)
IMMATURE GRANULOCYTES: 0.5 %
LDH SERPL L TO P-CCNC: 188 IU/L (ref 100–194)
LYMPH #: 0.7 K/UL (ref 1.2–3.4)
LYMPH%: 9 %
MAGNESIUM SERPL-MCNC: 2.1 MG/DL (ref 1.5–2.6)
MCH RBC QN AUTO: 28.4 PG (ref 25–35)
MCHC RBC AUTO-ENTMCNC: 31.7 G/DL (ref 31–36)
MCV RBC AUTO: 89.6 FL (ref 80–100)
MONO #: 1.3 K/UL (ref 0.1–0.6)
MONO%: 17.7 %
NUCLEATED RBCS: 0 %
PHOSPHATE FLD-MCNC: 2.8 MG/DL (ref 2.5–4.9)
PLATELET # BLD AUTO: 228 K/UL (ref 140–440)
PMV BLD AUTO: 9.3 FL (ref 8.8–12.7)
POTASSIUM SERPL-SCNC: 4.3 MMOL/L (ref 3.5–5)
PROT SERPL-MCNC: 6.1 G/DL (ref 6–8.2)
RBC # BLD AUTO: 3.38 M/UL (ref 4.3–5.9)
SODIUM: 136 MMOL/L (ref 134–144)
URATE SERPL-MCNC: 3.4 MG/DL (ref 2.6–7.8)
WBC # BLD AUTO: 7.5 K/UL (ref 5–10)

## 2019-04-30 ENCOUNTER — TELEPHONE (OUTPATIENT)
Dept: FAMILY MEDICINE | Facility: CLINIC | Age: 69
End: 2019-04-30

## 2019-04-30 NOTE — TELEPHONE ENCOUNTER
----- Message from Ronny Driscoll MD sent at 4/29/2019  5:14 PM CDT -----  Notify patient that his hemoglobin is better at 9. Previous hemoglobin was less than 6.

## 2019-05-02 LAB
ALBUMIN SERPL-MCNC: 3.7 G/DL (ref 3.1–4.7)
ALP SERPL-CCNC: 83 IU/L (ref 40–104)
ALT (SGPT): 15 IU/L (ref 3–33)
AST SERPL-CCNC: 21 IU/L (ref 10–40)
BASOPHILS NFR BLD: 0.1 K/UL (ref 0–0.2)
BASOPHILS NFR BLD: 1.4 %
BILIRUB SERPL-MCNC: 0.8 MG/DL (ref 0.3–1)
BUN SERPL-MCNC: 14 MG/DL (ref 8–20)
CALCIUM SERPL-MCNC: 8.9 MG/DL (ref 7.7–10.4)
CHLORIDE: 107 MMOL/L (ref 98–110)
CO2 SERPL-SCNC: 25.5 MMOL/L (ref 22.8–31.6)
CREATININE: 1.09 MG/DL (ref 0.6–1.4)
EOSINOPHIL NFR BLD: 0 K/UL (ref 0–0.7)
EOSINOPHIL NFR BLD: 0.4 %
ERYTHROCYTE [DISTWIDTH] IN BLOOD BY AUTOMATED COUNT: 18.6 % (ref 11.7–14.9)
GLUCOSE: 98 MG/DL (ref 70–99)
GRAN #: 5.8 K/UL (ref 1.4–6.5)
GRAN%: 74.1 %
HCT VFR BLD AUTO: 33.4 % (ref 39–55)
HGB BLD-MCNC: 10.1 G/DL (ref 14–16)
IMMATURE GRANS (ABS): 0 K/UL (ref 0–1)
IMMATURE GRANULOCYTES: 0.4 %
LDH SERPL L TO P-CCNC: 187 IU/L (ref 100–194)
LYMPH #: 0.6 K/UL (ref 1.2–3.4)
LYMPH%: 7.3 %
MAGNESIUM SERPL-MCNC: 2.3 MG/DL (ref 1.5–2.6)
MCH RBC QN AUTO: 28.5 PG (ref 25–35)
MCHC RBC AUTO-ENTMCNC: 30.2 G/DL (ref 31–36)
MCV RBC AUTO: 94.4 FL (ref 80–100)
MONO #: 1.3 K/UL (ref 0.1–0.6)
MONO%: 16.4 %
NUCLEATED RBCS: 0 %
PHOSPHATE FLD-MCNC: 2.2 MG/DL (ref 2.5–4.9)
PLATELET # BLD AUTO: 326 K/UL (ref 140–440)
PMV BLD AUTO: 9.1 FL (ref 8.8–12.7)
POTASSIUM SERPL-SCNC: 4 MMOL/L (ref 3.5–5)
PROT SERPL-MCNC: 6.4 G/DL (ref 6–8.2)
RBC # BLD AUTO: 3.54 M/UL (ref 4.3–5.9)
SODIUM: 140 MMOL/L (ref 134–144)
URATE SERPL-MCNC: 3.3 MG/DL (ref 2.6–7.8)
WBC # BLD AUTO: 7.8 K/UL (ref 5–10)

## 2019-05-15 ENCOUNTER — TELEPHONE (OUTPATIENT)
Dept: FAMILY MEDICINE | Facility: CLINIC | Age: 69
End: 2019-05-15

## 2019-05-15 NOTE — TELEPHONE ENCOUNTER
Patient was discharged from [facility] on 04/24/2019. Please schedule f/u ov with patient by 05/24/2019. We need to document discharged medicines with current medicine list and bill the 1111F code on claim. Patient needs to be scheduled within 14 days of discharge from hospital.    *YOU CAN SCHEDULE HOSPITAL F/U WITH A NURSE PRACTITIONER    Please send me back date of scheduled appointment. Thank you!

## 2019-05-21 ENCOUNTER — TELEPHONE (OUTPATIENT)
Dept: HEMATOLOGY/ONCOLOGY | Facility: CLINIC | Age: 69
End: 2019-05-21

## 2019-05-21 DIAGNOSIS — C85.91 NON-HODGKIN LYMPHOMA OF LYMPH NODES OF NECK, UNSPECIFIED NON-HODGKIN LYMPHOMA TYPE: ICD-10-CM

## 2019-05-21 DIAGNOSIS — C83.31 DIFFUSE LARGE B-CELL LYMPHOMA OF LYMPH NODES OF NECK: Primary | ICD-10-CM

## 2019-05-21 DIAGNOSIS — C61 MALIGNANT NEOPLASM OF PROSTATE: ICD-10-CM

## 2019-05-21 NOTE — TELEPHONE ENCOUNTER
Called  Mr cheema told him to start 2 times a week labs since he has c -diff that we will wait itll next week to start

## 2019-05-27 ENCOUNTER — SOCIAL WORK (OUTPATIENT)
Dept: HEMATOLOGY/ONCOLOGY | Facility: CLINIC | Age: 69
End: 2019-05-27

## 2019-05-27 ENCOUNTER — OUTSIDE PLACE OF SERVICE (OUTPATIENT)
Dept: PULMONOLOGY | Facility: CLINIC | Age: 69
End: 2019-05-27
Payer: MEDICARE

## 2019-05-27 ENCOUNTER — OUTSIDE PLACE OF SERVICE (OUTPATIENT)
Dept: PULMONOLOGY | Facility: CLINIC | Age: 69
End: 2019-05-27

## 2019-05-27 PROCEDURE — 99223 PR INITIAL HOSPITAL CARE,LEVL III: ICD-10-PCS | Mod: ,,, | Performed by: INTERNAL MEDICINE

## 2019-05-27 PROCEDURE — 99223 1ST HOSP IP/OBS HIGH 75: CPT | Mod: ,,, | Performed by: INTERNAL MEDICINE

## 2019-05-28 ENCOUNTER — TELEPHONE (OUTPATIENT)
Dept: HEMATOLOGY/ONCOLOGY | Facility: CLINIC | Age: 69
End: 2019-05-28

## 2019-05-28 NOTE — TELEPHONE ENCOUNTER
Kendell had me call the floor to ask the nurse magdalena if Mr. cheema was getting blood today she said she just gave him his first unit and going to give his second of blood

## 2019-05-29 NOTE — PROGRESS NOTES
I received a consult stating patient was admitted on 5/27/19 for shortness of breath.  I visited patient, he was in good spirits.  He did not request any supportive services at the time of the visit.

## 2019-06-03 ENCOUNTER — OFFICE VISIT (OUTPATIENT)
Dept: FAMILY MEDICINE | Facility: CLINIC | Age: 69
End: 2019-06-03
Payer: MEDICARE

## 2019-06-03 VITALS
HEIGHT: 68 IN | BODY MASS INDEX: 32.74 KG/M2 | RESPIRATION RATE: 18 BRPM | HEART RATE: 83 BPM | DIASTOLIC BLOOD PRESSURE: 77 MMHG | WEIGHT: 216 LBS | SYSTOLIC BLOOD PRESSURE: 135 MMHG

## 2019-06-03 DIAGNOSIS — C85.91 NON-HODGKIN LYMPHOMA OF LYMPH NODES OF NECK, UNSPECIFIED NON-HODGKIN LYMPHOMA TYPE: ICD-10-CM

## 2019-06-03 DIAGNOSIS — I10 BENIGN ESSENTIAL HYPERTENSION: Primary | ICD-10-CM

## 2019-06-03 DIAGNOSIS — E78.2 MULTIPLE-TYPE HYPERLIPIDEMIA: ICD-10-CM

## 2019-06-03 DIAGNOSIS — D63.0 ANEMIA COMPLICATING NEOPLASTIC DISEASE: ICD-10-CM

## 2019-06-03 DIAGNOSIS — R06.02 SHORTNESS OF BREATH: ICD-10-CM

## 2019-06-03 PROCEDURE — 99214 PR OFFICE/OUTPT VISIT, EST, LEVL IV, 30-39 MIN: ICD-10-PCS | Mod: ,,, | Performed by: INTERNAL MEDICINE

## 2019-06-03 PROCEDURE — 3075F SYST BP GE 130 - 139MM HG: CPT | Mod: ,,, | Performed by: INTERNAL MEDICINE

## 2019-06-03 PROCEDURE — 1101F PR PT FALLS ASSESS DOC 0-1 FALLS W/OUT INJ PAST YR: ICD-10-PCS | Mod: ,,, | Performed by: INTERNAL MEDICINE

## 2019-06-03 PROCEDURE — 99214 OFFICE O/P EST MOD 30 MIN: CPT | Mod: ,,, | Performed by: INTERNAL MEDICINE

## 2019-06-03 PROCEDURE — 3078F PR MOST RECENT DIASTOLIC BLOOD PRESSURE < 80 MM HG: ICD-10-PCS | Mod: ,,, | Performed by: INTERNAL MEDICINE

## 2019-06-03 PROCEDURE — 3075F PR MOST RECENT SYSTOLIC BLOOD PRESS GE 130-139MM HG: ICD-10-PCS | Mod: ,,, | Performed by: INTERNAL MEDICINE

## 2019-06-03 PROCEDURE — 3078F DIAST BP <80 MM HG: CPT | Mod: ,,, | Performed by: INTERNAL MEDICINE

## 2019-06-03 PROCEDURE — 1101F PT FALLS ASSESS-DOCD LE1/YR: CPT | Mod: ,,, | Performed by: INTERNAL MEDICINE

## 2019-06-03 RX ORDER — FUROSEMIDE 20 MG/1
1 TABLET ORAL DAILY
Refills: 3 | COMMUNITY
Start: 2019-05-02 | End: 2019-10-09

## 2019-06-03 RX ORDER — SODIUM PHOSPHATE, DIBASIC, ANHYDROUS, POTASSIUM PHOSPHATE, MONOBASIC, AND SODIUM PHOSPHATE, MONOBASIC, MONOHYDRATE 852; 155; 130 MG/1; MG/1; MG/1
1 TABLET, COATED ORAL 4 TIMES DAILY
Refills: 0 | Status: ON HOLD | COMMUNITY
Start: 2019-05-16 | End: 2020-01-01 | Stop reason: HOSPADM

## 2019-06-03 RX ORDER — OMEPRAZOLE 20 MG/1
20 CAPSULE, DELAYED RELEASE ORAL DAILY
COMMUNITY
End: 2020-01-01

## 2019-06-03 NOTE — PATIENT INSTRUCTIONS
Taking Your Blood Pressure  Blood pressure is the force of blood against the artery wall as it moves from the heart through the blood vessels. You can take your own blood pressure reading using a digital monitor. Take your readings the same each time, using the same arm. Take readings as often as your healthcare provider instructs.  About blood pressure monitors  Blood pressure monitors are designed for certain ages and cases. You can find monitors for older adults, for pregnant women, and for children. Make sure the one you choose is the right one for your age and situation.  The American Heart Association recommends an automatic cuff monitor that fits on your upper arm (bicep). The cuff should fit your arm size. A cuff thats too large or too small will not give an accurate reading. Measure around your upper arm to find your size.  Monitors that attach to your finger or wrist are not as accurate as monitors for your upper arm.  Ask your healthcare provider for help in choosing a monitor. Bring your monitor to your next provider visit if you need help in using it the correct way.  The steps below are general instructions for using an automatic digital monitor.  Step 1. Relax    · Take your blood pressure at the same time every day, such as in the morning or evening, or at the time your healthcare provider recommends.  · Wait at least a half-hour after smoking, eating, or exercising. Don't drink coffee, tea, soda, or other caffeinated beverages before checking your blood pressure.  · Sit comfortably at a table with both feet on the floor. Do not cross your legs or feet. Place the monitor near you.  · Rest for a few minutes before you begin.  Step 2. Wrap the cuff    · Place your arm on the table, palm up. Your arm should be at the level of your heart. Wrap the cuff around your upper arm, just above your elbow. Its best done on bare skin, not over clothing. Most cuffs will indicate where the brachial artery (the  blood vessel in the middle of the arm at the inner side of the elbow) should line up with the cuff. Look in your monitor's instruction booklet for an illustration. You can also bring your cuff to your healthcare provider and have them show you how to correctly place the cuff.  Step 3. Inflate the cuff    · Push the button that starts the pump.  · The cuff will tighten, then loosen.  · The numbers will change. When they stop changing, your blood pressure reading will appear.  · Take 2 or 3 readings one minute apart.  Step 4. Write down the results of each reading    · Write down your blood pressure numbers for each reading. Note the date and time. Keep your results in one place, such as a notebook. Even if your monitor has a built-in memory, keep a hard copy of the readings.  · Remove the cuff from your arm. Turn off the machine.  · Bring your blood pressure records with your healthcare providers at each visit.  · If you start a new blood pressure medicine, note the day you started the new medicine. Also note the day if you change the dose of your medicine. This information goes on your blood pressure recording sheet. This will help your healthcare provider monitor how well the medicine changes are working.  · Ask your healthcare provider what numbers should prompt you to call him or her. Also ask what numbers should prompt you to get help right away.  Date Last Reviewed: 11/1/2016  © 6296-1568 [employer]. [address] [place] [address], [place] [address]. All rights reserved. This information is not intended as a substitute for professional medical care. Always follow your healthcare professional's instructions.

## 2019-06-03 NOTE — PROGRESS NOTES
Subjective:       Patient ID: Naresh Painting is a 69 y.o. male.    Chief Complaint: Hypertension; Hyperlipidemia; and Hospital Follow Up    Mr. Naresh Steinberg is a 69-year-old -American male who comes for follow-up. Underlying medical issues of hypertension, dyslipidemia, gastroesophageal reflux and a 2 year old diagnosis of non-Hodgkin's lymphoma has been noted. He is status post chemotherapy with failure ( R- CHOP) nd now going through another chemotherapy for consideration of a bone marrow transplant. HD-MTX introduction followed by intrathecal methotrexate in past.    Interim complications have included frequent hospitalizations for shortness of breath and bloody pleural effusion which had needed to be tapped. He also had significant weight loss in the interim but slowly and steadily he is beginning back. He also had significant anemia which needed blood transfusion.    Blood pressures are generally stable at this point. Reflux symptoms are stable. He takes diuretics for edema in the legs.    Since multiple physicians are involved in his care, he and his wife seem to be confused about whose labs need to be followed. He has orders from me/Dr. Basilio, Dr. Amezquita as well as Dr. Santiago. I have  advised him to review/discuss with Dr Basilio also. They should notify all the physicians concerned avoid duplication.    In the interim, his well-meaning sister had brought him a huge complement and suite of vitamins and nutraceuticals. I've reviewed them and I think most of them are redundant with questionable benefits. ( please see pic)    He also plans to go weekly vegan and he asks me the benefit of going on such a diet given his underlying cancer. I've advised him though that I personally believe that vegan diet which is balanced and appropriate may have long-term cardiovascular benefits from several anecdotal reports-however, I'm not sure how he would be able to follow-up on that with an active catabolic state  going on at this point.    In the interim, he has seen Dr. Pamela M.D. cardiology who also was ordered another set of labs.    Hypertension   This is a chronic problem. The current episode started more than 1 year ago. The problem is controlled. Associated symptoms include malaise/fatigue. Pertinent negatives include no chest pain, headaches, neck pain, palpitations or shortness of breath. Risk factors for coronary artery disease include sedentary lifestyle, obesity, male gender and dyslipidemia. Past treatments include diuretics. The current treatment provides moderate improvement. Compliance problems include psychosocial issues and medication cost.  There is no history of pheochromocytoma or renovascular disease.   Gastroesophageal Reflux   He complains of heartburn. He reports no abdominal pain, no chest pain, no coughing or no nausea. This is a chronic problem. The current episode started more than 1 year ago. The problem has been gradually improving. Associated symptoms include fatigue. He has tried a PPI for the symptoms. The treatment provided moderate relief.   Anemia   Presents for follow-up visit. Symptoms include light-headedness, malaise/fatigue and pallor. There has been no abdominal pain, bruising/bleeding easily, confusion, fever or palpitations. Compliance with medications is 51-75%.       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     Social History     Socioeconomic History    Marital status:      Spouse name: Amita Painting    Number of children: 2     Years of education: Not on file    Highest education level: Not on file   Occupational History    Occupation: Retired      Employer: [employer]     Comment: [employer] Dept- 3 yrs    Occupation:      Employer: [employer]     Comment: 26 yrs    Occupation: Reproduction     Employer: [employer]     Comment: [employer]   Social Needs    Financial resource strain: Not on file    Food insecurity:     Worry: Not on file     Inability: Not on file    Transportation needs:     Medical: Not on file     Non-medical: Not on file   Tobacco Use    Smoking status: Former Smoker    Smokeless tobacco: Never Used    Tobacco comment: quit 1995   Substance and Sexual Activity    Alcohol use: Yes    Drug use: No    Sexual activity: Not Currently     Partners: Female   Lifestyle    Physical activity:     Days per week: Not on file     Minutes per session: Not on file    Stress: To some extent   Relationships    Social connections:     Talks on phone: Not on file     Gets together: Not on file     Attends Hoahaoism service: Not on file     Active member of club or organization: Not on file     Attends meetings of clubs or organizations: Not on file     Relationship status: Not on file   Other Topics Concern    Not on file   Social History Narrative    One daughter name is Meagan. Son's name is Alfie     Past Surgical History:   Procedure Laterality Date    EYE SURGERY      LYMPH NODE BIOPSY      PROCTECTOMY      PROSTATE SURGERY      UMBILICAL HERNIA REPAIR  04/05/2018    Incarcerated-      Family History   Problem Relation Age of Onset    Heart disease Mother     Diabetes Father        Review of Systems   Constitutional: Positive for activity change (slowed down), fatigue and malaise/fatigue. Negative for appetite change, fever and unexpected weight change (Lost 4 more lbs).   HENT: Negative for congestion, facial swelling, nosebleeds and sneezing.    Eyes: Negative  "for pain, discharge and visual disturbance.        Left eye Glass eye prosthesis   Respiratory: Positive for apnea (obstructive sleep apnea). Negative for cough, chest tightness and shortness of breath.    Cardiovascular: Negative for chest pain, palpitations and leg swelling.        H/O Left upper extremity deep vein thrombosis. Treated with anticoagulation   Gastrointestinal: Positive for heartburn. Negative for abdominal distention, abdominal pain, blood in stool, constipation, diarrhea and nausea.        Anorexia   Endocrine: Negative for cold intolerance, heat intolerance, polydipsia, polyphagia and polyuria.   Genitourinary: Negative for dysuria, hematuria and scrotal swelling.   Musculoskeletal: Negative for arthralgias, back pain, gait problem and neck pain.   Skin: Positive for pallor. Negative for color change, rash and wound.        Patient's has lumps in the neck which have now been diagnosed to be lymphoma.   Allergic/Immunologic: Negative for environmental allergies, food allergies and immunocompromised state.   Neurological: Positive for light-headedness. Negative for dizziness, seizures, speech difficulty, numbness and headaches.   Hematological: Positive for adenopathy. Does not bruise/bleed easily.        Diagnosis of NHL under chemotherapy. Significant reduction in the size of lymphadenopathy.   Psychiatric/Behavioral: Negative for agitation, behavioral problems and confusion. The patient is not nervous/anxious.          Objective:      Blood pressure 135/77, pulse 83, height 5' 8" (1.727 m), weight 98 kg (216 lb). Body mass index is 32.84 kg/m².  Physical Exam   Constitutional: He appears well-developed. No distress.   BMI is 32-somewhat chronically sick   HENT:   Head: Normocephalic and atraumatic.   Mouth/Throat: No oropharyngeal exudate.   Eyes: Conjunctivae and EOM are normal. Right eye exhibits no discharge and no exudate. Right conjunctiva is not injected. Right conjunctiva has no " hemorrhage.   Left eye is prosthetic.   Neck: Normal range of motion. Neck supple. No JVD present. No neck rigidity. Normal range of motion present.   The lymph nodes on right and left side have dramatically shrunken now. A small lymph node is felt on the right side which is firm, nontender and adherent to the underlying structures.   Cardiovascular: Normal rate and regular rhythm. Exam reveals no friction rub.   No murmur heard.  Pulmonary/Chest: Effort normal. No respiratory distress. He has decreased breath sounds in the right middle field and the right lower field. He has no wheezes.   Bilateral diminished air entry towards the bases   Abdominal: Soft. Bowel sounds are normal. He exhibits distension.   Musculoskeletal: He exhibits edema (1 +). He exhibits no tenderness or deformity.   Examination of the neck does not reveal any deformity.   Lymphadenopathy:     He has cervical adenopathy.        Right cervical: Deep cervical adenopathy present.     He has no axillary adenopathy.   Right-sided cervical lymphadenopathy. No left-sided lymphadenopathy. No axillary lymphadenopathy.   Neurological: He is alert. He has normal reflexes.   Skin: Skin is warm and dry. There is pallor.   Psychiatric: His mood appears anxious. Thought content is not delusional.   Nursing note and vitals reviewed.        Assessment:       1. Benign essential hypertension    2. Multiple-type hyperlipidemia    3. Non-Hodgkin lymphoma of lymph nodes of neck, unspecified non-Hodgkin lymphoma type    4. Shortness of breath    5. Anemia complicating neoplastic disease         Component      Latest Ref Rng & Units 5/30/2019   MCHC      31.0 - 36.0 g/dL 33.9   RDW      11.7 - 14.9 % 15.9 (H)   Platelets      140 - 440 K/uL 40 (LL)   MPV      8.8 - 12.7 fL 11.8   Gran%      % 68.0   Lymph%      % 9.1   Mono%      % 21.1   Eosinophil%      % 0.2   Basophil%      % 0.2   Gran # (ANC)      1.4 - 6.5 K/uL 3.5   Lymph #      1.2 - 3.4 K/uL 0.5 (L)   Mono  #      0.1 - 0.6 K/uL 1.1 (H)   Eos #      0.0 - 0.7 K/uL 0.0   Baso #      0.0 - 0.2 K/uL 0.0   Immature Grans (Abs)      0.0 - 1.0 K/uL 0.1   Immature Granulocytes      % 1.4   nRBC%      % 0   Glucose      70 - 99 mg/dL 100 (H)   BUN, Bld      8 - 20 mg/dL 20   Creatinine      0.60 - 1.40 mg/dL 1.40   Calcium      7.7 - 10.4 mg/dL 8.4   Sodium      134 - 144 mmol/L 139   Potassium      3.5 - 5.0 mmol/L 4.1   Chloride      98 - 110 mmol/L 111 (H)   CO2      22.8 - 31.6 mmol/L 22.0 (L)   Albumin      3.1 - 4.7 g/dL 3.3   BILIRUBIN TOTAL      0.3 - 1.0 mg/dL 0.6   Alkaline Phosphatase      40 - 104 IU/L 72   PROTEIN TOTAL      6.0 - 8.2 g/dL 5.8 (L)   ALT (SGPT)      3 - 33 IU/L 18   AST      10 - 40 IU/L 23               Plan:           Benign essential hypertension    Multiple-type hyperlipidemia    Non-Hodgkin lymphoma of lymph nodes of neck, unspecified non-Hodgkin lymphoma type    Shortness of breath    Anemia complicating neoplastic disease    Patient's blood pressures are stable at this point. He is currently not on any statin medications. Recently he has been tolerating therapy for non-Hodgkin's lymphoma fairly okay. Shortness of breath is better at this point. I reviewed his multivitamins and most of them are not recommended except perhaps a general multivitamin plus vitamin D and B-12. Again I do not have any labs as a baseline.    Reviewed Multiple medications.     I am ok with vegan diet- but difficult to comply    As for his multiple orders from multiple providers are concerned, I will try to streamline. Again explained my role as a primary care physician but I cannot be his oncologist and try to address all the issues concerning complications from chemotherapy or likewise.      Spent more than 25 minutes with patient which involved review of his medical conditions, labs, medications and with 50% of time face-to-face discussion about medical problems, management and any applicable changes.    Current  Outpatient Medications:     acyclovir (ZOVIRAX) 400 MG tablet, 1 tablet 2 (two) times daily., Disp: , Rfl:     aspirin 81 MG Chew, Take 81 mg by mouth once daily., Disp: , Rfl:     furosemide (LASIX) 20 MG tablet, 1 tablet once daily., Disp: , Rfl: 3    K-PHOS-NEUTRAL 250 mg tablet, 1 tablet 4 (four) times daily., Disp: , Rfl: 0    L GASSERI/B BIFIDUM/B LONGUM ([facility] HEALTH ORAL), Take by mouth., Disp: , Rfl:     MULTIVITAMIN/IRON/FOLIC ACID (CENTRUM COMPLETE ORAL), Take by mouth., Disp: , Rfl:     omeprazole (PRILOSEC) 20 MG capsule, Take 20 mg by mouth once daily., Disp: , Rfl:     potassium chloride SA (K-DUR,KLOR-CON) 10 MEQ tablet, Take 1 tablet (10 mEq total) by mouth 2 (two) times daily., Disp: 180 tablet, Rfl: 3    sulfamethoxazole-trimethoprim 800-160mg (BACTRIM DS) 800-160 mg Tab, 1 tablet 3 (three) times a week., Disp: , Rfl: 0    TOPROL XL 50 mg 24 hr tablet, Take 25 mg by mouth once daily., Disp: , Rfl:     vitamin D 1000 units Tab, Take 185 mg by mouth once daily., Disp: , Rfl:     vitamin E 100 UNIT capsule, Take 100 Units by mouth once daily., Disp: , Rfl:     Current Facility-Administered Medications:     leuprolide (6 month) SyKt 45 mg, 45 mg, Intramuscular, Q6 Months, Fauzia Maza NP, 45 mg at 03/27/19 1124

## 2019-06-03 NOTE — LETTER
Monica 3, 2019        Aman Basilio MD  [address]  [id number]  [facility] [address]             [facility]  [address] [place] [id number]  Phone: [phone]  Fax: [phone]   Patient: Naresh Painting   MR Number: [id number]   YOB: 1950   Date of Visit: 6/3/2019     Dear Dr. Basilio,     I had the opportunity to follow up on Naresh Steinberg.    I would appreciate also receiving copies of your consultations sent to me to review. (Besides Dr. Amezquita)    He is on standing orders for labs at least twice a week as per your recommendations and let me know if any changes need to be made or frequency needs to be reduced.    Patient seems to be somewhat overwhelmed with multiple providers probably ordering same set of labs.    Kind regards    Sincerely,      Ronny Driscoll MD            CC  No Recipients    Enclosure

## 2019-06-11 ENCOUNTER — OFFICE VISIT (OUTPATIENT)
Dept: HEMATOLOGY/ONCOLOGY | Facility: CLINIC | Age: 69
End: 2019-06-11
Payer: MEDICARE

## 2019-06-11 VITALS
SYSTOLIC BLOOD PRESSURE: 102 MMHG | RESPIRATION RATE: 20 BRPM | TEMPERATURE: 98 F | DIASTOLIC BLOOD PRESSURE: 60 MMHG | BODY MASS INDEX: 32.49 KG/M2 | HEART RATE: 78 BPM | WEIGHT: 213.69 LBS

## 2019-06-11 DIAGNOSIS — C85.91 NON-HODGKIN LYMPHOMA OF LYMPH NODES OF NECK, UNSPECIFIED NON-HODGKIN LYMPHOMA TYPE: ICD-10-CM

## 2019-06-11 DIAGNOSIS — C61 MALIGNANT NEOPLASM OF PROSTATE: ICD-10-CM

## 2019-06-11 DIAGNOSIS — R59.0 CERVICAL LYMPHADENOPATHY: ICD-10-CM

## 2019-06-11 DIAGNOSIS — D70.1 CHEMOTHERAPY-INDUCED NEUTROPENIA: ICD-10-CM

## 2019-06-11 DIAGNOSIS — D63.0 ANEMIA COMPLICATING NEOPLASTIC DISEASE: ICD-10-CM

## 2019-06-11 DIAGNOSIS — I82.B12 THROMBOSIS OF LEFT SUBCLAVIAN VEIN: ICD-10-CM

## 2019-06-11 DIAGNOSIS — Z94.81 S/P BONE MARROW TRANSPLANT: ICD-10-CM

## 2019-06-11 DIAGNOSIS — J90 PLEURAL EFFUSION ON RIGHT: ICD-10-CM

## 2019-06-11 DIAGNOSIS — Z90.79 HISTORY OF PROSTATECTOMY: ICD-10-CM

## 2019-06-11 DIAGNOSIS — C83.31 DIFFUSE LARGE B-CELL LYMPHOMA OF LYMPH NODES OF NECK: Primary | ICD-10-CM

## 2019-06-11 DIAGNOSIS — T45.1X5A CHEMOTHERAPY-INDUCED NEUTROPENIA: ICD-10-CM

## 2019-06-11 PROCEDURE — 1101F PT FALLS ASSESS-DOCD LE1/YR: CPT | Mod: ,,, | Performed by: INTERNAL MEDICINE

## 2019-06-11 PROCEDURE — 1101F PR PT FALLS ASSESS DOC 0-1 FALLS W/OUT INJ PAST YR: ICD-10-PCS | Mod: ,,, | Performed by: INTERNAL MEDICINE

## 2019-06-11 PROCEDURE — 99214 PR OFFICE/OUTPT VISIT, EST, LEVL IV, 30-39 MIN: ICD-10-PCS | Mod: ,,, | Performed by: INTERNAL MEDICINE

## 2019-06-11 PROCEDURE — 3074F SYST BP LT 130 MM HG: CPT | Mod: ,,, | Performed by: INTERNAL MEDICINE

## 2019-06-11 PROCEDURE — 3074F PR MOST RECENT SYSTOLIC BLOOD PRESSURE < 130 MM HG: ICD-10-PCS | Mod: ,,, | Performed by: INTERNAL MEDICINE

## 2019-06-11 PROCEDURE — 3078F DIAST BP <80 MM HG: CPT | Mod: ,,, | Performed by: INTERNAL MEDICINE

## 2019-06-11 PROCEDURE — 3078F PR MOST RECENT DIASTOLIC BLOOD PRESSURE < 80 MM HG: ICD-10-PCS | Mod: ,,, | Performed by: INTERNAL MEDICINE

## 2019-06-11 PROCEDURE — 99214 OFFICE O/P EST MOD 30 MIN: CPT | Mod: ,,, | Performed by: INTERNAL MEDICINE

## 2019-06-11 RX ORDER — PANTOPRAZOLE SODIUM 20 MG/1
20 TABLET, DELAYED RELEASE ORAL DAILY
COMMUNITY
End: 2019-10-09

## 2019-06-11 RX ORDER — ONDANSETRON HYDROCHLORIDE 8 MG/1
TABLET, FILM COATED ORAL EVERY 8 HOURS PRN
COMMUNITY
End: 2020-01-01

## 2019-06-11 NOTE — PROGRESS NOTES
[facility] Hematology/Oncology  PROGRESS NOTE      Subjective:       Patient ID:   NAME: Naresh Painting : 1950     69 y.o. male    Referring Doc: Yamila  Other Physicians: Adriano Michaud, Pamela, Chetna, Chloe; Link ()    Chief Complaint:  NHL f/u    History of Present Illness:     Patient returns today for a regularly scheduled follow-up visit.  The patient is here with his wife.  No CP, SOB, HA's or N/V. He was recently hospitalized at [facility] with low blood counts and required blood transfusion. He saw Dr Basilio yesterday at [facility] and claritza some labs. The latest PET at [facility] was ok per patient. He is having stem cell collection on . He may require XRT to the spot that is inactive in the abdomen.       ROS:   GEN: normal without any fever, night sweats or weight loss  HEENT: normal with no HA's, sore throat, stiff neck, changes in vision  CV: no CP, CP, COOPER  PULM: normal with no SOB, cough, hemoptysis, sputum or pleuritic pain  GI: normal with no abdominal pain, nausea, vomiting, , diarrhea, melanotic stools, BRBPR, or hematemesis;  : normal with no hematuria, dysuria  BREAST: normal with no mass, discharge, pain  SKIN: prior rash resolved    Allergies:  Review of patient's allergies indicates:  No Known Allergies    Medications:    Current Outpatient Medications:     aspirin 81 MG Chew, Take 81 mg by mouth once daily., Disp: , Rfl:     furosemide (LASIX) 20 MG tablet, 1 tablet once daily., Disp: , Rfl: 3    K-PHOS-NEUTRAL 250 mg tablet, 1 tablet 4 (four) times daily., Disp: , Rfl: 0    MULTIVITAMIN/IRON/FOLIC ACID (CENTRUM COMPLETE ORAL), Take by mouth., Disp: , Rfl:     omeprazole (PRILOSEC) 20 MG capsule, Take 20 mg by mouth once daily., Disp: , Rfl:     ondansetron (ZOFRAN) 8 MG tablet, Take by mouth every 8 (eight) hours as needed for Nausea., Disp: , Rfl:     pantoprazole (PROTONIX) 20 MG tablet, Take 20 mg by mouth once daily., Disp: , Rfl:     potassium chloride SA (K-DUR,KLOR-CON)  10 MEQ tablet, Take 1 tablet (10 mEq total) by mouth 2 (two) times daily., Disp: 180 tablet, Rfl: 3    sulfamethoxazole-trimethoprim 800-160mg (BACTRIM DS) 800-160 mg Tab, 1 tablet 3 (three) times a week., Disp: , Rfl: 0    TOPROL XL 50 mg 24 hr tablet, Take 25 mg by mouth once daily., Disp: , Rfl:     vitamin D 1000 units Tab, Take 185 mg by mouth once daily., Disp: , Rfl:     vitamin E 100 UNIT capsule, Take 100 Units by mouth once daily., Disp: , Rfl:     L GASSERI/B BIFIDUM/B LONGUM ([employer] ORAL), Take by mouth., Disp: , Rfl:     Current Facility-Administered Medications:     leuprolide (6 month) SyKt 45 mg, 45 mg, Intramuscular, Q6 Months, Fauzia Maza NP, 45 mg at 03/27/19 1121    PMHx/PSHx Updates:  See patient's last visit with me on 3/28/2019  See H&P on 6/9/2017        Pathology:  See path 9/13/2017          Objective:     Vitals:  Blood pressure 102/60, pulse 78, temperature 98.4 °F (36.9 °C), temperature source Oral, resp. rate 20, weight 96.9 kg (213 lb 11.2 oz).    Physical Examination:   GEN: no apparent distress, comfortable; AAOx3  HEAD: atraumatic and normocephalic  EYES: no pallor, no icterus, PERRLA  ENT: OMM, no pharyngeal erythema, external ears WNL; no nasal discharge; no thrush  NECK: no masses, thyroid normal, trachea midline,  LAD/LN's, supple; resolved neck LAD; left looks good  CV: RRR with no murmur; normal pulse; normal S1 and S2; no pedal edema  CHEST: good BS bilaterlally currently  ABDOM: nontender and nondistended; soft; normal bowel sounds; no rebound/guardingp; vertical wound healed well  MUSC/Skeletal: ROM normal; no crepitus; joints normal; no deformities or arthropathy  EXTREM: no clubbing, cyanosis, inflammation or swelling  SKIN: no lesions, ulcers, petechiae or subcutaneous nodules; fungal like rash under right axilla resolved  : no cannon  NEURO: grossly intact; motor/sensory WNL; AAOx3; no tremors  PSYCH: normal mood, affect and behavior  LYMPH:  normal cervical, supraclavicular, axillary and groin LN's            Labs:   6/4/2019 CBC on chart      5/30/2019  Lab Results   Component Value Date    WBC 5.2 05/30/2019    HGB 8.2 (L) 05/30/2019    HCT 24.2 (L) 05/30/2019    MCV 89.0 05/30/2019    PLT 40 (LL) 05/30/2019     6/3/2019  BMP  Lab Results   Component Value Date     06/03/2019    K 4.0 06/03/2019     06/03/2019    CO2 21.3 (L) 06/03/2019    BUN 15 06/03/2019    CREATININE 1.38 06/03/2019    CALCIUM 8.3 06/03/2019    ESTGFRAFRICA >60.0 11/25/2014    EGFRNONAA 76 12/17/2018     Lab Results   Component Value Date    ALT 12 12/17/2018    AST 23 06/03/2019    ALKPHOS 69 06/03/2019    BILITOT 0.7 06/03/2019           Radiology/Diagnostic Studies:    CT Chest  1/31/2019 - on chartT    IMPRESSION:    Interval development of moderate-large right-sided pleural effusion and  associated atelectatic changes within the right lung.    Development of conglomerate soft tissue densities within the visualized upper  abdomen likely representing interval progression of known lymphoma. Correlation  with dedicated CT of the abdomen with contrast is recommended. Additional soft  tissue density within the base of the neck on the left may also represent  pathologic adenopathy.    Additional observations as above.          PET 8/20/2018:  IMPRESSION:    1. Increased FDG uptake involving the anterolateral left sixth rib could be related to nondisplaced fracture here. Correlate with focal tenderness and any history of trauma. (he had a fall)  2. Otherwise, there is evidence of FDG avid malignancy.  3. Persistent enlarged left supraclavicular lymph node, stable from prior.  4. Improvement of previously seen reactive marrow.  5. Prostate gland is surgically absent.        Assessment/Plan:   (1) 68 y.o. male with diagnosis of development of right sided neck swelling since Feb 2017  - he has been referred by Dr Michaud with ID for an evaluation  - i spoke to Dr Michaud  previously about the patient peripherally  - FNA bx on 2/24 which was nondiagnostic  - he had a  guided biopsy of an entire left supraclavicular LN on 3/6/2017 with Dr Alexander Oh which showed  necrotizing granulomatous lymphadenitis  - CT scans were from Feb 2017  - flow cytometry studies from 5/25 appear to have been negative for any abnormal cell populations  - PET scan on 7/6/17 with markedly hypermetabolic LAD  - he was referred to and seen by Dr Basilio at [facility] on 7/28 and again on 8/25  - he underwent repeat cervical LN biopsy with Dr Oh on 9/13/2017 and that 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)  - he saw Dr Basilio again at [facility] this past Monday  - the prior bone marrow showed no involvement of the lymphoma  - he received R-CHOP and then proceed with a HD-MTX regimen and Intra-thecal MTX at [facility]    - he has been on HD-MTX chemotherapy at [facility] as of lately  - he completed last chemotherapy from June 18th through 21st  - he required neupogen the last week of June  - he recent developed a pleural effusion  - he was since found to have recurrent  lymphoma in the pleural fluid and was seen by Dr Basilio again and started on Retuximab, Cisplatin and cytarabine in preparation for a BMT  - he has been receiving chemotherapy under direction of Dr Basilio and is awaiting BMT  - his counts have been low and we have been faxing the labs to [facility]  - he sees Dr Basilio again yesterday and they are starting the process to start BM collection on 6/21/2019  - latest PET at [facility] looked good per patient but he has spot/LN in abdomen which is inactive but he may need XRT to this in the near-future      (2) Hx/of prostate cancer - 1995; s/p prostatectomy followed by XRT; on lupron per Dr Menjivar with  and Fauzia Maza (NP); he had recent lupron injection per      (3) JAK - uses CPAP     (4) GERD     (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 Diffuse large B-cell lymphoma of lymph nodes of neck     2. Non-Hodgkin lymphoma of lymph nodes of neck, unspecified non-Hodgkin lymphoma type     3. Chemotherapy-induced neutropenia     4. Anemia complicating neoplastic disease     5. Cervical lymphadenopathy     6. Thrombosis of left subclavian vein     7. History of prostatectomy (December/1995)     8. Malignant neoplasm of prostate     9. Pleural effusion on right     10. S/P bone marrow transplant         PLAN:  1. F/u with Dr Basilio at [facility] for planned marrow collection in preparation for BMT  2. Check labs weekly for now and faxing them to [facility] each time  3. He is now off eliquis  4.  F/u with pulmonary, PCP, Etc  5.  RTC 4-6 weeks    Fax note to Ronny Driscoll MD, Adriano Michaud Schuller and  Negro Santiago    Discussion:     I have explained all of the above in detail and the patient understands all of the current recommendation(s). I have answered all of their questions to the best of my ability and to their complete satisfaction.   The patient is to continue with the current management plan.            Electronically signed by Fidencio Rdz MD

## 2019-06-18 LAB
MAGNESIUM SERPL-MCNC: 2 MG/DL (ref 1.5–2.6)
PHOSPHATE FLD-MCNC: 2.6 MG/DL (ref 2.5–4.9)
URATE SERPL-MCNC: 3.2 MG/DL (ref 2.6–7.8)

## 2019-07-02 LAB
LDH SERPL L TO P-CCNC: 155 IU/L (ref 100–194)
MAGNESIUM SERPL-MCNC: 2.3 MG/DL (ref 1.5–2.6)
PHOSPHATE FLD-MCNC: 2.6 MG/DL (ref 2.5–4.9)
URATE SERPL-MCNC: 3.6 MG/DL (ref 2.6–7.8)

## 2019-08-26 ENCOUNTER — LAB VISIT (OUTPATIENT)
Dept: LAB | Facility: HOSPITAL | Age: 69
End: 2019-08-26
Attending: INTERNAL MEDICINE
Payer: MEDICARE

## 2019-08-26 DIAGNOSIS — I50.9 HEART FAILURE, UNSPECIFIED: Primary | ICD-10-CM

## 2019-08-26 DIAGNOSIS — R60.9 EDEMA: ICD-10-CM

## 2019-08-26 LAB
ALBUMIN SERPL BCP-MCNC: 3.4 G/DL (ref 3.5–5.2)
ALP SERPL-CCNC: 74 U/L (ref 55–135)
ALT SERPL W/O P-5'-P-CCNC: 13 U/L (ref 10–44)
ANION GAP SERPL CALC-SCNC: 7 MMOL/L (ref 8–16)
AST SERPL-CCNC: 26 U/L (ref 10–40)
BILIRUB SERPL-MCNC: 0.5 MG/DL (ref 0.1–1)
BNP SERPL-MCNC: 202 PG/ML (ref 0–99)
BUN SERPL-MCNC: 15 MG/DL (ref 8–23)
CALCIUM SERPL-MCNC: 9.5 MG/DL (ref 8.7–10.5)
CHLORIDE SERPL-SCNC: 117 MMOL/L (ref 95–110)
CO2 SERPL-SCNC: 21 MMOL/L (ref 23–29)
CREAT SERPL-MCNC: 1.9 MG/DL (ref 0.5–1.4)
EST. GFR  (AFRICAN AMERICAN): 40.4 ML/MIN/1.73 M^2
EST. GFR  (NON AFRICAN AMERICAN): 34.9 ML/MIN/1.73 M^2
GLUCOSE SERPL-MCNC: 90 MG/DL (ref 70–110)
POTASSIUM SERPL-SCNC: 4.5 MMOL/L (ref 3.5–5.1)
PROT SERPL-MCNC: 6 G/DL (ref 6–8.4)
SODIUM SERPL-SCNC: 145 MMOL/L (ref 136–145)

## 2019-08-26 PROCEDURE — 36415 COLL VENOUS BLD VENIPUNCTURE: CPT

## 2019-08-26 PROCEDURE — 80053 COMPREHEN METABOLIC PANEL: CPT

## 2019-08-26 PROCEDURE — 83880 ASSAY OF NATRIURETIC PEPTIDE: CPT

## 2019-10-09 ENCOUNTER — OFFICE VISIT (OUTPATIENT)
Dept: FAMILY MEDICINE | Facility: CLINIC | Age: 69
End: 2019-10-09
Payer: MEDICARE

## 2019-10-09 VITALS
HEART RATE: 86 BPM | RESPIRATION RATE: 16 BRPM | HEIGHT: 68 IN | DIASTOLIC BLOOD PRESSURE: 60 MMHG | BODY MASS INDEX: 29.25 KG/M2 | WEIGHT: 193 LBS | SYSTOLIC BLOOD PRESSURE: 103 MMHG

## 2019-10-09 DIAGNOSIS — C85.91 NON-HODGKIN LYMPHOMA OF LYMPH NODES OF NECK, UNSPECIFIED NON-HODGKIN LYMPHOMA TYPE: ICD-10-CM

## 2019-10-09 DIAGNOSIS — Z23 NEEDS FLU SHOT: ICD-10-CM

## 2019-10-09 DIAGNOSIS — E78.2 MULTIPLE-TYPE HYPERLIPIDEMIA: ICD-10-CM

## 2019-10-09 DIAGNOSIS — I10 BENIGN ESSENTIAL HYPERTENSION: Primary | Chronic | ICD-10-CM

## 2019-10-09 DIAGNOSIS — D63.0 ANEMIA COMPLICATING NEOPLASTIC DISEASE: ICD-10-CM

## 2019-10-09 PROCEDURE — 3074F PR MOST RECENT SYSTOLIC BLOOD PRESSURE < 130 MM HG: ICD-10-PCS | Mod: S$GLB,,, | Performed by: INTERNAL MEDICINE

## 2019-10-09 PROCEDURE — 90662 FLU VACCINE - HIGH DOSE (65+) PRESERVATIVE FREE IM: ICD-10-PCS | Mod: S$GLB,,, | Performed by: INTERNAL MEDICINE

## 2019-10-09 PROCEDURE — 99214 PR OFFICE/OUTPT VISIT, EST, LEVL IV, 30-39 MIN: ICD-10-PCS | Mod: 25,S$GLB,, | Performed by: INTERNAL MEDICINE

## 2019-10-09 PROCEDURE — 99214 OFFICE O/P EST MOD 30 MIN: CPT | Mod: 25,S$GLB,, | Performed by: INTERNAL MEDICINE

## 2019-10-09 PROCEDURE — G0008 FLU VACCINE - HIGH DOSE (65+) PRESERVATIVE FREE IM: ICD-10-PCS | Mod: S$GLB,,, | Performed by: INTERNAL MEDICINE

## 2019-10-09 PROCEDURE — 1101F PR PT FALLS ASSESS DOC 0-1 FALLS W/OUT INJ PAST YR: ICD-10-PCS | Mod: S$GLB,,, | Performed by: INTERNAL MEDICINE

## 2019-10-09 PROCEDURE — 3078F PR MOST RECENT DIASTOLIC BLOOD PRESSURE < 80 MM HG: ICD-10-PCS | Mod: S$GLB,,, | Performed by: INTERNAL MEDICINE

## 2019-10-09 PROCEDURE — G0008 ADMIN INFLUENZA VIRUS VAC: HCPCS | Mod: S$GLB,,, | Performed by: INTERNAL MEDICINE

## 2019-10-09 PROCEDURE — 3074F SYST BP LT 130 MM HG: CPT | Mod: S$GLB,,, | Performed by: INTERNAL MEDICINE

## 2019-10-09 PROCEDURE — 3078F DIAST BP <80 MM HG: CPT | Mod: S$GLB,,, | Performed by: INTERNAL MEDICINE

## 2019-10-09 PROCEDURE — 90662 IIV NO PRSV INCREASED AG IM: CPT | Mod: S$GLB,,, | Performed by: INTERNAL MEDICINE

## 2019-10-09 PROCEDURE — 1101F PT FALLS ASSESS-DOCD LE1/YR: CPT | Mod: S$GLB,,, | Performed by: INTERNAL MEDICINE

## 2019-10-09 RX ORDER — PENTAMIDINE ISETHIONATE 300 MG/6ML
1 INHALANT RESPIRATORY (INHALATION)
Refills: 5 | COMMUNITY
Start: 2019-09-10 | End: 2020-01-01

## 2019-10-09 RX ORDER — SODIUM BICARBONATE 650 MG/1
3 TABLET ORAL 2 TIMES DAILY
Refills: 11 | COMMUNITY
Start: 2019-09-26 | End: 2021-01-01 | Stop reason: ALTCHOICE

## 2019-10-09 RX ORDER — ACYCLOVIR 200 MG/1
1 CAPSULE ORAL DAILY
Refills: 4 | COMMUNITY
Start: 2019-09-15 | End: 2020-01-01

## 2019-10-09 NOTE — PATIENT INSTRUCTIONS
Anemia During Cancer    When levels of healthy red blood cells (RBCs) in the body drop to levels that are below normal, the condition is called anemia. Anemia can occur during cancer and its treatment for many reasons. Read below to learn more about anemia during cancer and how its treated.  What is anemia?  RBCs are made in the bone marrow. Normal blood has about 35% to 50% RBCs. Anemic blood has less than 35% RBCs. RBCs carry oxygen around the body. If you have low levels of RBCs, not enough oxygen is delivered to your body. This may cause symptoms of dizziness, weakness, or tiredness. You may have trouble doing daily tasks. You may often feel cold. And you may be short of breath and have a rapid heartbeat. But some people with anemia have no symptoms.  Why anemia can occur with cancer  Anemia during cancer can have several causes:. These include:  · Treatments that destroy bone marrow, such as chemotherapy and radiation therapy  · Blood loss during surgery  · Low levels of certain B vitamins or iron  · Kidney disease leading to low levels of EPO (erythropoietin), a substance the body needs to make RBCs  · Wasting (nutritional problems and weight loss) from cancer that lower the bodys ability to make RBCs  Testing for anemia  A blood sample is taken from your arm and then tested. Several different types of tests may be done on this blood. Some count the numbers of blood cells. Others test for substances the body uses to make RBCs, such as vitamins and iron.  Treating anemia during cancer  Your treatment will depend on the cause of your anemia. It will also depend on how severe your symptoms are. Your doctor can tell you more about treatment options and their risks and benefits for you. Treatments include the following:  · RBC transfusion. A tube (cannula) is put into a vein in the hand or arm. RBCs from a donor are sent through the tube into the body. This increases the number of healthy RBCs in the body. This  can reverse anemia very quickly. RBC transfusions are safe. However, there are some risks. Your doctor will discuss them with you. Be sure you understand these risks.You may need to sign a consent form before receiving treatment.  · Erythropoiesis stimulating agent (ILDA). This is medicine that causes the body to make more RBCs. An ILDA is given as a shot. It may be given along with iron (see below). An ILDA takes several weeks or months to reverse anemia. There are special risks with ILDA treatment. Your doctor will discuss them with you. Be sure you understand these risks.You may need to sign a consent form before receiving treatment.  · Intravenous (IV) iron. A liquid medicine containing iron is given by shots or IV line. Several treatments may be given. IV iron is usually used if you are being given an ILDA. IV iron usually takes 1 to 4 weeks to reverse anemia.  · Oral supplements. Iron or vitamin B supplements may be given. These come in liquid or pill form, to take by mouth or they may be by injection. Oral supplements can take weeks or months to reverse anemia.  Checking your progress  During the course of your treatment, youll likely have more blood tests. These are to check your blood levels and your response to the treatment.  Risks and complications  Each treatment has its own risks. Your healthcare provider will tell you what risks apply to you. These may include:  · Fever  · Hives or other allergic reactions  · Iron overload  · High or low blood pressure  · Nausea  · Liver inflammation  · Blood clots   Date Last Reviewed: 12/27/2015 © 2000-2017 [employer]. [address] [place] [address], [place] [address]. All rights reserved. This information is not intended as a substitute for professional medical care. Always follow your healthcare professional's instructions.

## 2019-10-09 NOTE — PROGRESS NOTES
Subjective:       Patient ID: Naresh Painting is a 69 y.o. male.    Chief Complaint: Hypertension; Gastroesophageal Reflux; and NHL    Is a 69-year-old call gentleman who comes for follow-up.  Underlying hypertension, gastroesophageal reflux has been noted. About a couple of years back he was diagnosed with non-Hodgkin's lymphoma based upon lymphadenopathy in the neck.  After that he has gone through several rounds of chemotherapy and recently he had stem cell transplant.  He is going through radiation therapy currently.  He has to put a face mask on.    Recent labs have shown a moderate degree of anemia with a hemoglobin of 8.  Potassium is slightly low and phosphorus is also low PT it kidney tests are somewhat elevated with a creatinine of 2.0.    Overall he has come a long way since his treatment started approximately 2 years ago with significant fluctuations in his clinical status.    He does have some energy and stamina and wants to do things.  He does feel cold at nighttime and covers himself with a couple of blankets well as his wife feels hot and warm.    Hypertension   This is a chronic problem. The current episode started more than 1 year ago. The problem is controlled. Associated symptoms include malaise/fatigue. Pertinent negatives include no chest pain, headaches, neck pain, palpitations or shortness of breath. Risk factors for coronary artery disease include sedentary lifestyle, obesity, male gender and dyslipidemia. Past treatments include beta blockers. Compliance problems include psychosocial issues.  Hypertensive end-organ damage includes kidney disease.   Gastroesophageal Reflux   He complains of heartburn. He reports no abdominal pain, no chest pain, no coughing or no nausea. This is a chronic problem. The current episode started more than 1 year ago. The problem has been waxing and waning. Associated symptoms include fatigue. He has tried a PPI for the symptoms. The treatment provided moderate  relief.       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     Social History     Socioeconomic History    Marital status:      Spouse name: Amita Painting    Number of children: 2    Years of education: Not on file    Highest education level: Not on file   Occupational History    Occupation: Retired      Employer: [employer]     Comment: [employer]- 3 yrs    Occupation:      Employer: [employer]     Comment: 26 yrs    Occupation: Reproduction     Employer: [employer]     Comment: [employer]   Social Needs    Financial resource strain: Not on file    Food insecurity:     Worry: Not on file     Inability: Not on file    Transportation needs:     Medical: Not on file     Non-medical: Not on file   Tobacco Use    Smoking status: Former Smoker    Smokeless tobacco: Never Used    Tobacco comment: quit 1995   Substance and Sexual Activity    Alcohol use: Yes    Drug use: No    Sexual activity: Not Currently     Partners: Female   Lifestyle    Physical activity:     Days per week: Not on file     Minutes per session: Not on file    Stress: To some extent   Relationships    Social connections:     Talks on phone: Not on file     Gets together: Not on file     Attends Orthodoxy service: Not on file     Active member of club or organization: Not on file     Attends meetings of clubs or organizations: Not on file     Relationship status: Not on file   Other Topics Concern    Not on file    Social History Narrative    One daughter name is Meagan. Son's name is Alfie     Past Surgical History:   Procedure Laterality Date    EYE SURGERY      LYMPH NODE BIOPSY      PROCTECTOMY      PROSTATE SURGERY      UMBILICAL HERNIA REPAIR  04/05/2018    Incarcerated-      Family History   Problem Relation Age of Onset    Heart disease Mother     Diabetes Father        Review of Systems   Constitutional: Positive for activity change (slowed down), fatigue and malaise/fatigue. Negative for appetite change, fever and unexpected weight change (Lost 20 lbs).   HENT: Negative for congestion, facial swelling, nosebleeds and sneezing.    Eyes: Negative for pain, discharge and visual disturbance.        Left eye Glass eye prosthesis   Respiratory: Positive for apnea (obstructive sleep apnea). Negative for cough, chest tightness and shortness of breath.    Cardiovascular: Negative for chest pain, palpitations and leg swelling.        H/O Left upper extremity deep vein thrombosis. Treated with anticoagulation   Gastrointestinal: Positive for heartburn. Negative for abdominal distention, abdominal pain, blood in stool, constipation, diarrhea and nausea.        Anorexia   Endocrine: Negative for cold intolerance, heat intolerance, polydipsia, polyphagia and polyuria.   Genitourinary: Negative for dysuria, hematuria and scrotal swelling.   Musculoskeletal: Negative for arthralgias, back pain, gait problem and neck pain.   Skin: Positive for pallor. Negative for color change, rash and wound.        Patient's has lumps in the neck which have now been diagnosed to be lymphoma.   Allergic/Immunologic: Negative for environmental allergies, food allergies and immunocompromised state.   Neurological: Negative for dizziness, seizures, speech difficulty, light-headedness, numbness and headaches.   Hematological: Positive for adenopathy. Does not bruise/bleed easily.        Diagnosis of NHL under chemotherapy.  "Significant reduction in the size of lymphadenopathy.   Psychiatric/Behavioral: Negative for agitation, behavioral problems and confusion. The patient is not nervous/anxious.          Objective:      Blood pressure 103/60, pulse 86, resp. rate 16, height 5' 8" (1.727 m), weight 87.5 kg (193 lb). Body mass index is 29.35 kg/m².  Physical Exam   Constitutional: He appears well-developed. No distress.   BMI is 29somewhat chronically sick   HENT:   Head: Normocephalic and atraumatic.   Mouth/Throat: No oropharyngeal exudate.   Eyes: Conjunctivae and EOM are normal. Right eye exhibits no discharge and no exudate. Right conjunctiva is not injected. Right conjunctiva has no hemorrhage.   Left eye is prosthetic.   Neck: Normal range of motion. Neck supple. No JVD present. No neck rigidity. Normal range of motion present.   The lymph nodes on right and left side have dramatically shrunken now.    Cardiovascular: Normal rate and regular rhythm. Exam reveals no friction rub.   No murmur heard.  Pulmonary/Chest: Effort normal. No respiratory distress. He has decreased breath sounds in the right middle field and the right lower field. He has no wheezes.   Bilateral diminished air entry towards the bases   Abdominal: Soft. Bowel sounds are normal. He exhibits distension.   Musculoskeletal: He exhibits edema (1 +). He exhibits no tenderness or deformity.   Examination of the neck does not reveal any deformity.   Lymphadenopathy:     He has no axillary adenopathy.   Right-sided cervical lymphadenopathy. No left-sided lymphadenopathy. No axillary lymphadenopathy.   Neurological: He is alert. He has normal reflexes.   Skin: Skin is warm and dry. There is pallor.   Psychiatric: His mood appears anxious. Thought content is not delusional.   Nursing note and vitals reviewed.        Assessment:       1. Benign essential hypertension    2. Multiple-type hyperlipidemia    3. Non-Hodgkin lymphoma of lymph nodes of neck, unspecified " non-Hodgkin lymphoma type    4. Anemia complicating neoplastic disease    5. Needs flu shot           Lab Visit on 08/26/2019   Component Date Value Ref Range Status    Sodium 08/26/2019 145  136 - 145 mmol/L Final    Potassium 08/26/2019 4.5  3.5 - 5.1 mmol/L Final    Chloride 08/26/2019 117* 95 - 110 mmol/L Final    CO2 08/26/2019 21* 23 - 29 mmol/L Final    Glucose 08/26/2019 90  70 - 110 mg/dL Final    BUN, Bld 08/26/2019 15  8 - 23 mg/dL Final    Creatinine 08/26/2019 1.9* 0.5 - 1.4 mg/dL Final    Calcium 08/26/2019 9.5  8.7 - 10.5 mg/dL Final    Total Protein 08/26/2019 6.0  6.0 - 8.4 g/dL Final    Albumin 08/26/2019 3.4* 3.5 - 5.2 g/dL Final    Total Bilirubin 08/26/2019 0.5  0.1 - 1.0 mg/dL Final    Alkaline Phosphatase 08/26/2019 74  55 - 135 U/L Final    AST 08/26/2019 26  10 - 40 U/L Final    ALT 08/26/2019 13  10 - 44 U/L Final    Anion Gap 08/26/2019 7* 8 - 16 mmol/L Final    eGFR if  08/26/2019 40.4* >60 mL/min/1.73 m^2 Final    eGFR if non African American 08/26/2019 34.9* >60 mL/min/1.73 m^2 Final    BNP 08/26/2019 202* 0 - 99 pg/mL Final     Recent labs at [facility] show a creatinine of 2.0.  Potassium is 3.3.  Electrolytes are somewhat abnormal with low phosphorus of 1.6 and albumin of 3.1.  CBC shows a hemoglobin of 8.7 and hematocrit of 26.3 with MCV of 106.  WBC is 3.9.  Immature granulocytes are 14.9.  Lymphocytes 5 in 1 0 sites 11.    Plan:           Benign essential hypertension    Multiple-type hyperlipidemia    Non-Hodgkin lymphoma of lymph nodes of neck, unspecified non-Hodgkin lymphoma type    Anemia complicating neoplastic disease    Needs flu shot  -     Influenza - High Dose (65+) (PF) (IM)     Blood pressures are fairly well under control.  Reflux symptoms are stable.    Recent stem cell transplant and post transplant radiation therapy has been noted.    Patient does have moderate degree of anemia which is being closely followed.  He may need a packed  red cell transfusion.    Today he will be updated on flu vaccination.    Electrolyte replacement have been reviewed.    [facility] from [facility] will be requested.    Follow-up he will keep in 4 months.    Spent hemal 25 minutes with patient which involved review of pts medical conditions, labs, medications and with 50% of time face-to-face discussion about medical problems, management and any applicable changes.        Current Outpatient Medications:     acyclovir (ZOVIRAX) 200 MG capsule, 1 capsule Daily., Disp: , Rfl: 4    K-PHOS-NEUTRAL 250 mg tablet, 1 tablet 4 (four) times daily., Disp: , Rfl: 0    L GASSERI/B BIFIDUM/B LONGUM ([place] ORAL), Take by mouth., Disp: , Rfl:     MULTIVITAMIN/IRON/FOLIC ACID (CENTRUM COMPLETE ORAL), Take by mouth., Disp: , Rfl:     NEBUPENT 300 mg SolR, 1 Dose every 30 days., Disp: , Rfl: 5    omeprazole (PRILOSEC) 20 MG capsule, Take 20 mg by mouth once daily., Disp: , Rfl:     ondansetron (ZOFRAN) 8 MG tablet, Take by mouth every 8 (eight) hours as needed for Nausea., Disp: , Rfl:     potassium chloride SA (K-DUR,KLOR-CON) 10 MEQ tablet, Take 1 tablet (10 mEq total) by mouth 2 (two) times daily., Disp: 180 tablet, Rfl: 3    sodium bicarbonate 650 MG tablet, 1 tablet Daily., Disp: , Rfl: 11    sulfamethoxazole-trimethoprim 800-160mg (BACTRIM DS) 800-160 mg Tab, 1 tablet 3 (three) times a week., Disp: , Rfl: 0    TOPROL XL 50 mg 24 hr tablet, Take 25 mg by mouth once daily., Disp: , Rfl:     vitamin D 1000 units Tab, Take 185 mg by mouth once daily., Disp: , Rfl:     vitamin E 100 UNIT capsule, Take 100 Units by mouth once daily., Disp: , Rfl:     Current Facility-Administered Medications:     leuprolide (6 month) SyKt 45 mg, 45 mg, Intramuscular, Q6 Months, Fauzia Maza NP, 45 mg at 03/27/19 1121

## 2020-01-01 ENCOUNTER — TELEPHONE (OUTPATIENT)
Dept: FAMILY MEDICINE | Facility: CLINIC | Age: 70
End: 2020-01-01

## 2020-01-01 ENCOUNTER — INFUSION (OUTPATIENT)
Dept: INFUSION THERAPY | Facility: HOSPITAL | Age: 70
End: 2020-01-01
Attending: INTERNAL MEDICINE
Payer: MEDICARE

## 2020-01-01 ENCOUNTER — PROCEDURE VISIT (OUTPATIENT)
Dept: SLEEP MEDICINE | Facility: HOSPITAL | Age: 70
End: 2020-01-01
Attending: INTERNAL MEDICINE
Payer: MEDICARE

## 2020-01-01 ENCOUNTER — TELEPHONE (OUTPATIENT)
Dept: HEMATOLOGY/ONCOLOGY | Facility: CLINIC | Age: 70
End: 2020-01-01

## 2020-01-01 ENCOUNTER — TELEPHONE (OUTPATIENT)
Dept: UROLOGY | Facility: CLINIC | Age: 70
End: 2020-01-01

## 2020-01-01 ENCOUNTER — OFFICE VISIT (OUTPATIENT)
Dept: FAMILY MEDICINE | Facility: CLINIC | Age: 70
End: 2020-01-01
Payer: MEDICARE

## 2020-01-01 ENCOUNTER — LAB VISIT (OUTPATIENT)
Dept: LAB | Facility: HOSPITAL | Age: 70
End: 2020-01-01
Attending: NURSE PRACTITIONER
Payer: MEDICARE

## 2020-01-01 ENCOUNTER — OFFICE VISIT (OUTPATIENT)
Dept: UROLOGY | Facility: CLINIC | Age: 70
End: 2020-01-01
Payer: MEDICARE

## 2020-01-01 ENCOUNTER — HOSPITAL ENCOUNTER (INPATIENT)
Facility: HOSPITAL | Age: 70
LOS: 2 days | Discharge: HOME | DRG: 641 | End: 2020-10-13
Attending: EMERGENCY MEDICINE | Admitting: INTERNAL MEDICINE
Payer: MEDICARE

## 2020-01-01 ENCOUNTER — OFFICE VISIT (OUTPATIENT)
Dept: CARDIOLOGY | Facility: CLINIC | Age: 70
End: 2020-01-01
Payer: MEDICARE

## 2020-01-01 ENCOUNTER — LAB VISIT (OUTPATIENT)
Dept: LAB | Facility: HOSPITAL | Age: 70
End: 2020-01-01
Payer: MEDICARE

## 2020-01-01 ENCOUNTER — OFFICE VISIT (OUTPATIENT)
Dept: HEMATOLOGY/ONCOLOGY | Facility: CLINIC | Age: 70
End: 2020-01-01
Payer: MEDICARE

## 2020-01-01 ENCOUNTER — PATIENT MESSAGE (OUTPATIENT)
Dept: FAMILY MEDICINE | Facility: CLINIC | Age: 70
End: 2020-01-01

## 2020-01-01 ENCOUNTER — PATIENT MESSAGE (OUTPATIENT)
Dept: UROLOGY | Facility: CLINIC | Age: 70
End: 2020-01-01

## 2020-01-01 ENCOUNTER — LAB VISIT (OUTPATIENT)
Dept: LAB | Facility: HOSPITAL | Age: 70
End: 2020-01-01
Attending: INTERNAL MEDICINE
Payer: MEDICARE

## 2020-01-01 VITALS
HEIGHT: 68 IN | WEIGHT: 176 LBS | DIASTOLIC BLOOD PRESSURE: 79 MMHG | HEART RATE: 65 BPM | TEMPERATURE: 98 F | BODY MASS INDEX: 26.67 KG/M2 | SYSTOLIC BLOOD PRESSURE: 137 MMHG

## 2020-01-01 VITALS
DIASTOLIC BLOOD PRESSURE: 77 MMHG | SYSTOLIC BLOOD PRESSURE: 148 MMHG | BODY MASS INDEX: 28.07 KG/M2 | HEIGHT: 68 IN | HEART RATE: 71 BPM | WEIGHT: 185.19 LBS

## 2020-01-01 VITALS
HEART RATE: 81 BPM | SYSTOLIC BLOOD PRESSURE: 118 MMHG | HEIGHT: 68 IN | DIASTOLIC BLOOD PRESSURE: 72 MMHG | TEMPERATURE: 98 F | OXYGEN SATURATION: 95 % | SYSTOLIC BLOOD PRESSURE: 120 MMHG | RESPIRATION RATE: 16 BRPM | WEIGHT: 161 LBS | BODY MASS INDEX: 26.07 KG/M2 | DIASTOLIC BLOOD PRESSURE: 77 MMHG | WEIGHT: 172 LBS | HEIGHT: 68 IN | BODY MASS INDEX: 24.4 KG/M2 | HEART RATE: 64 BPM

## 2020-01-01 VITALS
SYSTOLIC BLOOD PRESSURE: 128 MMHG | DIASTOLIC BLOOD PRESSURE: 74 MMHG | HEART RATE: 61 BPM | OXYGEN SATURATION: 94 % | BODY MASS INDEX: 24.86 KG/M2 | RESPIRATION RATE: 18 BRPM | WEIGHT: 164 LBS | HEIGHT: 68 IN

## 2020-01-01 VITALS
SYSTOLIC BLOOD PRESSURE: 129 MMHG | TEMPERATURE: 98 F | DIASTOLIC BLOOD PRESSURE: 67 MMHG | HEIGHT: 68 IN | HEART RATE: 67 BPM | WEIGHT: 173.19 LBS | BODY MASS INDEX: 26.25 KG/M2 | RESPIRATION RATE: 19 BRPM

## 2020-01-01 VITALS
SYSTOLIC BLOOD PRESSURE: 119 MMHG | BODY MASS INDEX: 23.92 KG/M2 | DIASTOLIC BLOOD PRESSURE: 65 MMHG | TEMPERATURE: 98 F | RESPIRATION RATE: 18 BRPM | HEART RATE: 70 BPM | WEIGHT: 157.31 LBS

## 2020-01-01 VITALS
BODY MASS INDEX: 25.96 KG/M2 | WEIGHT: 171.31 LBS | OXYGEN SATURATION: 99 % | SYSTOLIC BLOOD PRESSURE: 129 MMHG | HEIGHT: 68 IN | HEART RATE: 72 BPM | RESPIRATION RATE: 18 BRPM | DIASTOLIC BLOOD PRESSURE: 75 MMHG | TEMPERATURE: 98 F

## 2020-01-01 VITALS
HEART RATE: 68 BPM | SYSTOLIC BLOOD PRESSURE: 140 MMHG | WEIGHT: 171.94 LBS | BODY MASS INDEX: 26.06 KG/M2 | DIASTOLIC BLOOD PRESSURE: 83 MMHG | HEIGHT: 68 IN

## 2020-01-01 VITALS
RESPIRATION RATE: 18 BRPM | HEART RATE: 76 BPM | OXYGEN SATURATION: 98 % | SYSTOLIC BLOOD PRESSURE: 120 MMHG | BODY MASS INDEX: 25.89 KG/M2 | TEMPERATURE: 97 F | WEIGHT: 170.81 LBS | HEIGHT: 68 IN | DIASTOLIC BLOOD PRESSURE: 75 MMHG

## 2020-01-01 VITALS
RESPIRATION RATE: 16 BRPM | HEIGHT: 68 IN | WEIGHT: 175 LBS | SYSTOLIC BLOOD PRESSURE: 103 MMHG | DIASTOLIC BLOOD PRESSURE: 72 MMHG | BODY MASS INDEX: 26.52 KG/M2 | HEART RATE: 84 BPM

## 2020-01-01 VITALS
TEMPERATURE: 98 F | DIASTOLIC BLOOD PRESSURE: 83 MMHG | BODY MASS INDEX: 26.79 KG/M2 | WEIGHT: 176.19 LBS | RESPIRATION RATE: 17 BRPM | SYSTOLIC BLOOD PRESSURE: 129 MMHG | HEART RATE: 67 BPM

## 2020-01-01 VITALS
RESPIRATION RATE: 20 BRPM | DIASTOLIC BLOOD PRESSURE: 78 MMHG | HEART RATE: 78 BPM | TEMPERATURE: 98 F | SYSTOLIC BLOOD PRESSURE: 145 MMHG | HEIGHT: 68 IN | WEIGHT: 169 LBS | BODY MASS INDEX: 25.61 KG/M2

## 2020-01-01 DIAGNOSIS — C61 PROSTATE CANCER: Primary | ICD-10-CM

## 2020-01-01 DIAGNOSIS — N18.4 CKD (CHRONIC KIDNEY DISEASE), STAGE IV: ICD-10-CM

## 2020-01-01 DIAGNOSIS — C83.31 DIFFUSE LARGE B-CELL LYMPHOMA OF LYMPH NODES OF NECK: ICD-10-CM

## 2020-01-01 DIAGNOSIS — D63.0 ANEMIA COMPLICATING NEOPLASTIC DISEASE: Primary | ICD-10-CM

## 2020-01-01 DIAGNOSIS — R59.0 CERVICAL LYMPHADENOPATHY: ICD-10-CM

## 2020-01-01 DIAGNOSIS — C61 PROSTATE CANCER: ICD-10-CM

## 2020-01-01 DIAGNOSIS — I10 ESSENTIAL HYPERTENSION: Primary | ICD-10-CM

## 2020-01-01 DIAGNOSIS — E78.2 MULTIPLE-TYPE HYPERLIPIDEMIA: ICD-10-CM

## 2020-01-01 DIAGNOSIS — R97.21 RISING PSA FOLLOWING TREATMENT FOR MALIGNANT NEOPLASM OF PROSTATE: ICD-10-CM

## 2020-01-01 DIAGNOSIS — E86.0 DEHYDRATION: Primary | ICD-10-CM

## 2020-01-01 DIAGNOSIS — E87.6 HYPOKALEMIA: ICD-10-CM

## 2020-01-01 DIAGNOSIS — C85.91 NON-HODGKIN LYMPHOMA OF LYMPH NODES OF NECK, UNSPECIFIED NON-HODGKIN LYMPHOMA TYPE: ICD-10-CM

## 2020-01-01 DIAGNOSIS — G47.9 FATIGUE DUE TO SLEEP PATTERN DISTURBANCE: ICD-10-CM

## 2020-01-01 DIAGNOSIS — G47.33 OSA (OBSTRUCTIVE SLEEP APNEA): Primary | ICD-10-CM

## 2020-01-01 DIAGNOSIS — D63.0 ANEMIA COMPLICATING NEOPLASTIC DISEASE: ICD-10-CM

## 2020-01-01 DIAGNOSIS — C83.31 DIFFUSE LARGE B-CELL LYMPHOMA OF LYMPH NODES OF NECK: Primary | ICD-10-CM

## 2020-01-01 DIAGNOSIS — I10 BENIGN ESSENTIAL HYPERTENSION: Primary | ICD-10-CM

## 2020-01-01 DIAGNOSIS — N17.9 ACUTE KIDNEY INJURY: ICD-10-CM

## 2020-01-01 DIAGNOSIS — C85.91 NON-HODGKIN LYMPHOMA OF LYMPH NODES OF NECK, UNSPECIFIED NON-HODGKIN LYMPHOMA TYPE: Primary | ICD-10-CM

## 2020-01-01 DIAGNOSIS — R39.15 URINARY URGENCY: ICD-10-CM

## 2020-01-01 DIAGNOSIS — I10 BENIGN ESSENTIAL HYPERTENSION: ICD-10-CM

## 2020-01-01 DIAGNOSIS — Z94.81 S/P BONE MARROW TRANSPLANT: ICD-10-CM

## 2020-01-01 DIAGNOSIS — R53.83 FATIGUE DUE TO SLEEP PATTERN DISTURBANCE: ICD-10-CM

## 2020-01-01 DIAGNOSIS — D69.6 THROMBOCYTOPENIA: ICD-10-CM

## 2020-01-01 DIAGNOSIS — R06.02 SHORTNESS OF BREATH: ICD-10-CM

## 2020-01-01 DIAGNOSIS — Z90.79 HISTORY OF RADICAL PROSTATECTOMY: ICD-10-CM

## 2020-01-01 DIAGNOSIS — R21 RASH: ICD-10-CM

## 2020-01-01 DIAGNOSIS — R60.0 BILATERAL LEG EDEMA: ICD-10-CM

## 2020-01-01 DIAGNOSIS — R63.4 WEIGHT LOSS: ICD-10-CM

## 2020-01-01 DIAGNOSIS — R09.89 CHEST CONGESTION: ICD-10-CM

## 2020-01-01 DIAGNOSIS — D70.1 CHEMOTHERAPY-INDUCED NEUTROPENIA: ICD-10-CM

## 2020-01-01 DIAGNOSIS — N17.8 ACUTE RENAL FAILURE WITH OTHER SPECIFIED PATHOLOGICAL KIDNEY LESION SUPERIMPOSED ON STAGE 4 CHRONIC KIDNEY DISEASE: ICD-10-CM

## 2020-01-01 DIAGNOSIS — C61 MALIGNANT NEOPLASM OF PROSTATE: ICD-10-CM

## 2020-01-01 DIAGNOSIS — N39.41 URGE INCONTINENCE: ICD-10-CM

## 2020-01-01 DIAGNOSIS — Z90.79 HISTORY OF ROBOT-ASSISTED LAPAROSCOPIC RADICAL PROSTATECTOMY: ICD-10-CM

## 2020-01-01 DIAGNOSIS — R06.83 SNORING: ICD-10-CM

## 2020-01-01 DIAGNOSIS — G47.19 EXCESSIVE DAYTIME SLEEPINESS: ICD-10-CM

## 2020-01-01 DIAGNOSIS — R53.1 WEAKNESS: ICD-10-CM

## 2020-01-01 DIAGNOSIS — T45.1X5A CHEMOTHERAPY-INDUCED NEUTROPENIA: ICD-10-CM

## 2020-01-01 DIAGNOSIS — N18.4 ACUTE RENAL FAILURE WITH OTHER SPECIFIED PATHOLOGICAL KIDNEY LESION SUPERIMPOSED ON STAGE 4 CHRONIC KIDNEY DISEASE: ICD-10-CM

## 2020-01-01 DIAGNOSIS — Z23 NEEDS FLU SHOT: ICD-10-CM

## 2020-01-01 DIAGNOSIS — Z90.79 HISTORY OF PROSTATECTOMY: ICD-10-CM

## 2020-01-01 DIAGNOSIS — G47.33 OSA (OBSTRUCTIVE SLEEP APNEA): ICD-10-CM

## 2020-01-01 DIAGNOSIS — D46.9 MDS (MYELODYSPLASTIC SYNDROME): ICD-10-CM

## 2020-01-01 LAB
ABO + RH BLD: NORMAL
ALBUMIN SERPL BCP-MCNC: 2.4 G/DL (ref 3.5–5.2)
ALBUMIN SERPL BCP-MCNC: 2.7 G/DL (ref 3.5–5.2)
ALBUMIN SERPL BCP-MCNC: 2.8 G/DL (ref 3.5–5.2)
ALBUMIN SERPL BCP-MCNC: 2.8 G/DL (ref 3.5–5.2)
ALBUMIN SERPL BCP-MCNC: 2.9 G/DL (ref 3.5–5.2)
ALBUMIN SERPL BCP-MCNC: 3.3 G/DL (ref 3.5–5.2)
ALP SERPL-CCNC: 107 U/L (ref 55–135)
ALP SERPL-CCNC: 74 U/L (ref 55–135)
ALP SERPL-CCNC: 83 U/L (ref 55–135)
ALP SERPL-CCNC: 83 U/L (ref 55–135)
ALP SERPL-CCNC: 91 U/L (ref 55–135)
ALP SERPL-CCNC: 95 U/L (ref 55–135)
ALT SERPL W/O P-5'-P-CCNC: 22 U/L (ref 10–44)
ALT SERPL W/O P-5'-P-CCNC: 27 U/L (ref 10–44)
ALT SERPL W/O P-5'-P-CCNC: 29 U/L (ref 10–44)
ALT SERPL W/O P-5'-P-CCNC: 36 U/L (ref 10–44)
AMYLASE SERPL-CCNC: 30 U/L (ref 20–110)
ANION GAP SERPL CALC-SCNC: 10 MMOL/L (ref 8–16)
ANION GAP SERPL CALC-SCNC: 12 MMOL/L (ref 8–16)
ANION GAP SERPL CALC-SCNC: 5 MMOL/L (ref 8–16)
ANION GAP SERPL CALC-SCNC: 6 MMOL/L (ref 8–16)
ANION GAP SERPL CALC-SCNC: 7 MMOL/L (ref 8–16)
ANION GAP SERPL CALC-SCNC: 8 MMOL/L (ref 8–16)
ANION GAP SERPL CALC-SCNC: 9 MMOL/L (ref 8–16)
ANION GAP SERPL CALC-SCNC: 9 MMOL/L (ref 8–16)
AST SERPL-CCNC: 21 U/L (ref 10–40)
AST SERPL-CCNC: 24 U/L (ref 10–40)
AST SERPL-CCNC: 24 U/L (ref 10–40)
AST SERPL-CCNC: 30 U/L (ref 10–40)
AST SERPL-CCNC: 34 U/L (ref 10–40)
AST SERPL-CCNC: 46 U/L (ref 10–40)
BASOPHILS # BLD AUTO: 0 K/UL (ref 0–0.2)
BASOPHILS # BLD AUTO: 0.01 K/UL (ref 0–0.2)
BASOPHILS # BLD AUTO: 0.02 K/UL (ref 0–0.2)
BASOPHILS NFR BLD: 0 % (ref 0–1.9)
BASOPHILS NFR BLD: 0.2 % (ref 0–1.9)
BASOPHILS NFR BLD: 0.3 % (ref 0–1.9)
BASOPHILS NFR BLD: 0.4 % (ref 0–1.9)
BASOPHILS NFR BLD: 0.4 % (ref 0–1.9)
BASOPHILS NFR BLD: 0.5 % (ref 0–1.9)
BILIRUB SERPL-MCNC: 0.6 MG/DL (ref 0.1–1)
BILIRUB SERPL-MCNC: 0.7 MG/DL (ref 0.1–1)
BILIRUB SERPL-MCNC: 0.7 MG/DL (ref 0.1–1)
BILIRUB SERPL-MCNC: 0.8 MG/DL (ref 0.1–1)
BILIRUB SERPL-MCNC: NORMAL MG/DL
BLD GP AB SCN CELLS X3 SERPL QL: NORMAL
BLD PROD TYP BPU: NORMAL
BLD PROD TYP BPU: NORMAL
BLOOD UNIT EXPIRATION DATE: NORMAL
BLOOD UNIT EXPIRATION DATE: NORMAL
BLOOD UNIT TYPE CODE: 5100
BLOOD UNIT TYPE CODE: 5100
BLOOD UNIT TYPE: NORMAL
BLOOD UNIT TYPE: NORMAL
BLOOD URINE, POC: 50
BNP SERPL-MCNC: 237 PG/ML (ref 0–99)
BNP SERPL-MCNC: 878 PG/ML (ref 0–99)
BUN SERPL-MCNC: 24 MG/DL (ref 8–23)
BUN SERPL-MCNC: 30 MG/DL (ref 8–23)
BUN SERPL-MCNC: 37 MG/DL (ref 8–23)
BUN SERPL-MCNC: 38 MG/DL (ref 8–23)
BUN SERPL-MCNC: 42 MG/DL (ref 8–23)
BUN SERPL-MCNC: 42 MG/DL (ref 8–23)
BUN SERPL-MCNC: 45 MG/DL (ref 8–23)
BUN SERPL-MCNC: 46 MG/DL (ref 8–23)
CALCIUM SERPL-MCNC: 6.8 MG/DL (ref 8.7–10.5)
CALCIUM SERPL-MCNC: 6.9 MG/DL (ref 8.7–10.5)
CALCIUM SERPL-MCNC: 7.7 MG/DL (ref 8.7–10.5)
CALCIUM SERPL-MCNC: 7.7 MG/DL (ref 8.7–10.5)
CALCIUM SERPL-MCNC: 7.9 MG/DL (ref 8.7–10.5)
CALCIUM SERPL-MCNC: 8.2 MG/DL (ref 8.7–10.5)
CALCIUM SERPL-MCNC: 8.4 MG/DL (ref 8.7–10.5)
CALCIUM SERPL-MCNC: 8.4 MG/DL (ref 8.7–10.5)
CHLORIDE SERPL-SCNC: 111 MMOL/L (ref 95–110)
CHLORIDE SERPL-SCNC: 112 MMOL/L (ref 95–110)
CHLORIDE SERPL-SCNC: 118 MMOL/L (ref 95–110)
CHLORIDE SERPL-SCNC: 119 MMOL/L (ref 95–110)
CHLORIDE SERPL-SCNC: 121 MMOL/L (ref 95–110)
CHLORIDE SERPL-SCNC: 121 MMOL/L (ref 95–110)
CHLORIDE SERPL-SCNC: 123 MMOL/L (ref 95–110)
CHLORIDE SERPL-SCNC: 124 MMOL/L (ref 95–110)
CO2 SERPL-SCNC: 13 MMOL/L (ref 23–29)
CO2 SERPL-SCNC: 13 MMOL/L (ref 23–29)
CO2 SERPL-SCNC: 14 MMOL/L (ref 23–29)
CO2 SERPL-SCNC: 14 MMOL/L (ref 23–29)
CO2 SERPL-SCNC: 15 MMOL/L (ref 23–29)
CO2 SERPL-SCNC: 16 MMOL/L (ref 23–29)
CO2 SERPL-SCNC: 16 MMOL/L (ref 23–29)
CO2 SERPL-SCNC: 20 MMOL/L (ref 23–29)
CODING SYSTEM: NORMAL
CODING SYSTEM: NORMAL
COLOR, POC UA: NORMAL
COMPLEXED PSA SERPL-MCNC: 0.02 NG/ML (ref 0–4)
COMPLEXED PSA SERPL-MCNC: 0.03 NG/ML (ref 0–4)
CREAT SERPL-MCNC: 2.8 MG/DL (ref 0.5–1.4)
CREAT SERPL-MCNC: 2.8 MG/DL (ref 0.5–1.4)
CREAT SERPL-MCNC: 3.1 MG/DL (ref 0.5–1.4)
CREAT SERPL-MCNC: 3.2 MG/DL (ref 0.5–1.4)
CREAT SERPL-MCNC: 3.2 MG/DL (ref 0.5–1.4)
CREAT SERPL-MCNC: 3.3 MG/DL (ref 0.5–1.4)
CREAT SERPL-MCNC: 3.3 MG/DL (ref 0.5–1.4)
CREAT SERPL-MCNC: 3.4 MG/DL (ref 0.5–1.4)
DIFFERENTIAL METHOD: ABNORMAL
DISPENSE STATUS: NORMAL
DISPENSE STATUS: NORMAL
EOSINOPHIL # BLD AUTO: 0 K/UL (ref 0–0.5)
EOSINOPHIL # BLD AUTO: 0.1 K/UL (ref 0–0.5)
EOSINOPHIL NFR BLD: 0 % (ref 0–8)
EOSINOPHIL NFR BLD: 0.3 % (ref 0–8)
EOSINOPHIL NFR BLD: 0.7 % (ref 0–8)
EOSINOPHIL NFR BLD: 0.7 % (ref 0–8)
EOSINOPHIL NFR BLD: 1.4 % (ref 0–8)
EOSINOPHIL NFR BLD: 3 % (ref 0–8)
ERYTHROCYTE [DISTWIDTH] IN BLOOD BY AUTOMATED COUNT: 15 % (ref 11.5–14.5)
ERYTHROCYTE [DISTWIDTH] IN BLOOD BY AUTOMATED COUNT: 15.1 % (ref 11.5–14.5)
ERYTHROCYTE [DISTWIDTH] IN BLOOD BY AUTOMATED COUNT: 15.1 % (ref 11.5–14.5)
ERYTHROCYTE [DISTWIDTH] IN BLOOD BY AUTOMATED COUNT: 15.2 % (ref 11.5–14.5)
ERYTHROCYTE [DISTWIDTH] IN BLOOD BY AUTOMATED COUNT: 15.3 % (ref 11.5–14.5)
ERYTHROCYTE [DISTWIDTH] IN BLOOD BY AUTOMATED COUNT: 15.8 % (ref 11.5–14.5)
EST. GFR  (AFRICAN AMERICAN): 19.8 ML/MIN/1.73 M^2
EST. GFR  (AFRICAN AMERICAN): 20.6 ML/MIN/1.73 M^2
EST. GFR  (AFRICAN AMERICAN): 20.6 ML/MIN/1.73 M^2
EST. GFR  (AFRICAN AMERICAN): 21.4 ML/MIN/1.73 M^2
EST. GFR  (AFRICAN AMERICAN): 21.4 ML/MIN/1.73 M^2
EST. GFR  (AFRICAN AMERICAN): 22.2 ML/MIN/1.73 M^2
EST. GFR  (AFRICAN AMERICAN): 25.1 ML/MIN/1.73 M^2
EST. GFR  (AFRICAN AMERICAN): 25.1 ML/MIN/1.73 M^2
EST. GFR  (NON AFRICAN AMERICAN): 17.2 ML/MIN/1.73 M^2
EST. GFR  (NON AFRICAN AMERICAN): 17.8 ML/MIN/1.73 M^2
EST. GFR  (NON AFRICAN AMERICAN): 17.8 ML/MIN/1.73 M^2
EST. GFR  (NON AFRICAN AMERICAN): 18.5 ML/MIN/1.73 M^2
EST. GFR  (NON AFRICAN AMERICAN): 18.5 ML/MIN/1.73 M^2
EST. GFR  (NON AFRICAN AMERICAN): 19.2 ML/MIN/1.73 M^2
EST. GFR  (NON AFRICAN AMERICAN): 21.7 ML/MIN/1.73 M^2
EST. GFR  (NON AFRICAN AMERICAN): 21.7 ML/MIN/1.73 M^2
GLUCOSE SERPL-MCNC: 106 MG/DL (ref 70–110)
GLUCOSE SERPL-MCNC: 107 MG/DL (ref 70–110)
GLUCOSE SERPL-MCNC: 57 MG/DL (ref 70–110)
GLUCOSE SERPL-MCNC: 76 MG/DL (ref 70–110)
GLUCOSE SERPL-MCNC: 80 MG/DL (ref 70–110)
GLUCOSE SERPL-MCNC: 87 MG/DL (ref 70–110)
GLUCOSE SERPL-MCNC: 90 MG/DL (ref 70–110)
GLUCOSE SERPL-MCNC: 95 MG/DL (ref 70–110)
GLUCOSE UR QL STRIP: 500
HCT VFR BLD AUTO: 19.5 % (ref 40–54)
HCT VFR BLD AUTO: 22.7 % (ref 40–54)
HCT VFR BLD AUTO: 23.1 % (ref 40–54)
HCT VFR BLD AUTO: 23.9 % (ref 40–54)
HCT VFR BLD AUTO: 24.3 % (ref 40–54)
HCT VFR BLD AUTO: 26.3 % (ref 40–54)
HGB BLD-MCNC: 6.4 G/DL (ref 14–18)
HGB BLD-MCNC: 7.6 G/DL (ref 14–18)
HGB BLD-MCNC: 7.6 G/DL (ref 14–18)
HGB BLD-MCNC: 7.7 G/DL (ref 14–18)
HGB BLD-MCNC: 8.2 G/DL (ref 14–18)
HGB BLD-MCNC: 8.7 G/DL (ref 14–18)
IMM GRANULOCYTES # BLD AUTO: 0 K/UL (ref 0–0.04)
IMM GRANULOCYTES # BLD AUTO: 0.01 K/UL (ref 0–0.04)
IMM GRANULOCYTES # BLD AUTO: 0.02 K/UL (ref 0–0.04)
IMM GRANULOCYTES NFR BLD AUTO: 0 % (ref 0–0.5)
IMM GRANULOCYTES NFR BLD AUTO: 0.2 % (ref 0–0.5)
IMM GRANULOCYTES NFR BLD AUTO: 0.3 % (ref 0–0.5)
IMM GRANULOCYTES NFR BLD AUTO: 0.4 % (ref 0–0.5)
IMM GRANULOCYTES NFR BLD AUTO: 0.4 % (ref 0–0.5)
IMM GRANULOCYTES NFR BLD AUTO: 0.5 % (ref 0–0.5)
INR PPP: 1.2
KETONES UR QL STRIP: NORMAL
LEUKOCYTE ESTERASE URINE, POC: NORMAL
LIPASE SERPL-CCNC: 40 U/L (ref 4–60)
LYMPHOCYTES # BLD AUTO: 0.8 K/UL (ref 1–4.8)
LYMPHOCYTES # BLD AUTO: 0.8 K/UL (ref 1–4.8)
LYMPHOCYTES # BLD AUTO: 0.9 K/UL (ref 1–4.8)
LYMPHOCYTES # BLD AUTO: 0.9 K/UL (ref 1–4.8)
LYMPHOCYTES # BLD AUTO: 1.2 K/UL (ref 1–4.8)
LYMPHOCYTES # BLD AUTO: 1.4 K/UL (ref 1–4.8)
LYMPHOCYTES NFR BLD: 26.9 % (ref 18–48)
LYMPHOCYTES NFR BLD: 28 % (ref 18–48)
LYMPHOCYTES NFR BLD: 30 % (ref 18–48)
LYMPHOCYTES NFR BLD: 30.2 % (ref 18–48)
LYMPHOCYTES NFR BLD: 31.4 % (ref 18–48)
LYMPHOCYTES NFR BLD: 31.9 % (ref 18–48)
MAGNESIUM SERPL-MCNC: 1.7 MG/DL (ref 1.6–2.6)
MAGNESIUM SERPL-MCNC: 1.8 MG/DL (ref 1.6–2.6)
MAGNESIUM SERPL-MCNC: 1.9 MG/DL (ref 1.6–2.6)
MAGNESIUM SERPL-MCNC: 1.9 MG/DL (ref 1.6–2.6)
MCH RBC QN AUTO: 36.2 PG (ref 27–31)
MCH RBC QN AUTO: 36.4 PG (ref 27–31)
MCH RBC QN AUTO: 36.7 PG (ref 27–31)
MCH RBC QN AUTO: 36.8 PG (ref 27–31)
MCH RBC QN AUTO: 37.1 PG (ref 27–31)
MCH RBC QN AUTO: 37.3 PG (ref 27–31)
MCHC RBC AUTO-ENTMCNC: 31.8 G/DL (ref 32–36)
MCHC RBC AUTO-ENTMCNC: 32.8 G/DL (ref 32–36)
MCHC RBC AUTO-ENTMCNC: 33.1 G/DL (ref 32–36)
MCHC RBC AUTO-ENTMCNC: 33.3 G/DL (ref 32–36)
MCHC RBC AUTO-ENTMCNC: 33.5 G/DL (ref 32–36)
MCHC RBC AUTO-ENTMCNC: 33.7 G/DL (ref 32–36)
MCV RBC AUTO: 110 FL (ref 82–98)
MCV RBC AUTO: 111 FL (ref 82–98)
MCV RBC AUTO: 114 FL (ref 82–98)
MONOCYTES # BLD AUTO: 0.2 K/UL (ref 0.3–1)
MONOCYTES # BLD AUTO: 0.2 K/UL (ref 0.3–1)
MONOCYTES # BLD AUTO: 0.3 K/UL (ref 0.3–1)
MONOCYTES # BLD AUTO: 0.4 K/UL (ref 0.3–1)
MONOCYTES NFR BLD: 6.9 % (ref 4–15)
MONOCYTES NFR BLD: 7.4 % (ref 4–15)
MONOCYTES NFR BLD: 7.8 % (ref 4–15)
MONOCYTES NFR BLD: 8.6 % (ref 4–15)
MONOCYTES NFR BLD: 9.3 % (ref 4–15)
MONOCYTES NFR BLD: 9.7 % (ref 4–15)
NEUTROPHILS # BLD AUTO: 1.5 K/UL (ref 1.8–7.7)
NEUTROPHILS # BLD AUTO: 1.7 K/UL (ref 1.8–7.7)
NEUTROPHILS # BLD AUTO: 1.8 K/UL (ref 1.8–7.7)
NEUTROPHILS # BLD AUTO: 1.9 K/UL (ref 1.8–7.7)
NEUTROPHILS # BLD AUTO: 2.6 K/UL (ref 1.8–7.7)
NEUTROPHILS # BLD AUTO: 2.6 K/UL (ref 1.8–7.7)
NEUTROPHILS NFR BLD: 56.9 % (ref 38–73)
NEUTROPHILS NFR BLD: 59.3 % (ref 38–73)
NEUTROPHILS NFR BLD: 60.1 % (ref 38–73)
NEUTROPHILS NFR BLD: 61.7 % (ref 38–73)
NEUTROPHILS NFR BLD: 61.8 % (ref 38–73)
NEUTROPHILS NFR BLD: 62.4 % (ref 38–73)
NITRITE, POC UA: NORMAL
NRBC BLD-RTO: 0 /100 WBC
NRBC BLD-RTO: 1 /100 WBC
NUM UNITS TRANS PACKED RBC: NORMAL
NUM UNITS TRANS PACKED RBC: NORMAL
PH, POC UA: 9
PHOSPHATE SERPL-MCNC: 2 MG/DL (ref 2.7–4.5)
PHOSPHATE SERPL-MCNC: 2.2 MG/DL (ref 2.7–4.5)
PHOSPHATE SERPL-MCNC: 2.5 MG/DL (ref 2.7–4.5)
PHOSPHATE SERPL-MCNC: 2.7 MG/DL (ref 2.7–4.5)
PLATELET # BLD AUTO: 14 K/UL (ref 150–350)
PLATELET # BLD AUTO: 14 K/UL (ref 150–350)
PLATELET # BLD AUTO: 16 K/UL (ref 150–350)
PLATELET # BLD AUTO: 16 K/UL (ref 150–350)
PLATELET # BLD AUTO: 20 K/UL (ref 150–350)
PLATELET # BLD AUTO: 22 K/UL (ref 150–350)
PLATELET BLD QL SMEAR: ABNORMAL
PMV BLD AUTO: 11 FL (ref 9.2–12.9)
PMV BLD AUTO: 11.5 FL (ref 9.2–12.9)
PMV BLD AUTO: 11.9 FL (ref 9.2–12.9)
PMV BLD AUTO: 12.3 FL (ref 9.2–12.9)
PMV BLD AUTO: 12.6 FL (ref 9.2–12.9)
PMV BLD AUTO: 9.7 FL (ref 9.2–12.9)
POTASSIUM SERPL-SCNC: 2.5 MMOL/L (ref 3.5–5.1)
POTASSIUM SERPL-SCNC: 2.7 MMOL/L (ref 3.5–5.1)
POTASSIUM SERPL-SCNC: 3.2 MMOL/L (ref 3.5–5.1)
POTASSIUM SERPL-SCNC: 3.4 MMOL/L (ref 3.5–5.1)
POTASSIUM SERPL-SCNC: 3.4 MMOL/L (ref 3.5–5.1)
POTASSIUM SERPL-SCNC: 3.6 MMOL/L (ref 3.5–5.1)
PROT SERPL-MCNC: 4 G/DL (ref 6–8.4)
PROT SERPL-MCNC: 4.4 G/DL (ref 6–8.4)
PROT SERPL-MCNC: 4.5 G/DL (ref 6–8.4)
PROT SERPL-MCNC: 4.5 G/DL (ref 6–8.4)
PROT SERPL-MCNC: 4.7 G/DL (ref 6–8.4)
PROT SERPL-MCNC: 5.2 G/DL (ref 6–8.4)
PROTEIN, POC: 100
PROTHROMBIN TIME: 14.3 SEC (ref 10.6–14.8)
RBC # BLD AUTO: 1.77 M/UL (ref 4.6–6.2)
RBC # BLD AUTO: 2.05 M/UL (ref 4.6–6.2)
RBC # BLD AUTO: 2.09 M/UL (ref 4.6–6.2)
RBC # BLD AUTO: 2.09 M/UL (ref 4.6–6.2)
RBC # BLD AUTO: 2.2 M/UL (ref 4.6–6.2)
RBC # BLD AUTO: 2.37 M/UL (ref 4.6–6.2)
SARS-COV-2 RDRP RESP QL NAA+PROBE: NEGATIVE
SODIUM SERPL-SCNC: 138 MMOL/L (ref 136–145)
SODIUM SERPL-SCNC: 140 MMOL/L (ref 136–145)
SODIUM SERPL-SCNC: 140 MMOL/L (ref 136–145)
SODIUM SERPL-SCNC: 142 MMOL/L (ref 136–145)
SODIUM SERPL-SCNC: 143 MMOL/L (ref 136–145)
SODIUM SERPL-SCNC: 143 MMOL/L (ref 136–145)
SODIUM SERPL-SCNC: 145 MMOL/L (ref 136–145)
SODIUM SERPL-SCNC: 145 MMOL/L (ref 136–145)
SPECIFIC GRAVITY, POC UA: 1
TROPONIN I SERPL DL<=0.01 NG/ML-MCNC: 0.05 NG/ML
TROPONIN I SERPL DL<=0.01 NG/ML-MCNC: 0.06 NG/ML
TROPONIN I SERPL DL<=0.01 NG/ML-MCNC: 0.07 NG/ML
UROBILINOGEN, POC UA: NORMAL
WBC # BLD AUTO: 2.7 K/UL (ref 3.9–12.7)
WBC # BLD AUTO: 2.83 K/UL (ref 3.9–12.7)
WBC # BLD AUTO: 2.9 K/UL (ref 3.9–12.7)
WBC # BLD AUTO: 3.11 K/UL (ref 3.9–12.7)
WBC # BLD AUTO: 4.21 K/UL (ref 3.9–12.7)
WBC # BLD AUTO: 4.32 K/UL (ref 3.9–12.7)

## 2020-01-01 PROCEDURE — 80053 COMPREHEN METABOLIC PANEL: CPT | Mod: 91

## 2020-01-01 PROCEDURE — 84484 ASSAY OF TROPONIN QUANT: CPT | Mod: 91

## 2020-01-01 PROCEDURE — 81002 POCT URINE DIPSTICK WITHOUT MICROSCOPE: ICD-10-PCS | Mod: S$GLB,,, | Performed by: NURSE PRACTITIONER

## 2020-01-01 PROCEDURE — 25000003 PHARM REV CODE 250: Performed by: INTERNAL MEDICINE

## 2020-01-01 PROCEDURE — 1159F MED LIST DOCD IN RCRD: CPT | Mod: S$GLB,,, | Performed by: INTERNAL MEDICINE

## 2020-01-01 PROCEDURE — 63600175 PHARM REV CODE 636 W HCPCS: Performed by: INTERNAL MEDICINE

## 2020-01-01 PROCEDURE — 1159F PR MEDICATION LIST DOCUMENTED IN MEDICAL RECORD: ICD-10-PCS | Mod: S$GLB,,, | Performed by: NURSE PRACTITIONER

## 2020-01-01 PROCEDURE — 99214 PR OFFICE/OUTPT VISIT, EST, LEVL IV, 30-39 MIN: ICD-10-PCS | Mod: 25,S$GLB,, | Performed by: NURSE PRACTITIONER

## 2020-01-01 PROCEDURE — 1101F PR PT FALLS ASSESS DOC 0-1 FALLS W/OUT INJ PAST YR: ICD-10-PCS | Mod: S$GLB,,, | Performed by: INTERNAL MEDICINE

## 2020-01-01 PROCEDURE — 1159F MED LIST DOCD IN RCRD: CPT | Mod: 95,,, | Performed by: INTERNAL MEDICINE

## 2020-01-01 PROCEDURE — 3074F PR MOST RECENT SYSTOLIC BLOOD PRESSURE < 130 MM HG: ICD-10-PCS | Mod: S$GLB,,, | Performed by: INTERNAL MEDICINE

## 2020-01-01 PROCEDURE — 1101F PR PT FALLS ASSESS DOC 0-1 FALLS W/OUT INJ PAST YR: ICD-10-PCS | Mod: 95,,, | Performed by: INTERNAL MEDICINE

## 2020-01-01 PROCEDURE — 84484 ASSAY OF TROPONIN QUANT: CPT

## 2020-01-01 PROCEDURE — 1159F PR MEDICATION LIST DOCUMENTED IN MEDICAL RECORD: ICD-10-PCS | Mod: 95,,, | Performed by: INTERNAL MEDICINE

## 2020-01-01 PROCEDURE — 3078F DIAST BP <80 MM HG: CPT | Mod: CPTII,S$GLB,, | Performed by: INTERNAL MEDICINE

## 2020-01-01 PROCEDURE — 3008F PR BODY MASS INDEX (BMI) DOCUMENTED: ICD-10-PCS | Mod: S$GLB,,, | Performed by: INTERNAL MEDICINE

## 2020-01-01 PROCEDURE — 25000003 PHARM REV CODE 250: Performed by: STUDENT IN AN ORGANIZED HEALTH CARE EDUCATION/TRAINING PROGRAM

## 2020-01-01 PROCEDURE — 3008F PR BODY MASS INDEX (BMI) DOCUMENTED: ICD-10-PCS | Mod: CPTII,S$GLB,, | Performed by: INTERNAL MEDICINE

## 2020-01-01 PROCEDURE — 83735 ASSAY OF MAGNESIUM: CPT

## 2020-01-01 PROCEDURE — 36415 COLL VENOUS BLD VENIPUNCTURE: CPT | Mod: PO

## 2020-01-01 PROCEDURE — 1101F PT FALLS ASSESS-DOCD LE1/YR: CPT | Mod: CPTII,S$GLB,, | Performed by: NURSE PRACTITIONER

## 2020-01-01 PROCEDURE — 99215 PR OFFICE/OUTPT VISIT, EST, LEVL V, 40-54 MIN: ICD-10-PCS | Mod: 25,S$GLB,, | Performed by: INTERNAL MEDICINE

## 2020-01-01 PROCEDURE — 3008F BODY MASS INDEX DOCD: CPT | Mod: S$GLB,,, | Performed by: INTERNAL MEDICINE

## 2020-01-01 PROCEDURE — 25000003 PHARM REV CODE 250: Performed by: NURSE PRACTITIONER

## 2020-01-01 PROCEDURE — 99215 OFFICE O/P EST HI 40 MIN: CPT | Mod: 25,S$GLB,, | Performed by: INTERNAL MEDICINE

## 2020-01-01 PROCEDURE — 3074F SYST BP LT 130 MM HG: CPT | Mod: CPTII,S$GLB,, | Performed by: INTERNAL MEDICINE

## 2020-01-01 PROCEDURE — 81002 URINALYSIS NONAUTO W/O SCOPE: CPT | Mod: S$GLB,,, | Performed by: NURSE PRACTITIONER

## 2020-01-01 PROCEDURE — 3078F DIAST BP <80 MM HG: CPT | Mod: CPTII,S$GLB,, | Performed by: NURSE PRACTITIONER

## 2020-01-01 PROCEDURE — 93010 ELECTROCARDIOGRAM REPORT: CPT | Mod: ,,, | Performed by: INTERNAL MEDICINE

## 2020-01-01 PROCEDURE — 1126F PR PAIN SEVERITY QUANTIFIED, NO PAIN PRESENT: ICD-10-PCS | Mod: S$GLB,,, | Performed by: NURSE PRACTITIONER

## 2020-01-01 PROCEDURE — 85610 PROTHROMBIN TIME: CPT

## 2020-01-01 PROCEDURE — 1101F PR PT FALLS ASSESS DOC 0-1 FALLS W/OUT INJ PAST YR: ICD-10-PCS | Mod: CPTII,S$GLB,, | Performed by: INTERNAL MEDICINE

## 2020-01-01 PROCEDURE — 1101F PT FALLS ASSESS-DOCD LE1/YR: CPT | Mod: CPTII,S$GLB,, | Performed by: INTERNAL MEDICINE

## 2020-01-01 PROCEDURE — 3075F PR MOST RECENT SYSTOLIC BLOOD PRESS GE 130-139MM HG: ICD-10-PCS | Mod: 95,,, | Performed by: INTERNAL MEDICINE

## 2020-01-01 PROCEDURE — 1111F PR DISCHARGE MEDS RECONCILED W/ CURRENT OUTPATIENT MED LIST: ICD-10-PCS | Mod: S$GLB,,, | Performed by: INTERNAL MEDICINE

## 2020-01-01 PROCEDURE — 99213 OFFICE O/P EST LOW 20 MIN: CPT | Mod: S$GLB,,, | Performed by: INTERNAL MEDICINE

## 2020-01-01 PROCEDURE — 3074F PR MOST RECENT SYSTOLIC BLOOD PRESSURE < 130 MM HG: ICD-10-PCS | Mod: CPTII,S$GLB,, | Performed by: INTERNAL MEDICINE

## 2020-01-01 PROCEDURE — 90662 IIV NO PRSV INCREASED AG IM: CPT | Mod: S$GLB,,, | Performed by: INTERNAL MEDICINE

## 2020-01-01 PROCEDURE — 36415 COLL VENOUS BLD VENIPUNCTURE: CPT

## 2020-01-01 PROCEDURE — 3078F PR MOST RECENT DIASTOLIC BLOOD PRESSURE < 80 MM HG: ICD-10-PCS | Mod: CPTII,S$GLB,, | Performed by: INTERNAL MEDICINE

## 2020-01-01 PROCEDURE — 96402 CHEMO HORMON ANTINEOPL SQ/IM: CPT | Mod: 59,S$GLB,, | Performed by: NURSE PRACTITIONER

## 2020-01-01 PROCEDURE — 3008F PR BODY MASS INDEX (BMI) DOCUMENTED: ICD-10-PCS | Mod: CPTII,S$GLB,, | Performed by: NURSE PRACTITIONER

## 2020-01-01 PROCEDURE — 1159F PR MEDICATION LIST DOCUMENTED IN MEDICAL RECORD: ICD-10-PCS | Mod: S$GLB,,, | Performed by: INTERNAL MEDICINE

## 2020-01-01 PROCEDURE — 96402 PR CHEMOTHER HORMON ANTINEOPL SUB-Q/IM: ICD-10-PCS | Mod: S$GLB,,, | Performed by: NURSE PRACTITIONER

## 2020-01-01 PROCEDURE — 96523 IRRIG DRUG DELIVERY DEVICE: CPT

## 2020-01-01 PROCEDURE — 1101F PT FALLS ASSESS-DOCD LE1/YR: CPT | Mod: S$GLB,,, | Performed by: INTERNAL MEDICINE

## 2020-01-01 PROCEDURE — 1126F AMNT PAIN NOTED NONE PRSNT: CPT | Mod: S$GLB,,, | Performed by: INTERNAL MEDICINE

## 2020-01-01 PROCEDURE — G0378 HOSPITAL OBSERVATION PER HR: HCPCS

## 2020-01-01 PROCEDURE — 84100 ASSAY OF PHOSPHORUS: CPT

## 2020-01-01 PROCEDURE — 1101F PR PT FALLS ASSESS DOC 0-1 FALLS W/OUT INJ PAST YR: ICD-10-PCS | Mod: CPTII,S$GLB,, | Performed by: NURSE PRACTITIONER

## 2020-01-01 PROCEDURE — 99999 PR PBB SHADOW E&M-EST. PATIENT-LVL IV: CPT | Mod: PBBFAC,,, | Performed by: NURSE PRACTITIONER

## 2020-01-01 PROCEDURE — 3077F SYST BP >= 140 MM HG: CPT | Mod: CPTII,S$GLB,, | Performed by: NURSE PRACTITIONER

## 2020-01-01 PROCEDURE — 99214 OFFICE O/P EST MOD 30 MIN: CPT | Mod: 25,S$GLB,, | Performed by: NURSE PRACTITIONER

## 2020-01-01 PROCEDURE — 99214 PR OFFICE/OUTPT VISIT, EST, LEVL IV, 30-39 MIN: ICD-10-PCS | Mod: S$GLB,,, | Performed by: INTERNAL MEDICINE

## 2020-01-01 PROCEDURE — 3079F DIAST BP 80-89 MM HG: CPT | Mod: CPTII,S$GLB,, | Performed by: NURSE PRACTITIONER

## 2020-01-01 PROCEDURE — 3078F PR MOST RECENT DIASTOLIC BLOOD PRESSURE < 80 MM HG: ICD-10-PCS | Mod: CPTII,S$GLB,, | Performed by: NURSE PRACTITIONER

## 2020-01-01 PROCEDURE — 99499 NO LOS: ICD-10-PCS | Mod: S$GLB,,, | Performed by: INTERNAL MEDICINE

## 2020-01-01 PROCEDURE — 3008F BODY MASS INDEX DOCD: CPT | Mod: CPTII,S$GLB,, | Performed by: INTERNAL MEDICINE

## 2020-01-01 PROCEDURE — A4216 STERILE WATER/SALINE, 10 ML: HCPCS | Performed by: INTERNAL MEDICINE

## 2020-01-01 PROCEDURE — 3074F PR MOST RECENT SYSTOLIC BLOOD PRESSURE < 130 MM HG: ICD-10-PCS | Mod: 95,,, | Performed by: INTERNAL MEDICINE

## 2020-01-01 PROCEDURE — 3074F SYST BP LT 130 MM HG: CPT | Mod: S$GLB,,, | Performed by: INTERNAL MEDICINE

## 2020-01-01 PROCEDURE — 3079F DIAST BP 80-89 MM HG: CPT | Mod: S$GLB,,, | Performed by: INTERNAL MEDICINE

## 2020-01-01 PROCEDURE — 99213 PR OFFICE/OUTPT VISIT, EST, LEVL III, 20-29 MIN: ICD-10-PCS | Mod: 95,,, | Performed by: INTERNAL MEDICINE

## 2020-01-01 PROCEDURE — 1126F PR PAIN SEVERITY QUANTIFIED, NO PAIN PRESENT: ICD-10-PCS | Mod: S$GLB,,, | Performed by: INTERNAL MEDICINE

## 2020-01-01 PROCEDURE — 1159F MED LIST DOCD IN RCRD: CPT | Mod: S$GLB,,, | Performed by: NURSE PRACTITIONER

## 2020-01-01 PROCEDURE — 83880 ASSAY OF NATRIURETIC PEPTIDE: CPT

## 2020-01-01 PROCEDURE — 21400001 HC TELEMETRY ROOM

## 2020-01-01 PROCEDURE — U0002 COVID-19 LAB TEST NON-CDC: HCPCS

## 2020-01-01 PROCEDURE — 96360 HYDRATION IV INFUSION INIT: CPT

## 2020-01-01 PROCEDURE — 25000003 PHARM REV CODE 250: Performed by: EMERGENCY MEDICINE

## 2020-01-01 PROCEDURE — 80048 BASIC METABOLIC PNL TOTAL CA: CPT

## 2020-01-01 PROCEDURE — 80053 COMPREHEN METABOLIC PANEL: CPT

## 2020-01-01 PROCEDURE — 86901 BLOOD TYPING SEROLOGIC RH(D): CPT

## 2020-01-01 PROCEDURE — 96402 CHEMO HORMON ANTINEOPL SQ/IM: CPT | Mod: S$GLB,,, | Performed by: NURSE PRACTITIONER

## 2020-01-01 PROCEDURE — 95806 SLEEP STUDY UNATT&RESP EFFT: CPT

## 2020-01-01 PROCEDURE — 1101F PT FALLS ASSESS-DOCD LE1/YR: CPT | Mod: 95,,, | Performed by: INTERNAL MEDICINE

## 2020-01-01 PROCEDURE — 99999 PR PBB SHADOW E&M-EST. PATIENT-LVL IV: ICD-10-PCS | Mod: PBBFAC,,, | Performed by: NURSE PRACTITIONER

## 2020-01-01 PROCEDURE — 99285 EMERGENCY DEPT VISIT HI MDM: CPT | Mod: 25

## 2020-01-01 PROCEDURE — 3079F PR MOST RECENT DIASTOLIC BLOOD PRESSURE 80-89 MM HG: ICD-10-PCS | Mod: S$GLB,,, | Performed by: INTERNAL MEDICINE

## 2020-01-01 PROCEDURE — 99213 OFFICE O/P EST LOW 20 MIN: CPT | Mod: 95,,, | Performed by: INTERNAL MEDICINE

## 2020-01-01 PROCEDURE — 3079F PR MOST RECENT DIASTOLIC BLOOD PRESSURE 80-89 MM HG: ICD-10-PCS | Mod: CPTII,S$GLB,, | Performed by: NURSE PRACTITIONER

## 2020-01-01 PROCEDURE — 85025 COMPLETE CBC W/AUTO DIFF WBC: CPT

## 2020-01-01 PROCEDURE — 3078F DIAST BP <80 MM HG: CPT | Mod: 95,,, | Performed by: INTERNAL MEDICINE

## 2020-01-01 PROCEDURE — 3077F PR MOST RECENT SYSTOLIC BLOOD PRESSURE >= 140 MM HG: ICD-10-PCS | Mod: CPTII,S$GLB,, | Performed by: NURSE PRACTITIONER

## 2020-01-01 PROCEDURE — G0008 ADMIN INFLUENZA VIRUS VAC: HCPCS | Mod: S$GLB,,, | Performed by: INTERNAL MEDICINE

## 2020-01-01 PROCEDURE — 3078F PR MOST RECENT DIASTOLIC BLOOD PRESSURE < 80 MM HG: ICD-10-PCS | Mod: S$GLB,,, | Performed by: INTERNAL MEDICINE

## 2020-01-01 PROCEDURE — 93005 ELECTROCARDIOGRAM TRACING: CPT | Performed by: INTERNAL MEDICINE

## 2020-01-01 PROCEDURE — 3078F PR MOST RECENT DIASTOLIC BLOOD PRESSURE < 80 MM HG: ICD-10-PCS | Mod: 95,,, | Performed by: INTERNAL MEDICINE

## 2020-01-01 PROCEDURE — 93010 EKG 12-LEAD: ICD-10-PCS | Mod: ,,, | Performed by: INTERNAL MEDICINE

## 2020-01-01 PROCEDURE — 84153 ASSAY OF PSA TOTAL: CPT

## 2020-01-01 PROCEDURE — 83690 ASSAY OF LIPASE: CPT

## 2020-01-01 PROCEDURE — 97162 PT EVAL MOD COMPLEX 30 MIN: CPT

## 2020-01-01 PROCEDURE — 1111F DSCHRG MED/CURRENT MED MERGE: CPT | Mod: S$GLB,,, | Performed by: INTERNAL MEDICINE

## 2020-01-01 PROCEDURE — 3008F BODY MASS INDEX DOCD: CPT | Mod: CPTII,S$GLB,, | Performed by: NURSE PRACTITIONER

## 2020-01-01 PROCEDURE — 3078F DIAST BP <80 MM HG: CPT | Mod: S$GLB,,, | Performed by: INTERNAL MEDICINE

## 2020-01-01 PROCEDURE — 3288F PR FALLS RISK ASSESSMENT DOCUMENTED: ICD-10-PCS | Mod: CPTII,S$GLB,, | Performed by: INTERNAL MEDICINE

## 2020-01-01 PROCEDURE — 86920 COMPATIBILITY TEST SPIN: CPT

## 2020-01-01 PROCEDURE — 3288F FALL RISK ASSESSMENT DOCD: CPT | Mod: CPTII,S$GLB,, | Performed by: INTERNAL MEDICINE

## 2020-01-01 PROCEDURE — 96402 PR CHEMOTHER HORMON ANTINEOPL SUB-Q/IM: ICD-10-PCS | Mod: 59,S$GLB,, | Performed by: NURSE PRACTITIONER

## 2020-01-01 PROCEDURE — 99499 UNLISTED E&M SERVICE: CPT | Mod: S$GLB,,, | Performed by: INTERNAL MEDICINE

## 2020-01-01 PROCEDURE — 82150 ASSAY OF AMYLASE: CPT

## 2020-01-01 PROCEDURE — 3075F SYST BP GE 130 - 139MM HG: CPT | Mod: 95,,, | Performed by: INTERNAL MEDICINE

## 2020-01-01 PROCEDURE — 3074F SYST BP LT 130 MM HG: CPT | Mod: 95,,, | Performed by: INTERNAL MEDICINE

## 2020-01-01 PROCEDURE — 97116 GAIT TRAINING THERAPY: CPT

## 2020-01-01 PROCEDURE — G0008 FLU VACCINE - QUADRIVALENT - HIGH DOSE (65+) PRESERVATIVE FREE IM: ICD-10-PCS | Mod: S$GLB,,, | Performed by: INTERNAL MEDICINE

## 2020-01-01 PROCEDURE — 1126F AMNT PAIN NOTED NONE PRSNT: CPT | Mod: S$GLB,,, | Performed by: NURSE PRACTITIONER

## 2020-01-01 PROCEDURE — 99213 PR OFFICE/OUTPT VISIT, EST, LEVL III, 20-29 MIN: ICD-10-PCS | Mod: S$GLB,,, | Performed by: INTERNAL MEDICINE

## 2020-01-01 PROCEDURE — 90662 FLU VACCINE - QUADRIVALENT - HIGH DOSE (65+) PRESERVATIVE FREE IM: ICD-10-PCS | Mod: S$GLB,,, | Performed by: INTERNAL MEDICINE

## 2020-01-01 PROCEDURE — 99214 OFFICE O/P EST MOD 30 MIN: CPT | Mod: S$GLB,,, | Performed by: INTERNAL MEDICINE

## 2020-01-01 RX ORDER — POTASSIUM CHLORIDE 1.5 G/1.58G
40 POWDER, FOR SOLUTION ORAL
Status: DISCONTINUED | OUTPATIENT
Start: 2020-01-01 | End: 2020-01-01 | Stop reason: HOSPADM

## 2020-01-01 RX ORDER — ACETAMINOPHEN 325 MG/1
650 TABLET ORAL EVERY 4 HOURS PRN
Status: DISCONTINUED | OUTPATIENT
Start: 2020-01-01 | End: 2020-01-01 | Stop reason: HOSPADM

## 2020-01-01 RX ORDER — SODIUM CHLORIDE 0.9 % (FLUSH) 0.9 %
10 SYRINGE (ML) INJECTION
Status: COMPLETED | OUTPATIENT
Start: 2020-01-01 | End: 2020-01-01

## 2020-01-01 RX ORDER — SODIUM CHLORIDE 0.9 % (FLUSH) 0.9 %
10 SYRINGE (ML) INJECTION
Status: CANCELLED | OUTPATIENT
Start: 2020-01-01

## 2020-01-01 RX ORDER — LANOLIN ALCOHOL/MO/W.PET/CERES
400 CREAM (GRAM) TOPICAL DAILY
COMMUNITY
End: 2021-01-01 | Stop reason: ALTCHOICE

## 2020-01-01 RX ORDER — SODIUM BICARBONATE 650 MG/1
650 TABLET ORAL 2 TIMES DAILY
Status: DISCONTINUED | OUTPATIENT
Start: 2020-01-01 | End: 2020-01-01 | Stop reason: HOSPADM

## 2020-01-01 RX ORDER — PANCRELIPASE 36000; 180000; 114000 [USP'U]/1; [USP'U]/1; [USP'U]/1
CAPSULE, DELAYED RELEASE PELLETS ORAL
COMMUNITY
Start: 2020-01-01

## 2020-01-01 RX ORDER — EPINEPHRINE 0.22MG
100 AEROSOL WITH ADAPTER (ML) INHALATION DAILY
COMMUNITY
End: 2020-01-01

## 2020-01-01 RX ORDER — SODIUM,POTASSIUM PHOSPHATES 280-250MG
2 POWDER IN PACKET (EA) ORAL
Status: DISCONTINUED | OUTPATIENT
Start: 2020-01-01 | End: 2020-01-01 | Stop reason: HOSPADM

## 2020-01-01 RX ORDER — ONDANSETRON 4 MG/1
4 TABLET, FILM COATED ORAL EVERY 6 HOURS PRN
COMMUNITY
End: 2021-01-01

## 2020-01-01 RX ORDER — HEPARIN 100 UNIT/ML
5 SYRINGE INTRAVENOUS ONCE
Status: DISCONTINUED | OUTPATIENT
Start: 2020-01-01 | End: 2020-01-01 | Stop reason: HOSPADM

## 2020-01-01 RX ORDER — TALC
6 POWDER (GRAM) TOPICAL NIGHTLY PRN
Status: DISCONTINUED | OUTPATIENT
Start: 2020-01-01 | End: 2020-01-01 | Stop reason: HOSPADM

## 2020-01-01 RX ORDER — PANTOPRAZOLE SODIUM 40 MG/1
40 TABLET, DELAYED RELEASE ORAL DAILY
Status: DISCONTINUED | OUTPATIENT
Start: 2020-01-01 | End: 2020-01-01 | Stop reason: HOSPADM

## 2020-01-01 RX ORDER — CALCIUM CARBONATE 500(1250)
1000 TABLET ORAL 3 TIMES DAILY
Status: DISCONTINUED | OUTPATIENT
Start: 2020-01-01 | End: 2020-01-01

## 2020-01-01 RX ORDER — HEPARIN 100 UNIT/ML
500 SYRINGE INTRAVENOUS
Status: COMPLETED | OUTPATIENT
Start: 2020-01-01 | End: 2020-01-01

## 2020-01-01 RX ORDER — LOPERAMIDE HYDROCHLORIDE 2 MG/1
2 CAPSULE ORAL EVERY 6 HOURS PRN
COMMUNITY

## 2020-01-01 RX ORDER — CALCIUM CARBONATE 500(1250)
500 TABLET ORAL 3 TIMES DAILY
Status: DISCONTINUED | OUTPATIENT
Start: 2020-01-01 | End: 2020-01-01 | Stop reason: HOSPADM

## 2020-01-01 RX ORDER — POTASSIUM CHLORIDE 20 MEQ/1
40 TABLET, EXTENDED RELEASE ORAL ONCE
Status: COMPLETED | OUTPATIENT
Start: 2020-01-01 | End: 2020-01-01

## 2020-01-01 RX ORDER — POTASSIUM CHLORIDE 20 MEQ/1
20 TABLET, EXTENDED RELEASE ORAL ONCE
Status: COMPLETED | OUTPATIENT
Start: 2020-01-01 | End: 2020-01-01

## 2020-01-01 RX ORDER — HEPARIN 100 UNIT/ML
500 SYRINGE INTRAVENOUS
Status: CANCELLED | OUTPATIENT
Start: 2020-01-01

## 2020-01-01 RX ORDER — POTASSIUM CHLORIDE 20 MEQ/1
20 TABLET, EXTENDED RELEASE ORAL 3 TIMES DAILY
Status: DISCONTINUED | OUTPATIENT
Start: 2020-01-01 | End: 2020-01-01 | Stop reason: HOSPADM

## 2020-01-01 RX ORDER — SODIUM CHLORIDE 9 MG/ML
INJECTION, SOLUTION INTRAVENOUS CONTINUOUS
Status: DISCONTINUED | OUTPATIENT
Start: 2020-01-01 | End: 2020-01-01 | Stop reason: HOSPADM

## 2020-01-01 RX ORDER — ALBUTEROL SULFATE 90 UG/1
2 AEROSOL, METERED RESPIRATORY (INHALATION) EVERY 6 HOURS PRN
Qty: 18 G | Refills: 2 | Status: SHIPPED | OUTPATIENT
Start: 2020-01-01 | End: 2021-01-01

## 2020-01-01 RX ORDER — LANOLIN ALCOHOL/MO/W.PET/CERES
800 CREAM (GRAM) TOPICAL
Status: DISCONTINUED | OUTPATIENT
Start: 2020-01-01 | End: 2020-01-01 | Stop reason: HOSPADM

## 2020-01-01 RX ORDER — METOPROLOL SUCCINATE 25 MG/1
25 TABLET, EXTENDED RELEASE ORAL DAILY
COMMUNITY
Start: 2018-03-16 | End: 2021-01-01

## 2020-01-01 RX ORDER — CHOLECALCIFEROL (VITAMIN D3) 25 MCG
1000 TABLET ORAL DAILY
Qty: 1 TABLET | Refills: 0
Start: 2020-01-01

## 2020-01-01 RX ORDER — SODIUM BICARBONATE 650 MG/1
1950 TABLET ORAL 2 TIMES DAILY
Status: DISCONTINUED | OUTPATIENT
Start: 2020-01-01 | End: 2020-01-01 | Stop reason: HOSPADM

## 2020-01-01 RX ORDER — SODIUM CHLORIDE 0.9 % (FLUSH) 0.9 %
10 SYRINGE (ML) INJECTION
Status: DISCONTINUED | OUTPATIENT
Start: 2020-01-01 | End: 2020-01-01 | Stop reason: HOSPADM

## 2020-01-01 RX ORDER — FUROSEMIDE 40 MG/1
40 TABLET ORAL DAILY
Qty: 30 TABLET | Refills: 11 | Status: ON HOLD | OUTPATIENT
Start: 2020-01-01 | End: 2020-01-01 | Stop reason: HOSPADM

## 2020-01-01 RX ORDER — METOPROLOL SUCCINATE 25 MG/1
25 TABLET, EXTENDED RELEASE ORAL DAILY
Status: DISCONTINUED | OUTPATIENT
Start: 2020-01-01 | End: 2020-01-01 | Stop reason: HOSPADM

## 2020-01-01 RX ORDER — OXYBUTYNIN CHLORIDE 5 MG/1
5 TABLET, EXTENDED RELEASE ORAL DAILY
Qty: 90 TABLET | Refills: 3 | Status: SHIPPED | OUTPATIENT
Start: 2020-01-01 | End: 2021-02-26

## 2020-01-01 RX ORDER — ACYCLOVIR 400 MG/1
TABLET ORAL
COMMUNITY
Start: 2020-01-27 | End: 2020-01-01

## 2020-01-01 RX ORDER — LANOLIN ALCOHOL/MO/W.PET/CERES
400 CREAM (GRAM) TOPICAL DAILY
Status: DISCONTINUED | OUTPATIENT
Start: 2020-01-01 | End: 2020-01-01 | Stop reason: HOSPADM

## 2020-01-01 RX ORDER — GARLIC 1000 MG
CAPSULE ORAL
COMMUNITY
End: 2020-01-01

## 2020-01-01 RX ORDER — POTASSIUM CHLORIDE 20 MEQ/1
40 TABLET, EXTENDED RELEASE ORAL 3 TIMES DAILY
Status: DISCONTINUED | OUTPATIENT
Start: 2020-01-01 | End: 2020-01-01

## 2020-01-01 RX ORDER — OXYBUTYNIN CHLORIDE 5 MG/1
5 TABLET, EXTENDED RELEASE ORAL DAILY
Status: DISCONTINUED | OUTPATIENT
Start: 2020-01-01 | End: 2020-01-01 | Stop reason: HOSPADM

## 2020-01-01 RX ORDER — VITAMIN B COMPLEX
1 CAPSULE ORAL DAILY
COMMUNITY

## 2020-01-01 RX ORDER — POTASSIUM CHLORIDE 20 MEQ/1
20 TABLET, EXTENDED RELEASE ORAL 2 TIMES DAILY
COMMUNITY
Start: 2020-01-01

## 2020-01-01 RX ORDER — ONDANSETRON 2 MG/ML
4 INJECTION INTRAMUSCULAR; INTRAVENOUS EVERY 8 HOURS PRN
Status: DISCONTINUED | OUTPATIENT
Start: 2020-01-01 | End: 2020-01-01 | Stop reason: HOSPADM

## 2020-01-01 RX ADMIN — THERA TABS 1 TABLET: TAB at 08:10

## 2020-01-01 RX ADMIN — METOPROLOL SUCCINATE 25 MG: 25 TABLET, FILM COATED, EXTENDED RELEASE ORAL at 08:10

## 2020-01-01 RX ADMIN — HEPARIN 500 UNITS: 100 SYRINGE at 09:11

## 2020-01-01 RX ADMIN — HEPARIN 500 UNITS: 100 SYRINGE at 10:06

## 2020-01-01 RX ADMIN — MAGNESIUM OXIDE 400 MG: 400 TABLET ORAL at 08:10

## 2020-01-01 RX ADMIN — CALCIUM 500 MG: 500 TABLET ORAL at 08:10

## 2020-01-01 RX ADMIN — POTASSIUM BICARBONATE 50 MEQ: 977.5 TABLET, EFFERVESCENT ORAL at 08:10

## 2020-01-01 RX ADMIN — SODIUM CHLORIDE: 0.9 INJECTION, SOLUTION INTRAVENOUS at 09:10

## 2020-01-01 RX ADMIN — SODIUM CHLORIDE: 0.9 INJECTION, SOLUTION INTRAVENOUS at 06:10

## 2020-01-01 RX ADMIN — SODIUM CHLORIDE: 0.9 INJECTION, SOLUTION INTRAVENOUS at 08:10

## 2020-01-01 RX ADMIN — CALCIUM 500 MG: 500 TABLET ORAL at 03:10

## 2020-01-01 RX ADMIN — SODIUM CHLORIDE 10 ML: 9 INJECTION, SOLUTION INTRAMUSCULAR; INTRAVENOUS; SUBCUTANEOUS at 09:11

## 2020-01-01 RX ADMIN — POTASSIUM CHLORIDE 20 MEQ: 20 TABLET, EXTENDED RELEASE ORAL at 03:10

## 2020-01-01 RX ADMIN — SODIUM BICARBONATE 650 MG TABLET 1950 MG: at 08:10

## 2020-01-01 RX ADMIN — POTASSIUM CHLORIDE 20 MEQ: 20 TABLET, EXTENDED RELEASE ORAL at 08:10

## 2020-01-01 RX ADMIN — OXYBUTYNIN CHLORIDE 5 MG: 5 TABLET, EXTENDED RELEASE ORAL at 11:10

## 2020-01-01 RX ADMIN — MAGNESIUM OXIDE 400 MG: 400 TABLET ORAL at 11:10

## 2020-01-01 RX ADMIN — SODIUM BICARBONATE 650 MG TABLET 1950 MG: at 10:10

## 2020-01-01 RX ADMIN — POTASSIUM CHLORIDE 20 MEQ: 20 TABLET, EXTENDED RELEASE ORAL at 11:10

## 2020-01-01 RX ADMIN — SODIUM CHLORIDE: 0.9 INJECTION, SOLUTION INTRAVENOUS at 07:10

## 2020-01-01 RX ADMIN — HEPARIN 500 UNITS: 100 SYRINGE at 10:08

## 2020-01-01 RX ADMIN — THERA TABS 1 TABLET: TAB at 11:10

## 2020-01-01 RX ADMIN — CALCIUM 500 MG: 500 TABLET ORAL at 04:10

## 2020-01-01 RX ADMIN — METOPROLOL SUCCINATE 25 MG: 25 TABLET, FILM COATED, EXTENDED RELEASE ORAL at 11:10

## 2020-01-01 RX ADMIN — POTASSIUM CHLORIDE 20 MEQ: 20 TABLET, EXTENDED RELEASE ORAL at 04:10

## 2020-01-01 RX ADMIN — SODIUM CHLORIDE 1000 ML: 0.9 INJECTION, SOLUTION INTRAVENOUS at 06:10

## 2020-01-01 RX ADMIN — CALCIUM GLUCONATE 1000 MG: 94 INJECTION, SOLUTION INTRAVENOUS at 05:10

## 2020-01-01 RX ADMIN — SODIUM CHLORIDE, PRESERVATIVE FREE 10 ML: 5 INJECTION INTRAVENOUS at 10:08

## 2020-01-01 RX ADMIN — SODIUM BICARBONATE 650 MG TABLET 1950 MG: at 11:10

## 2020-01-01 RX ADMIN — PANTOPRAZOLE SODIUM 40 MG: 40 TABLET, DELAYED RELEASE ORAL at 08:10

## 2020-01-01 RX ADMIN — POTASSIUM CHLORIDE 20 MEQ: 20 TABLET, EXTENDED RELEASE ORAL at 06:10

## 2020-01-01 RX ADMIN — HEPARIN 500 UNITS: 100 SYRINGE at 10:09

## 2020-01-01 RX ADMIN — CALCIUM GLUCONATE 1000 MG: 94 INJECTION, SOLUTION INTRAVENOUS at 11:10

## 2020-01-01 RX ADMIN — POTASSIUM CHLORIDE 40 MEQ: 20 TABLET, EXTENDED RELEASE ORAL at 05:10

## 2020-01-01 RX ADMIN — OXYBUTYNIN CHLORIDE 5 MG: 5 TABLET, EXTENDED RELEASE ORAL at 08:10

## 2020-01-01 RX ADMIN — PANTOPRAZOLE SODIUM 40 MG: 40 TABLET, DELAYED RELEASE ORAL at 11:10

## 2020-01-01 RX ADMIN — SODIUM CHLORIDE: 0.9 INJECTION, SOLUTION INTRAVENOUS at 10:10

## 2020-02-27 NOTE — PROGRESS NOTES
Subjective:       Patient ID: Naresh Painting is a 69 y.o. male.    Chief Complaint: Prostate Cancer    Mr. Painting is a 69-year-old patient that underwent a radical prostatectomy in December of 1995 by Dr. Partida at [facility]".  He was first seen by Dr. Menjivar in 2005 where He was currently on GnRH agaonist therapy/Eligard 45 mg every six months due to rise in PSA.  He was last seen in clinic & received Lupron on 08/24/2018    He denies any urological problems or concerns.  Has been battling a cold; now with just a cough.  Reports good bladder control    He had some swelling to left side of neck which improved;   1st thought (-) Ca but PET Scan (+) then new bx showed diffuse Large B-cell NHL (germ cell origin) lymphoma.   He has completed rounds of chemo/radiation.  S/p bone marrow transplant.   He is having biopsy March 5th.  First seen at [facility], but now followed by Dr. Rdz  He now seeing Dr. Jefferson.     Has noticed worsening LUTS  Urinary urgency with urge incontinence.  Having more diarrhea since chemo/radiation.    Just had 40th wedding anniversary (1/26)                  PSA                  0.02                02/21/2020             PSA                  0.06                03/22/2019                 PSA                  0.06                08/17/2018                 PSA                  0.04                01/26/2018                 PSA                  0.17                07/25/2017                 PSA                  0.22                01/26/2017                 PSA                  0.19                07/22/2016                 PSA                  0.20                01/18/2016                 PSA                  0.19                07/24/2015                 PSA                  0.15                01/12/2015                 PSA                  0.17                06/06/2014                 PSA                  0.11                12/02/2013                 PSA                      0.13                 05/31/2013                 PSA                      0.16                11/29/2012                 PSA                      0.12                05/25/2012                 PSA                      0.13                10/18/2011                 PSA                      0.11                03/17/2011                 PSA                      0.20                08/25/2010                 PSA                      0.11                06/25/2009                 PSA                      0.09                01/09/2009                 PSA                      0.1                 06/12/2008                 PSA                      0.1                 12/11/2007                                   Past Medical History:    Prostate cancer                                               Past Surgical History:    PROSTATE SURGERY                                               HERNIA REPAIR                                                  EYE SURGERY                                                    No family history on file.      Social History    Marital Status:              Spouse Name:                       Years of Education:                 Number of children:               Occupational History    None on file    Social History Main Topics    Smoking Status: Former Smoker                   Packs/Day: 0.00  Years:          Smokeless Status: Never Used                        Comment: quit 1995    Alcohol Use: No              Drug Use: Not on file     Sexual Activity: Not on file          Other Topics            Concern    None on file    Social History Narrative    None on file        Allergies:  Review of patient's allergies indicates no known allergies.    Medications:  Current outpatient prescriptions: aspirin 81 MG Chew, Take 81 mg by mouth once daily., Disp: , Rfl: ;  pravastatin (PRAVACHOL) 40 MG tablet, , Disp: , Rfl: ;  TOPROL XL 50 mg 24 hr tablet, , Disp: , Rfl:   Current facility-administered medications:  triptorelin pamoate Syrg 22.5 mg, 22.5 mg, Intramuscular, 1 time in Clinic/HOD, Fauzia Maza NP              Review of Systems   Constitutional: Positive for activity change and unexpected weight change. Negative for appetite change, chills and fever.        Lost ~100lbs     HENT: Negative for facial swelling and trouble swallowing.    Eyes: Negative for visual disturbance.   Respiratory: Negative for chest tightness and shortness of breath.    Cardiovascular: Negative for chest pain and palpitations.   Gastrointestinal: Positive for diarrhea. Negative for abdominal pain, constipation, nausea and vomiting.   Genitourinary: Positive for enuresis, frequency and urgency. Negative for difficulty urinating, dysuria, flank pain, hematuria, penile pain, penile swelling, scrotal swelling and testicular pain.        Worsening LUTS since chemo/radiation  No dysuria or hematuria.  No bladder pain.     Musculoskeletal: Negative for back pain, gait problem, myalgias and neck stiffness.   Skin: Negative for rash.   Neurological: Negative for dizziness and speech difficulty.   Hematological: Does not bruise/bleed easily.   Psychiatric/Behavioral: Negative for behavioral problems.       Objective:      Physical Exam   Nursing note and vitals reviewed.  Constitutional: He is oriented to person, place, and time. Vital signs are normal. He appears well-developed and well-nourished.  Non-toxic appearance. He does not have a sickly appearance.   Urine was clear of infection in the office today.     HENT:   Head: Normocephalic and atraumatic.   Right Ear: External ear normal.   Left Ear: External ear normal.   Nose: Nose normal.   Mouth/Throat: Mucous membranes are normal.   Eyes: Conjunctivae and lids are normal. No scleral icterus.   Neck: Trachea normal, normal range of motion and full passive range of motion without pain. Neck supple. No JVD present. No tracheal deviation present.   Cardiovascular: Normal rate, S1 normal and S2  normal.    Pulmonary/Chest: Effort normal. No respiratory distress. He exhibits no tenderness.   Abdominal: Soft. Normal appearance. There is no hepatosplenomegaly. There is no tenderness. There is no CVA tenderness.   Genitourinary: Testes normal and penis normal.       Musculoskeletal: Normal range of motion.   Neurological: He is alert and oriented to person, place, and time. He has normal strength.   Skin: Skin is warm, dry and intact.     Psychiatric: He has a normal mood and affect. His behavior is normal. Judgment and thought content normal.       Assessment:       1. Prostate cancer    2. Rising PSA following treatment for malignant neoplasm of prostate    3. History of robot-assisted laparoscopic radical prostatectomy    4. S/P bone marrow transplant    5. Urinary urgency    6. Urge incontinence        Plan:         I spent 30 minutes with the patient of which more than half was spent in direct consultation with the patient in regards to our treatment and plan.    Education and recommendations of today's plan of care including home remedies.  We discussed his PSA results.  Last LUPRON; will give today.  We discussed his worsening LUTS. Contributory factors.  Rx for trial of oxybutynin 5mg XL; discussed benefit as well as risks.  Discussed consult with Dr. Ruiz.  RTC 6 months with PSA and LUPRON  Reminder set to check on results of his scan and bone marrow biopsy

## 2020-03-23 NOTE — TELEPHONE ENCOUNTER
Called to check on him; he was getting results.  They say no change; no worse  everyone doing well

## 2020-04-01 PROBLEM — B35.9 DERMATOPHYTOSES: Status: RESOLVED | Noted: 2019-02-13 | Resolved: 2020-01-01

## 2020-04-01 PROBLEM — E87.6 LOW BLOOD POTASSIUM: Status: RESOLVED | Noted: 2019-03-14 | Resolved: 2020-01-01

## 2020-04-01 PROBLEM — J90 PLEURAL EFFUSION ON RIGHT: Status: RESOLVED | Noted: 2019-02-01 | Resolved: 2020-01-01

## 2020-04-01 PROBLEM — K42.0 INCARCERATED UMBILICAL HERNIA: Status: RESOLVED | Noted: 2018-04-25 | Resolved: 2020-01-01

## 2020-04-01 PROBLEM — Z23 NEEDS FLU SHOT: Status: RESOLVED | Noted: 2019-10-09 | Resolved: 2020-01-01

## 2020-04-01 PROBLEM — R10.33 PERIUMBILICAL ABDOMINAL PAIN: Status: RESOLVED | Noted: 2019-02-05 | Resolved: 2020-01-01

## 2020-04-01 PROBLEM — R22.1 MASS OF NECK: Status: RESOLVED | Noted: 2017-07-07 | Resolved: 2020-01-01

## 2020-04-01 PROBLEM — I82.B19 SUBCLAVIAN VEIN THROMBOSIS: Status: RESOLVED | Noted: 2017-09-26 | Resolved: 2020-01-01

## 2020-04-01 NOTE — PROGRESS NOTES
Subjective:        The chief complaint leading to consultation is: HTN   patient location is:  Home  Visit type: Virtual visit with synchronous audio and video    This is a Virtual consultation for patient Mr. Naresh Steinberg who is a 69-year-old gentleman.  Underlying medical issues of hypertension, dyslipidemia and obesity have been noted.  The reason for virtual consultation is currently prevailing COVID-19 pandemic.  Patient and his wife understand the limitations of this consultation with no real time and good physical examination.    Secondary to diagnosis of lymphocytic leukemia with bone marrow transplant he has lost significant weight and he has come from a high of 247 a few years ago back to 175 now.  His blood pressures are running somewhat on the low side.  No dizziness or lightheadedness.  No nausea vomiting.    He also has chronic anemia.  Labs are not available at this point.    He also had recent prostate checkup and his PSA was apparently 0.  He does have some urinary incontinence and leakage.    Hypertension   This is a chronic problem. The current episode started more than 1 year ago. The problem is controlled. Pertinent negatives include no chest pain, headaches, neck pain or palpitations. Past treatments include beta blockers. The current treatment provides significant improvement. There is no history of pheochromocytoma or renovascular disease.       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  Tobacco History:  reports that he has quit smoking. He has never used smokeless tobacco.  Drug History:  reports that he does not use drugs.    Review of patient's allergies indicates:  No Known Allergies    Current Outpatient Medications   Medication Sig Dispense Refill    acyclovir (ZOVIRAX) 400 MG tablet       b complex vitamins capsule Take 1 capsule by mouth once daily. Vit B12      coenzyme Q10 100 mg capsule 100 mg.      garlic 1,000 mg Cap Take by mouth.      K-PHOS-NEUTRAL 250 mg tablet 1 tablet 4 (four) times daily.  0    L GASSERI/B BIFIDUM/B LONGUM ([employer] [facility] ORAL) Take by mouth.      magnesium oxide (MAG-OX) 400 mg (241.3 mg magnesium) tablet Take 400 mg by mouth once daily.      metoprolol succinate (TOPROL-XL) 25 MG 24 hr tablet 25 mg.      MULTIVITAMIN/IRON/FOLIC ACID (CENTRUM COMPLETE ORAL) Take by mouth.      NEBUPENT 300 mg SolR 1 Dose every 30 days.  5    oxybutynin (DITROPAN-XL) 5 MG TR24 Take 1 tablet (5 mg total) by mouth once daily. 90 tablet 3    potassium chloride SA (K-DUR,KLOR-CON) 10 MEQ tablet Take 1 tablet (10 mEq total) by mouth 2 (two) times daily. 180 tablet 3    sodium bicarbonate 650 MG tablet 1 tablet Daily.  11    VITAMIN A ORAL Take 2,400 mcg/day by mouth.      vitamin D 1000 units Tab Take 185 mg by mouth once daily.      vitamin E 100 UNIT capsule Take 100 Units by mouth once daily.       Current Facility-Administered Medications   Medication Dose Route Frequency Provider Last Rate Last Dose    leuprolide (6 month) SyKt 45 mg  45 mg Intramuscular Q6 Months Fauzia MIGUEL  "ROSEY Maza   45 mg at 02/27/20 1528       Review of Systems   Constitutional: Positive for fatigue (This is stable at this point.). Negative for activity change and unexpected weight change ( expected weight loss of 50-75 lb.).   HENT: Negative for congestion, hearing loss, rhinorrhea and trouble swallowing.    Eyes: Negative for discharge and visual disturbance.   Respiratory: Negative for cough, chest tightness and wheezing.    Cardiovascular: Negative for chest pain and palpitations.   Gastrointestinal: Negative for abdominal distention, abdominal pain, blood in stool, constipation, diarrhea and vomiting.   Endocrine: Positive for polydipsia and polyuria.   Genitourinary: Positive for urgency. Negative for difficulty urinating and hematuria.        Prostate cancer follow up with urology.   Musculoskeletal: Positive for arthralgias. Negative for joint swelling and neck pain.   Neurological: Negative for weakness and headaches.   Psychiatric/Behavioral: Negative for confusion and dysphoric mood.         Objective:      Vitals:    04/01/20 0910   BP: 103/72   Pulse: 84   Resp: 16   Weight: 79.4 kg (175 lb)   Height: 5' 8" (1.727 m)     Physical Exam:   Physical Exam   Constitutional: He appears well-developed and well-nourished.   Patient appears to have lost weight.  BMI is 26.  Appears to be comfortable and in no distress.  Looks slightly pale.   Skin: There is pallor.     Patient's vital signs were checked at home.  This is a limited examination through the patient portal.       Assessment:       1. Benign essential hypertension    2. Multiple-type hyperlipidemia    3. Non-Hodgkin lymphoma of lymph nodes of neck, unspecified non-Hodgkin lymphoma type    4. Anemia complicating neoplastic disease      Plan:   Benign essential hypertension    Multiple-type hyperlipidemia    Non-Hodgkin lymphoma of lymph nodes of neck, unspecified non-Hodgkin lymphoma type    Anemia complicating neoplastic disease      Follow up in " about 3 months (around 7/1/2020) for Hypertension/lipids.  Patient's blood pressures are stable and in fact slightly on the lower side.  He has lost significant amount of weight.  Perhaps it might be time to stop his blood pressure medication in case he continues to be on the lower side and experiences some dizziness or lightheadedness.    COVID precautions have been discussed.  Stay mostly at home and use mask if going outside.    Other associated medical problems and use of multiple vitamins has been noted.    Follow-up in 3 months or earlier as needed.  Forward me any labs or reports done recently by multiple specialists.    Apparently he has an appointment with Dr. Santiago which was postponed to June.  Total time spent with patient: 15 mts  Each patient to whom he or she provides medical services by telemedicine is:  (1) informed of the relationship between the physician and patient and the respective role of any other health care provider with respect to management of the patient; and (2) notified that he or she may decline to receive medical services by telemedicine and may withdraw from such care at any time.    This note was created using [employer] voice recognition software that occasionally misinterprets phrases or words.

## 2020-06-17 NOTE — TELEPHONE ENCOUNTER
----- Message from Melina Dalton sent at 6/17/2020 11:28 AM CDT -----  Gm I just scheduled patient for a port flush on Monday 6/22 but he does not have active orders in .

## 2020-06-22 NOTE — PLAN OF CARE
Problem: Activity Intolerance  Goal: Enhanced Capacity and Energy  Outcome: Ongoing, Not Progressing  Intervention: Optimize Activity Tolerance  Flowsheets (Taken 6/22/2020 1134)  Self-Care Promotion: independence encouraged  Activity Management:   ambulated - L4   activity encouraged  Environmental Support: rest periods encouraged

## 2020-08-03 NOTE — PLAN OF CARE
Problem: Infection  Goal: Infection Symptom Resolution  Outcome: Ongoing, Progressing  Intervention: Prevent or Manage Infection  Flowsheets (Taken 8/3/2020 1033)  Infection Management: aseptic technique maintained  Isolation Precautions: protective environment maintained

## 2020-08-10 NOTE — PROGRESS NOTES
[facility] Hematology/Oncology  PROGRESS NOTE      Subjective:       Patient ID:   NAME: Naresh Painting : 1950     70 y.o. male    Referring Doc: Yamila  Other Physicians: Adriano Michaud Pernenkil, Chetna, Chloe; Link ()    Chief Complaint:  NHL f/u    History of Present Illness:     Patient returns today for a regularly scheduled follow-up visit.  The patient is here with his wife.  He last showed up to see me in my oncology clinic over a year ago in 2019; he last saw Dr Driscoll on 2020; he had BMT last year and was inpatient at [facility] in [place] through 2019. He has been followed by Dr Basilio regularly. He recently saw Dr Méndez at [facility] and he has been diagnosed with possible MDS. He saw Dr Basilio just yesterday. He had PET scan in 2020 per [facility]. Dr Basilio does not want him to take any chemotherapy at this time. The are running additional tests on his recent bone marrow biopsy. He follows up with Dr Basilio in 2020. He sees Dr Méndez again on Aug 27th    He has stage IV CRI now as well and is seeing Dr Dago Way with nephrology at [facility]    He is feeling ok        Discussed covid19 precautions                ROS:   GEN: normal without any fever, night sweats or weight loss  HEENT: normal with no HA's, sore throat, stiff neck, changes in vision  CV: no CP, CP, COOPER  PULM: normal with no SOB, cough, hemoptysis, sputum or pleuritic pain  GI: normal with no abdominal pain, nausea, vomiting, , diarrhea, melanotic stools, BRBPR, or hematemesis;  : normal with no hematuria, dysuria  BREAST: normal with no mass, discharge, pain  SKIN: prior rash resolved    Allergies:  Review of patient's allergies indicates:  No Known Allergies    Medications:    Current Outpatient Medications:     b complex vitamins capsule, Take 1 capsule by mouth once daily. Vit B12, Disp: , Rfl:     coenzyme Q10 100 mg capsule, 100 mg., Disp: , Rfl:     garlic 1,000 mg Cap, Take by mouth., Disp: , Rfl:      K-PHOS-NEUTRAL 250 mg tablet, 1 tablet 4 (four) times daily., Disp: , Rfl: 0    magnesium oxide (MAG-OX) 400 mg (241.3 mg magnesium) tablet, Take 400 mg by mouth once daily., Disp: , Rfl:     metoprolol succinate (TOPROL-XL) 25 MG 24 hr tablet, 25 mg., Disp: , Rfl:     MULTIVITAMIN/IRON/FOLIC ACID (CENTRUM COMPLETE ORAL), Take by mouth., Disp: , Rfl:     oxybutynin (DITROPAN-XL) 5 MG TR24, Take 1 tablet (5 mg total) by mouth once daily., Disp: 90 tablet, Rfl: 3    sodium bicarbonate 650 MG tablet, 1 tablet Daily., Disp: , Rfl: 11    vitamin D 1000 units Tab, Take 185 mg by mouth once daily., Disp: , Rfl:     vitamin E 100 UNIT capsule, Take 100 Units by mouth once daily., Disp: , Rfl:     acyclovir (ZOVIRAX) 400 MG tablet, , Disp: , Rfl:     L GASSERI/B BIFIDUM/B LONGUM ([employer] ORAL), Take by mouth., Disp: , Rfl:     NEBUPENT 300 mg SolR, 1 Dose every 30 days., Disp: , Rfl: 5    VITAMIN A ORAL, Take 2,400 mcg/day by mouth., Disp: , Rfl:     Current Facility-Administered Medications:     leuprolide (6 month) SyKt 45 mg, 45 mg, Intramuscular, Q6 Months, Fauzia Maza, ROSEY, 45 mg at 02/27/20 1528    PMHx/PSHx Updates:  See patient's last visit with me on 6/11/2019  See H&P on 6/11/2019        Pathology:  See path 9/13/2017          Objective:     Vitals:  Blood pressure 129/83, pulse 67, temperature 98.1 °F (36.7 °C), resp. rate 17, weight 79.9 kg (176 lb 3.2 oz).    Physical Examination:   GEN: no apparent distress, comfortable; AAOx3  HEAD: atraumatic and normocephalic  EYES: no pallor, no icterus, PERRLA  ENT: OMM, no pharyngeal erythema, external ears WNL; no nasal discharge; no thrush  NECK: no masses, thyroid normal, trachea midline,  LAD/LN's, supple; resolved neck LAD;   CV: RRR with no murmur; normal pulse; normal S1 and S2; no pedal edema  CHEST: good BS bilaterlally currently  ABDOM: nontender and nondistended; soft; normal bowel sounds; no rebound/guarding;    MUSC/Skeletal:  ROM normal; no crepitus; joints normal; no deformities or arthropathy  EXTREM: no clubbing, cyanosis, inflammation or swelling  SKIN: no lesions, ulcers, petechiae or subcutaneous nodules;   : no cannon  NEURO: grossly intact; motor/sensory WNL; AAOx3; no tremors  PSYCH: normal mood, affect and behavior  LYMPH: normal cervical, supraclavicular, axillary and groin LN's            Labs:        No new labs available for review      Radiology/Diagnostic Studies:    PET  3/13/2020:    IMPRESSION:  1. Soft tissue attenuation mass with slightly increased FDG activity from  background centered between the liver and the IVC and aorta, unchanged from the  recent CT of the abdomen and pelvis but new from the previous PET/CT.  2. Similar irregular geographic FDG avid soft tissue attenuation mass along the  anterior margin of the left kidney and ureter in the retroperitoneum, unchanged  from the recent CT abdomen and pelvis but new/markedly enlarged from the  previous PET/CT.  3. Moderate size right-sided pleural effusion and adjacent atelectasis.  4. Additional incidental findings as noted above.           CT Chest  1/31/2019 - on chartT    IMPRESSION:    Interval development of moderate-large right-sided pleural effusion and  associated atelectatic changes within the right lung.    Development of conglomerate soft tissue densities within the visualized upper  abdomen likely representing interval progression of known lymphoma. Correlation  with dedicated CT of the abdomen with contrast is recommended. Additional soft  tissue density within the base of the neck on the left may also represent  pathologic adenopathy.    Additional observations as above.          PET 8/20/2018:  IMPRESSION:    1. Increased FDG uptake involving the anterolateral left sixth rib could be related to nondisplaced fracture here. Correlate with focal tenderness and any history of trauma. (he had a fall)  2. Otherwise, there is evidence of FDG avid  malignancy.  3. Persistent enlarged left supraclavicular lymph node, stable from prior.  4. Improvement of previously seen reactive marrow.  5. Prostate gland is surgically absent.        Assessment/Plan:   (1) 68 y.o. male with diagnosis of development of right sided neck swelling since Feb 2017  - he has been referred by Dr Michaud with ID for an evaluation  - i spoke to Dr Michaud previously about the patient peripherally  - FNA bx on 2/24 which was nondiagnostic  - he had a  guided biopsy of an entire left supraclavicular LN on 3/6/2017 with Dr Alexander Oh which showed  necrotizing granulomatous lymphadenitis  - CT scans were from Feb 2017  - flow cytometry studies from 5/25 appear to have been negative for any abnormal cell populations  - PET scan on 7/6/17 with markedly hypermetabolic LAD  - he was referred to and seen by Dr Basilio at [facility] on 7/28 and again on 8/25  - he underwent repeat cervical LN biopsy with Dr Oh on 9/13/2017 and that 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)  - he saw Dr Basilio again at [facility] this past Monday  - the prior bone marrow showed no involvement of the lymphoma  - he received R-CHOP and then proceed with a HD-MTX regimen and Intra-thecal MTX at [facility]    - he has been on HD-MTX chemotherapy at [facility] as of lately  - he completed last chemotherapy from June 18th through 21st  - he required neupogen the last week of June  - he recent developed a pleural effusion  - he was since found to have recurrent  lymphoma in the pleural fluid and was seen by Dr Basilio again and started on Retuximab, Cisplatin and cytarabine in preparation for a BMT  - he has been receiving chemotherapy under direction of Dr Basilio and is awaiting BMT  - his counts have been low and we have been faxing the labs to [facility]  - he sees Dr Basilio again yesterday and they are starting the process to start BM collection on 6/21/2019  - prior PET at [facility] looked good per patient but he  has spot/LN in abdomen which is inactive but he may need XRT to this in the near-future    8/11/2020:     - he had BMT last year and was inpatient at [facility] in [place] through Sept 2019. - He has been followed by Dr Basilio regularly.  -  He recently saw Dr Méndez at [facility] and he has been diagnosed with possible MDS. - He saw Dr Basilio just yesterday.   - [facility]   - Dr Basilio does not want him to take any chemotherapy at this time.   - They are running additional tests on his recent bone marrow biopsy.   - He follows up with Dr Basilio in Nov 2020. He sees Dr Méndez again on Aug 27th      (2) Hx/of prostate cancer - 1995; s/p prostatectomy followed by XRT; on lupron per Dr Menjivar with  and Fauzia Maza (NP); he had recent lupron injection per      (3) JAK - uses CPAP     (4) GERD     (5) Myocardial bridging congenital disorder - followed by Dr Santiago with cardiology   - recent echo with good EF at 60%     (6) recent subclavian vein thrombosis - on eliquis 5mg po bid     (7) Dental issues post-chemotherapy     (8) prior incarcerated hernia surgery with Dr Ellis on 4/4/18    (9) rash - right axilla - looked fungal - resolved    (10) Right pleural effusion - pathology consistent with recurrent lymphoma    (11) Anemia and thrombocytopenia due to chemotherapy in past    (12) CRI - stage IV - followed by Dr Dago Way with [facility] nephrology    1. Anemia complicating neoplastic disease     2. Chemotherapy-induced neutropenia     3. Diffuse large B-cell lymphoma of lymph nodes of neck     4. Non-Hodgkin lymphoma of lymph nodes of neck, unspecified non-Hodgkin lymphoma type     5. S/P bone marrow transplant     6. Cervical lymphadenopathy         PLAN:  1. F/u with Dr Basilio at [facility] in Nov 2020 and Dr Méndez on Aug 27th  2. Check labs monthly as per [facility]'s directives  3. Need latest notes from Dr Basilio and Dr Méndez and the latest labs from [facility]  4. F/u with pulmonary, PCP,  neph, [facility] etc  5. RTC in Dec 2020/Jan 2021    Fax note to Ronny Driscoll MD, Adriano Mihcaud Schuller, [address] and  Chetna; Pamela    Discussion:     COVID-19 Discussion:    I had long discussion with patient and any applicable family about the COVID-19 coronavirus epidemic and the recommended precautions with regard to cancer and/or hematology patients. I have re-iterated the CDC recommendations for adequate hand washing, use of hand -like products, and coughing into elbow, etc. In addition, especially for our patients who are on chemotherapy and/or our otherwise immunocompromised patients, I have recommended avoidance of crowds, including movie theaters, restaurants, churches, etc. I have recommended avoidance of any sick or symptomatic family members and/or friends. I have also recommended avoidance of any raw and unwashed food products, and general avoidance of food items that have not been prepared by themselves. The patient has been asked to call us immediately with any symptom developments, issues, questions or other general concerns.       I have explained all of the above in detail and the patient understands all of the current recommendation(s). I have answered all of their questions to the best of my ability and to their complete satisfaction.   The patient is to continue with the current management plan.            Electronically signed by Fidencio Rdz MD

## 2020-09-14 NOTE — PLAN OF CARE
Problem: Fatigue  Goal: Improved Activity Tolerance  Outcome: Ongoing, Progressing  Intervention: Promote Energy Conservation  Flowsheets (Taken 9/14/2020 1024)  Fatigue Management: frequent rest breaks encouraged  Sleep/Rest Enhancement: regular sleep/rest pattern promoted  Activity Management: ambulated - L4

## 2020-09-23 NOTE — PROGRESS NOTES
Subjective:       Chief Complaint   Patient presents with    Hypertension    Gastroesophageal Reflux    Lymphoma       Patient does experience dry mouth from oxybutynin.  No constipation.  The chief complaint leading to consultation is: HTN  The patient location is:  Home  Visit type: Virtual visit with synchronous audio/video or audio only  This was a video visit in lieu of in-person visit due to the coronavirus emergency. Patient acknowledged and consented to the video visit encounter.     Saliva test    BM Biopsy 3 times    Hypertension  This is a chronic problem. The current episode started more than 1 year ago. The problem is controlled. Pertinent negatives include no chest pain, headaches, neck pain or palpitations. Risk factors for coronary artery disease include sedentary lifestyle and obesity. Past treatments include beta blockers. The current treatment provides moderate improvement. Compliance problems include psychosocial issues.    Gastroesophageal Reflux  He complains of heartburn. He reports no abdominal pain, no chest pain, no coughing or no wheezing. This is a recurrent problem. The problem occurs occasionally. The problem has been waxing and waning. Associated symptoms include fatigue (This is stable at this point.). He has tried a PPI for the symptoms. The treatment provided moderate relief.       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  Tobacco History:  reports that he has quit smoking. He has never used smokeless tobacco.  Drug History:  reports no history of drug use.    Review of patient's allergies indicates:  No Known Allergies    Current Outpatient Medications   Medication Sig Dispense Refill    b complex vitamins capsule Take 1 capsule by mouth once daily. Vit B12      coenzyme Q10 100 mg capsule 100 mg.      K-PHOS-NEUTRAL 250 mg tablet 1 tablet 4 (four) times daily.  0    magnesium oxide (MAG-OX) 400 mg (241.3 mg magnesium) tablet Take 400 mg by mouth once daily.      metoprolol succinate (TOPROL-XL) 25 MG 24 hr tablet 25 mg.      MULTIVITAMIN/IRON/FOLIC ACID (CENTRUM COMPLETE ORAL) Take by mouth.      NEBUPENT 300 mg SolR 1 Dose every 30 days.  5    oxybutynin (DITROPAN-XL) 5 MG TR24 Take 1 tablet (5 mg total) by mouth once daily. 90 tablet 3    sodium bicarbonate 650 MG tablet 1 tablet Daily.  11    VITAMIN A ORAL Take 2,400 mcg/day by mouth.      vitamin D 1000 units Tab Take 185 mg by mouth once daily.      vitamin E 100 UNIT capsule Take 100 Units by mouth once daily.       Current Facility-Administered Medications   Medication Dose Route Frequency Provider Last Rate Last Dose    [START ON 10/2/2020] leuprolide (6 month) injection 45 mg  45 mg Intramuscular Q6 Months Fauzia Maza NP           Review of Systems   Constitutional: Positive for fatigue (This is stable at this point.). Negative for activity change and unexpected weight change.        Exercise tolerance good   HENT: Negative for congestion, hearing loss, rhinorrhea and trouble swallowing.    Eyes: Negative for discharge and visual  "disturbance.   Respiratory: Negative for cough, chest tightness and wheezing.    Cardiovascular: Negative for chest pain and palpitations.   Gastrointestinal: Positive for heartburn. Negative for abdominal distention, abdominal pain, blood in stool, constipation, diarrhea and vomiting.   Endocrine: Negative for polydipsia and polyuria.   Genitourinary: Negative for difficulty urinating, hematuria and urgency.        Prostate cancer follow up with urology.   Musculoskeletal: Negative for arthralgias, joint swelling and neck pain.        Fall X few times- had PT sessions   Neurological: Negative for weakness and headaches.   Hematological:        NHL S/P Treatment . Will not do BMT    Psychiatric/Behavioral: Negative for confusion and dysphoric mood.         Objective:      Vitals:    09/23/20 1700   BP: 137/79   Pulse: 65   Temp: 97.9 °F (36.6 °C)   Weight: 79.8 kg (176 lb)   Height: 5' 8" (1.727 m)     Physical Exam:   Physical Exam  Constitutional:       General: He is not in acute distress.     Appearance: He is ill-appearing. He is not toxic-appearing or diaphoretic.   Musculoskeletal:      Right lower leg: Edema (trace) present.      Left lower leg: Edema (trace) present.   Neurological:      Mental Status: Mental status is at baseline.   Psychiatric:         Behavior: Behavior normal.              Assessment:       1. Benign essential hypertension    2. Non-Hodgkin lymphoma of lymph nodes of neck, unspecified non-Hodgkin lymphoma type    3. Anemia complicating neoplastic disease      Plan:   Benign essential hypertension    Non-Hodgkin lymphoma of lymph nodes of neck, unspecified non-Hodgkin lymphoma type    Anemia complicating neoplastic disease     Patient's blood pressures are doing good.  Reflux symptoms are stable.  Underlying non-Hodgkin's lymphoma status post chemotherapy has been noted.  Patient is fairly stable at this point and apparently is not a candidate for bone marrow transplant.  I do not have " any records available at this point from [facility] and will have to look into it.    Appropriate seasonal immunizations have been discussed.    COVID-19 precautions have been discussed.    Fall precautions have been discussed.    Follow-up in 3 months time.  Follow up in about 3 months (around 12/23/2020) for HTN.    Total time spent with patient: 15    Each patient to whom he or she provides medical services by telemedicine is:  (1) informed of the relationship between the physician and patient and the respective role of any other health care provider with respect to management of the patient; and (2) notified that he or she may decline to receive medical services by telemedicine and may withdraw from such care at any time.    This note was created using [employer] voice recognition software that occasionally misinterprets phrases or words.   Current Outpatient Medications on File Prior to Visit   Medication Sig Dispense Refill    b complex vitamins capsule Take 1 capsule by mouth once daily. Vit B12      coenzyme Q10 100 mg capsule 100 mg.      K-PHOS-NEUTRAL 250 mg tablet 1 tablet 4 (four) times daily.  0    magnesium oxide (MAG-OX) 400 mg (241.3 mg magnesium) tablet Take 400 mg by mouth once daily.      metoprolol succinate (TOPROL-XL) 25 MG 24 hr tablet 25 mg.      MULTIVITAMIN/IRON/FOLIC ACID (CENTRUM COMPLETE ORAL) Take by mouth.      NEBUPENT 300 mg SolR 1 Dose every 30 days.  5    oxybutynin (DITROPAN-XL) 5 MG TR24 Take 1 tablet (5 mg total) by mouth once daily. 90 tablet 3    sodium bicarbonate 650 MG tablet 1 tablet Daily.  11    VITAMIN A ORAL Take 2,400 mcg/day by mouth.      vitamin D 1000 units Tab Take 185 mg by mouth once daily.      vitamin E 100 UNIT capsule Take 100 Units by mouth once daily.      [DISCONTINUED] acyclovir (ZOVIRAX) 400 MG tablet       [DISCONTINUED] garlic 1,000 mg Cap Take by mouth.      [DISCONTINUED] L GASSERI/B BIFIDUM/B LONGUM ([employer] ORAL) Take by  mouth.       Current Facility-Administered Medications on File Prior to Visit   Medication Dose Route Frequency Provider Last Rate Last Dose    [START ON 10/2/2020] leuprolide (6 month) injection 45 mg  45 mg Intramuscular Q6 Months Fauzia Maza NP

## 2020-09-23 NOTE — PATIENT INSTRUCTIONS
Identifying Your Heart Risks  What are your risk factors?  A risk factor increases your chance of having heart disease. Some risk factors cant be controlled, such as age or family history of heart disease. But most others can be managed by making lifestyle changes and taking medicine. For each risk factor you reduce, your chance of heart attack and stroke goes down. And, the length and quality of life may go up.  You can make changes to manage the following risk factors.  Abnormal cholesterol levels  Abnormal levels of cholesterol can increase your risk of developing atherosclerotic cardiovascular disease (ASCVD), which can lead to a heart, stroke, or other problems. If your cholesterol levels are of concern, your healthcare provider will work with you to improve your cholesterol level. Lifestyle changes such as diet, exercise, and weight management can help improve your cholesterol level, but you may also need medicine.    High blood pressure  High blood pressure (hypertension) occurs when blood pushes too hard against artery walls as it flows through them. This damages the artery lining. In general, youre at risk if you have:  · Blood pressure of 120/80 or higher. Your doctor may prescribe a personal goal.  · Blood pressure of 140/90 is high blood pressure.  Smoking  This is the most important risk factor you can change. Smoking damages arteries and makes it easier for plaque to build up. Smokers are also at higher risk for blood clots (which can block arteries) and stroke. Youre at risk if you use any kind of tobacco or nicotine. This means:  · Cigarettes  · E-cigarettes  · Chew tobacco  · Cigars  · Pipe  Diabetes  This health problem leads to a high level of sugar in your blood. It can damage the arteries if not kept under control. Diabetes makes you more likely to have a silent heart attack (one without symptoms). Youre at risk if:  · Your A1C is between 5.7 and 6.4. Once it reaches 6.5, you have  diabetes.  Your healthcare provider will help you figure out what your A1C should be. Your target number will depend on your age, general health, and other factors. Your treatment plan may need changes if your current number is too high.  Excess weight  Being overweight makes other risk factors, such as high blood pressure and diabetes, more likely. Excess weight around the waist or stomach increases your heart disease risk the most. Youre at risk if your:  · Waist circumference is more than 35 inches (women) or 40 inches (men).  · Body mass index (BMI) is greater than 25.  Lack of physical activity  If youre not active, problems with diabetes, blood pressure, cholesterol, and weight are more likely. Youre at risk if:  · You exercise less than 40 minutes per day, on fewer than 3 to 4 days a week.  Stress and strong emotions  Stressful events and feelings can raise heart rate and blood pressure. Stress can also bring on feelings of depression, anxiety, and anger. These feelings do not directly lead to heart disease, but they do affect overall health and make quality of life worse.  Date Last Reviewed: 7/1/2016  © 5925-6654 [employer]. [address] [place] [address]. All rights reserved. This information is not intended as a substitute for professional medical care. Always follow your healthcare professional's instructions.        Identifying Your Heart Risks  What are your risk factors?  A risk factor increases your chance of having heart disease. Some risk factors cant be controlled, such as age or family history of heart disease. But most others can be managed by making lifestyle changes and taking medicine. For each risk factor you reduce, your chance of heart attack and stroke goes down. And, the length and quality of life may go up.  You can make changes to manage the following risk factors.  Abnormal cholesterol levels  Abnormal levels of cholesterol can increase your risk of  developing atherosclerotic cardiovascular disease (ASCVD), which can lead to a heart, stroke, or other problems. If your cholesterol levels are of concern, your healthcare provider will work with you to improve your cholesterol level. Lifestyle changes such as diet, exercise, and weight management can help improve your cholesterol level, but you may also need medicine.    High blood pressure  High blood pressure (hypertension) occurs when blood pushes too hard against artery walls as it flows through them. This damages the artery lining. In general, youre at risk if you have:  · Blood pressure of 120/80 or higher. Your doctor may prescribe a personal goal.  · Blood pressure of 140/90 is high blood pressure.  Smoking  This is the most important risk factor you can change. Smoking damages arteries and makes it easier for plaque to build up. Smokers are also at higher risk for blood clots (which can block arteries) and stroke. Youre at risk if you use any kind of tobacco or nicotine. This means:  · Cigarettes  · E-cigarettes  · Chew tobacco  · Cigars  · Pipe  Diabetes  This health problem leads to a high level of sugar in your blood. It can damage the arteries if not kept under control. Diabetes makes you more likely to have a silent heart attack (one without symptoms). Youre at risk if:  · Your A1C is between 5.7 and 6.4. Once it reaches 6.5, you have diabetes.  Your healthcare provider will help you figure out what your A1C should be. Your target number will depend on your age, general health, and other factors. Your treatment plan may need changes if your current number is too high.  Excess weight  Being overweight makes other risk factors, such as high blood pressure and diabetes, more likely. Excess weight around the waist or stomach increases your heart disease risk the most. Youre at risk if your:  · Waist circumference is more than 35 inches (women) or 40 inches (men).  · Body mass index (BMI) is greater  than 25.  Lack of physical activity  If youre not active, problems with diabetes, blood pressure, cholesterol, and weight are more likely. Youre at risk if:  · You exercise less than 40 minutes per day, on fewer than 3 to 4 days a week.  Stress and strong emotions  Stressful events and feelings can raise heart rate and blood pressure. Stress can also bring on feelings of depression, anxiety, and anger. These feelings do not directly lead to heart disease, but they do affect overall health and make quality of life worse.  Date Last Reviewed: 7/1/2016 © 2000-2017 [employer]. [address] [place] [id number]. All rights reserved. This information is not intended as a substitute for professional medical care. Always follow your healthcare professional's instructions.        Identifying Your Heart Risks  What are your risk factors?  A risk factor increases your chance of having heart disease. Some risk factors cant be controlled, such as age or family history of heart disease. But most others can be managed by making lifestyle changes and taking medicine. For each risk factor you reduce, your chance of heart attack and stroke goes down. And, the length and quality of life may go up.  You can make changes to manage the following risk factors.  Abnormal cholesterol levels  Abnormal levels of cholesterol can increase your risk of developing atherosclerotic cardiovascular disease (ASCVD), which can lead to a heart, stroke, or other problems. If your cholesterol levels are of concern, your healthcare provider will work with you to improve your cholesterol level. Lifestyle changes such as diet, exercise, and weight management can help improve your cholesterol level, but you may also need medicine.    High blood pressure  High blood pressure (hypertension) occurs when blood pushes too hard against artery walls as it flows through them. This damages the artery lining. In general, youre at risk if you  have:  · Blood pressure of 120/80 or higher. Your doctor may prescribe a personal goal.  · Blood pressure of 140/90 is high blood pressure.  Smoking  This is the most important risk factor you can change. Smoking damages arteries and makes it easier for plaque to build up. Smokers are also at higher risk for blood clots (which can block arteries) and stroke. Youre at risk if you use any kind of tobacco or nicotine. This means:  · Cigarettes  · E-cigarettes  · Chew tobacco  · Cigars  · Pipe  Diabetes  This health problem leads to a high level of sugar in your blood. It can damage the arteries if not kept under control. Diabetes makes you more likely to have a silent heart attack (one without symptoms). Youre at risk if:  · Your A1C is between 5.7 and 6.4. Once it reaches 6.5, you have diabetes.  Your healthcare provider will help you figure out what your A1C should be. Your target number will depend on your age, general health, and other factors. Your treatment plan may need changes if your current number is too high.  Excess weight  Being overweight makes other risk factors, such as high blood pressure and diabetes, more likely. Excess weight around the waist or stomach increases your heart disease risk the most. Youre at risk if your:  · Waist circumference is more than 35 inches (women) or 40 inches (men).  · Body mass index (BMI) is greater than 25.  Lack of physical activity  If youre not active, problems with diabetes, blood pressure, cholesterol, and weight are more likely. Youre at risk if:  · You exercise less than 40 minutes per day, on fewer than 3 to 4 days a week.  Stress and strong emotions  Stressful events and feelings can raise heart rate and blood pressure. Stress can also bring on feelings of depression, anxiety, and anger. These feelings do not directly lead to heart disease, but they do affect overall health and make quality of life worse.  Date Last Reviewed: 7/1/2016  © 8804-3249 [employer]. [address] [place] [address], [place] [address]. All rights reserved. This information is not intended as a substitute for professional medical care. Always follow your healthcare professional's instructions.

## 2020-10-01 NOTE — PROGRESS NOTES
Subjective:    Patient ID:  Naresh Painting is a 70 y.o. 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     Past Surgical History:   Procedure Laterality Date    EYE SURGERY      LYMPH NODE BIOPSY      PROCTECTOMY      PROSTATE SURGERY      UMBILICAL HERNIA REPAIR  04/05/2018    Incarcerated-      Social History     Tobacco Use    Smoking status: Former Smoker    Smokeless tobacco: Never Used    Tobacco comment: quit 1995   Substance Use Topics    Alcohol use: Yes     Frequency: Monthly or less     Drinks per session: 1 or 2     Binge frequency: Never    Drug use: No        Review of Systems     ROS        Objective:        Vitals:    10/01/20 1348   BP: 118/72   Pulse: 64   Resp: 16       Lab Results   Component Value Date    WBC 8.5 07/02/2019    HGB 9.3 (L) 07/02/2019    HCT 29.2 (L) 07/02/2019     (L) 07/02/2019    CHOL 236 (H) 05/31/2018    TRIG 86 05/31/2018    HDL 53 05/31/2018    ALT 13 08/26/2019    AST 26 08/26/2019     08/26/2019    K 4.5 08/26/2019     (H) 08/26/2019    CREATININE 1.9 (H) 08/26/2019    BUN 15 08/26/2019    CO2 21 (L) 08/26/2019    PSA 0.13 05/31/2013    INR 1.1 01/31/2019        ECHOCARDIOGRAM RESULTS  No results found for this or any previous visit.    CURRENT/PREVIOUS  VISIT EKG  No results found for this or any previous visit.  No results found for this or any previous visit.  No results found for this or any previous visit.    PHYSICAL EXAM    Physical Exam   Cardiovascular: Normal rate and regular rhythm. Exam reveals no gallop.   No murmur heard.  Pulmonary/Chest: Effort normal. He has decreased breath sounds in the right lower field. He has rales in the left lower field.   Musculoskeletal:         General: Edema (3+ edema up to knees bilaterally) present.        Medication List with Changes/Refills   New Medications    FUROSEMIDE (LASIX) 40 MG TABLET    Take 1 tablet (40 mg total) by mouth once daily.   Current Medications    B COMPLEX VITAMINS CAPSULE    Take 1 capsule by mouth once daily. Vit B12    COENZYME Q10 100 MG CAPSULE    100 mg.    K-PHOS-NEUTRAL 250 MG TABLET    1 tablet 4 (four) times daily.    MAGNESIUM OXIDE (MAG-OX) 400 MG (241.3 MG MAGNESIUM) TABLET    Take 400 mg by mouth once daily.    METOPROLOL SUCCINATE (TOPROL-XL) 25 MG 24 HR TABLET    25 mg.    MULTIVITAMIN/IRON/FOLIC ACID (CENTRUM COMPLETE ORAL)    Take by mouth.    NEBUPENT 300 MG SOLR    1 Dose every 30 days.    OXYBUTYNIN (DITROPAN-XL) 5 MG TR24    Take 1 tablet (5 mg total) by mouth once daily.    SODIUM BICARBONATE 650 MG TABLET    1 tablet Daily.    VITAMIN A ORAL    Take 2,400 mcg/day by mouth.    VITAMIN D 1000 UNITS TAB    Take 185 mg by mouth once daily.    VITAMIN E 100 UNIT CAPSULE    Take 100 Units by mouth once daily.           Assessment:       1. Essential hypertension    2. MDS (myelodysplastic syndrome)    3. Bilateral leg edema    4. ?? 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

## 2020-10-02 NOTE — PROGRESS NOTES
CHIEF COMPLAINT:    Naresh Painting is a 70 y.o. male presents for prostate cancer f/u visit.      HISTORY OF PRESENTING ILLINESS:    Mr. Painting is a 70-year-old patient that underwent a radical prostatectomy in December of 1995 by Dr. Partida at [facility].  He was first seen by Dr. Menjivar in 2005 where He was currently on GnRH agaonist therapy/Eligard 45 mg every six months due to rise in PSA.  He was last seen in clinic & received Lupron on 02/27/     He denies any urological problems or concerns.  Has been battling a cold; now with just a cough.  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   He has completed rounds of chemo/radiation.  S/p bone marrow transplant.   He is having biopsy March 5th.  First seen at [facility], but now followed by Dr. Rdz  He now seeing Dr. Jefferson.            REVIEW OF SYSTEMS:  Review of Systems   Constitutional: Negative.  Negative for chills, diaphoresis and fever.        Feeling better; working in his garden     HENT: Negative for congestion and sore throat.    Eyes: Negative.  Negative for double vision.   Respiratory: Negative.  Negative for cough, shortness of breath and wheezing.    Cardiovascular: Negative.  Negative for chest pain, palpitations and leg swelling.   Gastrointestinal: Negative.  Negative for abdominal pain, blood in stool, constipation, diarrhea, nausea and vomiting.   Genitourinary: Negative.  Negative for dysuria, flank pain and hematuria.        Ok with urination     Musculoskeletal: Negative.  Negative for back pain, falls and neck pain.   Skin: Negative.  Negative for rash.   Neurological: Negative.  Negative for dizziness and seizures.   Endo/Heme/Allergies: Does not bruise/bleed easily.   Psychiatric/Behavioral: Negative.  Negative for depression. The patient is not nervous/anxious.          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       Past Surgical History:   Procedure Laterality Date    EYE SURGERY      LYMPH NODE BIOPSY      PROCTECTOMY      PROSTATE SURGERY      UMBILICAL HERNIA REPAIR  04/05/2018    Incarcerated-        Family History   Problem Relation Age of Onset    Heart disease Mother     Diabetes Father        Social History     Socioeconomic History    Marital status:      Spouse name: Amita Painting    Number of children: 2    Years of education: Not on file    Highest education level: Not on file   Occupational History    Occupation: Retired      Employer: [employer]     Comment: [employer]- 3 yrs    Occupation:      Employer: [employer]     Comment: 26 yrs    Occupation: Reproduction     Employer: [employer]     Comment: [employer]   Social Needs    Financial resource strain: Not very hard    Food insecurity     Worry: Never true     Inability: Never true    Transportation needs     Medical: No     Non-medical: No   Tobacco Use    Smoking status: Former Smoker    Smokeless tobacco: Never Used    Tobacco comment: quit 1995   Substance and Sexual Activity    Alcohol use: Yes     Frequency: Monthly or less     Drinks per session: 1 or 2     Binge frequency: Never    Drug use: No    Sexual activity: Not Currently     Partners: Female   Lifestyle    Physical activity     Days per week: 0 days     Minutes per session: 0 min     Stress: Not at all   Relationships    Social connections     Talks on phone: More than three times a week     Gets together: Once a week     Attends Hoahaoism service: Not on file     Active member of club or organization: Yes     Attends meetings of clubs or organizations: 1 to 4 times per year     Relationship status:    Other Topics Concern    Not on file   Social History Narrative    One daughter name is Meagan. Son's name is Naresh-III       Allergies:  Patient has no known allergies.    Medications:    Current Outpatient Medications:     b complex vitamins capsule, Take 1 capsule by mouth once daily. Vit B12, Disp: , Rfl:     coenzyme Q10 100 mg capsule, 100 mg., Disp: , Rfl:     furosemide (LASIX) 40 MG tablet, Take 1 tablet (40 mg total) by mouth once daily., Disp: 30 tablet, Rfl: 11    K-PHOS-NEUTRAL 250 mg tablet, 1 tablet 4 (four) times daily., Disp: , Rfl: 0    magnesium oxide (MAG-OX) 400 mg (241.3 mg magnesium) tablet, Take 400 mg by mouth once daily., Disp: , Rfl:     metoprolol succinate (TOPROL-XL) 25 MG 24 hr tablet, 25 mg., Disp: , Rfl:     MULTIVITAMIN/IRON/FOLIC ACID (CENTRUM COMPLETE ORAL), Take by mouth., Disp: , Rfl:     NEBUPENT 300 mg SolR, 1 Dose every 30 days., Disp: , Rfl: 5    oxybutynin (DITROPAN-XL) 5 MG TR24, Take 1 tablet (5 mg total) by mouth once daily., Disp: 90 tablet, Rfl: 3    sodium bicarbonate 650 MG tablet, 1 tablet Daily., Disp: , Rfl: 11    VITAMIN A ORAL, Take 2,400 mcg/day by mouth., Disp: , Rfl:     vitamin D 1000 units Tab, Take 185 mg by mouth once daily., Disp: , Rfl:     vitamin E 100 UNIT capsule, Take 100 Units by mouth once daily., Disp: , Rfl:     Current Facility-Administered Medications:     leuprolide (6 month) injection 45 mg, 45 mg, Intramuscular, Q6 Months, Fauzia Maza NP, 45 mg at 10/02/20 1552    PHYSICAL EXAMINATION:  Physical Exam  Vitals signs and nursing note reviewed.   Constitutional:       General: He is awake.       Appearance: Normal appearance. He is normal weight.   HENT:      Head: Normocephalic.      Right Ear: External ear normal.      Left Ear: External ear normal.      Nose: Nose normal.   Eyes:      Conjunctiva/sclera: Conjunctivae normal.   Neck:      Musculoskeletal: Normal range of motion.   Cardiovascular:      Rate and Rhythm: Normal rate.   Pulmonary:      Effort: Pulmonary effort is normal. No respiratory distress.   Abdominal:      General: There is no distension.      Tenderness: There is no abdominal tenderness. There is no right CVA tenderness, left CVA tenderness or rebound.   Genitourinary:     Penis: Normal.       Scrotum/Testes: Normal.   Musculoskeletal: Normal range of motion.   Skin:     General: Skin is warm and dry.   Neurological:      General: No focal deficit present.      Mental Status: He is alert and oriented to person, place, and time.   Psychiatric:         Mood and Affect: Mood normal.         Behavior: Behavior is cooperative.           LABS:        Lab Results   Component Value Date    PSA 0.13 05/31/2013    PSA 0.16 11/29/2012    PSA 0.12 05/25/2012    PSADIAG 0.03 09/21/2020    PSADIAG 0.02 02/21/2020    PSADIAG 0.06 03/22/2019             IMPRESSION:    Encounter Diagnoses   Name Primary?    Prostate cancer Yes    History of radical prostatectomy     Rising PSA following treatment for malignant neoplasm of prostate          Assessment:       1. Prostate cancer    2. History of radical prostatectomy    3. Rising PSA following treatment for malignant neoplasm of prostate        Plan:         I spent 25 minutes with the patient of which more than half was spent in direct consultation with the patient in regards to our treatment and plan.    Education and recommendations of today's plan of care including home remedies.  We discussed his labs; weight loss  PSA is good.  LUPRON 45 mg IM today  RTC 6 months with PSA

## 2020-10-09 PROBLEM — E86.0 DEHYDRATION: Status: ACTIVE | Noted: 2020-01-01

## 2020-10-09 PROBLEM — E87.20 METABOLIC ACIDOSIS: Status: ACTIVE | Noted: 2020-01-01

## 2020-10-09 PROBLEM — D69.6 THROMBOCYTOPENIA: Status: ACTIVE | Noted: 2020-01-01

## 2020-10-09 PROBLEM — R79.89 ELEVATED TROPONIN: Status: ACTIVE | Noted: 2020-01-01

## 2020-10-09 PROBLEM — N18.4 CKD (CHRONIC KIDNEY DISEASE), STAGE IV: Status: ACTIVE | Noted: 2020-01-01

## 2020-10-09 PROBLEM — K11.7 SIALORRHEA: Status: ACTIVE | Noted: 2020-01-01

## 2020-10-09 NOTE — ED PROVIDER NOTES
Encounter Date: 10/9/2020       History     Chief Complaint   Patient presents with    over production of saliva     70-year-old male presented emergency department with generalized malaise and weakness.  Patient has history of prostate cancer and received a Lupron shot about a week ago and was recently discharged from hospital and having nausea vomiting and diarrhea and on top of it taking Lasix and losing weight and feeling weak and dehydrated so came here.  Denies chest pain or shortness of breath.  Asked him about salivation and he said he gets nauseated and that is when he gets extra saliva.  Patient denies any other complaints at this time.  Patient had previous eye surgery Patient had stem cell transplant about an year ago and gets care at [facility].  Denies fever or chills or abdominal pain.  Denies any focal weakness or numbness however has generalized malaise and weakness.          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     Past Surgical History:   Procedure Laterality Date    EYE SURGERY      LYMPH NODE BIOPSY      PROCTECTOMY      PROSTATE SURGERY      UMBILICAL HERNIA REPAIR  04/05/2018    Incarcerated-      Family History   Problem Relation Age of Onset    Heart disease Mother     Diabetes Father      Social History     Tobacco Use    Smoking status: Former Smoker    Smokeless tobacco: Never Used    Tobacco comment: quit 1995   Substance Use  Topics    Alcohol use: Yes     Frequency: Monthly or less     Drinks per session: 1 or 2     Binge frequency: Never    Drug use: No     Review of Systems   Constitutional: Positive for fatigue.   HENT: Negative.    Eyes: Negative.    Respiratory: Negative.    Cardiovascular: Negative.    Gastrointestinal: Positive for diarrhea, nausea and vomiting. Negative for abdominal pain, blood in stool, constipation and rectal pain.   Endocrine: Negative.    Genitourinary: Negative.    Musculoskeletal: Negative.    Skin: Negative.    Allergic/Immunologic: Negative.    Neurological: Positive for weakness.   Hematological: Negative.    Psychiatric/Behavioral: Negative for agitation.   All other systems reviewed and are negative.      Physical Exam     Initial Vitals [10/09/20 1708]   BP Pulse Resp Temp SpO2   109/67 75 18 98.5 °F (36.9 °C) 95 %      MAP       --         Physical Exam    Nursing note and vitals reviewed.  Constitutional: He appears well-developed and well-nourished. No distress.   HENT:   Head: Normocephalic and atraumatic.   Nose: Nose normal.   Eyes: Conjunctivae and EOM are normal.   Unequal pupils from previous eye surgery   Neck: Normal range of motion. Neck supple. No tracheal deviation present.   Cardiovascular: Normal rate, regular rhythm, normal heart sounds and intact distal pulses. Exam reveals no friction rub.    No murmur heard.  Pulmonary/Chest: Breath sounds normal. No respiratory distress. He has no wheezes. He has no rales.   Abdominal: Soft. He exhibits no distension. There is no abdominal tenderness.   Musculoskeletal: Normal range of motion. No tenderness.   Neurological: He is alert and oriented to person, place, and time. He has normal strength. GCS score is 15. GCS eye subscore is 4. GCS verbal subscore is 5. GCS motor subscore is 6.   Skin: Skin is warm and dry. Capillary refill takes less than 2 seconds.   Psychiatric: He has a normal mood and affect. Thought content normal.         ED  Course   Procedures  Labs Reviewed   CBC W/ AUTO DIFFERENTIAL - Abnormal; Notable for the following components:       Result Value    RBC 2.37 (*)     Hemoglobin 8.7 (*)     Hematocrit 26.3 (*)     Mean Corpuscular Volume 111 (*)     Mean Corpuscular Hemoglobin 36.7 (*)     RDW 15.3 (*)     Platelets 22 (*)     Platelet Estimate Decreased (*)     All other components within normal limits    Narrative:       Platelet count critical result(s) called and verbal readback   obtained from Taiwo Garcia RN (ER) by SW1 10/09/2020 19:31   COMPREHENSIVE METABOLIC PANEL - Abnormal; Notable for the following components:    Potassium 3.2 (*)     Chloride 112 (*)     CO2 16 (*)     BUN, Bld 46 (*)     Creatinine 3.3 (*)     Calcium 8.2 (*)     Total Protein 5.2 (*)     Albumin 3.3 (*)     AST 46 (*)     eGFR if  20.6 (*)     eGFR if non  17.8 (*)     All other components within normal limits   TROPONIN I - Abnormal; Notable for the following components:    Troponin I 0.055 (*)     All other components within normal limits    Narrative:     Troponin critical result(s) repeated. Called and verbal readback   obtained from Marguerite Stevens Rn/ER by AS2 10/09/2020 19:20   B-TYPE NATRIURETIC PEPTIDE - Abnormal; Notable for the following components:     (*)     All other components within normal limits   PROTIME-INR   LIPASE   AMYLASE   SARS-COV-2 RNA AMPLIFICATION, QUAL     EKG Readings: (Independently Interpreted)   Initial Reading: No STEMI. Rhythm: Normal Sinus Rhythm. Heart Rate: 71. Ectopy: No Ectopy. Conduction: RBBB.       Imaging Results          X-Ray Chest AP Portable (Final result)  Result time 10/09/20 17:48:13    Final result by Salomón Castro MD (10/09/20 17:48:13)                 Narrative:    CLINICAL HISTORY:  70 years (1950) Male CHF CHF    TECHNIQUE:  Portable AP radiograph the chest.    COMPARISON:  Radiograph from May 27, 2019.    FINDINGS:  The lungs are clear. There is  blunting of the right costophrenic angle  consistent with trace pleural effusion. No pneumothorax is identified.  The heart is normal in size. Right-sided subclavian medical infusion  port with tip at the level of the SVC. Osseous structures show  degenerative changes in the spine. The visualized upper abdomen is  unremarkable.    IMPRESSION:  Blunting of the right costophrenic angle in keeping with a combination  of atelectasis, scarring or possibly trace pleural fluid.                  .            Electronically Signed by SHIRAZ Clarke on 10/9/2020 5:50 PM                               Medical Decision Making:   Differential Diagnosis:   70-year-old male presented emergency department with generalized malaise and weakness and patient said he had 20 lb weight loss over the past week and having nausea vomiting and diarrhea and could not eat anything and having weakness and patient taking Lasix on top of it and feels weak and dehydrated.  Patient hydrated in the emergency department with IV fluids.  Potassium supplements given.  Patient's troponin elevated however patient does not have any chest pain.  Thrombocytopenia noted.  Given patient's presentation screening labs reviewed.  Hospital Medicine consulted for evaluation for admission for further hydration and monitoring and observation and thrombocytopenia to be monitored as well.  Hospital Medicine evaluating the patient for admission.  Patient does not have evidence of active bleeding at this time.  This could be secondary to recent Lupron shot and symptoms per patient started immediately after that.  Clinical Tests:   Lab Tests: Reviewed  Radiological Study: Reviewed  Medical Tests: Reviewed                             Clinical Impression:       ICD-10-CM ICD-9-CM   1. Dehydration  E86.0 276.51   2. Hypokalemia  E87.6 276.8   3. Thrombocytopenia  D69.6 287.5   4. Weight loss  R63.4 783.21   5. Acute kidney injury  N17.9 584.9                           ED Disposition Condition    Admit                             Jenna Castle MD  10/09/20 1955
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
## 2020-10-10 NOTE — PLAN OF CARE
Problem: Skin Injury Risk Increased  Goal: Skin Health and Integrity  Intervention: Promote and Optimize Oral Intake  Flowsheets (Taken 10/10/2020 1045)  Oral Nutrition Promotion: calorie dense liquids provided     Problem: Malnutrition  Goal: Improved Nutritional Intake  Outcome: Ongoing, Progressing  Intervention: Promote and Optimize Oral Intake  Flowsheets (Taken 10/10/2020 1045)  Oral Nutrition Promotion: calorie dense liquids provided

## 2020-10-10 NOTE — PROGRESS NOTES
[facility]  Adult Nutrition   Progress Note (Initial Assessment)     SUMMARY     Recommendations  Recommendation/Intervention:   1. Continue regular diet as tolerated by patient   2. RD to add Ensure Enlive BID (700 kcal, 40 g pro) to aid in meeting needs when oral intake is inadequate  3. Monitor renal labs  4. Malnutrition added to patient problem list if MD agrees with RD findings  Goals: 1. Patient to meet at least 75% estimated calorie and protein needs via PO diet  Nutrition Goal Status: new    Dietitian Rounds Brief    · Pt assessed 2' MST score (3). Noted poor appetite. Eating mainly soft foods at home (mashed potato, cream of wheat). No N/V reported. Diarrhea ongoing per pt. Eating bkfst during RD visit--declined additional food items. Offered supplement, pt accepted. Will monitor GFR.   · Admits to weight loss. Per EMR weight history, noted 10.4% wt loss over past month--severe. NFPE not performed at this time.    Malnutrition Assessment    RD suspects malnutrition. RD noted clinical characteristics that support a diagnosis of malnutrition. Assessment is based on medical records, interview with patient, and visible physical findings. For patient and clinician safety, physical contact (palpation) was not utilized during assessment due to departmental decision to stop performing NFPE during the COVID-19 pandemic.      Nutritional Diagnosis (PES Statement)     Severe malnutrition in the context of acute illness RT prostate cancer on Lupron and N/V/D AEB inadequate oral intake x 2 weeks, and severe weight loss of 10.4% per EMR.     Reason for Assessment  Reason For Assessment: identified at risk by screening criteria(MST score (3))  Diagnosis: other (see comments), cancer diagnosis/related complications(dehydration)  Relevant Medical History: prostate cancer, B cell lymphoma, HTN, CKD stage IV    Nutrition Risk Screen  Nutrition Risk Screen: no indicators present     MST Score: 3  Have you  "recently lost weight without trying?: Yes: 14-23 lbs  Weight loss score: 2  Have you been eating poorly because of a decreased appetite?: Yes  Appetite score: 1       Nutrition/Diet History  Patient Reported Diet/Restrictions/Preferences: general, soft  Food Allergies: NKFA  Factors Affecting Nutritional Intake: diarrhea    Anthropometrics  Temp: 98.7 °F (37.1 °C)  Height Method: Stated  Height: 5' 8" (172.7 cm)  Height (inches): 68 in  Weight Method: Standard Scale  Weight: 70.5 kg (155 lb 6.8 oz)  Weight (lb): 155.43 lb  Ideal Body Weight (IBW), Male: 154 lb  % Ideal Body Weight, Male (lb): 100.93 %  BMI (Calculated): 23.6  BMI Grade: 18.5-24.9 - normal       Weight History:  Wt Readings from Last 10 Encounters:   10/10/20 70.5 kg (155 lb 6.8 oz)   10/02/20 78 kg (171 lb 15.3 oz)   10/01/20 78 kg (172 lb)   09/23/20 79.8 kg (176 lb)   09/14/20 78.6 kg (173 lb 3.2 oz)   08/11/20 79.9 kg (176 lb 3.2 oz)   08/03/20 77.5 kg (170 lb 12.8 oz)   06/22/20 76.7 kg (169 lb)   04/01/20 79.4 kg (175 lb)   02/27/20 84 kg (185 lb 3 oz)     RD interpretation of weight history: 18# wt loss over past month--10.4% wt loss (severe)    Lab/Procedures/Meds: Pertinent Labs Reviewed    Clinical Chemistry:  Recent Labs   Lab 10/09/20  1830 10/10/20  0428    140   K 3.2* 3.2*   * 118*   CO2 16* 13*   GLU 80 57*   BUN 46* 45*   CREATININE 3.3* 3.2*   CALCIUM 8.2* 7.7*   PROT 5.2* 4.5*   ALBUMIN 3.3* 2.8*   BILITOT 0.7 0.8   ALKPHOS 107 91   AST 46* 34   ALT 36 29   ANIONGAP 10 9   ESTGFRAFRICA 20.6* 21.4*   EGFRNONAA 17.8* 18.5*   MG  --  1.9   PHOS  --  2.7   AMYLASE 30  --    LIPASE 40  --        CBC:   Recent Labs   Lab 10/10/20  0428   WBC 3.11*   RBC 2.09*   HGB 7.7*   HCT 23.1*   PLT 14*   *   MCH 36.8*   MCHC 33.3         Cardiac Profile:  Recent Labs   Lab 10/09/20  1830 10/09/20  2240 10/10/20  0428   *  --   --    TROPONINI 0.055* 0.054* 0.065*         Medications: Pertinent Medications " reviewed    Scheduled Meds:   magnesium oxide  400 mg Oral Daily    metoprolol succinate  25 mg Oral Daily    multivitamin  1 tablet Oral Daily    oxybutynin  5 mg Oral Daily    pantoprazole  40 mg Oral Daily    sodium bicarbonate  1,950 mg Oral BID       Continuous Infusions:   sodium chloride 0.9% 100 mL/hr at 10/10/20 0620       PRN Meds:.acetaminophen, magnesium oxide, magnesium oxide, melatonin, ondansetron, potassium chloride, potassium chloride, potassium, sodium phosphates, potassium, sodium phosphates, potassium, sodium phosphates, sodium chloride 0.9%    Estimated/Assessed Needs  Weight Used For Calorie Calculations: 70.5 kg (155 lb 6.8 oz)  Energy Calorie Requirements (kcal): 5632-0862 (25-30 kcal/kg)  Energy Need Method: Kcal/kg  Protein Requirements: 56-70 g (0.8-1 g/kg)  Weight Used For Protein Calculations: 70.5 kg (155 lb 6.8 oz)  Fluid Requirements (mL): 1 ml/kcal or per MD  Estimated Fluid Requirement Method: RDA Method  RDA Method (mL): 1762       Nutrition Prescription Ordered  Current Diet Order: Regular    Evaluation of Received Nutrient/Fluid Intake  IV Fluid (mL): 2400  Energy Calories Required: not meeting needs  Protein Required: not meeting needs  Fluid Required: meeting needs  Tolerance: tolerating     Intake/Output Summary (Last 24 hours) at 10/10/2020 1038  Last data filed at 10/9/2020 2233  Gross per 24 hour   Intake 1000 ml   Output 200 ml   Net 800 ml        % Meal Intake: 25 - 50 %    Nutrition Risk  Level of Risk/Frequency of Follow-up: high     Monitor and Evaluation  Food and Nutrient Intake: food and beverage intake, energy intake  Food and Nutrient Adminstration: diet order  Physical Activity and Function: nutrition-related ADLs and IADLs  Anthropometric Measurements: weight, weight change, body mass index  Biochemical Data, Medical Tests and Procedures: electrolyte and renal panel, gastrointestinal profile, glucose/endocrine profile  Nutrition-Focused Physical Findings:  overall appearance     Nutrition Follow-Up  RD Follow-up?: Yes    Brigitte Zambrano RD 10/10/2020 10:38 AM
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
## 2020-10-10 NOTE — PLAN OF CARE
10/10/20 0950   MONTERO Message   Medicare Outpatient and Observation Notification regarding financial responsibility Given to patient/caregiver;Explained to patient/caregiver;Signed/date by patient/caregiver   Date MONTERO was signed 10/10/20   Time MONTERO was signed 0950

## 2020-10-10 NOTE — SUBJECTIVE & OBJECTIVE
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      Current Outpatient Medications on File Prior to Encounter   Medication Sig    b complex vitamins capsule Take 1 capsule by mouth once daily. Vit B12    coenzyme Q10 100 mg capsule Take 100 mg by mouth once daily.     K-PHOS-NEUTRAL 250 mg tablet Take 1 tablet by mouth 4 (four) times daily.     loperamide (IMODIUM) 2 mg capsule Take 2 mg by mouth every 6 (six) hours as needed for Diarrhea.    magnesium oxide (MAG-OX) 400 mg (241.3 mg magnesium) tablet Take 400 mg by mouth once daily.    metoprolol succinate (TOPROL-XL) 25 MG 24 hr tablet Take 25 mg by mouth once daily.     MULTIVITAMIN/IRON/FOLIC ACID (CENTRUM COMPLETE ORAL) Take 1 tablet by mouth once daily.     ondansetron (ZOFRAN) 4 MG tablet Take 4 mg by mouth every 6 (six) hours as needed for Nausea.    oxybutynin (DITROPAN-XL) 5 MG TR24 Take 1 tablet (5 mg total) by mouth once daily.    sodium bicarbonate 650 MG tablet Take 3 tablets by mouth 2 (two) times daily.     VITAMIN A ORAL Take 2,400 mcg/day by mouth once daily.     vitamin D 1000 units Tab Take 185 mg by mouth once daily.     vitamin E 100 UNIT capsule Take 100 Units by mouth once daily.    furosemide (LASIX) 40 MG tablet Take 1 tablet (40 mg total) by mouth once daily.    NEBUPENT 300 mg SolR 1 Dose every 30 days.     Family History     Problem Relation (Age of Onset)    Diabetes Father    Heart disease Mother        Tobacco Use    Smoking status: Former Smoker    Smokeless tobacco: Never Used    Tobacco comment: quit 1995   Substance and Sexual Activity    Alcohol use: Yes     Frequency: Monthly or less     Drinks per session: 1 or 2     Binge frequency: Never    Drug use: No    Sexual activity: Not Currently     Partners: Female     Review of Systems   Constitutional: Positive for fatigue. Negative for chills, diaphoresis and fever.   HENT: Negative for congestion, ear pain, sore throat and trouble swallowing.         Excessive salivation   Eyes: Negative for pain, discharge and visual disturbance.   Respiratory: Negative for cough, chest tightness, shortness of breath and wheezing.    Cardiovascular: Negative for chest pain, palpitations and leg swelling.   Gastrointestinal: Positive for diarrhea, nausea and vomiting. Negative for abdominal distention, abdominal pain, blood in stool and constipation.   Endocrine: Negative for polydipsia, polyphagia and polyuria.   Genitourinary: Negative for dysuria, flank pain, frequency and urgency.   Musculoskeletal: Negative for back pain, joint swelling, neck pain and neck stiffness.   Skin: Negative for rash and wound.   Allergic/Immunologic: Negative for immunocompromised state.   Neurological: Positive for weakness. Negative for dizziness, syncope, speech difficulty, light-headedness, numbness and headaches.   Hematological: Negative for adenopathy.   Psychiatric/Behavioral: Negative for confusion and suicidal ideas. The patient is not nervous/anxious.    All other systems reviewed and are negative.    Objective:     Vital Signs (Most Recent):  Temp: 98.2 °F (36.8 °C) (10/09/20  2151)  Pulse: 75 (10/09/20 2151)  Resp: 19 (10/09/20 2151)  BP: (!) 153/67 (10/09/20 2151)  SpO2: 99 % (10/09/20 2151) Vital Signs (24h Range):  Temp:  [98.2 °F (36.8 °C)-98.5 °F (36.9 °C)] 98.2 °F (36.8 °C)  Pulse:  [68-75] 75  Resp:  [18-24] 19  SpO2:  [95 %-99 %] 99 %  BP: (109-153)/(65-67) 153/67     Weight: 70.8 kg (156 lb)  Body mass index is 23.72 kg/m².    Physical Exam  Vitals signs and nursing note reviewed.   Constitutional:       Appearance: He is well-developed. He is ill-appearing.   HENT:      Head: Normocephalic and atraumatic.   Eyes:      Comments: Left eye is a prosthesis   Neck:      Musculoskeletal: Normal range of motion and neck supple.   Cardiovascular:      Rate and Rhythm: Normal rate and regular rhythm.      Heart sounds: Normal heart sounds.   Pulmonary:      Effort: Pulmonary effort is normal.      Breath sounds: Normal breath sounds.   Abdominal:      General: Bowel sounds are normal.      Palpations: Abdomen is soft.   Musculoskeletal: Normal range of motion.   Skin:     General: Skin is warm and dry.      Capillary Refill: Capillary refill takes less than 2 seconds.      Comments: Stage 1 to coccyx    Neurological:      Mental Status: He is alert and oriented to person, place, and time.   Psychiatric:         Behavior: Behavior normal.         Thought Content: Thought content normal.         Judgment: Judgment normal.             Significant Labs:   CBC:   Recent Labs   Lab 10/09/20  1830   WBC 4.21   HGB 8.7*   HCT 26.3*   PLT 22*     CMP:   Recent Labs   Lab 10/09/20  1830      K 3.2*   *   CO2 16*   GLU 80   BUN 46*   CREATININE 3.3*   CALCIUM 8.2*   PROT 5.2*   ALBUMIN 3.3*   BILITOT 0.7   ALKPHOS 107   AST 46*   ALT 36   ANIONGAP 10   EGFRNONAA 17.8*     Troponin:   Recent Labs   Lab 10/09/20  1830   TROPONINI 0.055*       Significant Imaging: EKG: I have reviewed all pertinent results/findings within the past 24 hours and my personal findings are: sinus rhythm, short  TX, RBBB     X-ray Chest Ap Portable    Result Date: 10/9/2020  CLINICAL HISTORY: 70 years (1950) Male CHF CHF TECHNIQUE: Portable AP radiograph the chest. COMPARISON: Radiograph from May 27, 2019. FINDINGS: The lungs are clear. There is blunting of the right costophrenic angle consistent with trace pleural effusion. No pneumothorax is identified. The heart is normal in size. Right-sided subclavian medical infusion port with tip at the level of the SVC. Osseous structures show degenerative changes in the spine. The visualized upper abdomen is unremarkable. IMPRESSION: Blunting of the right costophrenic angle in keeping with a combination of atelectasis, scarring or possibly trace pleural fluid. . Electronically Signed by SHIRAZ Clarke on 10/9/2020 5:50 PM
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
## 2020-10-11 NOTE — UM SECONDARY REVIEW
CM requested inpt order from Dr. López, Pattient with ongoing need for IVF for volume depletion and electrolyte replacement. CM will follow.     715pm Dr. López gave permission to roll to inpt, order written.

## 2020-10-11 NOTE — SUBJECTIVE & OBJECTIVE
Interval History:  No acute events last 24 hours  Generalized weakness is now resolved approved patient denies nausea vomiting abdominal pain chest pain or shortness of breath.  Worsening leg to light abnormalities    Objective:     Vital Signs (Most Recent):  Temp: 97.7 °F (36.5 °C) (10/11/20 1134)  Pulse: 71 (10/11/20 1134)  Resp: 17 (10/11/20 1134)  BP: 115/67 (10/11/20 1134)  SpO2: 96 % (10/11/20 1134) Vital Signs (24h Range):  Temp:  [97.7 °F (36.5 °C)-98.4 °F (36.9 °C)] 97.7 °F (36.5 °C)  Pulse:  [67-79] 71  Resp:  [17-20] 17  SpO2:  [96 %-100 %] 96 %  BP: (104-135)/(53-70) 115/67     Weight: 70.5 kg (155 lb 6.8 oz)  Body mass index is 23.63 kg/m².  No intake or output data in the 24 hours ending 10/11/20 1325   Physical Exam patient appears no acute distress wife is at bedside  HEENT sclerae nonicteric  Neck is supple nontender  Lungs are clear to auscultation  Heart is regular rate and rhythm no rub no murmur  Abdomen is soft nontender  Extremities no edema   exam no Weinberg  Neuro patient is alert oriented x3 motor exam is nonfocal  Skin no rash    Significant Labs:   BMP:   Recent Labs   Lab 10/11/20  0329 10/11/20  0724   GLU 76 87    145   K 2.5* 3.4*   * 119*   CO2 14* 14*   BUN 38* 42*   CREATININE 2.8* 3.4*   CALCIUM 6.8* 8.4*   MG 1.7  --      CBC:   Recent Labs   Lab 10/10/20  0428 10/11/20  0329 10/11/20  0724   WBC 3.11* 2.83* 4.32   HGB 7.7* 6.4* 8.2*   HCT 23.1* 19.5* 24.3*   PLT 14* 14* 20*     CMP:   Recent Labs   Lab 10/10/20  0428 10/10/20  2053 10/11/20  0329 10/11/20  0724    142 143 145   K 3.2* 2.7* 2.5* 3.4*   * 121* 124* 119*   CO2 13* 13* 14* 14*   GLU 57* 95 76 87   BUN 45* 42* 38* 42*   CREATININE 3.2* 2.8* 2.8* 3.4*   CALCIUM 7.7* 6.9* 6.8* 8.4*   PROT 4.5*  --  4.0* 4.7*   ALBUMIN 2.8*  --  2.4* 2.9*   BILITOT 0.8  --  0.6 0.6   ALKPHOS 91  --  74 95   AST 34  --  24 30   ALT 29  --  22 27   ANIONGAP 9 8 5* 12   EGFRNONAA 18.5* 21.7* 21.7* 17.2*        discharge note

## 2020-10-11 NOTE — PLAN OF CARE
10/11/20 1104   Discharge Reassessment   Assessment Type Discharge Planning Reassessment   Anticipated Discharge Disposition Home   Discharge Plan A Home with family   Discharge Plan B Home with family   DME Needed Upon Discharge  none   Post-Acute Status   Discharge Delays None known at this time   Pt's in observation status
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
## 2020-10-11 NOTE — PLAN OF CARE
Problem: Adult Inpatient Plan of Care  Goal: Plan of Care Review  Outcome: Ongoing, Progressing  Goal: Patient-Specific Goal (Individualization)  Outcome: Ongoing, Progressing  Goal: Absence of Hospital-Acquired Illness or Injury  Outcome: Ongoing, Progressing  Goal: Optimal Comfort and Wellbeing  Outcome: Ongoing, Progressing  Goal: Readiness for Transition of Care  Outcome: Ongoing, Progressing  Goal: Rounds/Family Conference  Outcome: Ongoing, Progressing     Problem: Skin Injury Risk Increased  Goal: Skin Health and Integrity  Outcome: Ongoing, Progressing     Problem: Fall Injury Risk  Goal: Absence of Fall and Fall-Related Injury  Outcome: Ongoing, Progressing     Problem: Malnutrition  Goal: Improved Nutritional Intake  Outcome: Ongoing, Progressing

## 2020-10-12 NOTE — PLAN OF CARE
Problem: Adult Inpatient Plan of Care  Goal: Plan of Care Review  Outcome: Ongoing, Progressing  Goal: Patient-Specific Goal (Individualization)  Outcome: Ongoing, Progressing  Goal: Absence of Hospital-Acquired Illness or Injury  Outcome: Ongoing, Progressing  Intervention: Identify and Manage Fall Risk  Flowsheets (Taken 10/12/2020 1722)  Safety Promotion/Fall Prevention:   assistive device/personal item within reach   bed alarm set   Fall Risk reviewed with patient/family   nonskid shoes/socks when out of bed   side rails raised x 2   instructed to call staff for mobility  Intervention: Prevent VTE (venous thromboembolism)  Flowsheets (Taken 10/12/2020 1722)  VTE Prevention/Management: remove, assess skin and reapply compression stockings  Goal: Optimal Comfort and Wellbeing  Outcome: Ongoing, Progressing  Goal: Readiness for Transition of Care  Outcome: Ongoing, Progressing  Goal: Rounds/Family Conference  Outcome: Ongoing, Progressing     Problem: Skin Injury Risk Increased  Goal: Skin Health and Integrity  Outcome: Ongoing, Progressing  Intervention: Optimize Skin Protection  Flowsheets (Taken 10/12/2020 1722)  Head of Bed (HOB): HOB elevated  Intervention: Promote and Optimize Oral Intake  Flowsheets (Taken 10/12/2020 1722)  Oral Nutrition Promotion: calorie dense foods provided     Problem: Fall Injury Risk  Goal: Absence of Fall and Fall-Related Injury  Outcome: Ongoing, Progressing  Intervention: Identify and Manage Contributors to Fall Injury Risk  Flowsheets (Taken 10/12/2020 1722)  Self-Care Promotion:   independence encouraged   BADL personal objects within reach  Medication Review/Management: medications reviewed  Intervention: Promote Injury-Free Environment  Flowsheets (Taken 10/12/2020 1722)  Safety Promotion/Fall Prevention:   assistive device/personal item within reach   bed alarm set   Fall Risk reviewed with patient/family   nonskid shoes/socks when out of bed   side rails raised x 2    instructed to call staff for mobility  Environmental Safety Modification:   assistive device/personal items within reach   clutter free environment maintained   room organization consistent   room near unit station   lighting adjusted     Problem: Malnutrition  Goal: Improved Nutritional Intake  Outcome: Ongoing, Progressing  Intervention: Promote and Optimize Oral Intake  Flowsheets (Taken 10/12/2020 1722)  Oral Nutrition Promotion: calorie dense foods provided
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
## 2020-10-12 NOTE — PROGRESS NOTES
[facility]  Adult Nutrition   Progress Note (Follow-Up)    SUMMARY     Recommendations/Interventions:  1. Continue current diet as tolerated, encourage intake.  2. Continue Ensure Enlive BID (700 kcal, 40 g pro) to aid in meeting needs when oral intake is inadequate   3. Hypokalemia, hypophosphatemia: Continue to monitor electrolytes and replace per MD  4. Malnutrition added to patients problem list if MD agrees with RD findings.    Goals: 1. Patient to meet at least 75% estimated calorie and protein needs via PO intake of meals and supplements. 2. Electrolytes to trend towards target range. 3. Malnutrition added to patients problem list.  Nutrition Goal Status: progressing towards goal    Dietitian Rounds Brief:  · Seen 2' f/u. Patient stated he has been eating around 50% of meals and drinking 2 Ensure/day. No issues with N/V. Having some diarrhea-3 Bm's a day. Worsening hypokalemia and hypocalcemia-per nephrology start oral supplement and continue magnesium supplement. Will continue to monitor intake, labs, and plan of care.    Malnutrition Assessment  · Severe Protein Calorie Malnutrition RT inadequate protein and calorie intake due to decreased appetite and intake AEB intake < 50% of estimated energy needs > 1 month, weight loss of 10.4% in 3 weeks (21 lbs), 88.78 % UBW, and signs of muscle and fat wasting seen in orbital and temporal region.  Reason for Assessment  Reason For Assessment: RD follow-up  Diagnosis: cancer diagnosis/related complications  Relevant Medical History: prostate cancer, B cell lymphoma, HTN, CKD stage IV  Interdisciplinary Rounds: attended    Nutrition Risk Screen  Nutrition Risk Screen: no indicators present     MST Score: 3  Have you recently lost weight without trying?: Yes: 14-23 lbs  Weight loss score: 2  Have you been eating poorly because of a decreased appetite?: Yes  Appetite score: 1     Nutrition/Diet History  Patient Reported Diet/Restrictions/Preferences:  "general, soft  Food Allergies: NKFA  Factors Affecting Nutritional Intake: decreased appetite, diarrhea    Anthropometrics  Temp: 98.1 °F (36.7 °C)  Height Method: Stated  Height: 5' 8" (172.7 cm)  Height (inches): 68 in  Weight Method: Standard Scale  Weight: 70.7 kg (155 lb 13.8 oz)  Weight (lb): 155.87 lb  Ideal Body Weight (IBW), Male: 154 lb  % Ideal Body Weight, Male (lb): 100.93 %  BMI (Calculated): 23.7  BMI Grade: 18.5-24.9 - normal  Weight Loss: unintentional  Usual Body Weight (UBW), k.8 kg  Weight Change Amount: 21 lb  % Usual Body Weight: 88.78  % Weight Change From Usual Weight: -11.4 %     Weight History:  Wt Readings from Last 10 Encounters:   10/12/20 70.7 kg (155 lb 13.8 oz)   10/02/20 78 kg (171 lb 15.3 oz)   10/01/20 78 kg (172 lb)   20 79.8 kg (176 lb)   20 78.6 kg (173 lb 3.2 oz)   20 79.9 kg (176 lb 3.2 oz)   20 77.5 kg (170 lb 12.8 oz)   20 76.7 kg (169 lb)   20 79.4 kg (175 lb)   20 84 kg (185 lb 3 oz)     Lab/Procedures/Meds: Pertinent Labs Reviewed  Clinical Chemistry:  Recent Labs   Lab 10/09/20  1830 10/10/20  0428 10/11/20  0329 10/12/20  0429    140 143 143   K 3.2* 3.2* 2.5* 3.2*   * 118* 124* 121*   CO2 16* 13* 14* 16*   GLU 80 57* 76 107   BUN 46* 45* 38* 37*   CREATININE 3.3* 3.2* 2.8* 3.3*   CALCIUM 8.2* 7.7* 6.8* 7.9*   PROT 5.2* 4.5* 4.0* 4.5*   ALBUMIN 3.3* 2.8* 2.4* 2.8*   BILITOT 0.7 0.8 0.6 0.7   ALKPHOS 107 91 74 83   AST 46* 34 24 24   ALT 36 29 22 22   ANIONGAP 10 9 5* 6*   ESTGFRAFRICA 20.6* 21.4* 25.1* 20.6*   EGFRNONAA 17.8* 18.5* 21.7* 17.8*   MG  --  1.9 1.7 1.9   PHOS  --  2.7 2.5* 2.2*   AMYLASE 30  --   --   --    LIPASE 40  --   --   --     < > = values in this interval not displayed.     CBC:   Recent Labs   Lab 10/12/20  0429   WBC 2.90*   RBC 2.05*   HGB 7.6*   HCT 22.7*   PLT 16*   *   MCH 37.1*   MCHC 33.5     Cardiac Profile:  Recent Labs   Lab 10/09/20  1830 10/09/20  2240 10/10/20  0428 "   *  --   --    TROPONINI 0.055* 0.054* 0.065*     Medications: Pertinent Medications reviewed  Scheduled Meds:   calcium carbonate  500 mg Oral TID    magnesium oxide  400 mg Oral Daily    metoprolol succinate  25 mg Oral Daily    multivitamin  1 tablet Oral Daily    oxybutynin  5 mg Oral Daily    pantoprazole  40 mg Oral Daily    potassium chloride  20 mEq Oral TID    sodium bicarbonate  1,950 mg Oral BID     Continuous Infusions:   sodium chloride 0.9% 100 mL/hr at 10/10/20 2039     PRN Meds:.acetaminophen, magnesium oxide, magnesium oxide, melatonin, ondansetron, potassium chloride, potassium chloride, potassium, sodium phosphates, potassium, sodium phosphates, potassium, sodium phosphates, sodium chloride 0.9%    Estimated/Assessed Needs  Weight Used For Calorie Calculations: 70.5 kg (155 lb 6.8 oz)  Energy Calorie Requirements (kcal): 8268-5163 (25-30 kcal/kg)  Energy Need Method: Kcal/kg  Protein Requirements: 56-70 g (0.8-1 g/kg)  Weight Used For Protein Calculations: 70.5 kg (155 lb 6.8 oz)  Fluid Requirements (mL): 1 ml/kcal or per MD  Estimated Fluid Requirement Method: RDA Method  RDA Method (mL): 1762     Nutrition Prescription Ordered    Current Diet Order: Regular Diet    Evaluation of Received Nutrient/Fluid Intake    Energy Calories Required: not meeting needs  Protein Required: not meeting needs  Fluid Required: not meeting needs  Tolerance: tolerating  % Intake of Estimated Energy Needs: 50 - 75 %  % Meal Intake: 50 - 75 %  No intake or output data in the 24 hours ending 10/12/20 0754   Nutrition Risk    Level of Risk/Frequency of Follow-up: high   Monitor and Evaluation    Food and Nutrient Intake: food and beverage intake, energy intake  Food and Nutrient Adminstration: diet order  Physical Activity and Function: nutrition-related ADLs and IADLs  Anthropometric Measurements: weight, weight change, body mass index  Biochemical Data, Medical Tests and Procedures: electrolyte and  renal panel, gastrointestinal profile, glucose/endocrine profile  Nutrition-Focused Physical Findings: overall appearance     Nutrition Follow-Up    RD Follow-up?: Yes  Mehreen Denney RD 10/12/2020 10:32 AM

## 2020-10-12 NOTE — PLAN OF CARE
Important Message from Medicare was sign, explained and given to patient/caregiver on 10/12/2020 at 10:35am     answered all questions.

## 2020-10-12 NOTE — SUBJECTIVE & OBJECTIVE
Interval History:  Patient without new complaints.  He has generalized weakness but improved.  Patient denies nausea vomiting abdominal pain shortness of breath or chest pain.  Case discussed with nephrologist      Objective:     Vital Signs (Most Recent):  Temp: 97.7 °F (36.5 °C) (10/12/20 1156)  Pulse: 80 (10/12/20 1156)  Resp: 18 (10/12/20 1156)  BP: 107/61 (10/12/20 1156)  SpO2: 96 % (10/12/20 1156) Vital Signs (24h Range):  Temp:  [97.7 °F (36.5 °C)-98.8 °F (37.1 °C)] 97.7 °F (36.5 °C)  Pulse:  [67-80] 80  Resp:  [16-20] 18  SpO2:  [96 %-100 %] 96 %  BP: (107-141)/(61-75) 107/61     Weight: 70.7 kg (155 lb 13.8 oz)  Body mass index is 23.7 kg/m².    Intake/Output Summary (Last 24 hours) at 10/12/2020 1511  Last data filed at 10/12/2020 1005  Gross per 24 hour   Intake 240 ml   Output 250 ml   Net -10 ml      Physical Exam patient appears no acute distress  Wife at bedside-laboratory findings and ultrasound of kidney results discussed with patient and his wife.  Findings and computer reviewed with patient's wife.  HEENT sclerae nonicteric  Neck is supple nontender  Lungs are clear  Heart is regular rate rhythm  Neuro patient is alert oriented x3  Motor exam is nonfocal   exam no [place]  Skin no rash  Psych patient is pleasant calm cooperative      Significant Labs:   BMP:   Recent Labs   Lab 10/12/20  0429         K 3.2*   *   CO2 16*   BUN 37*   CREATININE 3.3*   CALCIUM 7.9*   MG 1.9     CBC:   Recent Labs   Lab 10/11/20  0329 10/11/20  0724 10/12/20  0429   WBC 2.83* 4.32 2.90*   HGB 6.4* 8.2* 7.6*   HCT 19.5* 24.3* 22.7*   PLT 14* 20* 16*     CMP:   Recent Labs   Lab 10/11/20  0329 10/11/20  0724 10/12/20  0429    145 143   K 2.5* 3.4* 3.2*   * 119* 121*   CO2 14* 14* 16*   GLU 76 87 107   BUN 38* 42* 37*   CREATININE 2.8* 3.4* 3.3*   CALCIUM 6.8* 8.4* 7.9*   PROT 4.0* 4.7* 4.5*   ALBUMIN 2.4* 2.9* 2.8*   BILITOT 0.6 0.6 0.7   ALKPHOS 74 95 83   AST 24 30 24   ALT 22 27 22    ANIONGAP 5* 12 6*   EGFRNONAA 21.7* 17.2* 17.8*       Significant Imaging:
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
## 2020-10-13 NOTE — PLAN OF CARE
Problem: Adult Inpatient Plan of Care  Goal: Plan of Care Review  Outcome: Met  Goal: Patient-Specific Goal (Individualization)  Outcome: Met  Goal: Absence of Hospital-Acquired Illness or Injury  Outcome: Met  Intervention: Identify and Manage Fall Risk  Flowsheets (Taken 10/13/2020 1557)  Safety Promotion/Fall Prevention:   assistive device/personal item within reach   bed alarm set   Fall Risk reviewed with patient/family   nonskid shoes/socks when out of bed   side rails raised x 2   instructed to call staff for mobility  Intervention: Prevent VTE (venous thromboembolism)  Flowsheets (Taken 10/13/2020 1557)  VTE Prevention/Management:   remove, assess skin and reapply compression stockings   ambulation promoted  Goal: Optimal Comfort and Wellbeing  Outcome: Met  Intervention: Provide Person-Centered Care  Flowsheets (Taken 10/13/2020 1557)  Trust Relationship/Rapport: care explained  Goal: Readiness for Transition of Care  Outcome: Met  Goal: Rounds/Family Conference  Outcome: Met     Problem: Skin Injury Risk Increased  Goal: Skin Health and Integrity  Outcome: Met  Intervention: Optimize Skin Protection  Flowsheets (Taken 10/13/2020 1557)  Head of Bed (HOB): HOB elevated  Intervention: Promote and Optimize Oral Intake  Flowsheets (Taken 10/13/2020 1557)  Oral Nutrition Promotion: calorie dense foods provided     Problem: Fall Injury Risk  Goal: Absence of Fall and Fall-Related Injury  Outcome: Met  Intervention: Identify and Manage Contributors to Fall Injury Risk  Flowsheets (Taken 10/13/2020 1557)  Self-Care Promotion:   independence encouraged   BADL personal objects within reach  Medication Review/Management: medications reviewed  Intervention: Promote Injury-Free Environment  Flowsheets (Taken 10/13/2020 1557)  Safety Promotion/Fall Prevention:   assistive device/personal item within reach   bed alarm set   Fall Risk reviewed with patient/family   nonskid shoes/socks when out of bed   side rails raised  x 2   instructed to call staff for mobility  Environmental Safety Modification:   assistive device/personal items within reach   clutter free environment maintained   room near unit station   room organization consistent   lighting adjusted     Problem: Malnutrition  Goal: Improved Nutritional Intake  Outcome: Met  Intervention: Promote and Optimize Oral Intake  Flowsheets (Taken 10/13/2020 7464)  Oral Nutrition Promotion: calorie dense foods provided

## 2020-10-13 NOTE — NURSING
Pt. Discharged home via wheelchair to family members private vehicle. Dc tele. Pt. Has all personal belongings and discharge paper work. Charge nurse de accessed port A cath.   Pt. Vss.

## 2020-10-13 NOTE — HOSPITAL COURSE
Patient was seen and followed by renal.  His Lasix has been discontinued.  Patient's electrolytes have been corrected.  He still has acidosis metabolic which appears chronic.  He is routinely followed at [facility]  And with urologist at [facility].  Patient has been instructed to return to his PCP this week . He needs repeat CBC Thursday 10/15   Urology in 1 week to discuss cystic mass findings of left kidney.
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
## 2020-10-13 NOTE — PT/OT/SLP EVAL
Physical Therapy Evaluation and Discharge Note    Patient Name:  Naresh Painting   MRN:  [id number]    Recommendations:     Discharge Recommendations:  home   Discharge Equipment Recommendations: none   Barriers to discharge: None    Assessment:     Naresh Painting is a 70 y.o. male admitted with a medical diagnosis of Dehydration. .  At this time, patient is functioning at their prior level of function and does not require further acute PT services.     Recent Surgery: * No surgery found *      Plan:     During this hospitalization, patient does not require further acute PT services.  Please re-consult if situation changes.      Subjective     Chief Complaint: rapid weight loss  Patient/Family Comments/goals:home with his wife  Pain/Comfort:  ·      Patients cultural, spiritual, Yazdanism conflicts given the current situation:      Living Environment:  Pt lives in a one story house  with 5 step entry and B railing. Pt uses his quad cane for community ambulation only .  Prior to admission, patients level of function was Modified Independent .  Equipment used at home: cane, quad.  DME owned (not currently used): none.  Upon discharge, patient will have assistance from his wife.    Objective:     Communicated with nurse prior to session.  Patient found standing in the BR  with telemetry upon PT entry to room.    General Precautions: Standard,     Orthopedic Precautions:    Braces:       Exams:  · Cognitive Exam:  Patient is oriented to Person, Place, Time and Situation  · RUE Strength: WFL  · LUE Strength: WFL  · RLE ROM: WFL  · RLE Strength: WFL  · LLE ROM: WFL  · LLE Strength: WNL    Functional Mobility:  · Transfers:     · Sit to Stand:  modified independence with quad cane  · Toilet Transfer: supervision with  no AD  using  Step Transfer  · Gait: 200 ft SBA no AD   · Balance: good sitting / good /fair+ standinf balance    AM-PAC 6 CLICK MOBILITY  Total Score:24       Therapeutic Activities and Exercises:   Pt  presented standing  just outside of BR door . Pt returned to commode  and requested a bag to discard his soiled brief. Pt placed brief in bag while standing w/o AD.  Pt ambulated in the goss 30 ft with QC and cueing for proper usage.  Pt then slowly  ambulated 200 ft SBA w/o AD without LOB. Pt stepped on scale with SBA . He was pleased that he had gained weight since 10/9/2020.     AM-PAC 6 CLICK MOBILITY  Total Score:24     Patient left up in chair with call button in reach and chair alarm on.    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       Past Surgical History:   Procedure Laterality Date    EYE SURGERY      LYMPH NODE BIOPSY      PROCTECTOMY      PROSTATE SURGERY      UMBILICAL HERNIA REPAIR  04/05/2018    Incarcerated-        Time Tracking:     PT Received On: 10/13/20  PT Start Time: 0945     PT Stop Time: 1002  PT Total Time (min): 17 min     Billable Minutes: Evaluation 7  minutes and Gait Training 10 minutes      Manisha Georges, PT  10/13/2020

## 2020-10-13 NOTE — PLAN OF CARE
10/13/20 1503   Final Note   Assessment Type Final Discharge Note   Anticipated Discharge Disposition Home     Discharge orders reviewed.  Patient to discharge home this date with no needs.    Alissa Calix LCSW  Case Management  Ext. [phone]

## 2020-10-14 NOTE — TELEPHONE ENCOUNTER
DC Date 10/13/20  Reached out to patient to schedule HFU.  HFU appointment scheduled 10/15/20.  C3 Notified.
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
## 2020-10-15 NOTE — Clinical Note
Dear Dr. Rdz    Kindly review the pictures taken on my progress note for what appears to be purpura like lesions.  He has extremely low platelets.  I did note that you have asked him to get in touch with Dr. Mary crockett.    Dr. Yamila LEI

## 2020-10-15 NOTE — TELEPHONE ENCOUNTER
Called the patient and spoke with his wife Amita.  I instructed her that Dr. Rdz wants him to see Dr. Hernandez because of his blood counts.  I made an appointment with Dr. Hernandez on 10/22/2020 @ 1000.  Amita verbalized understanding.

## 2020-10-15 NOTE — PATIENT INSTRUCTIONS
Chronic Kidney Disease (CKD)     The role of the kidneys is to remove waste products and extra water from the blood.  When the kidneys do not work as they should, waste products begin to build up in the blood. This is called chronic kidney disease (CKD). CKD means that you have kidney damage or a decrease in kidney function lasting at least 3 months. CKD allows extra water, waste, and toxins to build up in the body. This can eventually become life-threatening. You might need dialysis or a kidney transplant to stay alive. This most severe form is called end stage renal disease.  Diabetes is the leading causes of chronic renal failure. Other causes include high blood pressure, hardening of the arteries (atherosclerosis), lupus, inflammation of the blood vessels (vasculitis), and past viral or bacterial infections. Certain over-the-counter pain medicines can cause renal failure when taken often over a long period of time. These include aspirin, ibuprofen, and related anti-inflammatory medicines called NSAIDs (nonsteroidal anti-inflammatory drugs).  Home care  The following guidelines will help you care for yourself at home:  · If you have diabetes, talk with your healthcare provider about keeping your blood sugar under control. Ask if you need to make and changes to your diet, lifestyle, or medicines.  · If you have high blood pressure:  ¨ Take prescribed medicine to lower your blood pressure to the recommended goal of less than 130/80.  ¨ Start a regular exercise program that you enjoy. Check with your healthcare provider to be sure your planned exercise program is right for you.  ¨ Eat less salt (sodium). Your healthcare provider can tell you how much salt per day is safe for you.  · If you are overweight, talk with your healthcare provider about a weight loss plan.  · If you smoke, you must quit. Smoking makes kidney disease worse. Talk with your healthcare provider about ways to help you quit.  For more  information, visit the following links:  ¨ www.smokefree.gov/sites/default/files/pdf/clearing-the-air-accessible.pdf  ¨ www.smokefree.gov  ¨ www.cancer.org/healthy/stayawayfromtobacco/guidetoquittingsmoking/  · Most people with CKD need to follow a special diet.  Be sure you understand yours. In general, you will need to limit protein, salt, potassium, and phosphorus. You also need to limit how much fluid you drink.   · CKD is a risk factor for heart disease. Talk with your healthcare provider about any other risk factors you might have and what you can do to lessen them.  · Talk with your healthcare provider about any medicines you are taking to find out if they need to be reduced or stopped.  · Don't use the following over-the-counter medicines, or consult your healthcare provider before using:  ¨ Aspirin and NSAIDs such as ibuprofen or naproxen. Using acetaminophen for fever or pain is OK.  ¨ Laxatives and antacids containing magnesium or aluminum  ¨ Fleet or phospho soda enemas containing phosphorus  ¨ Certain stomach acid-blocking medicine such as cimetidine or ranitidine   ¨ Decongestants containing pseudoephedrine   ¨ Herbal supplements  Follow-up care  Follow up with your healthcare provider, or as advised. Contact one of the following for more information:  · American Association of Kidney Patients 417-892-9522 www.aakp.org  · National Kidney Foundation 476-449-5386 www.kidney.org  · American Kidney Fund 805-306-7264 www.kidneyfund.org  · National Kidney Disease Education Program 866-4KIDNEY www.nkdep.nih.gov  If an X-ray, ECG (cardiogram), or other diagnostic test was taken, you will be told of any new findings that may affect your care.  Call 911  Call 911 if you have any of the following:  · Severe weakness, dizziness, fainting, drowsiness, or confusion  · Chest pain or shortness of breath  · Heart beating fast, slow, or irregularly  When to seek medical advice  Call your healthcare provider right away  if any of these occur:  · Nausea or vomiting  · Fever of 100.4°F (38°C) or higher, or as directed by your healthcare provider  · Unexpected weight gain or swelling in the legs, ankles, or around the eyes  · Decrease or absent urine output  Date Last Reviewed: 9/1/2016  © 7772-5530 [employer]. [address] [place] [address], [place] [address]. All rights reserved. This information is not intended as a substitute for professional medical care. Always follow your healthcare professional's instructions.

## 2020-10-15 NOTE — Clinical Note
Dear Dr. Rdz    Kindly review this gentleman's recent progress note any was hospitalized recently with worsening of thrombocytopenia and rash which I suspect might be purpura.  His renal functions also degenerated.  I have sent a message to his nephrologist also.    Kind regards    Dr. Yamila LEI     he has sent messages to [facility] also for follow-up but thus far no response.

## 2020-11-12 NOTE — TELEPHONE ENCOUNTER
Vy from [facility] said they checked up on pt., even though they didn't have an order, just to see how he was doing. They said he was in bad shape. He had a recent fall & had several different complaints. She would like to talk to you about re-instating his HH & discuss his conditions.    Order printed for evaluation by home health [facility].  I do not have their address or fax number.

## 2021-01-01 ENCOUNTER — TELEPHONE (OUTPATIENT)
Dept: FAMILY MEDICINE | Facility: CLINIC | Age: 71
End: 2021-01-01

## 2021-01-01 ENCOUNTER — DOCUMENT SCAN (OUTPATIENT)
Dept: HOME HEALTH SERVICES | Facility: HOSPITAL | Age: 71
End: 2021-01-01

## 2021-01-01 ENCOUNTER — PATIENT MESSAGE (OUTPATIENT)
Dept: FAMILY MEDICINE | Facility: CLINIC | Age: 71
End: 2021-01-01

## 2021-01-01 ENCOUNTER — OFFICE VISIT (OUTPATIENT)
Dept: FAMILY MEDICINE | Facility: CLINIC | Age: 71
End: 2021-01-01
Payer: MEDICARE

## 2021-01-01 VITALS
DIASTOLIC BLOOD PRESSURE: 67 MMHG | HEIGHT: 68 IN | BODY MASS INDEX: 26.98 KG/M2 | WEIGHT: 178 LBS | SYSTOLIC BLOOD PRESSURE: 99 MMHG | HEART RATE: 80 BPM

## 2021-01-01 DIAGNOSIS — R53.1 WEAKNESS: ICD-10-CM

## 2021-01-01 DIAGNOSIS — E72.09 FANCONI SYNDROME: ICD-10-CM

## 2021-01-01 DIAGNOSIS — N18.4 STAGE 4 CHRONIC KIDNEY DISEASE: ICD-10-CM

## 2021-01-01 DIAGNOSIS — L89.159 PRESSURE INJURY OF SKIN OF SACRAL REGION, UNSPECIFIED INJURY STAGE: ICD-10-CM

## 2021-01-01 DIAGNOSIS — D63.0 ANEMIA COMPLICATING NEOPLASTIC DISEASE: ICD-10-CM

## 2021-01-01 DIAGNOSIS — R53.1 WEAKNESS: Primary | ICD-10-CM

## 2021-01-01 DIAGNOSIS — C85.91 NON-HODGKIN LYMPHOMA OF LYMPH NODES OF NECK, UNSPECIFIED NON-HODGKIN LYMPHOMA TYPE: Primary | ICD-10-CM

## 2021-01-01 DIAGNOSIS — I10 BENIGN ESSENTIAL HYPERTENSION: Chronic | ICD-10-CM

## 2021-01-01 DIAGNOSIS — C85.91 NON-HODGKIN LYMPHOMA OF LYMPH NODES OF NECK, UNSPECIFIED NON-HODGKIN LYMPHOMA TYPE: ICD-10-CM

## 2021-01-01 PROCEDURE — 3074F SYST BP LT 130 MM HG: CPT | Mod: 95,,, | Performed by: INTERNAL MEDICINE

## 2021-01-01 PROCEDURE — 3074F PR MOST RECENT SYSTOLIC BLOOD PRESSURE < 130 MM HG: ICD-10-PCS | Mod: 95,,, | Performed by: INTERNAL MEDICINE

## 2021-01-01 PROCEDURE — 3078F PR MOST RECENT DIASTOLIC BLOOD PRESSURE < 80 MM HG: ICD-10-PCS | Mod: 95,,, | Performed by: INTERNAL MEDICINE

## 2021-01-01 PROCEDURE — 1159F PR MEDICATION LIST DOCUMENTED IN MEDICAL RECORD: ICD-10-PCS | Mod: 95,,, | Performed by: INTERNAL MEDICINE

## 2021-01-01 PROCEDURE — 99214 PR OFFICE/OUTPT VISIT, EST, LEVL IV, 30-39 MIN: ICD-10-PCS | Mod: 95,,, | Performed by: INTERNAL MEDICINE

## 2021-01-01 PROCEDURE — 3078F DIAST BP <80 MM HG: CPT | Mod: 95,,, | Performed by: INTERNAL MEDICINE

## 2021-01-01 PROCEDURE — 3008F PR BODY MASS INDEX (BMI) DOCUMENTED: ICD-10-PCS | Mod: 95,,, | Performed by: INTERNAL MEDICINE

## 2021-01-01 PROCEDURE — 99214 OFFICE O/P EST MOD 30 MIN: CPT | Mod: 95,,, | Performed by: INTERNAL MEDICINE

## 2021-01-01 PROCEDURE — 3008F BODY MASS INDEX DOCD: CPT | Mod: 95,,, | Performed by: INTERNAL MEDICINE

## 2021-01-01 PROCEDURE — 1159F MED LIST DOCD IN RCRD: CPT | Mod: 95,,, | Performed by: INTERNAL MEDICINE

## 2021-01-01 RX ORDER — EPINEPHRINE 0.22MG
100 AEROSOL WITH ADAPTER (ML) INHALATION DAILY
COMMUNITY

## 2021-01-01 RX ORDER — BUDESONIDE 3 MG/1
9 CAPSULE, COATED PELLETS ORAL DAILY
COMMUNITY

## 2021-01-01 RX ORDER — FUROSEMIDE 80 MG/1
80 TABLET ORAL 2 TIMES DAILY
COMMUNITY

## 2021-01-01 RX ORDER — GARLIC 1000 MG
1 CAPSULE ORAL DAILY
COMMUNITY

## 2021-02-04 ENCOUNTER — TELEPHONE (OUTPATIENT)
Dept: CARDIOLOGY | Facility: CLINIC | Age: 71
End: 2021-02-04

## 2022-02-20 NOTE — PATIENT INSTRUCTIONS
Pleural Effusion     Pleural effusion is fluid buildup between the layers of tissue that line the outside of the lungs.   Your healthcare provider has told you that you have pleural effusion. Pleural effusion means that you have extra fluid between the pleura. This area is called the pleural space. The pleura are 2 layers of thin, smooth tissue that surround the lungs and line the chest. The pleural space usually holds only a small amount of fluid. This fluid lubricates the pleura. But if too much fluid fills the space, it can make it hard or painful to breathe.  There are 2 types of pleural effusion:  · Inflammatory. This is caused by a lung disease like pneumonia or lung cancer, both of which irritates the pleura.  · Noninflammatory. This is caused by abnormal fluid pressures inside the lungs. The pressure can be caused by congestive heart failure (CHF). In CHF, extra fluid collects inside the lung tissues because of a weak heart muscle. This extra fluid then leaks into the pleural space. Other causes of noninflammatory pleural effusions include kidney disease, liver disease, and malnutrition.  What are the symptoms of pleural effusion?  The symptoms of pleural effusion include:  · Sharp pains in the chest, especially when taking a breath, coughing, or sneezing  · Trouble breathing  · Cough  · Fever  What are the causes of pleural effusion?  Common causes include:  · Congestive heart failure  · Cirrhosis of the liver  · Pneumonia  · Viral lung infection  · Cancer  · Blood clot in the lung (pulmonary embolism)  · Heart surgery  Chest infections like pneumonia and heart disease are the most common causes. Less common causes include lung cancer.  How is pleural effusion diagnosed?  Your healthcare provider will examine you and ask about your health history. Tests include:  · Blood tests  · Analysis of fluid in pleural space, chest X-ray, CT scan, or ultrasound  How is pleural effusion treated?  The extra fluid may  be drained from the pleural space. This is done with a procedure called thoracentesis. This procedure uses a thin needle to draw out the fluid from the pleural space. In some cases, a tube is placed in the chest to drain the extra fluid. The tube will likely stay in place for several days.  You may have other treatments, depending on the cause of your pleural effusion. If its because of a bacterial infection, you will be given antibiotics to fight the infection. If its because of a heart condition, you will be given medicines and other treatment for your heart. Your healthcare provider can tell you more about the cause of your pleural effusion and your treatment choices.  What are the long-term concerns?  If untreated, pleural effusion can lead to serious health problems, such as collapsed lung from fluid filling the pleural space.     Call 911  Call 911 if you have:  · Trouble breathing  · Worsening chest pain   When to call your healthcare provider   Call your healthcare provider right away if you have:  · Continued coughing  · Fever  Date Last Reviewed: 12/1/2016  © 2339-3230 [employer]. [address] [place] [address], [place] [address]. All rights reserved. This information is not intended as a substitute for professional medical care. Always follow your healthcare professional's instructions.         4

## 2024-09-17 NOTE — TELEPHONE ENCOUNTER
----- Message from Tasia Harrell LPN sent at 10/12/2020 11:36 AM CDT -----  Contact: Amita (wife)    ----- Message -----  From: Kaur Polanco  Sent: 10/12/2020  11:31 AM CDT  To: [facility]    Pt's wife would like to speak with Fauzia       Please contact Amita (wife): [phone]       Patient/Caregiver requests family/friend to interpret.

## 2025-03-19 NOTE — SUBJECTIVE & OBJECTIVE
Interval History:  No acute events since admission     patient denies abdominal pain nausea vomiting diarrhea constipation.  Patient denies chest pain shortness of breath.  He is requesting to go home.  Objective:     Vital Signs (Most Recent):  Temp: 98.1 °F (36.7 °C) (10/10/20 1205)  Pulse: 73 (10/10/20 1205)  Resp: 18 (10/10/20 1205)  BP: (!) 98/58 (10/10/20 1205)  SpO2: 97 % (10/10/20 1205) Vital Signs (24h Range):  Temp:  [98.1 °F (36.7 °C)-99.5 °F (37.5 °C)] 98.1 °F (36.7 °C)  Pulse:  [68-83] 73  Resp:  [18-24] 18  SpO2:  [95 %-99 %] 97 %  BP: ()/(55-70) 98/58     Weight: 70.5 kg (155 lb 6.8 oz)  Body mass index is 23.63 kg/m².    Intake/Output Summary (Last 24 hours) at 10/10/2020 1623  Last data filed at 10/9/2020 2233  Gross per 24 hour   Intake 1000 ml   Output 200 ml   Net 800 ml      Physical Exam patient appears in no distress he is eating breakfast sitting in chair  HEENT sclerae nonicteric  Neck is supple  Lungs are clear  Heart is regular rate rhythm  Abdomen is soft nontender  Extremities no edema  Neuro patient is alert oriented moves all extremities equally   exam no Weinberg  Psych patient is pleasant calm cooperative    Significant Labs:   BMP:   Recent Labs   Lab 10/10/20  0428   GLU 57*      K 3.2*   *   CO2 13*   BUN 45*   CREATININE 3.2*   CALCIUM 7.7*   MG 1.9     CBC:   Recent Labs   Lab 10/09/20  1830 10/10/20  0428   WBC 4.21 3.11*   HGB 8.7* 7.7*   HCT 26.3* 23.1*   PLT 22* 14*     CMP:   Recent Labs   Lab 10/09/20  1830 10/10/20  0428    140   K 3.2* 3.2*   * 118*   CO2 16* 13*   GLU 80 57*   BUN 46* 45*   CREATININE 3.3* 3.2*   CALCIUM 8.2* 7.7*   PROT 5.2* 4.5*   ALBUMIN 3.3* 2.8*   BILITOT 0.7 0.8   ALKPHOS 107 91   AST 46* 34   ALT 36 29   ANIONGAP 10 9   EGFRNONAA 17.8* 18.5*       Significant Imaging:    [Follow-up] : a follow-up of an existing diagnosis [FreeTextEntry1] : GERD, loose BMs